# Patient Record
Sex: FEMALE | Race: BLACK OR AFRICAN AMERICAN | Employment: UNEMPLOYED | URBAN - METROPOLITAN AREA
[De-identification: names, ages, dates, MRNs, and addresses within clinical notes are randomized per-mention and may not be internally consistent; named-entity substitution may affect disease eponyms.]

---

## 2018-06-15 ENCOUNTER — OP HISTORICAL/CONVERTED ENCOUNTER (OUTPATIENT)
Dept: OTHER | Age: 28
End: 2018-06-15

## 2018-06-20 ENCOUNTER — OP HISTORICAL/CONVERTED ENCOUNTER (OUTPATIENT)
Dept: OTHER | Age: 28
End: 2018-06-20

## 2019-08-05 ENCOUNTER — IP HISTORICAL/CONVERTED ENCOUNTER (OUTPATIENT)
Dept: OTHER | Age: 29
End: 2019-08-05

## 2020-09-03 ENCOUNTER — ED HISTORICAL/CONVERTED ENCOUNTER (OUTPATIENT)
Dept: OTHER | Age: 30
End: 2020-09-03

## 2020-09-04 ENCOUNTER — ED HISTORICAL/CONVERTED ENCOUNTER (OUTPATIENT)
Dept: OTHER | Age: 30
End: 2020-09-04

## 2020-12-19 ENCOUNTER — HOSPITAL ENCOUNTER (EMERGENCY)
Age: 30
Discharge: HOME | End: 2020-12-20
Attending: EMERGENCY MEDICINE
Payer: MEDICAID

## 2020-12-19 VITALS
SYSTOLIC BLOOD PRESSURE: 130 MMHG | WEIGHT: 144 LBS | RESPIRATION RATE: 16 BRPM | HEIGHT: 62 IN | DIASTOLIC BLOOD PRESSURE: 76 MMHG | BODY MASS INDEX: 26.5 KG/M2 | TEMPERATURE: 98 F | HEART RATE: 101 BPM | OXYGEN SATURATION: 98 %

## 2020-12-19 DIAGNOSIS — J20.8 ACUTE BRONCHITIS DUE TO OTHER SPECIFIED ORGANISMS: ICD-10-CM

## 2020-12-19 DIAGNOSIS — R10.9 ABDOMINAL DISCOMFORT: Primary | ICD-10-CM

## 2020-12-19 PROCEDURE — 99283 EMERGENCY DEPT VISIT LOW MDM: CPT

## 2020-12-19 PROCEDURE — 96375 TX/PRO/DX INJ NEW DRUG ADDON: CPT

## 2020-12-19 PROCEDURE — 96374 THER/PROPH/DIAG INJ IV PUSH: CPT

## 2020-12-20 ENCOUNTER — APPOINTMENT (OUTPATIENT)
Dept: CT IMAGING | Age: 30
End: 2020-12-20
Attending: EMERGENCY MEDICINE
Payer: MEDICAID

## 2020-12-20 LAB
ALBUMIN SERPL-MCNC: 2.3 G/DL (ref 3.5–5)
ALBUMIN/GLOB SERPL: 0.4 {RATIO} (ref 1.1–2.2)
ALP SERPL-CCNC: 157 U/L (ref 45–117)
ALT SERPL-CCNC: 23 U/L (ref 12–78)
ANION GAP SERPL CALC-SCNC: 10 MMOL/L (ref 5–15)
APPEARANCE UR: ABNORMAL
AST SERPL W P-5'-P-CCNC: 59 U/L (ref 15–37)
BACTERIA URNS QL MICRO: ABNORMAL /HPF
BILIRUB DIRECT SERPL-MCNC: 0.1 MG/DL (ref 0–0.2)
BILIRUB SERPL-MCNC: 0.4 MG/DL (ref 0.2–1)
BILIRUB UR QL: ABNORMAL
BUN SERPL-MCNC: 18 MG/DL (ref 6–20)
BUN/CREAT SERPL: 18 (ref 12–20)
CA-I BLD-MCNC: 8 MG/DL (ref 8.5–10.1)
CHLORIDE SERPL-SCNC: 98 MMOL/L (ref 97–108)
CO2 SERPL-SCNC: 24 MMOL/L (ref 21–32)
COLOR UR: YELLOW
CREAT SERPL-MCNC: 1.01 MG/DL (ref 0.55–1.02)
DIFFERENTIAL METHOD BLD: ABNORMAL
EPITH CASTS URNS QL MICRO: ABNORMAL /LPF
ERYTHROCYTE [DISTWIDTH] IN BLOOD BY AUTOMATED COUNT: 16.1 % (ref 11.5–14.5)
GLOBULIN SER CALC-MCNC: 5.3 G/DL (ref 2–4)
GLUCOSE SERPL-MCNC: 98 MG/DL (ref 65–100)
GLUCOSE UR STRIP.AUTO-MCNC: NEGATIVE MG/DL
HCG SERPL QL: NEGATIVE
HCT VFR BLD AUTO: 25.6 % (ref 35–47)
HGB BLD-MCNC: 8.2 G/DL (ref 11.5–16)
HGB UR QL STRIP: ABNORMAL
KETONES UR QL STRIP.AUTO: 15 MG/DL
LEUKOCYTE ESTERASE UR QL STRIP.AUTO: ABNORMAL
LIPASE SERPL-CCNC: 138 U/L (ref 73–393)
LYMPHOCYTES NFR BLD: 15 % (ref 12–49)
MCH RBC QN AUTO: 29.4 PG (ref 26–34)
MCHC RBC AUTO-ENTMCNC: 32 G/DL (ref 30–36.5)
MCV RBC AUTO: 91.8 FL (ref 80–99)
MONOCYTES NFR BLD: 7 % (ref 5–13)
NEUTS SEG NFR BLD: 78 % (ref 32–75)
NITRITE UR QL STRIP.AUTO: NEGATIVE
PH UR STRIP: 6 [PH] (ref 5–8)
PLATELET # BLD AUTO: 147 K/UL (ref 150–400)
PMV BLD AUTO: 10.1 FL (ref 8.9–12.9)
POTASSIUM SERPL-SCNC: 3.2 MMOL/L (ref 3.5–5.1)
PROT SERPL-MCNC: 7.6 G/DL (ref 6.4–8.2)
PROT UR STRIP-MCNC: ABNORMAL MG/DL
RBC # BLD AUTO: 2.79 M/UL (ref 3.8–5.2)
RBC #/AREA URNS HPF: ABNORMAL /HPF (ref 0–5)
SODIUM SERPL-SCNC: 132 MMOL/L (ref 136–145)
SP GR UR REFRACTOMETRY: 1.02 (ref 1–1.03)
UROBILINOGEN UR QL STRIP.AUTO: 0.1 EU/DL (ref 0.2–1)
WBC # BLD AUTO: 4.1 K/UL (ref 3.6–11)
WBC URNS QL MICRO: ABNORMAL /HPF (ref 0–4)

## 2020-12-20 PROCEDURE — C9113 INJ PANTOPRAZOLE SODIUM, VIA: HCPCS | Performed by: EMERGENCY MEDICINE

## 2020-12-20 PROCEDURE — 84703 CHORIONIC GONADOTROPIN ASSAY: CPT

## 2020-12-20 PROCEDURE — 74011250636 HC RX REV CODE- 250/636: Performed by: EMERGENCY MEDICINE

## 2020-12-20 PROCEDURE — 81001 URINALYSIS AUTO W/SCOPE: CPT

## 2020-12-20 PROCEDURE — 80048 BASIC METABOLIC PNL TOTAL CA: CPT

## 2020-12-20 PROCEDURE — 74011250637 HC RX REV CODE- 250/637: Performed by: EMERGENCY MEDICINE

## 2020-12-20 PROCEDURE — 80076 HEPATIC FUNCTION PANEL: CPT

## 2020-12-20 PROCEDURE — 85025 COMPLETE CBC W/AUTO DIFF WBC: CPT

## 2020-12-20 PROCEDURE — 74176 CT ABD & PELVIS W/O CONTRAST: CPT

## 2020-12-20 PROCEDURE — 96374 THER/PROPH/DIAG INJ IV PUSH: CPT

## 2020-12-20 PROCEDURE — 71250 CT THORAX DX C-: CPT

## 2020-12-20 PROCEDURE — 83690 ASSAY OF LIPASE: CPT

## 2020-12-20 PROCEDURE — 96375 TX/PRO/DX INJ NEW DRUG ADDON: CPT

## 2020-12-20 RX ORDER — DULOXETIN HYDROCHLORIDE 30 MG/1
CAPSULE, DELAYED RELEASE ORAL
COMMUNITY
Start: 2020-10-25

## 2020-12-20 RX ORDER — CEPHALEXIN 500 MG/1
500 CAPSULE ORAL ONCE
Status: DISCONTINUED | OUTPATIENT
Start: 2020-12-20 | End: 2020-12-20

## 2020-12-20 RX ORDER — RIOCIGUAT 2.5 MG/1
2.5 TABLET, FILM COATED ORAL 2 TIMES DAILY
COMMUNITY
Start: 2020-12-08

## 2020-12-20 RX ORDER — AMBRISENTAN 5 MG/1
5 TABLET, FILM COATED ORAL DAILY
COMMUNITY
Start: 2020-12-08

## 2020-12-20 RX ORDER — PANTOPRAZOLE SODIUM 40 MG/1
TABLET, DELAYED RELEASE ORAL
COMMUNITY
Start: 2020-11-15

## 2020-12-20 RX ORDER — AMOXICILLIN 500 MG/1
500 CAPSULE ORAL ONCE
Status: COMPLETED | OUTPATIENT
Start: 2020-12-20 | End: 2020-12-20

## 2020-12-20 RX ORDER — AMOXICILLIN 500 MG/1
500 TABLET, FILM COATED ORAL 3 TIMES DAILY
Qty: 21 TAB | Refills: 0 | Status: SHIPPED | OUTPATIENT
Start: 2020-12-20 | End: 2020-12-27

## 2020-12-20 RX ORDER — SPIRONOLACTONE 25 MG/1
25 TABLET ORAL DAILY
COMMUNITY
Start: 2020-12-13

## 2020-12-20 RX ORDER — MESALAMINE 1000 MG/1
SUPPOSITORY RECTAL
Status: ON HOLD | COMMUNITY
Start: 2020-11-15 | End: 2021-08-07

## 2020-12-20 RX ORDER — FUROSEMIDE 20 MG/1
20 TABLET ORAL DAILY
COMMUNITY
Start: 2020-12-17

## 2020-12-20 RX ORDER — ONDANSETRON 2 MG/ML
4 INJECTION INTRAMUSCULAR; INTRAVENOUS
Status: COMPLETED | OUTPATIENT
Start: 2020-12-20 | End: 2020-12-20

## 2020-12-20 RX ORDER — PREDNISONE 5 MG/1
18 TABLET ORAL
COMMUNITY
Start: 2020-10-21

## 2020-12-20 RX ORDER — POLYETHYLENE GLYCOL 3350 17 G/17G
POWDER, FOR SOLUTION ORAL
Status: ON HOLD | COMMUNITY
Start: 2020-11-12 | End: 2021-08-07

## 2020-12-20 RX ORDER — POTASSIUM CHLORIDE 750 MG/1
CAPSULE, EXTENDED RELEASE ORAL
COMMUNITY
Start: 2020-12-13 | End: 2021-02-12

## 2020-12-20 RX ORDER — HYDROXYCHLOROQUINE SULFATE 200 MG/1
200 TABLET, FILM COATED ORAL DAILY
COMMUNITY
Start: 2020-12-14

## 2020-12-20 RX ADMIN — AMOXICILLIN 500 MG: 500 CAPSULE ORAL at 02:56

## 2020-12-20 RX ADMIN — PANTOPRAZOLE SODIUM 40 MG: 40 INJECTION, POWDER, FOR SOLUTION INTRAVENOUS at 01:31

## 2020-12-20 RX ADMIN — ONDANSETRON 4 MG: 2 INJECTION INTRAMUSCULAR; INTRAVENOUS at 01:31

## 2020-12-20 NOTE — ED PROVIDER NOTES
EMERGENCY DEPARTMENT HISTORY AND PHYSICAL EXAM      Date: 12/19/2020  Patient Name: Dominick Richmond    History of Presenting Illness     Chief Complaint   Patient presents with    Cough     for a week    Abdominal Pain     gastris with diarrhea    Facial Pain     with swelling since yesterday       History Provided By: Patient    HPI: Dominick Richmond, 31 y.o. female   presents to the ED with cc of abdominal discomfort. Patient who recently had a colonoscopy came to emergency room complaining of abdominal discomfort. The discomfort is generalized with a fluctuating intensity without any obvious aggravating or alleviating factors. Mild nausea without active vomiting. Patient states she had a several episode of diarrhea that was nonbloody. Patient also complains of nonproductive cough about a week. Patient has longstanding have lupus on chronic prednisone with other immunosuppressive medications. No fever chills. No shortness of breath. Patient states that she recently had a negative Covid test.      PCP: UNKNOWN    No current facility-administered medications on file prior to encounter. Current Outpatient Medications on File Prior to Encounter   Medication Sig Dispense Refill    spironolactone (ALDACTONE) 25 mg tablet       Adempas 2.5 mg tab tablet       predniSONE (DELTASONE) 5 mg tablet TAKE 1 TABLET BY MOUTH TWICE A DAY      potassium chloride SA (MICRO-K) 10 mEq capsule       Miralax 17 gram/dose powder MIX 238G AND DRINK ONCE AS DIRECTED BY MD      pantoprazole (PROTONIX) 40 mg tablet TAKE 1 TABLET BY MOUTH EVERY DAY      mesalamine (CANASA) 1,000 mg suppository INSERT 1 SUPPOSITORY RECTALLY EVERY DAY AT BEDTIME      hydrOXYchloroQUINE (PLAQUENIL) 200 mg tablet       furosemide (LASIX) 20 mg tablet       DULoxetine (CYMBALTA) 30 mg capsule TAKE 1 CAPSULE BY MOUTH TWICE A DAY      Letairis 5 mg tablet          Past History     Past Medical History:  History reviewed.  No pertinent past medical history. Past Surgical History:  History reviewed. No pertinent surgical history. Family History:  History reviewed. No pertinent family history. Social History:  Social History     Tobacco Use    Smoking status: Never Smoker    Smokeless tobacco: Never Used   Substance Use Topics    Alcohol use: Never     Frequency: Never    Drug use: Never       Allergies: Allergies   Allergen Reactions    Doxycycline Nausea and Vomiting    Heparin Anaphylaxis    Percocet [Oxycodone-Acetaminophen] Nausea and Vomiting    Remicade [Infliximab] Anaphylaxis    Vancomycin Hives         Review of Systems   Review of Systems   Constitutional: Negative for activity change, appetite change, chills and fever. HENT: Negative for sore throat. Eyes: Negative for discharge. Respiratory: Negative for shortness of breath. Cardiovascular: Negative for chest pain. Gastrointestinal: Positive for nausea. Endocrine: Negative for polyuria. Genitourinary: Positive for dysuria and frequency. Negative for difficulty urinating. Musculoskeletal: Negative for arthralgias. Skin: Negative for rash. Neurological: Negative for headaches. Hematological: Negative for adenopathy. Psychiatric/Behavioral: Negative for dysphoric mood. All other systems reviewed and are negative. Physical Exam   Physical Exam  Vitals signs and nursing note reviewed. Constitutional:       Appearance: Normal appearance. Comments: Patient with a cushingoid appearance of the face   HENT:      Head: Normocephalic and atraumatic. Nose: Nose normal.      Mouth/Throat:      Mouth: Mucous membranes are moist.      Pharynx: Oropharynx is clear. Eyes:      Conjunctiva/sclera: Conjunctivae normal.   Neck:      Musculoskeletal: Neck supple. Cardiovascular:      Rate and Rhythm: Normal rate and regular rhythm. Heart sounds: Normal heart sounds.    Pulmonary:      Effort: Pulmonary effort is normal.      Breath sounds: Normal breath sounds. Abdominal:      General: Abdomen is protuberant. Bowel sounds are normal. There is distension. Palpations: Abdomen is soft. Tenderness: There is no abdominal tenderness. Hernia: No hernia is present. Musculoskeletal:      Right lower leg: No edema. Left lower leg: No edema. Skin:     General: Skin is warm and dry. Neurological:      General: No focal deficit present. Mental Status: She is alert and oriented to person, place, and time.    Psychiatric:         Mood and Affect: Mood normal.         Diagnostic Study Results     Labs -     Recent Results (from the past 12 hour(s))   URINALYSIS W/ RFLX MICROSCOPIC    Collection Time: 12/20/20 12:00 AM   Result Value Ref Range    Color Yellow      Appearance Hazy (A) Clear      Specific gravity 1.020 1.003 - 1.030      pH (UA) 6.0 5.0 - 8.0      Protein Trace (A) Negative mg/dL    Glucose Negative Negative mg/dL    Ketone 15 (A) Negative mg/dL    Bilirubin Small (A) Negative      Blood Small (A) Negative      Urobilinogen 0.1 (L) 0.2 - 1.0 EU/dL    Nitrites Negative Negative      Leukocyte Esterase Large (A) Negative     CBC WITH AUTOMATED DIFF    Collection Time: 12/20/20 12:00 AM   Result Value Ref Range    WBC 4.1 3.6 - 11.0 K/uL    RBC 2.79 (L) 3.80 - 5.20 M/uL    HGB 8.2 (L) 11.5 - 16.0 g/dL    HCT 25.6 (L) 35.0 - 47.0 %    MCV 91.8 80.0 - 99.0 FL    MCH 29.4 26.0 - 34.0 PG    MCHC 32.0 30.0 - 36.5 g/dL    RDW 16.1 (H) 11.5 - 14.5 %    PLATELET 317 (L) 535 - 400 K/uL    MPV 10.1 8.9 - 12.9 FL    NEUTROPHILS 78 (H) 32 - 75 %    LYMPHOCYTES 15 12 - 49 %    MONOCYTES 7 5 - 13 %    DF AUTOMATED     METABOLIC PANEL, BASIC    Collection Time: 12/20/20 12:00 AM   Result Value Ref Range    Sodium 132 (L) 136 - 145 mmol/L    Potassium 3.2 (L) 3.5 - 5.1 mmol/L    Chloride 98 97 - 108 mmol/L    CO2 24 21 - 32 mmol/L    Anion gap 10 5 - 15 mmol/L    Glucose 98 65 - 100 mg/dL    BUN 18 6 - 20 mg/dL    Creatinine 1.01 0.55 - 1.02 mg/dL    BUN/Creatinine ratio 18 12 - 20      GFR est AA >60 >60 ml/min/1.73m2    GFR est non-AA >60 >60 ml/min/1.73m2    Calcium 8.0 (L) 8.5 - 10.1 mg/dL   HEPATIC FUNCTION PANEL    Collection Time: 12/20/20 12:00 AM   Result Value Ref Range    Protein, total 7.6 6.4 - 8.2 g/dL    Albumin 2.3 (L) 3.5 - 5.0 g/dL    Globulin 5.3 (H) 2.0 - 4.0 g/dL    A-G Ratio 0.4 (L) 1.1 - 2.2      Bilirubin, total 0.4 0.2 - 1.0 mg/dL    Bilirubin, direct 0.1 0.0 - 0.2 mg/dL    Alk. phosphatase 157 (H) 45 - 117 U/L    AST (SGOT) 59 (H) 15 - 37 U/L    ALT (SGPT) 23 12 - 78 U/L   LIPASE    Collection Time: 12/20/20 12:00 AM   Result Value Ref Range    Lipase 138 73 - 393 U/L   URINE MICROSCOPIC    Collection Time: 12/20/20 12:00 AM   Result Value Ref Range    WBC 5-10 0 - 4 /hpf    RBC 0-5 0 - 5 /hpf    Epithelial cells Few Few /lpf    Bacteria 2+ (A) Negative /hpf   HCG QL SERUM    Collection Time: 12/20/20 12:15 AM   Result Value Ref Range    HCG, Ql. Negative Negative         Radiologic Studies -   CT ABD PELV WO CONT   Final Result   IMPRESSION: No acute cardiopulmonary abnormality. Numerous chronic compression   fractures without acute component, or retropulsion. Diffuse subcutaneous edema   and soft tissue calcifications in this patient has a history of connective   tissue disease/lupus            HISTORY:   abd pain /abd distension/s/p colonoscopy/ cough     Dose reduction technique: All CT scans at this facility are performed using dose reduction optimization   technique as appropriate on the exam including the following: Automated exposure   control, adjustment of the MA and/or KV according to patient size of use of   iterative reconstructive technique. .      TECHNIQUE: CT of the abdomen and pelvis without contrast   COMPARISON: None   LIMITATIONS: None      CHEST: See above.                LIVER: Normal.        GALLBLADDER: Normal.        BILIARY TREE: Normal.          PANCREAS: Normal.           SPLEEN: Normal. ADRENAL GLANDS: Normal.       KIDNEYS/URETERS/BLADDER: Normal.          RETROPERITONEUM/AORTA: Normal aorta. Retroperitoneal adenopathy is noted for   instance with 1.2 cm left paratracheal lymph node axial image 55 series 206. I   suspect adenopathy along the iliac vasculature as well, bilateral.   BOWEL/MESENTERY: Wall thickening and surrounding fat stranding/fluid at the   level of the rectum. No obvious drainable fluid collection. Some of the   perirectal fluid appears to be contiguous with distal retroperitoneal fluid at   the level of the iliac vasculature. PERITONEAL CAVITY: Fluid about the rectum. No free air. .   Reproductive organs: Normal   BONE/TISSUES: No acute abnormality. . Diffuse subcutaneous fat calcifications   throughout the visualized chest abdomen and pelvic wall. Causes of diffuse         OTHER: None. Results called to [facility] on 12/20/2020 2:42 AM.         IMPRESSION: Wall thickening at the level of the rectum with some adjacent   stranding is noted and nonspecific. This could be some residual change after   colonoscopy or due to a proctocolitis. Retroperitoneal adenopathy, pelvic   adenopathy, diffuse subcutaneous edema and marked soft tissue subcutaneous fat   calcifications in this patient with connective tissue disease/lupus; the   adenopathy could be contributing to some of the edema in the retroperitoneum. CT CHEST WO CONT   Final Result   IMPRESSION: No acute cardiopulmonary abnormality. Numerous chronic compression   fractures without acute component, or retropulsion. Diffuse subcutaneous edema   and soft tissue calcifications in this patient has a history of connective   tissue disease/lupus            HISTORY:   abd pain /abd distension/s/p colonoscopy/ cough     Dose reduction technique:    All CT scans at this facility are performed using dose reduction optimization   technique as appropriate on the exam including the following: Automated exposure   control, adjustment of the MA and/or KV according to patient size of use of   iterative reconstructive technique. .      TECHNIQUE: CT of the abdomen and pelvis without contrast   COMPARISON: None   LIMITATIONS: None      CHEST: See above. LIVER: Normal.        GALLBLADDER: Normal.        BILIARY TREE: Normal.          PANCREAS: Normal.           SPLEEN: Normal.           ADRENAL GLANDS: Normal.       KIDNEYS/URETERS/BLADDER: Normal.          RETROPERITONEUM/AORTA: Normal aorta. Retroperitoneal adenopathy is noted for   instance with 1.2 cm left paratracheal lymph node axial image 55 series 206. I   suspect adenopathy along the iliac vasculature as well, bilateral.   BOWEL/MESENTERY: Wall thickening and surrounding fat stranding/fluid at the   level of the rectum. No obvious drainable fluid collection. Some of the   perirectal fluid appears to be contiguous with distal retroperitoneal fluid at   the level of the iliac vasculature. PERITONEAL CAVITY: Fluid about the rectum. No free air. .   Reproductive organs: Normal   BONE/TISSUES: No acute abnormality. . Diffuse subcutaneous fat calcifications   throughout the visualized chest abdomen and pelvic wall. Causes of diffuse         OTHER: None. Results called to [facility] on 12/20/2020 2:42 AM.         IMPRESSION: Wall thickening at the level of the rectum with some adjacent   stranding is noted and nonspecific. This could be some residual change after   colonoscopy or due to a proctocolitis. Retroperitoneal adenopathy, pelvic   adenopathy, diffuse subcutaneous edema and marked soft tissue subcutaneous fat   calcifications in this patient with connective tissue disease/lupus; the   adenopathy could be contributing to some of the edema in the retroperitoneum. CT Results  (Last 48 hours)               12/20/20 0126  CT ABD PELV WO CONT Final result    Impression:  IMPRESSION: No acute cardiopulmonary abnormality.  Numerous chronic compression fractures without acute component, or retropulsion. Diffuse subcutaneous edema   and soft tissue calcifications in this patient has a history of connective   tissue disease/lupus               HISTORY:   abd pain /abd distension/s/p colonoscopy/ cough     Dose reduction technique: All CT scans at this facility are performed using dose reduction optimization   technique as appropriate on the exam including the following: Automated exposure   control, adjustment of the MA and/or KV according to patient size of use of   iterative reconstructive technique. .       TECHNIQUE: CT of the abdomen and pelvis without contrast   COMPARISON: None   LIMITATIONS: None       CHEST: See above. LIVER: Normal.        GALLBLADDER: Normal.        BILIARY TREE: Normal.           PANCREAS: Normal.            SPLEEN: Normal.           ADRENAL GLANDS: Normal.       KIDNEYS/URETERS/BLADDER: Normal.           RETROPERITONEUM/AORTA: Normal aorta. Retroperitoneal adenopathy is noted for   instance with 1.2 cm left paratracheal lymph node axial image 55 series 206. I   suspect adenopathy along the iliac vasculature as well, bilateral.   BOWEL/MESENTERY: Wall thickening and surrounding fat stranding/fluid at the   level of the rectum. No obvious drainable fluid collection. Some of the   perirectal fluid appears to be contiguous with distal retroperitoneal fluid at   the level of the iliac vasculature. PERITONEAL CAVITY: Fluid about the rectum. No free air. .   Reproductive organs: Normal   BONE/TISSUES: No acute abnormality. . Diffuse subcutaneous fat calcifications   throughout the visualized chest abdomen and pelvic wall. Causes of diffuse           OTHER: None. Results called to [facility] 11 on 12/20/2020 2:42 AM.           IMPRESSION: Wall thickening at the level of the rectum with some adjacent   stranding is noted and nonspecific.  This could be some residual change after   colonoscopy or due to a proctocolitis. Retroperitoneal adenopathy, pelvic   adenopathy, diffuse subcutaneous edema and marked soft tissue subcutaneous fat   calcifications in this patient with connective tissue disease/lupus; the   adenopathy could be contributing to some of the edema in the retroperitoneum. Narrative:  HISTORY:  abd pain /abd distension/s/p colonoscopy/ cough   Dose reduction technique: All CT scans at this facility are performed using dose reduction optimization   technique as appropriate on the exam including the following: Automated exposure   control, adjustment of the MA and/or KV according to patient size of use of   iterative reconstructive technique. .       TECHNIQUE: Noncontrast CT of the chest is performed    COMPARISON: Multilevel tract cannot abdomen: None   LIMITATIONS: None       LUNG PARENCHYMA: Normal   TRACHEA/BRONCHI: Normal   PULMONARY VESSELS: Normal.    PLEURA: Normal   MEDIASTINUM: Normal   HEART: Normal   AORTA/GREAT VESSELS: Normal.   ESOPHAGUS: Normal   AXILLAE: Normal   BONES/TISSUES: No acute osseous abnormality. There are numerous areas of wedge   type compression fracture throughout the thoracic spine including T3, T4, T6,   T8, T10, T11, T12, none of these demonstrate retropulsion or acute component at   this time. There is diffuse subcutaneous edema and diffuse subcutaneous fat   calcification throughout the chest wall. No soft tissue gas or obvious drainable   fluid collection. UPPER ABDOMEN: See below   OTHER: None           12/20/20 0125  CT CHEST WO CONT Final result    Impression:  IMPRESSION: No acute cardiopulmonary abnormality. Numerous chronic compression   fractures without acute component, or retropulsion. Diffuse subcutaneous edema   and soft tissue calcifications in this patient has a history of connective   tissue disease/lupus               HISTORY:   abd pain /abd distension/s/p colonoscopy/ cough     Dose reduction technique:    All CT scans at this facility are performed using dose reduction optimization   technique as appropriate on the exam including the following: Automated exposure   control, adjustment of the MA and/or KV according to patient size of use of   iterative reconstructive technique. .       TECHNIQUE: CT of the abdomen and pelvis without contrast   COMPARISON: None   LIMITATIONS: None       CHEST: See above. LIVER: Normal.        GALLBLADDER: Normal.        BILIARY TREE: Normal.           PANCREAS: Normal.            SPLEEN: Normal.           ADRENAL GLANDS: Normal.       KIDNEYS/URETERS/BLADDER: Normal.           RETROPERITONEUM/AORTA: Normal aorta. Retroperitoneal adenopathy is noted for   instance with 1.2 cm left paratracheal lymph node axial image 55 series 206. I   suspect adenopathy along the iliac vasculature as well, bilateral.   BOWEL/MESENTERY: Wall thickening and surrounding fat stranding/fluid at the   level of the rectum. No obvious drainable fluid collection. Some of the   perirectal fluid appears to be contiguous with distal retroperitoneal fluid at   the level of the iliac vasculature. PERITONEAL CAVITY: Fluid about the rectum. No free air. .   Reproductive organs: Normal   BONE/TISSUES: No acute abnormality. . Diffuse subcutaneous fat calcifications   throughout the visualized chest abdomen and pelvic wall. Causes of diffuse           OTHER: None. Results called to [facility] on 12/20/2020 2:42 AM.           IMPRESSION: Wall thickening at the level of the rectum with some adjacent   stranding is noted and nonspecific. This could be some residual change after   colonoscopy or due to a proctocolitis. Retroperitoneal adenopathy, pelvic   adenopathy, diffuse subcutaneous edema and marked soft tissue subcutaneous fat   calcifications in this patient with connective tissue disease/lupus; the   adenopathy could be contributing to some of the edema in the retroperitoneum.        Narrative:  HISTORY:  abd pain /abd distension/s/p colonoscopy/ cough   Dose reduction technique: All CT scans at this facility are performed using dose reduction optimization   technique as appropriate on the exam including the following: Automated exposure   control, adjustment of the MA and/or KV according to patient size of use of   iterative reconstructive technique. .       TECHNIQUE: Noncontrast CT of the chest is performed    COMPARISON: Multilevel tract cannot abdomen: None   LIMITATIONS: None       LUNG PARENCHYMA: Normal   TRACHEA/BRONCHI: Normal   PULMONARY VESSELS: Normal.    PLEURA: Normal   MEDIASTINUM: Normal   HEART: Normal   AORTA/GREAT VESSELS: Normal.   ESOPHAGUS: Normal   AXILLAE: Normal   BONES/TISSUES: No acute osseous abnormality. There are numerous areas of wedge   type compression fracture throughout the thoracic spine including T3, T4, T6,   T8, T10, T11, T12, none of these demonstrate retropulsion or acute component at   this time. There is diffuse subcutaneous edema and diffuse subcutaneous fat   calcification throughout the chest wall. No soft tissue gas or obvious drainable   fluid collection. UPPER ABDOMEN: See below   OTHER: None               CXR Results  (Last 48 hours)    None            Medical Decision Making   I am the first provider for this patient. I reviewed the vital signs, available nursing notes, past medical history, past surgical history, family history and social history. Vital Signs-Reviewed the patient's vital signs. Patient Vitals for the past 12 hrs:   Temp Pulse Resp BP SpO2   12/19/20 2350 98 °F (36.7 °C) (!) 101 16 130/76 98 %       Records Reviewed:     Provider Notes (Medical Decision Making):       ED Course:   Initial assessment performed. The patients presenting problems have been discussed, and they are in agreement with the care plan formulated and outlined with them. I have encouraged them to ask questions as they arise throughout their visit. PROCEDURES      Disposition: Condition stable   DC- Adult Discharges: All of the diagnostic tests were reviewed and questions answered. Diagnosis, care plan and treatment options were discussed. understand instructions and will follow up as directed. The patients results have been reviewed with them. They have been counseled regarding their diagnosis. The patient verbally convey understanding and agreement of the signs, symptoms, diagnosis, treatment and prognosis and additionally agrees to follow up as recommended. They also agree with the care-plan and convey that all of their questions have been answered. I have also put together some discharge instructions for them that include: 1) educational information regarding their diagnosis, 2) how to care for their diagnosis at home, as well a 3) list of reasons why they would want to return to the ED prior to their follow-up appointment, should their condition change. PLAN:  1. Current Discharge Medication List      START taking these medications    Details   amoxicillin 500 mg tab Take 500 mg by mouth three (3) times daily for 7 days. Qty: 21 Tab, Refills: 0           2. Follow-up Information     Follow up With Specialties Details Why Contact Info    Follow up with your primary care physician  Schedule an appointment as soon as possible for a visit in 3 days As needed     Follow up with your primary care physician  Schedule an appointment as soon as possible for a visit in 3 days As needed     Follow up with your primary care physician  Schedule an appointment as soon as possible for a visit in 3 days As needed         Return to ED if worse     Diagnosis     Clinical Impression:   1. Abdominal discomfort    2. Acute bronchitis due to other specified organisms        Please note that this dictation was completed with FIN[employer], the computer voice recognition software.   Quite often unanticipated grammatical, syntax, homophones, and other interpretive errors are inadvertently transcribed by the computer software. Please disregard these errors. Please excuse any errors that have escaped final proofreading. Thank you.

## 2020-12-20 NOTE — ED TRIAGE NOTES
Pt states she has had a non productive cough for over a week, abdomen pain and diarrhea since Wed and facial swelling since yesterday.

## 2021-02-12 ENCOUNTER — APPOINTMENT (OUTPATIENT)
Dept: GENERAL RADIOLOGY | Age: 31
End: 2021-02-12
Attending: EMERGENCY MEDICINE
Payer: MEDICAID

## 2021-02-12 ENCOUNTER — APPOINTMENT (OUTPATIENT)
Dept: CT IMAGING | Age: 31
End: 2021-02-12
Attending: EMERGENCY MEDICINE
Payer: MEDICAID

## 2021-02-12 ENCOUNTER — HOSPITAL ENCOUNTER (EMERGENCY)
Age: 31
Discharge: HOME | End: 2021-02-12
Attending: EMERGENCY MEDICINE | Admitting: EMERGENCY MEDICINE
Payer: MEDICAID

## 2021-02-12 VITALS
WEIGHT: 145.06 LBS | BODY MASS INDEX: 26.69 KG/M2 | DIASTOLIC BLOOD PRESSURE: 51 MMHG | RESPIRATION RATE: 16 BRPM | HEART RATE: 94 BPM | OXYGEN SATURATION: 93 % | TEMPERATURE: 98.9 F | HEIGHT: 62 IN | SYSTOLIC BLOOD PRESSURE: 105 MMHG

## 2021-02-12 DIAGNOSIS — M62.838 MUSCLE SPASM: Primary | ICD-10-CM

## 2021-02-12 LAB
ALBUMIN SERPL-MCNC: 2.5 G/DL (ref 3.5–5)
ALBUMIN/GLOB SERPL: 0.5 {RATIO} (ref 1.1–2.2)
ALP SERPL-CCNC: 96 U/L (ref 45–117)
ALT SERPL-CCNC: 19 U/L (ref 12–78)
ANION GAP SERPL CALC-SCNC: 10 MMOL/L (ref 5–15)
APPEARANCE UR: ABNORMAL
AST SERPL-CCNC: 36 U/L (ref 15–37)
ATRIAL RATE: 89 BPM
BACTERIA URNS QL MICRO: ABNORMAL /HPF
BASOPHILS # BLD: 0 K/UL (ref 0–0.1)
BASOPHILS NFR BLD: 0 % (ref 0–1)
BILIRUB SERPL-MCNC: 0.3 MG/DL (ref 0.2–1)
BILIRUB UR QL: NEGATIVE
BUN SERPL-MCNC: 15 MG/DL (ref 6–20)
BUN/CREAT SERPL: 18 (ref 12–20)
CALCIUM SERPL-MCNC: 8.1 MG/DL (ref 8.5–10.1)
CALCULATED P AXIS, ECG09: 34 DEGREES
CALCULATED R AXIS, ECG10: -5 DEGREES
CALCULATED T AXIS, ECG11: 15 DEGREES
CHLORIDE SERPL-SCNC: 102 MMOL/L (ref 97–108)
CO2 SERPL-SCNC: 23 MMOL/L (ref 21–32)
COLOR UR: ABNORMAL
CREAT SERPL-MCNC: 0.82 MG/DL (ref 0.55–1.02)
DIAGNOSIS, 93000: NORMAL
DIFFERENTIAL METHOD BLD: ABNORMAL
EOSINOPHIL # BLD: 0 K/UL (ref 0–0.4)
EOSINOPHIL NFR BLD: 0 % (ref 0–7)
EPITH CASTS URNS QL MICRO: ABNORMAL /LPF
ERYTHROCYTE [DISTWIDTH] IN BLOOD BY AUTOMATED COUNT: 15.7 % (ref 11.5–14.5)
GLOBULIN SER CALC-MCNC: 5.3 G/DL (ref 2–4)
GLUCOSE SERPL-MCNC: 83 MG/DL (ref 65–100)
GLUCOSE UR STRIP.AUTO-MCNC: NEGATIVE MG/DL
HCG UR QL: NEGATIVE
HCT VFR BLD AUTO: 27.5 % (ref 35–47)
HGB BLD-MCNC: 8.4 G/DL (ref 11.5–16)
HGB UR QL STRIP: NEGATIVE
IMM GRANULOCYTES # BLD AUTO: 0 K/UL
IMM GRANULOCYTES NFR BLD AUTO: 0 %
KETONES UR QL STRIP.AUTO: NEGATIVE MG/DL
LEUKOCYTE ESTERASE UR QL STRIP.AUTO: NEGATIVE
LYMPHOCYTES # BLD: 0.2 K/UL (ref 0.8–3.5)
LYMPHOCYTES NFR BLD: 5 % (ref 12–49)
MCH RBC QN AUTO: 28.5 PG (ref 26–34)
MCHC RBC AUTO-ENTMCNC: 30.5 G/DL (ref 30–36.5)
MCV RBC AUTO: 93.2 FL (ref 80–99)
MONOCYTES # BLD: 0.2 K/UL (ref 0–1)
MONOCYTES NFR BLD: 6 % (ref 5–13)
MYELOCYTES NFR BLD MANUAL: 2 %
NEUTS SEG # BLD: 3.3 K/UL (ref 1.8–8)
NEUTS SEG NFR BLD: 87 % (ref 32–75)
NITRITE UR QL STRIP.AUTO: NEGATIVE
NRBC # BLD: 0.02 K/UL (ref 0–0.01)
NRBC BLD-RTO: 0.5 PER 100 WBC
P-R INTERVAL, ECG05: 138 MS
PH UR STRIP: 6 [PH] (ref 5–8)
PLATELET # BLD AUTO: 179 K/UL (ref 150–400)
PMV BLD AUTO: 9.7 FL (ref 8.9–12.9)
POTASSIUM SERPL-SCNC: 3.6 MMOL/L (ref 3.5–5.1)
PROT SERPL-MCNC: 7.8 G/DL (ref 6.4–8.2)
PROT UR STRIP-MCNC: ABNORMAL MG/DL
Q-T INTERVAL, ECG07: 370 MS
QRS DURATION, ECG06: 90 MS
QTC CALCULATION (BEZET), ECG08: 450 MS
RBC # BLD AUTO: 2.95 M/UL (ref 3.8–5.2)
RBC #/AREA URNS HPF: ABNORMAL /HPF (ref 0–5)
RBC MORPH BLD: ABNORMAL
SODIUM SERPL-SCNC: 135 MMOL/L (ref 136–145)
SP GR UR REFRACTOMETRY: >1.03 (ref 1–1.03)
TROPONIN I SERPL-MCNC: <0.05 NG/ML
TROPONIN I SERPL-MCNC: <0.05 NG/ML
UA: UC IF INDICATED,UAUC: ABNORMAL
UROBILINOGEN UR QL STRIP.AUTO: 0.2 EU/DL (ref 0.2–1)
VENTRICULAR RATE, ECG03: 89 BPM
WBC # BLD AUTO: 3.8 K/UL (ref 3.6–11)
WBC URNS QL MICRO: ABNORMAL /HPF (ref 0–4)

## 2021-02-12 PROCEDURE — 81025 URINE PREGNANCY TEST: CPT

## 2021-02-12 PROCEDURE — 99285 EMERGENCY DEPT VISIT HI MDM: CPT

## 2021-02-12 PROCEDURE — 36415 COLL VENOUS BLD VENIPUNCTURE: CPT

## 2021-02-12 PROCEDURE — 71046 X-RAY EXAM CHEST 2 VIEWS: CPT

## 2021-02-12 PROCEDURE — 74011000636 HC RX REV CODE- 636: Performed by: EMERGENCY MEDICINE

## 2021-02-12 PROCEDURE — 93005 ELECTROCARDIOGRAM TRACING: CPT

## 2021-02-12 PROCEDURE — 84484 ASSAY OF TROPONIN QUANT: CPT

## 2021-02-12 PROCEDURE — 74011250637 HC RX REV CODE- 250/637: Performed by: EMERGENCY MEDICINE

## 2021-02-12 PROCEDURE — 81001 URINALYSIS AUTO W/SCOPE: CPT

## 2021-02-12 PROCEDURE — 71275 CT ANGIOGRAPHY CHEST: CPT

## 2021-02-12 PROCEDURE — 96374 THER/PROPH/DIAG INJ IV PUSH: CPT

## 2021-02-12 PROCEDURE — 74011250636 HC RX REV CODE- 250/636: Performed by: EMERGENCY MEDICINE

## 2021-02-12 PROCEDURE — 85025 COMPLETE CBC W/AUTO DIFF WBC: CPT

## 2021-02-12 PROCEDURE — 80053 COMPREHEN METABOLIC PANEL: CPT

## 2021-02-12 RX ORDER — MORPHINE SULFATE 2 MG/ML
4 INJECTION, SOLUTION INTRAMUSCULAR; INTRAVENOUS
Status: COMPLETED | OUTPATIENT
Start: 2021-02-12 | End: 2021-02-12

## 2021-02-12 RX ORDER — DIAZEPAM 5 MG/1
5 TABLET ORAL
Qty: 15 TAB | Refills: 0 | Status: ON HOLD | OUTPATIENT
Start: 2021-02-12 | End: 2021-08-07

## 2021-02-12 RX ORDER — DIAZEPAM 5 MG/1
5 TABLET ORAL
Status: COMPLETED | OUTPATIENT
Start: 2021-02-12 | End: 2021-02-12

## 2021-02-12 RX ORDER — GABAPENTIN 600 MG/1
300 TABLET ORAL 2 TIMES DAILY
COMMUNITY

## 2021-02-12 RX ADMIN — MORPHINE SULFATE 4 MG: 2 INJECTION, SOLUTION INTRAMUSCULAR; INTRAVENOUS at 11:30

## 2021-02-12 RX ADMIN — DIAZEPAM 5 MG: 5 TABLET ORAL at 13:31

## 2021-02-12 RX ADMIN — IOPAMIDOL 80 ML: 755 INJECTION, SOLUTION INTRAVENOUS at 15:02

## 2021-02-12 NOTE — ED NOTES
Pt noted she had an episode of palpitations while at XR. Pt stated she normally has intermittent palpitations and this felt like one of her normal episodes but was concerned morphine caused. Denies any discomfort at this time. Dr. Lee Cronin made aware.

## 2021-02-12 NOTE — ED PROVIDER NOTES
Date: 2/12/2021  Patient Name: Rick Warner    History of Presenting Illness     Chief Complaint   Patient presents with    Muscle Pain       History Provided By: Patient    HPI: Rick Warner, 31 y.o. female with a past medical history of lupus, RA, and pulmonary hypertension presents to the ED with cc of left-sided chest, neck and back pain. Patient states that she was receiving an infusion for her lupus when she suddenly began to experience this pain. She states that the pain is not worse with movement or with palpation, but just comes and goes on its own. She denies any prior episodes of the symptoms and has tolerated her infusion without any difficulty in the past.  She states that she has mild associated shortness of breath, but denies any recent fever, cough or any other symptoms. There are no other complaints, changes, or physical findings at this time. PCP: UNKNOWN    No current facility-administered medications on file prior to encounter. Current Outpatient Medications on File Prior to Encounter   Medication Sig Dispense Refill    gabapentin (NEURONTIN) 600 mg tablet Take 600 mg by mouth two (2) times a day.  rituximab (RITUXAN IV) 1,000 mg by IntraVENous route every 6 months.  spironolactone (ALDACTONE) 25 mg tablet       Adempas 2.5 mg tab tablet       predniSONE (DELTASONE) 5 mg tablet Take 10 mg by mouth nightly.       Miralax 17 gram/dose powder MIX 238G AND DRINK ONCE AS DIRECTED BY MD      pantoprazole (PROTONIX) 40 mg tablet TAKE 1 TABLET BY MOUTH EVERY DAY      mesalamine (CANASA) 1,000 mg suppository INSERT 1 SUPPOSITORY RECTALLY EVERY DAY AT BEDTIME      hydrOXYchloroQUINE (PLAQUENIL) 200 mg tablet       furosemide (LASIX) 20 mg tablet       DULoxetine (CYMBALTA) 30 mg capsule TAKE 1 CAPSULE BY MOUTH TWICE A DAY      Letairis 5 mg tablet       [DISCONTINUED] potassium chloride SA (MICRO-K) 10 mEq capsule          Past History     Past Medical History:  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[facility] ([facility] [address]     Pulmonary HTN (HCC)     RA (rheumatoid arthritis) ([facility] [address]        Past Surgical History:  Past Surgical History:   Procedure Laterality Date    HX ORTHOPAEDIC      x2 left knee sxs    HX ORTHOPAEDIC      bilateral ankle sxs       Family History:  History reviewed. No pertinent family history. Social History:  Social History     Tobacco Use    Smoking status: Never Smoker    Smokeless tobacco: Never Used   Substance Use Topics    Alcohol use: Never     Frequency: Never    Drug use: Never       Allergies: Allergies   Allergen Reactions    Doxycycline Nausea and Vomiting    Heparin Anaphylaxis    Percocet [Oxycodone-Acetaminophen] Nausea and Vomiting    Remicade [Infliximab] Anaphylaxis    Vancomycin Hives         Review of Systems   Review of Systems   Constitutional: Negative for fatigue and fever. HENT: Negative. Eyes: Negative. Respiratory: Positive for shortness of breath. Negative for wheezing. Cardiovascular: Positive for chest pain. Negative for leg swelling. Gastrointestinal: Negative for blood in stool, constipation, diarrhea, nausea and vomiting. Endocrine: Negative. Genitourinary: Negative for difficulty urinating and dysuria. Musculoskeletal: Positive for back pain and neck pain. Skin: Negative for rash. Allergic/Immunologic: Negative. Neurological: Negative for weakness and numbness. Hematological: Negative. Psychiatric/Behavioral: Negative. Physical Exam   Physical Exam  Vitals signs and nursing note reviewed. Constitutional:       General: She is not in acute distress. Appearance: She is well-developed. Comments: Appears uncomfortable   HENT:      Head: Normocephalic and atraumatic. Eyes:      Conjunctiva/sclera: Conjunctivae normal.   Neck:      Musculoskeletal: Neck supple. Vascular: No JVD. Trachea: No tracheal deviation.    Cardiovascular:      Rate and Rhythm: Normal rate and regular rhythm. Heart sounds: No murmur. No friction rub. No gallop. Pulmonary:      Effort: Pulmonary effort is normal. No respiratory distress. Breath sounds: Normal breath sounds. No stridor. No wheezing. Abdominal:      General: Bowel sounds are normal. There is no distension. Palpations: Abdomen is soft. There is no mass. Tenderness: There is no abdominal tenderness. There is no guarding. Musculoskeletal: Normal range of motion. General: No tenderness. Comments: No deformity. No ttp over the thoracic or lumbar back. Skin:     General: Skin is warm and dry. Findings: No rash. Neurological:      Mental Status: She is alert and oriented to person, place, and time. Comments: No focal deficits   Psychiatric:         Behavior: Behavior normal.         Thought Content: Thought content normal.         Judgment: Judgment normal.         Diagnostic Study Results     Labs -  Recent Results (from the past 72 hour(s))   METABOLIC PANEL, COMPREHENSIVE    Collection Time: 02/12/21 11:23 AM   Result Value Ref Range    Sodium 135 (L) 136 - 145 mmol/L    Potassium 3.6 3.5 - 5.1 mmol/L    Chloride 102 97 - 108 mmol/L    CO2 23 21 - 32 mmol/L    Anion gap 10 5 - 15 mmol/L    Glucose 83 65 - 100 mg/dL    BUN 15 6 - 20 MG/DL    Creatinine 0.82 0.55 - 1.02 MG/DL    BUN/Creatinine ratio 18 12 - 20      GFR est AA >60 >60 ml/min/1.73m2    GFR est non-AA >60 >60 ml/min/1.73m2    Calcium 8.1 (L) 8.5 - 10.1 MG/DL    Bilirubin, total 0.3 0.2 - 1.0 MG/DL    ALT (SGPT) 19 12 - 78 U/L    AST (SGOT) 36 15 - 37 U/L    Alk.  phosphatase 96 45 - 117 U/L    Protein, total 7.8 6.4 - 8.2 g/dL    Albumin 2.5 (L) 3.5 - 5.0 g/dL    Globulin 5.3 (H) 2.0 - 4.0 g/dL    A-G Ratio 0.5 (L) 1.1 - 2.2     CBC WITH AUTOMATED DIFF    Collection Time: 02/12/21 11:23 AM   Result Value Ref Range    WBC 3.8 3.6 - 11.0 K/uL    RBC 2.95 (L) 3.80 - 5.20 M/uL    HGB 8.4 (L) 11.5 - 16.0 g/dL    HCT 27.5 (L) 35.0 - 47.0 %    MCV 93.2 80.0 - 99.0 FL    MCH 28.5 26.0 - 34.0 PG    MCHC 30.5 30.0 - 36.5 g/dL    RDW 15.7 (H) 11.5 - 14.5 %    PLATELET 367 705 - 522 K/uL    MPV 9.7 8.9 - 12.9 FL    NRBC 0.5 (H) 0  WBC    ABSOLUTE NRBC 0.02 (H) 0.00 - 0.01 K/uL    NEUTROPHILS 87 (H) 32 - 75 %    LYMPHOCYTES 5 (L) 12 - 49 %    MONOCYTES 6 5 - 13 %    EOSINOPHILS 0 0 - 7 %    BASOPHILS 0 0 - 1 %    MYELOCYTES 2 (H) 0 %    IMMATURE GRANULOCYTES 0 %    ABS. NEUTROPHILS 3.3 1.8 - 8.0 K/UL    ABS. LYMPHOCYTES 0.2 (L) 0.8 - 3.5 K/UL    ABS. MONOCYTES 0.2 0.0 - 1.0 K/UL    ABS. EOSINOPHILS 0.0 0.0 - 0.4 K/UL    ABS. BASOPHILS 0.0 0.0 - 0.1 K/UL    ABS. IMM.  GRANS. 0.0 K/UL    DF MANUAL      RBC COMMENTS ANISOCYTOSIS  1+       TROPONIN I    Collection Time: 02/12/21 11:23 AM   Result Value Ref Range    Troponin-I, Qt. <0.05 <0.05 ng/mL   EKG, 12 LEAD, INITIAL    Collection Time: 02/12/21 11:28 AM   Result Value Ref Range    Ventricular Rate 89 BPM    Atrial Rate 89 BPM    P-R Interval 138 ms    QRS Duration 90 ms    Q-T Interval 370 ms    QTC Calculation (Bezet) 450 ms    Calculated P Axis 34 degrees    Calculated R Axis -5 degrees    Calculated T Axis 15 degrees    Diagnosis       Normal sinus rhythm  Nonspecific ST and T wave abnormality  Abnormal ECG  No previous ECGs available  Confirmed by Mindy Shaffer M.D., Sujatha Santiago ([id number]) on 2/12/2021 12:06:56 PM     URINALYSIS W/ REFLEX CULTURE    Collection Time: 02/12/21  1:06 PM    Specimen: Urine   Result Value Ref Range    Color YELLOW/STRAW      Appearance CLOUDY (A) CLEAR      Specific gravity >1.030 (H) 1.003 - 1.030    pH (UA) 6.0 5.0 - 8.0      Protein TRACE (A) NEG mg/dL    Glucose Negative NEG mg/dL    Ketone Negative NEG mg/dL    Bilirubin Negative NEG      Blood Negative NEG      Urobilinogen 0.2 0.2 - 1.0 EU/dL    Nitrites Negative NEG      Leukocyte Esterase Negative NEG      WBC 0-4 0 - 4 /hpf    RBC 0-5 0 - 5 /hpf    Epithelial cells MODERATE (A) FEW /lpf    Bacteria 1+ (A) NEG /hpf UA: UC IF INDICATED CULTURE NOT INDICATED BY UA RESULT CNI     HCG URINE, QL. - POC    Collection Time: 02/12/21  1:08 PM   Result Value Ref Range    Pregnancy test,urine (POC) Negative NEG     TROPONIN I    Collection Time: 02/12/21  2:34 PM   Result Value Ref Range    Troponin-I, Qt. <0.05 <0.05 ng/mL       Radiologic Studies -   CTA CHEST W OR W WO CONT   Final Result   1. Suboptimal examination due to respiratory motion. 2. No pulmonary embolism demonstrated to the segmental level. 3. Cardiomegaly, vascular congestion, and mild interstitial edema. No pleural   fluid. Distended central pulmonary arteries, increased in size since 12/2/2020.   4. Extensive calcification in the subcutaneous soft tissues and multiple chronic   vertebral compression fractures, as previously demonstrated. XR CHEST PA LAT   Final Result    Diffuse interstitial prominence or mild infiltrate with small pleural   effusions. Correlate for interstitial edema versus chronic interstitial change. .    . CT Results  (Last 48 hours)               02/12/21 1503  CTA CHEST W OR W WO CONT Final result    Impression:  1. Suboptimal examination due to respiratory motion. 2. No pulmonary embolism demonstrated to the segmental level. 3. Cardiomegaly, vascular congestion, and mild interstitial edema. No pleural   fluid. Distended central pulmonary arteries, increased in size since 12/2/2020.   4. Extensive calcification in the subcutaneous soft tissues and multiple chronic   vertebral compression fractures, as previously demonstrated. Narrative:  EXAMINATION:  CTA CHEST W OR W WO CONT   CLINICAL INFORMATION:  Sudden onset L chest pain radiating to neck and down back   during infusion treatment. Abnormal CXR. COMPARISON: CT chest of 12/20/2020       TECHNIQUE: Precontrast  images were obtained to localize the volume for   acquisition.  Multislice helical CT arteriography was performed from the   diaphragm to the thoracic inlet during uneventful rapid bolus intravenous   administration of 80 mL of Isovue  370. Lung and soft tissue windows were   generated. Post processing was performed to include 3D MIP  and coronal and   sagittal reformatted images. CT dose reduction was achieved through the use of a   standardized protocol tailored for this examination and automatic exposure   control for dose modulation. Findings   DIAGNOSTIC QUALITY: Suboptimal due to motion artifact. PULMONARY EMBOLISM: No central pulmonary embolism. Difficult to exclude   pulmonary embolism distal to the segmental level. PULMONARY ARTERIES:  Dilated, suggesting pulmonary arterial hypertension. Interval increase in size of pulmonary arteries when compared to 12/20/2020. LUNG PARENCHYMA: No focal airspace disease. Mild vascular congestion and   interstitial edema. THYROID: No nodule. PLEURAL EFFUSION: None. CENTRAL AIRWAYS:  Patent. ADENOPATHY:  None. HEART : Generalized cardiomegaly. Heart size appears increased when compared to   the previous study. No pericardial fluid. GREAT VESSELS:  No significant abnormalities. UPPER ABDOMEN:  Hepatic steatosis. BONES:  Multiple mild to moderate thoracic vertebral compression fractures, also   present previously. No acute fractures. No focal bone destruction. Extensive soft tissue calcifications, also present previously, presumably due to   the patient's history of connective tissue disease. CXR Results  (Last 48 hours)    None          Medical Decision Making   I am the first provider for this patient. I reviewed the vital signs, available nursing notes, past medical history, past surgical history, family history and social history. Vital Signs-Reviewed the patient's vital signs.   Patient Vitals for the past 12 hrs:   Temp Pulse Resp BP SpO2   02/12/21 1111 98.7 °F (37.1 °C) 87 16 113/72 100 %         Records Reviewed: Nursing Notes and Old Medical Records    Provider Notes (Medical Decision Making):   Patient presenting with acute onset of L-sided chest pain radiating into her neck and down her back during her infusion treatment. Lower suspicion for infusion reaction as patient has had her infusions before without difficulty. DDx includes muscle strain/spasm, atypical presentation of ACS, PE, pleurisy. WIll check labs, cardiac enzymes, CXR. ED Course:   Initial assessment performed. The patients presenting problems have been discussed, and they are in agreement with the care plan formulated and outlined with them. I have encouraged them to ask questions as they arise throughout their visit. ED Course as of Feb 13 1501   Fri Feb 12, 2021   1349 Pt continues to have pain despite the morning, Valium ordered. Repeat trop at 1430/     [CK]   1457 Pt states she is feeling better. Discussed lab and imaging results with patient. Will get CTA chest given abnormal CXR. [CK]      ED Course User Index  [CK] Melly Garcia DO       EKG interpreted by me. Shows a normal sinus rhythm with a heart rate of 89. No ST elevations or depressions concerning for ischemia. Normal intervals. Betsy Ryan DO          Disposition:      Chandrakant So results have been reviewed with her. She has been counseled regarding her diagnosis. She verbally conveys understanding and agreement of the signs, symptoms, diagnosis, treatment, and prognosis and additionally agrees to follow up as recommended with Dr. Adam Jones in 24-48 hours. She also agrees with the care-plan and conveys that all of Her questions have been answered. I have also put together some discharge instructions for Her that include: 1) educational information regarding their diagnosis, 2) how to care for their diagnosis at home, as well a 3) list of reasons why they would want to return to the ED prior to their follow-up appointment, should their condition change. DISCHARGE PLAN:  1. Discharge Medication List as of 2/12/2021  3:49 PM      START taking these medications    Details   diazePAM (Valium) 5 mg tablet Take 1 Tab by mouth every eight (8) hours as needed (spasm). Max Daily Amount: 15 mg., Normal, Disp-15 Tab, R-0         CONTINUE these medications which have NOT CHANGED    Details   gabapentin (NEURONTIN) 600 mg tablet Take 600 mg by mouth two (2) times a day., Historical Med      rituximab (RITUXAN IV) 1,000 mg by IntraVENous route every 6 months., Historical Med      spironolactone (ALDACTONE) 25 mg tablet Historical Med      Adempas 2.5 mg tab tablet Historical Med, JOHN      predniSONE (DELTASONE) 5 mg tablet Take 10 mg by mouth nightly., Historical Med      Miralax 17 gram/dose powder MIX 238G AND DRINK ONCE AS DIRECTED BY MD, Historical Med, JOHN      pantoprazole (PROTONIX) 40 mg tablet TAKE 1 TABLET BY MOUTH EVERY DAY, Historical Med      mesalamine (CANASA) 1,000 mg suppository INSERT 1 SUPPOSITORY RECTALLY EVERY DAY AT BEDTIME, Historical Med      hydrOXYchloroQUINE (PLAQUENIL) 200 mg tablet Historical Med      furosemide (LASIX) 20 mg tablet Historical Med      DULoxetine (CYMBALTA) 30 mg capsule TAKE 1 CAPSULE BY MOUTH TWICE A DAY, Historical Med      Letairis 5 mg tablet Historical Med, JOHN           2. Follow-up Information     Follow up With Specialties Details Why Contact Info      Schedule an appointment as soon as possible for a visit   Your PCP    [facility]  As needed, If symptoms worsen [place] [id number]        3. Return to ED if worse     Diagnosis     Clinical Impression:   1. Muscle spasm        Attestations:    Shona Morgan, DO    Please note that this dictation was completed with CRMnext, the [employer] voice recognition software. Quite often unanticipated grammatical, syntax, homophones, and other interpretive errors are inadvertently transcribed by the computer software.   Please disregard these errors. Please excuse any errors that have escaped final proofreading. Thank you.

## 2021-02-12 NOTE — ED NOTES
Pt able to get up to the bedside commode without assistance and steady gait. Mother present at bedside. Urine sample obtained and will send to lab.

## 2021-02-12 NOTE — ED TRIAGE NOTES
Triage: pt was receiving her RA infusion at [facility] office in [place] and went to lay back, sharp pain from neck radiating into left lower back. Pt was given 324mg Aspirin at office. NSR per EMS en route. Denies injury or trauma to area.

## 2021-02-12 NOTE — ED NOTES
Pt unable to provide urine sample at this time but reports she is not pregnant. Dr. Janyce Peabody made aware and morphine given.

## 2021-02-12 NOTE — ED NOTES
Pt ambulatory out of ED with discharge instructions and prescriptions in hand given by Dr. Mitch Henriquez; pt verbalized understanding of discharge paperwork and time allotted for questions. VSS. Pt alert and oriented. Pt accompanied by mother.

## 2021-08-07 ENCOUNTER — APPOINTMENT (OUTPATIENT)
Dept: GENERAL RADIOLOGY | Age: 31
End: 2021-08-07
Attending: FAMILY MEDICINE
Payer: MEDICARE

## 2021-08-07 ENCOUNTER — APPOINTMENT (OUTPATIENT)
Dept: CT IMAGING | Age: 31
End: 2021-08-07
Attending: PHYSICIAN ASSISTANT
Payer: MEDICARE

## 2021-08-07 ENCOUNTER — HOSPITAL ENCOUNTER (OUTPATIENT)
Age: 31
Setting detail: OBSERVATION
Discharge: HOME HEALTH | End: 2021-08-10
Attending: FAMILY MEDICINE | Admitting: FAMILY MEDICINE
Payer: MEDICARE

## 2021-08-07 DIAGNOSIS — A41.9 SEPSIS WITHOUT ACUTE ORGAN DYSFUNCTION, DUE TO UNSPECIFIED ORGANISM (HCC): ICD-10-CM

## 2021-08-07 DIAGNOSIS — L03.113 CELLULITIS OF RIGHT UPPER EXTREMITY: Primary | ICD-10-CM

## 2021-08-07 DIAGNOSIS — L02.413 ABSCESS OF RIGHT UPPER EXTREMITY: ICD-10-CM

## 2021-08-07 PROBLEM — L03.90 CELLULITIS: Status: ACTIVE | Noted: 2021-08-07

## 2021-08-07 LAB
ANION GAP SERPL CALC-SCNC: 10 MMOL/L (ref 5–15)
BASOPHILS # BLD: 0 K/UL (ref 0–0.1)
BASOPHILS NFR BLD: 0 % (ref 0–1)
BUN SERPL-MCNC: 16 MG/DL (ref 6–20)
BUN/CREAT SERPL: 20 (ref 12–20)
CA-I BLD-MCNC: 8.4 MG/DL (ref 8.5–10.1)
CHLORIDE SERPL-SCNC: 108 MMOL/L (ref 97–108)
CO2 SERPL-SCNC: 26 MMOL/L (ref 21–32)
CREAT SERPL-MCNC: 0.82 MG/DL (ref 0.55–1.02)
CRP SERPL-MCNC: 1.66 MG/DL (ref 0–0.6)
DIFFERENTIAL METHOD BLD: ABNORMAL
EOSINOPHIL # BLD: 0 K/UL (ref 0–0.4)
EOSINOPHIL NFR BLD: 0 % (ref 0–7)
ERYTHROCYTE [DISTWIDTH] IN BLOOD BY AUTOMATED COUNT: 18.4 % (ref 11.5–14.5)
ERYTHROCYTE [SEDIMENTATION RATE] IN BLOOD: 81 MM/HR
GLUCOSE BLD STRIP.AUTO-MCNC: 91 MG/DL (ref 65–117)
GLUCOSE SERPL-MCNC: 112 MG/DL (ref 65–100)
HCT VFR BLD AUTO: 33.3 % (ref 35–47)
HGB BLD-MCNC: 9.7 G/DL (ref 11.5–16)
IMM GRANULOCYTES # BLD AUTO: 0.1 K/UL (ref 0–0.04)
IMM GRANULOCYTES NFR BLD AUTO: 2 % (ref 0–0.5)
LACTATE SERPL-SCNC: 1.6 MMOL/L (ref 0.4–2)
LYMPHOCYTES # BLD: 0.7 K/UL (ref 0.8–3.5)
LYMPHOCYTES NFR BLD: 11 % (ref 12–49)
MCH RBC QN AUTO: 28 PG (ref 26–34)
MCHC RBC AUTO-ENTMCNC: 29.1 G/DL (ref 30–36.5)
MCV RBC AUTO: 96 FL (ref 80–99)
MONOCYTES # BLD: 0.1 K/UL (ref 0–1)
MONOCYTES NFR BLD: 2 % (ref 5–13)
NEUTS SEG # BLD: 5.1 K/UL (ref 1.8–8)
NEUTS SEG NFR BLD: 85 % (ref 32–75)
PERFORMED BY, TECHID: NORMAL
PLATELET # BLD AUTO: 264 K/UL (ref 150–400)
PMV BLD AUTO: 9.6 FL (ref 8.9–12.9)
POTASSIUM SERPL-SCNC: 3 MMOL/L (ref 3.5–5.1)
RBC # BLD AUTO: 3.47 M/UL (ref 3.8–5.2)
RBC MORPH BLD: ABNORMAL
SODIUM SERPL-SCNC: 144 MMOL/L (ref 136–145)
WBC # BLD AUTO: 6 K/UL (ref 3.6–11)

## 2021-08-07 PROCEDURE — 73060 X-RAY EXAM OF HUMERUS: CPT

## 2021-08-07 PROCEDURE — 65270000029 HC RM PRIVATE

## 2021-08-07 PROCEDURE — 99218 HC RM OBSERVATION: CPT

## 2021-08-07 PROCEDURE — 80048 BASIC METABOLIC PNL TOTAL CA: CPT

## 2021-08-07 PROCEDURE — 74011250636 HC RX REV CODE- 250/636: Performed by: INTERNAL MEDICINE

## 2021-08-07 PROCEDURE — 74011000258 HC RX REV CODE- 258: Performed by: INTERNAL MEDICINE

## 2021-08-07 PROCEDURE — 99205 OFFICE O/P NEW HI 60 MIN: CPT | Performed by: SURGERY

## 2021-08-07 PROCEDURE — 75810000289 HC I&D ABSCESS SIMP/COMP/MULT

## 2021-08-07 PROCEDURE — 74011000258 HC RX REV CODE- 258: Performed by: FAMILY MEDICINE

## 2021-08-07 PROCEDURE — 74011250636 HC RX REV CODE- 250/636: Performed by: FAMILY MEDICINE

## 2021-08-07 PROCEDURE — 74011636637 HC RX REV CODE- 636/637: Performed by: PHYSICIAN ASSISTANT

## 2021-08-07 PROCEDURE — 74011000250 HC RX REV CODE- 250: Performed by: FAMILY MEDICINE

## 2021-08-07 PROCEDURE — 82962 GLUCOSE BLOOD TEST: CPT

## 2021-08-07 PROCEDURE — 36415 COLL VENOUS BLD VENIPUNCTURE: CPT

## 2021-08-07 PROCEDURE — 86140 C-REACTIVE PROTEIN: CPT

## 2021-08-07 PROCEDURE — 74011250637 HC RX REV CODE- 250/637: Performed by: FAMILY MEDICINE

## 2021-08-07 PROCEDURE — 74011250637 HC RX REV CODE- 250/637: Performed by: PHYSICIAN ASSISTANT

## 2021-08-07 PROCEDURE — 87205 SMEAR GRAM STAIN: CPT

## 2021-08-07 PROCEDURE — 96367 TX/PROPH/DG ADDL SEQ IV INF: CPT

## 2021-08-07 PROCEDURE — 99284 EMERGENCY DEPT VISIT MOD MDM: CPT

## 2021-08-07 PROCEDURE — 73090 X-RAY EXAM OF FOREARM: CPT

## 2021-08-07 PROCEDURE — G0378 HOSPITAL OBSERVATION PER HR: HCPCS

## 2021-08-07 PROCEDURE — 87040 BLOOD CULTURE FOR BACTERIA: CPT

## 2021-08-07 PROCEDURE — 99223 1ST HOSP IP/OBS HIGH 75: CPT | Performed by: INTERNAL MEDICINE

## 2021-08-07 PROCEDURE — 96365 THER/PROPH/DIAG IV INF INIT: CPT

## 2021-08-07 PROCEDURE — 83605 ASSAY OF LACTIC ACID: CPT

## 2021-08-07 PROCEDURE — 85652 RBC SED RATE AUTOMATED: CPT

## 2021-08-07 PROCEDURE — 74011250636 HC RX REV CODE- 250/636: Performed by: PHYSICIAN ASSISTANT

## 2021-08-07 PROCEDURE — 85025 COMPLETE CBC W/AUTO DIFF WBC: CPT

## 2021-08-07 RX ORDER — SODIUM CHLORIDE 0.9 % (FLUSH) 0.9 %
5-40 SYRINGE (ML) INJECTION EVERY 8 HOURS
Status: DISCONTINUED | OUTPATIENT
Start: 2021-08-07 | End: 2021-08-10 | Stop reason: HOSPADM

## 2021-08-07 RX ORDER — LINEZOLID 2 MG/ML
600 INJECTION, SOLUTION INTRAVENOUS EVERY 12 HOURS
Status: DISCONTINUED | OUTPATIENT
Start: 2021-08-07 | End: 2021-08-07

## 2021-08-07 RX ORDER — OXYCODONE HYDROCHLORIDE 5 MG/1
5 TABLET ORAL
Status: DISCONTINUED | OUTPATIENT
Start: 2021-08-07 | End: 2021-08-10 | Stop reason: HOSPADM

## 2021-08-07 RX ORDER — DIPHENHYDRAMINE HYDROCHLORIDE 50 MG/ML
25 INJECTION, SOLUTION INTRAMUSCULAR; INTRAVENOUS ONCE
Status: DISCONTINUED | OUTPATIENT
Start: 2021-08-07 | End: 2021-08-07

## 2021-08-07 RX ORDER — HYDROXYCHLOROQUINE SULFATE 200 MG/1
200 TABLET, FILM COATED ORAL
Status: DISCONTINUED | OUTPATIENT
Start: 2021-08-08 | End: 2021-08-10 | Stop reason: HOSPADM

## 2021-08-07 RX ORDER — HYDROMORPHONE HYDROCHLORIDE 1 MG/ML
1 INJECTION, SOLUTION INTRAMUSCULAR; INTRAVENOUS; SUBCUTANEOUS
Status: DISPENSED | OUTPATIENT
Start: 2021-08-07 | End: 2021-08-09

## 2021-08-07 RX ORDER — POTASSIUM CHLORIDE 750 MG/1
40 TABLET, FILM COATED, EXTENDED RELEASE ORAL
Status: COMPLETED | OUTPATIENT
Start: 2021-08-07 | End: 2021-08-07

## 2021-08-07 RX ORDER — CLINDAMYCIN PHOSPHATE 600 MG/50ML
600 INJECTION INTRAVENOUS EVERY 8 HOURS
Status: DISCONTINUED | OUTPATIENT
Start: 2021-08-07 | End: 2021-08-07

## 2021-08-07 RX ORDER — FUROSEMIDE 40 MG/1
20 TABLET ORAL DAILY
Status: DISCONTINUED | OUTPATIENT
Start: 2021-08-08 | End: 2021-08-10 | Stop reason: HOSPADM

## 2021-08-07 RX ORDER — ACETAMINOPHEN 325 MG/1
650 TABLET ORAL
Status: DISCONTINUED | OUTPATIENT
Start: 2021-08-07 | End: 2021-08-10 | Stop reason: HOSPADM

## 2021-08-07 RX ORDER — DULOXETIN HYDROCHLORIDE 30 MG/1
30 CAPSULE, DELAYED RELEASE ORAL DAILY
Status: DISCONTINUED | OUTPATIENT
Start: 2021-08-08 | End: 2021-08-10 | Stop reason: HOSPADM

## 2021-08-07 RX ORDER — SODIUM CHLORIDE 0.9 % (FLUSH) 0.9 %
5-40 SYRINGE (ML) INJECTION AS NEEDED
Status: DISCONTINUED | OUTPATIENT
Start: 2021-08-07 | End: 2021-08-10 | Stop reason: HOSPADM

## 2021-08-07 RX ORDER — ONDANSETRON 4 MG/1
4 TABLET, ORALLY DISINTEGRATING ORAL
Status: DISCONTINUED | OUTPATIENT
Start: 2021-08-07 | End: 2021-08-10 | Stop reason: HOSPADM

## 2021-08-07 RX ORDER — SODIUM CHLORIDE 9 MG/ML
125 INJECTION, SOLUTION INTRAVENOUS CONTINUOUS
Status: DISCONTINUED | OUTPATIENT
Start: 2021-08-07 | End: 2021-08-10 | Stop reason: HOSPADM

## 2021-08-07 RX ORDER — PANTOPRAZOLE SODIUM 40 MG/1
40 TABLET, DELAYED RELEASE ORAL
Status: DISCONTINUED | OUTPATIENT
Start: 2021-08-08 | End: 2021-08-10 | Stop reason: HOSPADM

## 2021-08-07 RX ORDER — ONDANSETRON 2 MG/ML
4 INJECTION INTRAMUSCULAR; INTRAVENOUS
Status: DISCONTINUED | OUTPATIENT
Start: 2021-08-07 | End: 2021-08-10 | Stop reason: HOSPADM

## 2021-08-07 RX ORDER — ACETAMINOPHEN 650 MG/1
650 SUPPOSITORY RECTAL
Status: DISCONTINUED | OUTPATIENT
Start: 2021-08-07 | End: 2021-08-10 | Stop reason: HOSPADM

## 2021-08-07 RX ORDER — POLYETHYLENE GLYCOL 3350 17 G/17G
17 POWDER, FOR SOLUTION ORAL DAILY PRN
Status: DISCONTINUED | OUTPATIENT
Start: 2021-08-07 | End: 2021-08-10 | Stop reason: HOSPADM

## 2021-08-07 RX ORDER — AMBRISENTAN 5 MG/1
5 TABLET, FILM COATED ORAL DAILY
Status: DISCONTINUED | OUTPATIENT
Start: 2021-08-10 | End: 2021-08-10 | Stop reason: HOSPADM

## 2021-08-07 RX ORDER — SPIRONOLACTONE 25 MG/1
25 TABLET ORAL DAILY
Status: DISCONTINUED | OUTPATIENT
Start: 2021-08-08 | End: 2021-08-10 | Stop reason: HOSPADM

## 2021-08-07 RX ORDER — PREDNISONE 1 MG/1
3 TABLET ORAL
Status: DISCONTINUED | OUTPATIENT
Start: 2021-08-07 | End: 2021-08-10 | Stop reason: HOSPADM

## 2021-08-07 RX ORDER — LIDOCAINE HYDROCHLORIDE AND EPINEPHRINE 10; 10 MG/ML; UG/ML
1.5 INJECTION, SOLUTION INFILTRATION; PERINEURAL ONCE
Status: COMPLETED | OUTPATIENT
Start: 2021-08-07 | End: 2021-08-07

## 2021-08-07 RX ORDER — GABAPENTIN 300 MG/1
300 CAPSULE ORAL 2 TIMES DAILY
Status: DISCONTINUED | OUTPATIENT
Start: 2021-08-07 | End: 2021-08-10 | Stop reason: HOSPADM

## 2021-08-07 RX ADMIN — CEFTAROLINE FOSAMIL 600 MG: 600 POWDER, FOR SOLUTION INTRAVENOUS at 23:05

## 2021-08-07 RX ADMIN — POTASSIUM CHLORIDE 40 MEQ: 750 TABLET, FILM COATED, EXTENDED RELEASE ORAL at 12:23

## 2021-08-07 RX ADMIN — Medication 10 ML: at 18:57

## 2021-08-07 RX ADMIN — Medication 10 ML: at 22:32

## 2021-08-07 RX ADMIN — GABAPENTIN 300 MG: 300 CAPSULE ORAL at 22:32

## 2021-08-07 RX ADMIN — ACETAMINOPHEN 650 MG: 325 TABLET ORAL at 18:58

## 2021-08-07 RX ADMIN — OXYCODONE 5 MG: 5 TABLET ORAL at 22:32

## 2021-08-07 RX ADMIN — PIPERACILLIN AND TAZOBACTAM 3.38 G: 3; .375 INJECTION, POWDER, LYOPHILIZED, FOR SOLUTION INTRAVENOUS at 13:00

## 2021-08-07 RX ADMIN — SODIUM CHLORIDE 1000 ML: 9 INJECTION, SOLUTION INTRAVENOUS at 12:14

## 2021-08-07 RX ADMIN — PREDNISONE 3 MG: 1 TABLET ORAL at 23:03

## 2021-08-07 RX ADMIN — HYDROMORPHONE HYDROCHLORIDE 1 MG: 1 INJECTION, SOLUTION INTRAMUSCULAR; INTRAVENOUS; SUBCUTANEOUS at 20:26

## 2021-08-07 RX ADMIN — SODIUM CHLORIDE 125 ML/HR: 9 INJECTION, SOLUTION INTRAVENOUS at 22:31

## 2021-08-07 RX ADMIN — CLINDAMYCIN PHOSPHATE 600 MG: 600 INJECTION, SOLUTION INTRAVENOUS at 14:20

## 2021-08-07 RX ADMIN — LIDOCAINE HYDROCHLORIDE AND EPINEPHRINE 15 MG: 10; 10 INJECTION, SOLUTION INFILTRATION; PERINEURAL at 10:57

## 2021-08-07 NOTE — H&P
History and Physical    Patient: Laura Carlson MRN: [id number]  SSN: [id number]    YOB: 1990  Age: 27 y.o. Sex: female      Subjective:      Laura Carlson is a 27 y.o. female who presents to the [facility] emergency department with a history of lupus, pulmonary hypertension, rheumatoid arthritis and calcinosis who developed right arm cellulitis approximately 7 days ago while at the [facility] in [place]. She has since developed abscesses in the upper right arm and 2 separate abscesses in the mid forearm. Patient is immunosuppressed her primary surgeon is Dr. Garett Steele. Patient with a C-reactive protein of 1.66 blood cultures were obtained as well as wound culture from an I&D performed in the upper arm. Patient given clindamycin. 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[facility] ([facility] [address]     Pulmonary HTN ([facility] [address]     RA (rheumatoid arthritis) ([facility] [address]      Past Surgical History:   Procedure Laterality Date    HX ORTHOPAEDIC      x2 left knee sxs    HX ORTHOPAEDIC      bilateral ankle sxs      Family History   Problem Relation Age of Onset    Hypertension Mother      Social History     Tobacco Use    Smoking status: Never Smoker    Smokeless tobacco: Never Used   Substance Use Topics    Alcohol use: Never      Prior to Admission medications    Medication Sig Start Date End Date Taking? Authorizing Provider   gabapentin (NEURONTIN) 600 mg tablet Take 300 mg by mouth two (2) times a day. Yes Other, MD Sherman   spironolactone (ALDACTONE) 25 mg tablet 25 mg daily. At bedtime 12/13/20  Yes Other, MD Sherman   Adempas 2.5 mg tab tablet 2.5 mg two (2) times a day. 12/8/20  Yes Other, MD Sherman   predniSONE (DELTASONE) 5 mg tablet Take 18 mg by mouth nightly.  15 mg in the AM and 3 mg at night 10/21/20  Yes Other, MD Sherman   pantoprazole (PROTONIX) 40 mg tablet TAKE 1 TABLET BY MOUTH EVERY DAY 11/15/20  Yes Other, MD Sherman   hydrOXYchloroQUINE (PLAQUENIL) 200 mg tablet  12/14/20  Yes Yvonne, Sherman MD   furosemide (LASIX) 20 mg tablet  12/17/20  Yes Other, MD Sherman   DULoxetine (CYMBALTA) 30 mg capsule TAKE 1 CAPSULE BY MOUTH TWICE A DAY 10/25/20  Yes Other, MD hSerman   Letairis 5 mg tablet 5 mg daily. In AM 12/8/20  Yes Other, MD Sherman        Allergies   Allergen Reactions    Doxycycline Nausea and Vomiting    Heparin Anaphylaxis    Percocet [Oxycodone-Acetaminophen] Nausea and Vomiting    Remicade [Infliximab] Anaphylaxis    Vancomycin Hives       Review of Systems:  Review of Systems   Constitutional: Positive for chills and malaise/fatigue. Negative for fever. HENT: Negative. Eyes: Negative. Respiratory: Negative for cough and shortness of breath. Cardiovascular: Negative for chest pain and leg swelling. Gastrointestinal: Negative for abdominal pain, nausea and vomiting. Genitourinary: Negative. Musculoskeletal: Positive for joint pain and myalgias. Skin:        Abscess of the right arm with cellulitis   Neurological: Negative. Psychiatric/Behavioral: Negative. Objective:     Recent Results (from the past 24 hour(s))   CBC WITH AUTOMATED DIFF    Collection Time: 08/07/21 10:56 AM   Result Value Ref Range    WBC 6.0 3.6 - 11.0 K/uL    RBC 3.47 (L) 3.80 - 5.20 M/uL    HGB 9.7 (L) 11.5 - 16.0 g/dL    HCT 33.3 (L) 35.0 - 47.0 %    MCV 96.0 80.0 - 99.0 FL    MCH 28.0 26.0 - 34.0 PG    MCHC 29.1 (L) 30.0 - 36.5 g/dL    RDW 18.4 (H) 11.5 - 14.5 %    PLATELET 602 750 - 610 K/uL    MPV 9.6 8.9 - 12.9 FL    NEUTROPHILS 85 (H) 32 - 75 %    LYMPHOCYTES 11 (L) 12 - 49 %    MONOCYTES 2 (L) 5 - 13 %    EOSINOPHILS 0 0 - 7 %    BASOPHILS 0 0 - 1 %    IMMATURE GRANULOCYTES 2 (H) 0.0 - 0.5 %    ABS. NEUTROPHILS 5.1 1.8 - 8.0 K/UL    ABS. LYMPHOCYTES 0.7 (L) 0.8 - 3.5 K/UL    ABS. MONOCYTES 0.1 0.0 - 1.0 K/UL    ABS. EOSINOPHILS 0.0 0.0 - 0.4 K/UL    ABS. BASOPHILS 0.0 0.0 - 0.1 K/UL    ABS. IMM.  GRANS. 0.1 (H) 0.00 - 0.04 K/UL    DF Smear Scanned      RBC COMMENTS Anisocytosis  2+ METABOLIC PANEL, BASIC    Collection Time: 08/07/21 10:56 AM   Result Value Ref Range    Sodium 144 136 - 145 mmol/L    Potassium 3.0 (L) 3.5 - 5.1 mmol/L    Chloride 108 97 - 108 mmol/L    CO2 26 21 - 32 mmol/L    Anion gap 10 5 - 15 mmol/L    Glucose 112 (H) 65 - 100 mg/dL    BUN 16 6 - 20 mg/dL    Creatinine 0.82 0.55 - 1.02 mg/dL    BUN/Creatinine ratio 20 12 - 20      GFR est AA >60 >60 ml/min/1.73m2    GFR est non-AA >60 >60 ml/min/1.73m2    Calcium 8.4 (L) 8.5 - 10.1 mg/dL   LACTIC ACID    Collection Time: 08/07/21 10:56 AM   Result Value Ref Range    Lactic acid 1.6 0.4 - 2.0 mmol/L   C REACTIVE PROTEIN, QT    Collection Time: 08/07/21 10:56 AM   Result Value Ref Range    C-Reactive protein 1.66 (H) 0.00 - 0.60 mg/dL   SED RATE (ESR)    Collection Time: 08/07/21 10:56 AM   Result Value Ref Range    Sed rate, automated 81 mm/hr        XR FOREARM RT AP/LAT   Final Result   No soft tissue gas is identified. No acute injury. Chronic   subcutaneous calcifications. XR HUMERUS RT   Final Result   impression: 2 views of the right forearm reveal no fracture or dislocation. Diffuse calcifications are visible, similar to the forearm. The pattern does not   suggest vascular arterial or venous calcifications. Psoas, thermal injury prior   cellulitis or infection should be considered. Vitals:    08/07/21 1019 08/07/21 1424 08/07/21 1636   BP: 134/88 129/86 128/78   Pulse: (!) 110 (!) 107 97   Resp: 18 16 16   Temp: 98.3 °F (36.8 °C)  98.1 °F (36.7 °C)   SpO2: 98% 98% 99%   Weight: 64 kg (141 lb)     Height: 5' 3\" (1.6 m)          Physical Exam:  Physical Exam  Vitals reviewed. Constitutional:       Appearance: She is ill-appearing. HENT:      Head: Normocephalic and atraumatic. Mouth/Throat:      Mouth: Mucous membranes are moist.      Pharynx: Oropharynx is clear. Cardiovascular:      Rate and Rhythm: Normal rate and regular rhythm. Heart sounds: Normal heart sounds.    Pulmonary: Effort: Pulmonary effort is normal.      Breath sounds: Normal breath sounds. Abdominal:      General: Abdomen is flat. Bowel sounds are normal.      Palpations: Abdomen is soft. Musculoskeletal:         General: Normal range of motion. Cervical back: Normal range of motion and neck supple. Skin:     Comments: Right upper arm abscess with I&D placed. There is also a second smaller abscess in the proximal forearm with a large abscess in the distal forearm. Fluctuance is noted at the distal site   Neurological:      General: No focal deficit present. Mental Status: She is alert and oriented to person, place, and time. Mental status is at baseline. Psychiatric:         Mood and Affect: Mood normal.          Assessment:     Hospital Problems  Never Reviewed        Codes Class Noted POA    Cellulitis ICD-10-CM: L03.90  ICD-9-CM: 682.9  8/7/2021 Unknown              Plan:     Impression:    1. Immunosuppression  2. Lupus  3. Pulmonary hypertension  4. Cellulitis with abscesses of the right arm  5. Rheumatoid arthritis    Plan:    1. Immunosuppression  Continue prednisone    2. Lupus  Continue prednisone  Continue Plaquenil    3. Pulmonary hypertension  Continue adempas  Continue Lasix  Continue Letairis  Continue spironolactone    4. Cellulitis with abscess of the right forearm  CT of the right arm to rule out deep infection  General surgery consultation, Dr. Brenna Nguyen patient's primary surgeon  Clindamycin and Zosyn  Blood cultures pending  Wound culture pending from I&D at freestanding ER  ID consultation due to immunosuppression  Due to the possibility of 3 abscesses may benefit from OR I&D    5.   Rheumatoid arthritis  Continue immunosuppression medications    CODE STATUS: Full code    DVT prophylaxis: SCDs  Ulcer prophylaxis: Protonix    Discharge barriers: Continue antibiotics, wound cultures and surgical evaluation    Time of evaluation 45 minutes  Signed By: NORY Stone 7, 2021

## 2021-08-07 NOTE — CONSULTS
Consult Date: 8/7/2021    Consults  Abscess and immunosuppression    Subjective   This is a 27year old female with Lupus and RA on Plaquenil and Prednisone who presented to the ED with right arm redness, pain, swelling and warmth. She was afebrile with normal WBC but elevated CRP and ESR. Plain x-rays showed no soft tissue gas. Images reviewed by me (see below). Blood and wound cultures were sent. Patient started on Clindamycin because of Vancomycin allergy and Zosyn. ID has been consulted for this reason. He has also been seen by General Surgery but no surgical intervention recommended at this time. Patient resting comfortably except for pain in her right upper arm with redness extending down her arm. There was drainage when she was admitted but not at this time. 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us ([facility] [address]     Pulmonary HTN ([facility] [address]     RA (rheumatoid arthritis) ([facility] [address]       Past Surgical History:   Procedure Laterality Date    HX ORTHOPAEDIC      x2 left knee sxs    HX ORTHOPAEDIC      bilateral ankle sxs     Family History   Problem Relation Age of Onset    Hypertension Mother       Social History     Tobacco Use    Smoking status: Never Smoker    Smokeless tobacco: Never Used   Substance Use Topics    Alcohol use: Never       Current Facility-Administered Medications   Medication Dose Route Frequency Provider Last Rate Last Admin    . PHARMACY TO SUBSTITUTE PER PROTOCOL (Reordered from: Adempas 2.5 mg tab tablet)    Per Protocol Roya Morales PA-C        [START ON 8/8/2021] DULoxetine (CYMBALTA) capsule 30 mg  30 mg Oral DAILY Roya Morales PA-C        [START ON 8/8/2021] furosemide (LASIX) tablet 20 mg  20 mg Oral DAILY Roya Morales PA-C        gabapentin (NEURONTIN) capsule 300 mg  300 mg Oral BID Huma Perez PA-C        [START ON 8/8/2021] hydrOXYchloroQUINE (PLAQUENIL) tablet 200 mg  200 mg Oral DAILY WITH BREAKFAST Roya Morales PA-C        [facility] PHARMACY TO SUBSTITUTE PER PROTOCOL (Reordered from: Letairis 5 mg tablet)    Per Protocol Reasoner, Claudene Evangelist, PA-C        [START ON 8/8/2021] pantoprazole (PROTONIX) tablet 40 mg  40 mg Oral ACB Eligio Morales PA-C        [START ON 8/8/2021] spironolactone (ALDACTONE) tablet 25 mg  25 mg Oral DAILY Reasoner, Claudene Evangelist, PA-C        [START ON 8/8/2021] predniSONE (DELTASONE) tablet 15 mg  15 mg Oral DAILY WITH BREAKFAST Reasoner, Claudene Evangelist, PA-C        predniSONE (DELTASONE) tablet 3 mg  3 mg Oral QHS Eligio Morales PA-C        sodium chloride (NS) flush 5-40 mL  5-40 mL IntraVENous Q8H Reasoner, Claudene Evangelist, PA-C        sodium chloride (NS) flush 5-40 mL  5-40 mL IntraVENous PRN Reasoner, Claudene Evangelist, PA-C        acetaminophen (TYLENOL) tablet 650 mg  650 mg Oral Q6H PRN Reasoner, Claudene Evangelist, PA-C        Or    acetaminophen (TYLENOL) suppository 650 mg  650 mg Rectal Q6H PRN Reasoner, Claudene Evangelist, PA-C        polyethylene glycol (MIRALAX) packet 17 g  17 g Oral DAILY PRN Reasoner, Claudene Evangelist, PA-C        ondansetron (ZOFRAN ODT) tablet 4 mg  4 mg Oral Q8H PRN Reasoner, Claudene Evangelist, PA-C        Or    ondansetron (ZOFRAN) injection 4 mg  4 mg IntraVENous Q6H PRN Reasoner, Claudene Evangelist, PA-C        piperacillin-tazobactam (ZOSYN) 3.375 g in 0.9% sodium chloride (MBP/ADV) 100 mL MBP  3.375 g IntraVENous Q8H Reasoner, Claudene Evangelist, PA-C        HYDROmorphone (DILAUDID) injection 1 mg  1 mg IntraVENous Q4H PRN Reasoner, Claudene Evangelist, PA-C        clindamycin (CLEOCIN) 600mg D5W 50mL IVPB (premix)  600 mg IntraVENous Q8H Reasoner, Claudene Evangelist, PA-C        oxyCODONE IR (ROXICODONE) tablet 5 mg  5 mg Oral Q4H PRN Reasoner, Claudene Evangelist, PA-C        0.9% sodium chloride infusion  125 mL/hr IntraVENous CONTINUOUS Reasoner, Claudene Evangelist, PA-C            Review of Systems   Constitutional: Negative for chills and fever. HENT: Negative. Eyes: Negative. Respiratory: Negative. Cardiovascular: Negative. Gastrointestinal: Negative. Endocrine: Negative. Genitourinary: Negative. Musculoskeletal: Positive for arthralgias, joint swelling and myalgias. Skin: Positive for wound (right arm). Negative for rash. Allergic/Immunologic: Positive for immunocompromised state. Neurological: Negative. Hematological: Negative. Psychiatric/Behavioral: Negative. Objective     Vital signs for last 24 hours:  Visit Vitals  /78   Pulse 97   Temp 98.1 °F (36.7 °C)   Resp 16   Ht 5' 3\" (1.6 m)   Wt 141 lb (64 kg)   SpO2 99%   BMI 24.98 kg/m²       Intake/Output this shift:  Current Shift: 08/07 0701 - 08/07 1900  In: 1566.7 [I.V.:1566.7]  Out: -   Last 3 Shifts: No intake/output data recorded. Data Review:   Recent Results (from the past 24 hour(s))   CBC WITH AUTOMATED DIFF    Collection Time: 08/07/21 10:56 AM   Result Value Ref Range    WBC 6.0 3.6 - 11.0 K/uL    RBC 3.47 (L) 3.80 - 5.20 M/uL    HGB 9.7 (L) 11.5 - 16.0 g/dL    HCT 33.3 (L) 35.0 - 47.0 %    MCV 96.0 80.0 - 99.0 FL    MCH 28.0 26.0 - 34.0 PG    MCHC 29.1 (L) 30.0 - 36.5 g/dL    RDW 18.4 (H) 11.5 - 14.5 %    PLATELET 091 890 - 514 K/uL    MPV 9.6 8.9 - 12.9 FL    NEUTROPHILS 85 (H) 32 - 75 %    LYMPHOCYTES 11 (L) 12 - 49 %    MONOCYTES 2 (L) 5 - 13 %    EOSINOPHILS 0 0 - 7 %    BASOPHILS 0 0 - 1 %    IMMATURE GRANULOCYTES 2 (H) 0.0 - 0.5 %    ABS. NEUTROPHILS 5.1 1.8 - 8.0 K/UL    ABS. LYMPHOCYTES 0.7 (L) 0.8 - 3.5 K/UL    ABS. MONOCYTES 0.1 0.0 - 1.0 K/UL    ABS. EOSINOPHILS 0.0 0.0 - 0.4 K/UL    ABS. BASOPHILS 0.0 0.0 - 0.1 K/UL    ABS. IMM.  GRANS. 0.1 (H) 0.00 - 0.04 K/UL    DF Smear Scanned      RBC COMMENTS Anisocytosis  2+       METABOLIC PANEL, BASIC    Collection Time: 08/07/21 10:56 AM   Result Value Ref Range    Sodium 144 136 - 145 mmol/L    Potassium 3.0 (L) 3.5 - 5.1 mmol/L    Chloride 108 97 - 108 mmol/L    CO2 26 21 - 32 mmol/L    Anion gap 10 5 - 15 mmol/L    Glucose 112 (H) 65 - 100 mg/dL    BUN 16 6 - 20 mg/dL    Creatinine 0.82 0.55 - 1.02 mg/dL BUN/Creatinine ratio 20 12 - 20      GFR est AA >60 >60 ml/min/1.73m2    GFR est non-AA >60 >60 ml/min/1.73m2    Calcium 8.4 (L) 8.5 - 10.1 mg/dL   LACTIC ACID    Collection Time: 08/07/21 10:56 AM   Result Value Ref Range    Lactic acid 1.6 0.4 - 2.0 mmol/L   C REACTIVE PROTEIN, QT    Collection Time: 08/07/21 10:56 AM   Result Value Ref Range    C-Reactive protein 1.66 (H) 0.00 - 0.60 mg/dL   SED RATE (ESR)    Collection Time: 08/07/21 10:56 AM   Result Value Ref Range    Sed rate, automated 81 mm/hr     X-ray right arm (8/7)        Physical Exam  Vitals and nursing note reviewed. Constitutional:       Appearance: She is ill-appearing. HENT:      Head: Normocephalic and atraumatic. Right Ear: External ear normal.      Left Ear: External ear normal.      Nose: Nose normal.      Mouth/Throat:      Pharynx: Oropharynx is clear. Eyes:      Pupils: Pupils are equal, round, and reactive to light. Cardiovascular:      Rate and Rhythm: Normal rate and regular rhythm. Heart sounds: No murmur heard. Pulmonary:      Effort: Pulmonary effort is normal.      Breath sounds: Normal breath sounds. Abdominal:      General: Bowel sounds are normal.      Palpations: Abdomen is soft. Genitourinary:     Comments: No Mckeon  Musculoskeletal:         General: Swelling and tenderness present. Cervical back: Neck supple. Right lower leg: No edema. Left lower leg: No edema. Comments: Right arm is moderately swollen with erythema and warmth; there is a small wound over bicep that is dry at this time; there is diffuse tenderness; hardening of the skin noted, related to subcutaneous calcification seen on x-rays   Skin:     Findings: Erythema present. No rash. Neurological:      General: No focal deficit present. Mental Status: She is alert and oriented to person, place, and time.    Psychiatric:         Mood and Affect: Mood normal.         Behavior: Behavior normal.         Thought Content: Thought content normal.         Judgment: Judgment normal.       ASSESSMENT/PLAN    1. Cellulitis and abscess, right arm, etiology unclear  2. Immunocompromised patient   3. Elevated CRP  4. Lupus  5. Rheumatoid arthritis  6. Allergy to Vancomycin and Doxycycline    Comment: This is likely Staphylococcal in origin, either MSSA or MRSA. MRSA resistance is about 50% here, therefore, will need to adjust antibiotic. Vancomycin and Doxycycline are prohibited because of allergy and Linezolid would interact with her Duloxetine to cause serotonin syndrome. This leaves Daptomycin and Ceftaroline, neither of which are available in oral formulation. Ceftaroline would allow us to use monotherapy and cover MRSA and Gram negative rods. ESR and CRP may be difficult to follow because of her Lupus and RA. 1. Discontinue Zosyn and Clindamycin  2. Start Ceftaroline IV for now  3. In am, repeat CRP and check procal  4. Follow-up blood and wound culture    Isaac Parada MD

## 2021-08-07 NOTE — CONSULTS
History and Physical    Chief complaints: Right arm abscess. History of Presenting Illness:  Zehra Doherty is a 27 y.o. woman presented to hospital with right arm abscess. Patient has a history of lupus. Patient is immunocompromise state. Patient apparently had a small abscess drained right upper arm area in the emergency room. Patient complaining of significant pain on the right arm and some swelling. 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us ([facility] [address]     Pulmonary HTN ([facility] [address]     RA (rheumatoid arthritis) ([facility] [address]       Past Surgical History:   Procedure Laterality Date    HX ORTHOPAEDIC      x2 left knee sxs    HX ORTHOPAEDIC      bilateral ankle sxs     Family History   Problem Relation Age of Onset    Hypertension Mother       Social History     Tobacco Use    Smoking status: Never Smoker    Smokeless tobacco: Never Used   Substance Use Topics    Alcohol use: Never       Prior to Admission medications    Medication Sig Start Date End Date Taking? Authorizing Provider   gabapentin (NEURONTIN) 600 mg tablet Take 300 mg by mouth two (2) times a day. Yes Other, MD Sherman   spironolactone (ALDACTONE) 25 mg tablet 25 mg daily. At bedtime 12/13/20  Yes Other, MD Sherman   Adempas 2.5 mg tab tablet 2.5 mg two (2) times a day. 12/8/20  Yes Other, MD Sherman   predniSONE (DELTASONE) 5 mg tablet Take 18 mg by mouth nightly. 15 mg in the AM and 3 mg at night 10/21/20  Yes Other, MD Sherman   pantoprazole (PROTONIX) 40 mg tablet TAKE 1 TABLET BY MOUTH EVERY DAY 11/15/20  Yes Other, MD Sherman   hydrOXYchloroQUINE (PLAQUENIL) 200 mg tablet  12/14/20  Yes Other, MD Sherman   furosemide (LASIX) 20 mg tablet  12/17/20  Yes Other, MD Sherman   DULoxetine (CYMBALTA) 30 mg capsule TAKE 1 CAPSULE BY MOUTH TWICE A DAY 10/25/20  Yes Other, MD Sherman   Letairis 5 mg tablet 5 mg daily.  In AM 12/8/20  Yes Other, MD Sherman     Allergies   Allergen Reactions    Doxycycline Nausea and Vomiting    Heparin Anaphylaxis    Remicade [Infliximab] Anaphylaxis    Vancomycin Hives    Percocet [Oxycodone-Acetaminophen] Nausea and Vomiting     Side effect        Review of Systems:  Pertinent review of systems discussed in HPI, and rest of organ systems personally reviewed and they are negative. Objective:   Vital signs reviewed:      Visit Vitals  /78   Pulse 97   Temp 98.1 °F (36.7 °C)   Resp 16   Ht 5' 3\" (1.6 m)   Wt 141 lb (64 kg)   SpO2 99%   BMI 24.98 kg/m²       Physical Exam:   General appearance:   Well hydrated she is awake and alert  Head and neck atraumatic normocephalic  Cardiac system regular rate  ENT oral mucosa is normal no hoarseness no jaundice  Pulmonary no audible wheeze  Chest wall excursion normal with respiration cycle  Abdomen soft nontender distended  Neurologic intact cranial nerves intact skin is warm moist.  Right arm is examined. Patient has a relatively small size abscesses at least 3 different locations one is a proximal to the dorsum of the right wrist about the size of a marble and there is another 1 distal to the antecubital area at the volar aspect and about the size of a marble and there is another one at the medial aspect of the biceps and this 1 is may be about marble size as well. She does have surrounding cellulitis. Data Review: Labs are reviewed. Discussed  Recent Results (from the past 24 hour(s))   CBC WITH AUTOMATED DIFF    Collection Time: 08/07/21 10:56 AM   Result Value Ref Range    WBC 6.0 3.6 - 11.0 K/uL    RBC 3.47 (L) 3.80 - 5.20 M/uL    HGB 9.7 (L) 11.5 - 16.0 g/dL    HCT 33.3 (L) 35.0 - 47.0 %    MCV 96.0 80.0 - 99.0 FL    MCH 28.0 26.0 - 34.0 PG    MCHC 29.1 (L) 30.0 - 36.5 g/dL    RDW 18.4 (H) 11.5 - 14.5 %    PLATELET 057 667 - 647 K/uL    MPV 9.6 8.9 - 12.9 FL    NEUTROPHILS 85 (H) 32 - 75 %    LYMPHOCYTES 11 (L) 12 - 49 %    MONOCYTES 2 (L) 5 - 13 %    EOSINOPHILS 0 0 - 7 %    BASOPHILS 0 0 - 1 %    IMMATURE GRANULOCYTES 2 (H) 0.0 - 0.5 %    ABS. NEUTROPHILS 5.1 1.8 - 8.0 K/UL    ABS. LYMPHOCYTES 0.7 (L) 0.8 - 3.5 K/UL    ABS. MONOCYTES 0.1 0.0 - 1.0 K/UL    ABS. EOSINOPHILS 0.0 0.0 - 0.4 K/UL    ABS. BASOPHILS 0.0 0.0 - 0.1 K/UL    ABS. IMM. GRANS. 0.1 (H) 0.00 - 0.04 K/UL    DF Smear Scanned      RBC COMMENTS Anisocytosis  2+       METABOLIC PANEL, BASIC    Collection Time: 08/07/21 10:56 AM   Result Value Ref Range    Sodium 144 136 - 145 mmol/L    Potassium 3.0 (L) 3.5 - 5.1 mmol/L    Chloride 108 97 - 108 mmol/L    CO2 26 21 - 32 mmol/L    Anion gap 10 5 - 15 mmol/L    Glucose 112 (H) 65 - 100 mg/dL    BUN 16 6 - 20 mg/dL    Creatinine 0.82 0.55 - 1.02 mg/dL    BUN/Creatinine ratio 20 12 - 20      GFR est AA >60 >60 ml/min/1.73m2    GFR est non-AA >60 >60 ml/min/1.73m2    Calcium 8.4 (L) 8.5 - 10.1 mg/dL   LACTIC ACID    Collection Time: 08/07/21 10:56 AM   Result Value Ref Range    Lactic acid 1.6 0.4 - 2.0 mmol/L   C REACTIVE PROTEIN, QT    Collection Time: 08/07/21 10:56 AM   Result Value Ref Range    C-Reactive protein 1.66 (H) 0.00 - 0.60 mg/dL   SED RATE (ESR)    Collection Time: 08/07/21 10:56 AM   Result Value Ref Range    Sed rate, automated 81 mm/hr         Imagings reviewed: discussed as below. Assessment:     Active Problems:    Cellulitis (8/7/2021)      Sepsis ([address] (8/7/2021)        Plan:     Patient has multiple abscesses right forearm and they are relatively small size. My recommendation is to keep the arm elevated conservative management with arm elevations. And frequent warm compress therapy. Patient is a relatively small size abscesses and that we can manage this conservatively. Patient is not certainly septic. And keep the arm elevated as as well.     I thank you for consultation and let me participate the care of this patient

## 2021-08-08 ENCOUNTER — APPOINTMENT (OUTPATIENT)
Dept: CT IMAGING | Age: 31
End: 2021-08-08
Attending: PHYSICIAN ASSISTANT
Payer: MEDICARE

## 2021-08-08 LAB
CRP SERPL-MCNC: 4.99 MG/DL (ref 0–0.6)
PROCALCITONIN SERPL-MCNC: <0.05 NG/ML

## 2021-08-08 PROCEDURE — 74011250637 HC RX REV CODE- 250/637: Performed by: PHYSICIAN ASSISTANT

## 2021-08-08 PROCEDURE — 97530 THERAPEUTIC ACTIVITIES: CPT

## 2021-08-08 PROCEDURE — G0378 HOSPITAL OBSERVATION PER HR: HCPCS

## 2021-08-08 PROCEDURE — 74011250636 HC RX REV CODE- 250/636: Performed by: INTERNAL MEDICINE

## 2021-08-08 PROCEDURE — 84145 PROCALCITONIN (PCT): CPT

## 2021-08-08 PROCEDURE — 36415 COLL VENOUS BLD VENIPUNCTURE: CPT

## 2021-08-08 PROCEDURE — 99232 SBSQ HOSP IP/OBS MODERATE 35: CPT | Performed by: INTERNAL MEDICINE

## 2021-08-08 PROCEDURE — 97161 PT EVAL LOW COMPLEX 20 MIN: CPT

## 2021-08-08 PROCEDURE — 74011636637 HC RX REV CODE- 636/637: Performed by: PHYSICIAN ASSISTANT

## 2021-08-08 PROCEDURE — 74011000636 HC RX REV CODE- 636: Performed by: FAMILY MEDICINE

## 2021-08-08 PROCEDURE — 74011250636 HC RX REV CODE- 250/636: Performed by: PHYSICIAN ASSISTANT

## 2021-08-08 PROCEDURE — 73201 CT UPPER EXTREMITY W/DYE: CPT

## 2021-08-08 PROCEDURE — 65270000029 HC RM PRIVATE

## 2021-08-08 PROCEDURE — 86140 C-REACTIVE PROTEIN: CPT

## 2021-08-08 PROCEDURE — 74011000258 HC RX REV CODE- 258: Performed by: INTERNAL MEDICINE

## 2021-08-08 RX ORDER — KETOROLAC TROMETHAMINE 15 MG/ML
15 INJECTION, SOLUTION INTRAMUSCULAR; INTRAVENOUS
Status: DISCONTINUED | OUTPATIENT
Start: 2021-08-08 | End: 2021-08-10 | Stop reason: HOSPADM

## 2021-08-08 RX ADMIN — KETOROLAC TROMETHAMINE 15 MG: 15 INJECTION, SOLUTION INTRAMUSCULAR; INTRAVENOUS at 15:33

## 2021-08-08 RX ADMIN — PREDNISONE 3 MG: 1 TABLET ORAL at 21:38

## 2021-08-08 RX ADMIN — IOPAMIDOL 100 ML: 755 INJECTION, SOLUTION INTRAVENOUS at 13:08

## 2021-08-08 RX ADMIN — SODIUM CHLORIDE 125 ML/HR: 9 INJECTION, SOLUTION INTRAVENOUS at 19:47

## 2021-08-08 RX ADMIN — HYDROMORPHONE HYDROCHLORIDE 1 MG: 1 INJECTION, SOLUTION INTRAMUSCULAR; INTRAVENOUS; SUBCUTANEOUS at 13:58

## 2021-08-08 RX ADMIN — HYDROMORPHONE HYDROCHLORIDE 1 MG: 1 INJECTION, SOLUTION INTRAMUSCULAR; INTRAVENOUS; SUBCUTANEOUS at 04:42

## 2021-08-08 RX ADMIN — Medication 10 ML: at 21:39

## 2021-08-08 RX ADMIN — CEFTAROLINE FOSAMIL 600 MG: 600 POWDER, FOR SOLUTION INTRAVENOUS at 21:39

## 2021-08-08 RX ADMIN — FUROSEMIDE 20 MG: 40 TABLET ORAL at 10:09

## 2021-08-08 RX ADMIN — GABAPENTIN 300 MG: 300 CAPSULE ORAL at 21:38

## 2021-08-08 RX ADMIN — Medication 10 ML: at 15:41

## 2021-08-08 RX ADMIN — DULOXETINE HYDROCHLORIDE 30 MG: 30 CAPSULE, DELAYED RELEASE ORAL at 10:10

## 2021-08-08 RX ADMIN — HYDROXYCHLOROQUINE SULFATE 200 MG: 200 TABLET, FILM COATED ORAL at 10:08

## 2021-08-08 RX ADMIN — HYDROMORPHONE HYDROCHLORIDE 1 MG: 1 INJECTION, SOLUTION INTRAMUSCULAR; INTRAVENOUS; SUBCUTANEOUS at 10:10

## 2021-08-08 RX ADMIN — SPIRONOLACTONE 25 MG: 25 TABLET ORAL at 10:08

## 2021-08-08 RX ADMIN — PREDNISONE 15 MG: 5 TABLET ORAL at 13:58

## 2021-08-08 RX ADMIN — Medication 10 ML: at 06:00

## 2021-08-08 RX ADMIN — SODIUM CHLORIDE 125 ML/HR: 9 INJECTION, SOLUTION INTRAVENOUS at 04:46

## 2021-08-08 RX ADMIN — CEFTAROLINE FOSAMIL 600 MG: 600 POWDER, FOR SOLUTION INTRAVENOUS at 10:34

## 2021-08-08 RX ADMIN — GABAPENTIN 300 MG: 300 CAPSULE ORAL at 10:09

## 2021-08-08 RX ADMIN — KETOROLAC TROMETHAMINE 15 MG: 15 INJECTION, SOLUTION INTRAMUSCULAR; INTRAVENOUS at 21:38

## 2021-08-08 RX ADMIN — PANTOPRAZOLE SODIUM 40 MG: 40 TABLET, DELAYED RELEASE ORAL at 10:08

## 2021-08-08 NOTE — ROUTINE PROCESS
Bedside and Verbal shift change report given to Dk Colon Janessaboyd Acuna by Jeannine Regalado (offgoing nurse).  Report included the following information SBAR, Kardex, MAR, Accordion, Recent Results, Med Rec Status and Quality Measures

## 2021-08-08 NOTE — PROGRESS NOTES
Hospitalist Progress Note    Subjective:   Daily Progress Note: 8/8/2021 3:21 PM    Still with persistent severe right arm pain and cellulitis    Current Facility-Administered Medications   Medication Dose Route Frequency    ketorolac (TORADOL) injection 15 mg  15 mg IntraVENous Q6H PRN    . PHARMACY TO SUBSTITUTE PER PROTOCOL (Reordered from: Adempas 2.5 mg tab tablet)    Per Protocol    DULoxetine (CYMBALTA) capsule 30 mg  30 mg Oral DAILY    furosemide (LASIX) tablet 20 mg  20 mg Oral DAILY    gabapentin (NEURONTIN) capsule 300 mg  300 mg Oral BID    hydrOXYchloroQUINE (PLAQUENIL) tablet 200 mg  200 mg Oral DAILY WITH BREAKFAST    . PHARMACY TO SUBSTITUTE PER PROTOCOL (Reordered from: Letairis 5 mg tablet)    Per Protocol    pantoprazole (PROTONIX) tablet 40 mg  40 mg Oral ACB    spironolactone (ALDACTONE) tablet 25 mg  25 mg Oral DAILY    predniSONE (DELTASONE) tablet 15 mg  15 mg Oral DAILY WITH BREAKFAST    predniSONE (DELTASONE) tablet 3 mg  3 mg Oral QHS    sodium chloride (NS) flush 5-40 mL  5-40 mL IntraVENous Q8H    sodium chloride (NS) flush 5-40 mL  5-40 mL IntraVENous PRN    acetaminophen (TYLENOL) tablet 650 mg  650 mg Oral Q6H PRN    Or    acetaminophen (TYLENOL) suppository 650 mg  650 mg Rectal Q6H PRN    polyethylene glycol (MIRALAX) packet 17 g  17 g Oral DAILY PRN    ondansetron (ZOFRAN ODT) tablet 4 mg  4 mg Oral Q8H PRN    Or    ondansetron (ZOFRAN) injection 4 mg  4 mg IntraVENous Q6H PRN    HYDROmorphone (DILAUDID) injection 1 mg  1 mg IntraVENous Q4H PRN    oxyCODONE IR (ROXICODONE) tablet 5 mg  5 mg Oral Q4H PRN    0.9% sodium chloride infusion  125 mL/hr IntraVENous CONTINUOUS    cefTARoline (TEFLARO) 600 mg in 0.9% sodium chloride (MBP/ADV) 50 mL MBP  600 mg IntraVENous Q12H        Review of Systems  Review of Systems   Constitutional: Positive for malaise/fatigue. Negative for chills and fever. HENT: Negative. Eyes: Negative. Respiratory: Negative.   Negative for cough and shortness of breath. Cardiovascular: Negative. Negative for chest pain and leg swelling. Gastrointestinal: Negative for abdominal pain, nausea and vomiting. Genitourinary: Negative. Musculoskeletal: Negative. Skin:        Right arm cellulitis   Neurological: Negative. Psychiatric/Behavioral: Negative. Objective:     Visit Vitals  /89 (BP Patient Position: At rest;Supine)   Pulse 71   Temp 97.9 °F (36.6 °C)   Resp 17   Ht 5' 3\" (1.6 m)   Wt 64 kg (141 lb)   SpO2 97%   BMI 24.98 kg/m²      O2 Device: None (Room air)    Temp (24hrs), Av °F (36.7 °C), Min:97.9 °F (36.6 °C), Max:98.1 °F (36.7 °C)      No intake/output data recorded.  1901 -  0700  In: 1566.7 [I.V.:1566.7]  Out: -     Recent Results (from the past 24 hour(s))   GLUCOSE, POC    Collection Time: 21  9:12 PM   Result Value Ref Range    Glucose (POC) 91 65 - 117 mg/dL    Performed by Gabi Costa    C REACTIVE PROTEIN, QT    Collection Time: 21 11:03 AM   Result Value Ref Range    C-Reactive protein 4.99 (H) 0.00 - 0.60 mg/dL   PROCALCITONIN    Collection Time: 21 11:03 AM   Result Value Ref Range    Procalcitonin <0.05 (H) 0 ng/mL        XR FOREARM RT AP/LAT   Final Result   No soft tissue gas is identified. No acute injury. Chronic   subcutaneous calcifications. XR HUMERUS RT   Final Result   impression: 2 views of the right forearm reveal no fracture or dislocation. Diffuse calcifications are visible, similar to the forearm. The pattern does not   suggest vascular arterial or venous calcifications. Psoas, thermal injury prior   cellulitis or infection should be considered. CT UP EXT RT W CONT    (Results Pending)        PHYSICAL EXAM:    Physical Exam  Vitals reviewed. Constitutional:       Appearance: She is ill-appearing. HENT:      Head: Normocephalic and atraumatic.       Mouth/Throat:      Mouth: Mucous membranes are moist.      Pharynx: Oropharynx is clear.   Eyes:      Conjunctiva/sclera: Conjunctivae normal.   Cardiovascular:      Rate and Rhythm: Regular rhythm. Tachycardia present. Pulses: Normal pulses. Heart sounds: Normal heart sounds. Pulmonary:      Effort: Pulmonary effort is normal.      Breath sounds: Normal breath sounds. Abdominal:      General: Abdomen is flat. Bowel sounds are normal.      Palpations: Abdomen is soft. Musculoskeletal:         General: Normal range of motion. Cervical back: Normal range of motion and neck supple. Skin:     Comments: Right arm cellulitis with 2 areas of induration without obvious fluctuance   Neurological:      General: No focal deficit present. Mental Status: She is alert and oriented to person, place, and time. Mental status is at baseline. Psychiatric:         Mood and Affect: Mood normal.          Data Review    Recent Results (from the past 24 hour(s))   GLUCOSE, POC    Collection Time: 08/07/21  9:12 PM   Result Value Ref Range    Glucose (POC) 91 65 - 117 mg/dL    Performed by Theresa Salvador    C REACTIVE PROTEIN, QT    Collection Time: 08/08/21 11:03 AM   Result Value Ref Range    C-Reactive protein 4.99 (H) 0.00 - 0.60 mg/dL   PROCALCITONIN    Collection Time: 08/08/21 11:03 AM   Result Value Ref Range    Procalcitonin <0.05 (H) 0 ng/mL        Assessment/Plan:     Active Problems:    Cellulitis (8/7/2021)      Sepsis ([address] (8/7/2021)      Hospital course: This 79-year-old female with immunosuppression currently on prednisone secondary to lupus and rheumatoid arthritis. Patient developed right arm cellulitis and 3 potential abscesses on the right arm. The upper arm abscess was I&D at the [facility] emergency department and cultures are pending. ID consultation due to the immunosuppression. Patient was started on clindamycin and Zosyn. These were discontinued for Teflaro. Dr. Stephanie Juárez, general surgery consultation. CT of the right arm pending    Impression:     1. Immunosuppression  2. Lupus  3. Pulmonary hypertension  4. Cellulitis with abscesses of the right arm  5. Rheumatoid arthritis     Plan:     1. Immunosuppression  Continue prednisone     2.  Lupus  Continue prednisone  Continue Plaquenil     3.  Pulmonary hypertension  Continue adempas  Continue Lasix  Continue Letairis  Continue spironolactone     4.  Cellulitis with abscess of the right forearm  CT of the right arm to rule out deep infection  General surgery consultation, Dr. Stephanie Juárez patient's primary surgeon  Clindamycin and Zosyn discontinued  Teflaro  Blood cultures pending  [facility]  ID consultation due to immunosuppression  Due to the possibility of 3 abscesses may benefit from OR I&D  Dilaudid and Toradol for pain control  Warm compresses to the indurated area     5. Rheumatoid arthritis  Continue immunosuppression medications     CODE STATUS: Full code     DVT prophylaxis: SCDs  Ulcer prophylaxis: Protonix     Discharge barriers: Continue antibiotics, wound cultures and surgical evaluation    Care Plan discussed with: Patient/Family    Total time spent with patient: >35 minutes.

## 2021-08-08 NOTE — PROGRESS NOTES
Progress Note    Patient: Sandie Arriola MRN: [id number]  SSN: [id number]    YOB: 1990  Age: 27 y.o. Sex: female      Admit Date: 8/7/2021    LOS: 1 day     Subjective:   Patient followed for cellulitis/abscess right arm. Blood and wound cultures still pending. She remains afebrile. Currently on IV Ceftaroline alone. CT scan of right arm showed mild subcutaneous edema within the right upper extremity, nonspecific but consistent with cellulitis and no abscess seen. Objective:     Vitals:    08/07/21 1424 08/07/21 1636 08/07/21 2221 08/08/21 0823   BP: 129/86 128/78 (!) 141/81 136/89   Pulse: (!) 107 97 84 71   Resp: 16 16 16 17   Temp:  98.1 °F (36.7 °C) 98.1 °F (36.7 °C) 97.9 °F (36.6 °C)   SpO2: 98% 99% 97% 97%   Weight:       Height:            Intake and Output:  Current Shift: No intake/output data recorded. Last three shifts: 08/06 1901 - 08/08 0700  In: 1566.7 [I.V.:1566.7]  Out: -     Physical Exam:   Vitals and nursing note reviewed. Constitutional:       Appearance: She is ill-appearing. HENT:      Head: Normocephalic and atraumatic. Right Ear: External ear normal.      Left Ear: External ear normal.      Nose: Nose normal.      Mouth/Throat:      Pharynx: Oropharynx is clear. Eyes:      Pupils: Pupils are equal, round, and reactive to light. Cardiovascular:      Rate and Rhythm: Normal rate and regular rhythm. Heart sounds: No murmur heard. Pulmonary:      Effort: Pulmonary effort is normal.      Breath sounds: Normal breath sounds. Abdominal:      General: Bowel sounds are normal.      Palpations: Abdomen is soft. Genitourinary:     Comments: No Mckeon  Musculoskeletal:         General: Swelling and tenderness present. Cervical back: Neck supple. Right lower leg: No edema. Left lower leg: No edema.       Comments: Right arm is moderately swollen with erythema and warmth; there is a small wound over bicep that is dry at this time; there is diffuse tenderness; hardening of the skin noted, related to subcutaneous calcification seen on x-rays   Skin:     Findings: Erythema present. No rash. Neurological:      General: No focal deficit present. Mental Status: She is alert and oriented to person, place, and time. Psychiatric:         Mood and Affect: Mood normal.         Behavior: Behavior normal.         Thought Content: Thought content normal.         Judgment: Judgment normal.      Lab/Data Review:     CRP 4.99 < 1.66  Procal  <0.05    Blood cultures (8/7) Pending  Wound culture right arm (8/7) Pending    CT scan right arm (8/8) Mild subcutaneous edema within the right upper extremity, nonspecific but  could be seen with cellulitis. No drainable rim-enhancing abscess identified. No  CT evidence of osteomyelitis  Assessment:     Active Problems:    Cellulitis (8/7/2021)      Sepsis ([address] (8/7/2021)    1. Cellulitis and abscess, right arm, etiology unclear, Day #2 IV Ceftaroline  2. Immunocompromised patient   3. Elevated CRP  4. Lupus  5. Rheumatoid arthritis  6. Allergy to Vancomycin and Doxycycline     Comment:  Gram stain of wound culture unimpressive with only rare WBCs and no organisms seen, which would be unusual for MSSA/MRSA infections. No abscess on CT scan. Plan:   1. Continue Ceftaroline IV for now  2. In am, repeat CRP and procal  4.  Follow-up blood and wound cultures       Signed By: Marcia Oconnell MD     August 8, 2021

## 2021-08-08 NOTE — PROGRESS NOTES
Transition of Care Plan   RUR- 10% Low Risk   DISPOSITION: The disposition plan is pending medical progression and recommendation.  F/U with PCP/Specialist     Transport: Family -Alesia Lilly [facility] [phone]  Reason for Admission: \"cellulitus in arm\"                    RUR Score: 10% Low Risk                    Plan for utilizing home health: N/A         PATIENT WOULD LIKE A PCP HERE AT [address] [place] [address]. PCP: First and Last name:  Unknown (Inactive)     Name of Practice:    Are you a current patient: Yes/No:    Approximate date of last visit:    Can you participate in a virtual visit with your PCP:                     Current Advanced Directive/Advance Care Plan: Full Code  Has no ACP documentation on file at this time. Healthcare Decision Maker:   Click here to complete liveBooks including selection of the Healthcare Decision Maker Relationship (ie \"Primary\")   Parents                       Transition of Care Plan:  Pending recommendation. Reviewed chart for transitions of care and discussed in rounds. CM met with patient at bedside to explain role and offer support. Patient is alert and oriented x4 and confirmed demographics. Baseline: DME - walker  ADLs/IDALS: Needs  assistance  Previous Home Health:Yes - [facility]  Previous SNF/IPR: N/A  ER Contact: Mother Duarte Lilly [facility] - [phone]    Patient lives in a 1 level house with 4 steps to enter. Patient reports that she lives with her parents Patient needs assistance with ADLs/IDAL. Patient uses DMEs (walker) to ambulate. Patient's preferred pharmacy is [facility] located in [place] for her prescriptions. Patient's parents is expected to transport at discharge. Care Management Interventions  PCP Verified by CM: Yes  Palliative Care Criteria Met (RRAT>21 & CHF Dx)?: No  Mode of Transport at Discharge:  Other (see comment)  Transition of Care Consult (CM Consult): Discharge Planning  MyChart Signup: No  Discharge Durable Medical Equipment: No  Physical Therapy Consult: Yes  Occupational Therapy Consult: Yes  Speech Therapy Consult: No  Current Support Network: Relative's Home  Confirm Follow Up Transport: Family  The Patient and/or Patient Representative was Provided with a Choice of Provider and Agrees with the Discharge Plan?: Yes  Freedom of Choice List was Provided with Basic Dialogue that Supports the Patient's Individualized Plan of Care/Goals, Treatment Preferences and Shares the Quality Data Associated with the Providers?: Yes  The [employer] Information Provided?: No    Medicare pt has received, reviewed, and signed 2nd IM letter informing them of their right to appeal the discharge. Signed copy has been placed on pt bedside chart. CM will continue to follow, provide support and assist with ALO needs as they arise.     Renuka Harkins, MSW, CRM, LMHP-e

## 2021-08-08 NOTE — PROGRESS NOTES
PHYSICAL THERAPY EVALUATION/DISCHARGE  Patient: Karla Enciso (32 y.o. female)  Date: 8/8/2021  Primary Diagnosis: Cellulitis [L03.90]  Sepsis ([facility] [A41.9]       Precautions:        ASSESSMENT  Based on the objective data described below, the patient presents at functional baseline for mobility and amb. Pt A&O x 4; per pt report pt resides with her mother who only assists with her wound care in a 1 SH with 3 HENRY, handrail available; IND with ADLS and IADLS, used RW for mobility when experiencing increased pain. DME at home RW. Pt sitting EOB upon PT arrival, agreeable to evaluation and reporting incr LBP. Recliner obtained for pt and pt ambulated independently to chair. Pt completed toileting with IND and requested ambulation in hallway, demonstrated good endurance and safety awareness, left ambulating with mother in hallway due to demonstration of functional baseline. Pt demonstrated IND for bed mobility, supine <> sit and sit <> stand transfers. Pt amb 20 ft in hallway with use of IV pole; demonstrates slowed gt pattern 2/2 incr pain reported in RUE/LLE due to presence of wounds noted. Pt does not have concerns for acute PT, pt educated on use of outpatient PT for musculoskeletal pain/dysfunction reported during session. Pt did well with session today, increase UE pain reported but good participation. Pt has no skilled acute PT needs at this time noted by PT or reported by pt, will DC skilled PT following evaluation; pt verbalized understanding and agreement.      Other factors to consider for discharge: mother provides some assist at baseline, good pt education on condition/safety awareness     PLAN :  Recommendations and Planned Interventions: DC from skilled PT services following evaluation    Frequency/Duration: Patient will be followed by physical therapy: DC from services following evaluation due to pt being at functional baseline; no skilled therapy required during admission, please reorder if needed Recommendation for discharge: (in order for the patient to meet his/her long term goals)  Home with prior level of care    This discharge recommendation:  Has been made in collaboration with the attending provider and/or case management    IF patient discharges home will need the following DME: patient owns DME required for discharge       SUBJECTIVE:   Patient stated I'm just moving slow because of the pain.     OBJECTIVE DATA SUMMARY:   HISTORY:    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     [facility]     RA (rheumatoid arthritis) ([facility]      Past Surgical History:   Procedure Laterality Date    HX ORTHOPAEDIC      x2 left knee sxs    HX ORTHOPAEDIC      bilateral ankle sxs       Personal factors and/or comorbidities impacting plan of care:     Home Situation  Home Environment: Private residence  # Steps to Enter: 3  Rails to Enter: Yes  One/Two Story Residence: One story  Living Alone: No  Support Systems: Parent  Patient Expects to be Discharged to[de-identified] House  Current DME Used/Available at Home: Walker, rolling    EXAMINATION/PRESENTATION/DECISION MAKING:   Critical Behavior:  Neurologic State: Alert  Orientation Level: Oriented X4  Cognition: Appropriate decision making, Appropriate for age attention/concentration, Appropriate safety awareness     Hearing: Auditory  Auditory Impairment: None  Skin:  Wound present on RUE/LLE, LLE dressing present  Edema: RUE swelling noted  Range Of Motion:  AROM: Within functional limits      Strength:    Strength: Within functional limits         Functional Mobility:  Bed Mobility:   Did not participate           Transfers:  Sit to Stand: Independent  Stand to Sit: Independent                 Sliding Board : Independent     Balance:   Sitting: Intact  Standing: Intact; With support  Ambulation/Gait Training:  Distance (ft): 30 Feet (ft)  Assistive Device: Other (comment) (IV pole)  Ambulation - Level of Assistance: Supervision        Gait Abnormalities: Trunk sway increased        Base of Support: Widened     Speed/Krysten: Slow         Functional Measure:  [employer] AM-PAC 6 Clicks         Basic Mobility Inpatient Short Form  How much difficulty does the patient currently have. .. Unable A Lot A Little None   1. Turning over in bed (including adjusting bedclothes, sheets and blankets)? [] 1   [] 2   [] 3   [x] 4   2. Sitting down on and standing up from a chair with arms ( e.g., wheelchair, bedside commode, etc.)   [] 1   [] 2   [] 3   [x] 4   3. Moving from lying on back to sitting on the side of the bed? [] 1   [] 2   [] 3   [x] 4          How much help from another person does the patient currently need. .. Total A Lot A Little None   4. Moving to and from a bed to a chair (including a wheelchair)? [] 1   [] 2   [] 3   [x] 4   5. Need to walk in hospital room? [] 1   [] 2   [] 3   [x] 4   6. Climbing 3-5 steps with a railing? [] 1   [] 2   [] 3   [x] 4   © 2007, Trustees of [facility] [employer], under license to [employer]. All rights reserved     Score:  Initial: 24/24 Most Recent: X (Date: 8/8/2021)   Interpretation of Tool:  Represents activities that are increasingly more difficult (i.e. Bed mobility, Transfers, Gait).   Score 24 23 22-20 19-15 14-10 9-7 6   Modifier CH CI CJ CK CL CM CN          Physical Therapy Evaluation Charge Determination   History Examination Presentation Decision-Making   MEDIUM  Complexity : 1-2 comorbidities / personal factors will impact the outcome/ POC  LOW Complexity : 1-2 Standardized tests and measures addressing body structure, function, activity limitation and / or participation in recreation  LOW Complexity : Stable, uncomplicated  Other outcome measures [facility]  LOW      Based on the above components, the patient evaluation is determined to be of the following complexity level: LOW     Pain Rating:  Pt did not verbally rate pain but did complain of RUE pain frequently during session    Activity Tolerance:   Fair    After treatment patient left in no apparent distress:   Ambulating in hallway with mother and RN updated      COMMUNICATION/EDUCATION:   The patients plan of care was discussed with: Registered nurse. Fall prevention education was provided and the patient/caregiver indicated understanding. and Patient/family have participated as able in goal setting and plan of care.       Thank you for this referral.  Bethel Rodriguez, PT, DPT   Time Calculation: 30 mins

## 2021-08-09 ENCOUNTER — ANESTHESIA EVENT (OUTPATIENT)
Dept: SURGERY | Age: 31
End: 2021-08-09
Payer: MEDICARE

## 2021-08-09 ENCOUNTER — ANESTHESIA (OUTPATIENT)
Dept: SURGERY | Age: 31
End: 2021-08-09
Payer: MEDICARE

## 2021-08-09 LAB
ALBUMIN SERPL-MCNC: 2.5 G/DL (ref 3.5–5)
ALBUMIN/GLOB SERPL: 0.5 {RATIO} (ref 1.1–2.2)
ALP SERPL-CCNC: 94 U/L (ref 45–117)
ALT SERPL-CCNC: 23 U/L (ref 12–78)
ANION GAP SERPL CALC-SCNC: 3 MMOL/L (ref 5–15)
AST SERPL W P-5'-P-CCNC: 13 U/L (ref 15–37)
BACTERIA SPEC CULT: NORMAL
BASOPHILS # BLD: 0 K/UL (ref 0–0.1)
BASOPHILS NFR BLD: 0 % (ref 0–1)
BILIRUB SERPL-MCNC: 0.3 MG/DL (ref 0.2–1)
BUN SERPL-MCNC: 17 MG/DL (ref 6–20)
BUN/CREAT SERPL: 27 (ref 12–20)
CA-I BLD-MCNC: 8.6 MG/DL (ref 8.5–10.1)
CHLORIDE SERPL-SCNC: 108 MMOL/L (ref 97–108)
CO2 SERPL-SCNC: 26 MMOL/L (ref 21–32)
CREAT SERPL-MCNC: 0.64 MG/DL (ref 0.55–1.02)
CRP SERPL-MCNC: 9.44 MG/DL (ref 0–0.6)
DIFFERENTIAL METHOD BLD: ABNORMAL
EOSINOPHIL # BLD: 0 K/UL (ref 0–0.4)
EOSINOPHIL NFR BLD: 0 % (ref 0–7)
ERYTHROCYTE [DISTWIDTH] IN BLOOD BY AUTOMATED COUNT: 18.1 % (ref 11.5–14.5)
GLOBULIN SER CALC-MCNC: 4.7 G/DL (ref 2–4)
GLUCOSE BLD STRIP.AUTO-MCNC: 78 MG/DL (ref 65–117)
GLUCOSE BLD STRIP.AUTO-MCNC: 88 MG/DL (ref 65–117)
GLUCOSE BLD STRIP.AUTO-MCNC: 95 MG/DL (ref 65–117)
GLUCOSE SERPL-MCNC: 80 MG/DL (ref 65–100)
GRAM STN SPEC: NORMAL
GRAM STN SPEC: NORMAL
HCT VFR BLD AUTO: 28.6 % (ref 35–47)
HGB BLD-MCNC: 8.3 G/DL (ref 11.5–16)
IMM GRANULOCYTES # BLD AUTO: 0.1 K/UL (ref 0–0.04)
IMM GRANULOCYTES NFR BLD AUTO: 1 % (ref 0–0.5)
LYMPHOCYTES # BLD: 0.3 K/UL (ref 0.8–3.5)
LYMPHOCYTES NFR BLD: 5 % (ref 12–49)
MCH RBC QN AUTO: 27.2 PG (ref 26–34)
MCHC RBC AUTO-ENTMCNC: 29 G/DL (ref 30–36.5)
MCV RBC AUTO: 93.8 FL (ref 80–99)
MONOCYTES # BLD: 0.2 K/UL (ref 0–1)
MONOCYTES NFR BLD: 3 % (ref 5–13)
NEUTS SEG # BLD: 5.2 K/UL (ref 1.8–8)
NEUTS SEG NFR BLD: 91 % (ref 32–75)
NRBC # BLD: 0.02 K/UL (ref 0–0.01)
NRBC BLD-RTO: 0.3 PER 100 WBC
PERFORMED BY, TECHID: NORMAL
PLATELET # BLD AUTO: 251 K/UL (ref 150–400)
PMV BLD AUTO: 9.8 FL (ref 8.9–12.9)
POTASSIUM SERPL-SCNC: 3.8 MMOL/L (ref 3.5–5.1)
PROCALCITONIN SERPL-MCNC: 0.07 NG/ML
PROT SERPL-MCNC: 7.2 G/DL (ref 6.4–8.2)
RBC # BLD AUTO: 3.05 M/UL (ref 3.8–5.2)
SODIUM SERPL-SCNC: 137 MMOL/L (ref 136–145)
SPECIAL REQUESTS,SREQ: NORMAL
WBC # BLD AUTO: 5.7 K/UL (ref 3.6–11)

## 2021-08-09 PROCEDURE — 87205 SMEAR GRAM STAIN: CPT

## 2021-08-09 PROCEDURE — 99232 SBSQ HOSP IP/OBS MODERATE 35: CPT | Performed by: SURGERY

## 2021-08-09 PROCEDURE — 85025 COMPLETE CBC W/AUTO DIFF WBC: CPT

## 2021-08-09 PROCEDURE — 84145 PROCALCITONIN (PCT): CPT

## 2021-08-09 PROCEDURE — 77030040361 HC SLV COMPR DVT MDII -B: Performed by: SURGERY

## 2021-08-09 PROCEDURE — 87077 CULTURE AEROBIC IDENTIFY: CPT

## 2021-08-09 PROCEDURE — 74011250636 HC RX REV CODE- 250/636: Performed by: INTERNAL MEDICINE

## 2021-08-09 PROCEDURE — 74011000258 HC RX REV CODE- 258: Performed by: INTERNAL MEDICINE

## 2021-08-09 PROCEDURE — 82962 GLUCOSE BLOOD TEST: CPT

## 2021-08-09 PROCEDURE — 74011250636 HC RX REV CODE- 250/636: Performed by: SURGERY

## 2021-08-09 PROCEDURE — 77030019895 HC PCKNG STRP IODO -A: Performed by: SURGERY

## 2021-08-09 PROCEDURE — 74011250637 HC RX REV CODE- 250/637: Performed by: PHYSICIAN ASSISTANT

## 2021-08-09 PROCEDURE — 99232 SBSQ HOSP IP/OBS MODERATE 35: CPT | Performed by: INTERNAL MEDICINE

## 2021-08-09 PROCEDURE — 10061 I&D ABSCESS COMP/MULTIPLE: CPT | Performed by: SURGERY

## 2021-08-09 PROCEDURE — 2709999900 HC NON-CHARGEABLE SUPPLY: Performed by: SURGERY

## 2021-08-09 PROCEDURE — 36415 COLL VENOUS BLD VENIPUNCTURE: CPT

## 2021-08-09 PROCEDURE — 86140 C-REACTIVE PROTEIN: CPT

## 2021-08-09 PROCEDURE — 76060000032 HC ANESTHESIA 0.5 TO 1 HR: Performed by: SURGERY

## 2021-08-09 PROCEDURE — 74011250636 HC RX REV CODE- 250/636: Performed by: PHYSICIAN ASSISTANT

## 2021-08-09 PROCEDURE — 76210000006 HC OR PH I REC 0.5 TO 1 HR: Performed by: SURGERY

## 2021-08-09 PROCEDURE — 74011000250 HC RX REV CODE- 250: Performed by: SURGERY

## 2021-08-09 PROCEDURE — 74011636637 HC RX REV CODE- 636/637: Performed by: PHYSICIAN ASSISTANT

## 2021-08-09 PROCEDURE — G0378 HOSPITAL OBSERVATION PER HR: HCPCS

## 2021-08-09 PROCEDURE — 97165 OT EVAL LOW COMPLEX 30 MIN: CPT

## 2021-08-09 PROCEDURE — 80053 COMPREHEN METABOLIC PANEL: CPT

## 2021-08-09 PROCEDURE — 65270000029 HC RM PRIVATE

## 2021-08-09 PROCEDURE — 97535 SELF CARE MNGMENT TRAINING: CPT

## 2021-08-09 PROCEDURE — 87147 CULTURE TYPE IMMUNOLOGIC: CPT

## 2021-08-09 PROCEDURE — 76010000138 HC OR TIME 0.5 TO 1 HR: Performed by: SURGERY

## 2021-08-09 PROCEDURE — 87186 SC STD MICRODIL/AGAR DIL: CPT

## 2021-08-09 RX ORDER — SODIUM CHLORIDE 0.9 % (FLUSH) 0.9 %
5-40 SYRINGE (ML) INJECTION AS NEEDED
Status: CANCELLED | OUTPATIENT
Start: 2021-08-09

## 2021-08-09 RX ORDER — ONDANSETRON 4 MG/1
4 TABLET, ORALLY DISINTEGRATING ORAL
Status: CANCELLED | OUTPATIENT
Start: 2021-08-09

## 2021-08-09 RX ORDER — HYDROMORPHONE HYDROCHLORIDE 1 MG/ML
0.2 INJECTION, SOLUTION INTRAMUSCULAR; INTRAVENOUS; SUBCUTANEOUS
Status: DISCONTINUED | OUTPATIENT
Start: 2021-08-09 | End: 2021-08-09 | Stop reason: HOSPADM

## 2021-08-09 RX ORDER — PROPOFOL 10 MG/ML
INJECTION, EMULSION INTRAVENOUS AS NEEDED
Status: DISCONTINUED | OUTPATIENT
Start: 2021-08-09 | End: 2021-08-09 | Stop reason: HOSPADM

## 2021-08-09 RX ORDER — FENTANYL CITRATE 50 UG/ML
INJECTION, SOLUTION INTRAMUSCULAR; INTRAVENOUS AS NEEDED
Status: DISCONTINUED | OUTPATIENT
Start: 2021-08-09 | End: 2021-08-09 | Stop reason: HOSPADM

## 2021-08-09 RX ORDER — CEFAZOLIN SODIUM 1 G/3ML
INJECTION, POWDER, FOR SOLUTION INTRAMUSCULAR; INTRAVENOUS AS NEEDED
Status: DISCONTINUED | OUTPATIENT
Start: 2021-08-09 | End: 2021-08-09 | Stop reason: HOSPADM

## 2021-08-09 RX ORDER — LIDOCAINE HYDROCHLORIDE 10 MG/ML
INJECTION INFILTRATION; PERINEURAL AS NEEDED
Status: DISCONTINUED | OUTPATIENT
Start: 2021-08-09 | End: 2021-08-09 | Stop reason: HOSPADM

## 2021-08-09 RX ORDER — FENTANYL CITRATE 50 UG/ML
25 INJECTION, SOLUTION INTRAMUSCULAR; INTRAVENOUS
Status: DISCONTINUED | OUTPATIENT
Start: 2021-08-09 | End: 2021-08-09 | Stop reason: HOSPADM

## 2021-08-09 RX ORDER — SODIUM CHLORIDE 0.9 % (FLUSH) 0.9 %
5-40 SYRINGE (ML) INJECTION EVERY 8 HOURS
Status: DISCONTINUED | OUTPATIENT
Start: 2021-08-09 | End: 2021-08-09 | Stop reason: HOSPADM

## 2021-08-09 RX ORDER — MIDAZOLAM HYDROCHLORIDE 1 MG/ML
INJECTION, SOLUTION INTRAMUSCULAR; INTRAVENOUS AS NEEDED
Status: DISCONTINUED | OUTPATIENT
Start: 2021-08-09 | End: 2021-08-09 | Stop reason: HOSPADM

## 2021-08-09 RX ORDER — CEFAZOLIN SODIUM 1 G/3ML
INJECTION, POWDER, FOR SOLUTION INTRAMUSCULAR; INTRAVENOUS
Status: COMPLETED
Start: 2021-08-09 | End: 2021-08-09

## 2021-08-09 RX ORDER — SODIUM CHLORIDE 0.9 % (FLUSH) 0.9 %
5-40 SYRINGE (ML) INJECTION AS NEEDED
Status: DISCONTINUED | OUTPATIENT
Start: 2021-08-09 | End: 2021-08-09 | Stop reason: HOSPADM

## 2021-08-09 RX ORDER — ONDANSETRON 2 MG/ML
4 INJECTION INTRAMUSCULAR; INTRAVENOUS
Status: CANCELLED | OUTPATIENT
Start: 2021-08-09

## 2021-08-09 RX ORDER — SODIUM CHLORIDE 0.9 % (FLUSH) 0.9 %
5-40 SYRINGE (ML) INJECTION EVERY 8 HOURS
Status: CANCELLED | OUTPATIENT
Start: 2021-08-09

## 2021-08-09 RX ORDER — ENOXAPARIN SODIUM 100 MG/ML
40 INJECTION SUBCUTANEOUS DAILY
Status: CANCELLED | OUTPATIENT
Start: 2021-08-10

## 2021-08-09 RX ADMIN — FENTANYL CITRATE 25 MCG: 50 INJECTION, SOLUTION INTRAMUSCULAR; INTRAVENOUS at 18:49

## 2021-08-09 RX ADMIN — Medication 10 ML: at 21:37

## 2021-08-09 RX ADMIN — CEFAZOLIN 2 G: 1 INJECTION, POWDER, FOR SOLUTION INTRAMUSCULAR; INTRAVENOUS at 21:29

## 2021-08-09 RX ADMIN — PROPOFOL 50 MG: 10 INJECTION, EMULSION INTRAVENOUS at 19:02

## 2021-08-09 RX ADMIN — OXYCODONE 5 MG: 5 TABLET ORAL at 13:18

## 2021-08-09 RX ADMIN — FUROSEMIDE 20 MG: 40 TABLET ORAL at 10:19

## 2021-08-09 RX ADMIN — KETOROLAC TROMETHAMINE 15 MG: 15 INJECTION, SOLUTION INTRAMUSCULAR; INTRAVENOUS at 21:28

## 2021-08-09 RX ADMIN — MIDAZOLAM HYDROCHLORIDE 2 MG: 1 INJECTION, SOLUTION INTRAMUSCULAR; INTRAVENOUS at 18:48

## 2021-08-09 RX ADMIN — MIDAZOLAM HYDROCHLORIDE 2 MG: 1 INJECTION, SOLUTION INTRAMUSCULAR; INTRAVENOUS at 18:33

## 2021-08-09 RX ADMIN — PROPOFOL 50 MG: 10 INJECTION, EMULSION INTRAVENOUS at 18:58

## 2021-08-09 RX ADMIN — Medication 10 ML: at 05:31

## 2021-08-09 RX ADMIN — HYDROXYCHLOROQUINE SULFATE 200 MG: 200 TABLET, FILM COATED ORAL at 10:16

## 2021-08-09 RX ADMIN — GABAPENTIN 300 MG: 300 CAPSULE ORAL at 10:16

## 2021-08-09 RX ADMIN — CEFAZOLIN SODIUM 2 G: 1 INJECTION, POWDER, FOR SOLUTION INTRAMUSCULAR; INTRAVENOUS at 18:48

## 2021-08-09 RX ADMIN — PANTOPRAZOLE SODIUM 40 MG: 40 TABLET, DELAYED RELEASE ORAL at 10:16

## 2021-08-09 RX ADMIN — PIPERACILLIN AND TAZOBACTAM 3.38 G: 3; .375 INJECTION, POWDER, LYOPHILIZED, FOR SOLUTION INTRAVENOUS at 16:32

## 2021-08-09 RX ADMIN — FENTANYL CITRATE 25 MCG: 50 INJECTION, SOLUTION INTRAMUSCULAR; INTRAVENOUS at 18:54

## 2021-08-09 RX ADMIN — CEFTAROLINE FOSAMIL 600 MG: 600 POWDER, FOR SOLUTION INTRAVENOUS at 10:15

## 2021-08-09 RX ADMIN — HYDROMORPHONE HYDROCHLORIDE 1 MG: 1 INJECTION, SOLUTION INTRAMUSCULAR; INTRAVENOUS; SUBCUTANEOUS at 01:59

## 2021-08-09 RX ADMIN — GABAPENTIN 300 MG: 300 CAPSULE ORAL at 21:33

## 2021-08-09 RX ADMIN — FENTANYL CITRATE 50 MCG: 50 INJECTION, SOLUTION INTRAMUSCULAR; INTRAVENOUS at 18:52

## 2021-08-09 RX ADMIN — Medication 10 ML: at 13:24

## 2021-08-09 RX ADMIN — SPIRONOLACTONE 25 MG: 25 TABLET ORAL at 10:16

## 2021-08-09 RX ADMIN — KETOROLAC TROMETHAMINE 15 MG: 15 INJECTION, SOLUTION INTRAMUSCULAR; INTRAVENOUS at 10:16

## 2021-08-09 RX ADMIN — DULOXETINE HYDROCHLORIDE 30 MG: 30 CAPSULE, DELAYED RELEASE ORAL at 10:16

## 2021-08-09 RX ADMIN — SODIUM CHLORIDE 125 ML/HR: 9 INJECTION, SOLUTION INTRAVENOUS at 05:54

## 2021-08-09 RX ADMIN — PREDNISONE 15 MG: 5 TABLET ORAL at 10:16

## 2021-08-09 RX ADMIN — PREDNISONE 3 MG: 1 TABLET ORAL at 21:33

## 2021-08-09 RX ADMIN — PROPOFOL 50 MG: 10 INJECTION, EMULSION INTRAVENOUS at 18:53

## 2021-08-09 RX ADMIN — SODIUM CHLORIDE 125 ML/HR: 9 INJECTION, SOLUTION INTRAVENOUS at 21:34

## 2021-08-09 NOTE — ANESTHESIA POSTPROCEDURE EVALUATION
Procedure(s):  INCISION AND DRAINAGE OF RIGHT ARM.     general    Anesthesia Post Evaluation      Multimodal analgesia: multimodal analgesia not used between 6 hours prior to anesthesia start to PACU discharge  Patient location during evaluation: bedside  Patient participation: complete - patient participated  Level of consciousness: awake and alert  Pain score: 0  Pain management: adequate  Airway patency: patent  Anesthetic complications: no  Cardiovascular status: acceptable  Respiratory status: acceptable  Hydration status: acceptable  Post anesthesia nausea and vomiting:  none  Final Post Anesthesia Temperature Assessment:  Normothermia (36.0-37.5 degrees C)      INITIAL Post-op Vital signs:   Vitals Value Taken Time   /91 08/09/21 1905   Temp 37 °C (98.6 °F) 08/09/21 1905   Pulse 73 08/09/21 1905   Resp 20 08/09/21 1905   SpO2 99 % 08/09/21 1905

## 2021-08-09 NOTE — PROGRESS NOTES
Problem: Self Care Deficits Care Plan (Adult)  Goal: *Acute Goals and Plan of Care (Insert Text)  Outcome: Not Met  Note:    Patient will be mod I with bathing  Patient will be independent with toilet transfer, toileting hygiene  Patient will be independent with edema management techniques for the [facility]  Patient will be independent with RUE HEP to promote > ADL independence     OCCUPATIONAL THERAPY EVALUATION  Patient: Tonya Lou (74 y.o. female)  Date: 8/9/2021  Primary Diagnosis: Cellulitis [L03.90]  Sepsis ([address] [A41.9]  Procedure(s) (LRB):  INCISION AND DRAINAGE OF RIGHT ARM (Right)     Precautions: elevate RuE       ASSESSMENT  Based on the objective data described below, the patient presents with minor deficits in self care and mobility tasks. Deficits/impairments noted;  decreased RUE strength/ROM, edema, pain. Patient admitted to [facility] due to arm pain. Dx;  cellulitis/sepsis; Chart review indicates patient developed cellulitis and with several abcesses ~ 7 days PTA. An I&D was performed at a free standing ED, test results from culture still pending. PMHx:  RA, Lupus, MELINA ankle surgery. Xrays negative for right forearm/humeral fracture. Patient resides with parents, has a walker which is used on occasion. Patient overall independent with ADLs/IADLs prior to admission. Patient received sitting at EOB. Patient agreeable to OT evaluation. Patient sat EOB to complete grooming tasks and was initially agreeable for upper body bathing but declined later as not feeling up to it/low back and RUE hurting. Patient able to complete tasks at Mod I level-  increased time needed to complete tasks using the right UE. Current Level of Function Impacting Discharge (ADLs/self-care): Ted to mod I for ADLs. SBA for OOB mobility due to patient's report of low back pain: moving slow, feeling \"stiff\". Functional Outcome Measure:   The patient scored 21/24 on the [facility] outcome measure which is indicative of patient  benefiting from brief OT services to promote maximal level of independence prior to discharge    Other factors to consider for discharge: PLOF     Patient will benefit from skilled therapy intervention to address the above noted impairments. PLAN :  Recommendations and Planned Interventions: self care training, therapeutic exercise, therapeutic activities, patient education, home safety training, and family training/education    Frequency/Duration: Patient will be followed by occupational therapy 2 times a week to address goals. Recommendation for discharge: (in order for the patient to meet his/her long term goals)  Home to self/family care    This discharge recommendation:  A follow-up discussion with the attending provider and/or case management is planned    IF patient discharges home will need the following DME: none       SUBJECTIVE:   Patient stated I can't open my hand fully right now. Re; right hand    OBJECTIVE DATA SUMMARY:   HISTORY:   Past Medical History:   Diagnosis Date    Lupus [address]     Pulmonary HTN ([facility]     RA (rheumatoid arthritis) ([facility] [facility]      Past Surgical History:   Procedure Laterality Date    HX ORTHOPAEDIC      x2 left knee sxs    HX ORTHOPAEDIC      bilateral ankle sxs       Expanded or extensive additional review of patient history:     Home Situation  Home Environment: Private residence  # Steps to Enter: 3  Rails to Enter: Yes  One/Two Story Residence: One story  Living Alone: No  Support Systems: Parent  Patient Expects to be Discharged to[de-identified] House  Current DME Used/Available at Home: Walker, rolling    PLOF: Pt mod I for ADLS/IADLS, mod I with mobility prior to admission. Hand dominance: Right    EXAMINATION OF PERFORMANCE DEFICITS:  Cognitive/Behavioral Status:  Neurologic State: Alert  Orientation Level: Oriented X4  Cognition: Appropriate decision making; Appropriate safety awareness             Skin: not formally assessed    Edema: RUE/not formally assessed    Hearing: Auditory  Auditory Impairment: None    Vision/Perceptual:           Appeared intact            Range of Motion:  UE:  AROM: Within functional limits (decreased RUE movement)  --decreased RUE movement due to pain. Decreased R finger flexion/extension   Strength:  UE  Strength: Within functional limits (decreased RUE)      --R UE not formally tested due to pain,          Coordination:     Fine Motor Skills-Upper: Left Intact; Right Impaired    Gross Motor Skills-Upper: Left Intact; Right Intact    Tone & Sensation: Joanna UE:  Tone--normal       Balance:  Sitting: Intact; Without support  Standing: Intact; Without support    Functional Mobility and Transfers for ADLs:  Bed Mobility:  Sit to Supine: Stand-by assistance  Scooting: Modified independent    Transfers:  Sit to Stand: Modified independent  Stand to Sit: Modified independent    ADL Assessment:     Feeding: mod I  LB dressing; mod I  Grooming; Mod I    --tasks completed sitting at EOB       ADL Intervention and task modifications:  Feeding  Feeding Assistance: Modified independent  Drink to Mouth: Modified independent    Grooming  Grooming Assistance: Modified independent  Position Performed: Seated edge of bed  Washing Face: Modified independent  Washing Hands: Modified independent  Brushing Teeth: Modified independent         Lower Body Dressing Assistance  Socks: Modified independent  Position Performed: Seated edge of bed        Functional Measure:      [address] [facility] [place] [address] [address] AM-PACTM \"6 Clicks\"                                                       Daily Activity Inpatient Short Form  How much help from another person does the patient currently need. .. Total; A Lot A Little None   1. Putting on and taking off regular lower body clothing? []  1 []  2 []  3 [x]  4   2. Bathing (including washing, rinsing, drying)? []  1 []  2 [x]  3 []  4   3. Toileting, which includes using toilet, bedpan or urinal? [] 1 []  2 [x]  3 []  4   4. Putting on and taking off regular upper body clothing? []  1 []  2 [x]  3 []  4   5. Taking care of personal grooming such as brushing teeth? []  1 []  2 []  3 [x]  4   6. Eating meals? []  1 []  2 []  3 [x]  4   © , Trustees of [address] [place] [address] [address] [address], under license to [employer]. All rights reserved     Score: 24     Interpretation of Tool:  Represents clinically-significant functional categories (i.e. Activities of daily living). Percentage of Impairment CH    0%   CI    1-19% CJ    20-39% CK    40-59% CL    60-79% CM    80-99% CN     100%   [facility]  Score 6-24 24 23 20-22 15-19 10-14 7-9 6        Occupational Therapy Evaluation Charge Determination   History Examination Decision-Making   LOW Complexity : Brief history review  LOW Complexity : 1-3 performance deficits relating to physical, cognitive , or psychosocial skils that result in activity limitations and / or participation restrictions  MEDIUM Complexity : Patient may present with comorbidities that affect occupational performnce. Miniml to moderate modification of tasks or assistance (eg, physical or verbal ) with assesment(s) is necessary to enable patient to complete evaluation       Based on the above components, the patient evaluation is determined to be of the following complexity level: LOW   Pain Ratin/10--RUE, Low back    Activity Tolerance:   Fair  Please refer to the flowsheet for vital signs taken during this treatment. After treatment patient left in no apparent distress:    Supine in bed, Call bell within reach, and RUE elevated on pillows    COMMUNICATION/EDUCATION:   The patients plan of care was discussed with: Case management. Patient/family have participated as able in goal setting and plan of care. This patients plan of care is appropriate for delegation to [facility].     Thank you for this referral.  Rayo Mckenna OTR/L  Time Calculation: 27 mins

## 2021-08-09 NOTE — PROGRESS NOTES
Patient came to patient hold without her IV antibiotics infusing. This RN called Chad Parsons, primary RN on floor. Stated the antibiotic was not even finished. This RN will investigate and address. This RN informed Dr. Sonia Prasad, verbal order given to give patient 2g ancef in OR then finish pipercillin infusion when the patient returns to the floor.

## 2021-08-09 NOTE — ANESTHESIA PREPROCEDURE EVALUATION
Relevant Problems   No relevant active problems       Anesthetic History               Review of Systems / Medical History      Pulmonary                Comments: H/O Pulmonary Hypertension - no echo in system   Neuro/Psych              Cardiovascular    Hypertension                   GI/Hepatic/Renal                Endo/Other        Arthritis     Other Findings   Comments: H/O Lupus   (no cardiopulmonary issues according to pt.)           Physical Exam    Airway  Mallampati: III  TM Distance: 4 - 6 cm    Mouth opening: Normal     Cardiovascular  Regular rate and rhythm,  S1 and S2 normal,  no murmur, click, rub, or gallop             Dental         Pulmonary  Breath sounds clear to auscultation               Abdominal  GI exam deferred       Other Findings            Anesthetic Plan    ASA: 3  Anesthesia type: general          Induction: Intravenous  Anesthetic plan and risks discussed with: Patient

## 2021-08-09 NOTE — PROGRESS NOTES
Hospitalist Progress Note    Subjective:   Daily Progress Note: 8/9/2021 3:21 PM    Patient seen and examined at bedside, overnight events noted, patient currently still has right arm discomfort, minimal improvement, discussed with RN    Current Facility-Administered Medications   Medication Dose Route Frequency    ketorolac (TORADOL) injection 15 mg  15 mg IntraVENous Q6H PRN    . PHARMACY TO SUBSTITUTE PER PROTOCOL (Reordered from: [facility] 2.5 mg tab tablet)    Per Protocol    DULoxetine (CYMBALTA) capsule 30 mg  30 mg Oral DAILY    furosemide (LASIX) tablet 20 mg  20 mg Oral DAILY    gabapentin (NEURONTIN) capsule 300 mg  300 mg Oral BID    hydrOXYchloroQUINE (PLAQUENIL) tablet 200 mg  200 mg Oral DAILY WITH BREAKFAST    . PHARMACY TO SUBSTITUTE PER PROTOCOL (Reordered from: [facility] 5 mg tablet)    Per Protocol    pantoprazole (PROTONIX) tablet 40 mg  40 mg Oral ACB    spironolactone (ALDACTONE) tablet 25 mg  25 mg Oral DAILY    predniSONE (DELTASONE) tablet 15 mg  15 mg Oral DAILY WITH BREAKFAST    predniSONE (DELTASONE) tablet 3 mg  3 mg Oral QHS    sodium chloride (NS) flush 5-40 mL  5-40 mL IntraVENous Q8H    sodium chloride (NS) flush 5-40 mL  5-40 mL IntraVENous PRN    acetaminophen (TYLENOL) tablet 650 mg  650 mg Oral Q6H PRN    Or    acetaminophen (TYLENOL) suppository 650 mg  650 mg Rectal Q6H PRN    polyethylene glycol (MIRALAX) packet 17 g  17 g Oral DAILY PRN    ondansetron (ZOFRAN ODT) tablet 4 mg  4 mg Oral Q8H PRN    Or    ondansetron (ZOFRAN) injection 4 mg  4 mg IntraVENous Q6H PRN    HYDROmorphone (DILAUDID) injection 1 mg  1 mg IntraVENous Q4H PRN    oxyCODONE IR (ROXICODONE) tablet 5 mg  5 mg Oral Q4H PRN    0.9% sodium chloride infusion  125 mL/hr IntraVENous CONTINUOUS    cefTARoline (TEFLARO) 600 mg in 0.9% sodium chloride (MBP/ADV) 50 mL MBP  600 mg IntraVENous Q12H        Review of Systems  Review of Systems   Constitutional: Positive for malaise/fatigue.  Negative for chills and fever. HENT: Negative. Eyes: Negative. Respiratory: Negative. Negative for cough and shortness of breath. Cardiovascular: Negative. Negative for chest pain and leg swelling. Gastrointestinal: Negative for abdominal pain, nausea and vomiting. Genitourinary: Negative. Musculoskeletal: Negative. Skin:        Right arm cellulitis   Neurological: Negative. Psychiatric/Behavioral: Negative. Objective:     Visit Vitals  BP (!) 139/91   Pulse 91   Temp 98.3 °F (36.8 °C)   Resp 15   Ht 5' 3\" (1.6 m)   Wt 64 kg (141 lb)   SpO2 96%   BMI 24.98 kg/m²      O2 Device: None (Room air)    Temp (24hrs), Av.4 °F (36.9 °C), Min:98 °F (36.7 °C), Max:98.9 °F (37.2 °C)      No intake/output data recorded. No intake/output data recorded.     Recent Results (from the past 24 hour(s))   C REACTIVE PROTEIN, QT    Collection Time: 21 11:03 AM   Result Value Ref Range    C-Reactive protein 4.99 (H) 0.00 - 0.60 mg/dL   PROCALCITONIN    Collection Time: 21 11:03 AM   Result Value Ref Range    Procalcitonin <0.05 (H) 0 ng/mL   C REACTIVE PROTEIN, QT    Collection Time: 21  7:40 AM   Result Value Ref Range    C-Reactive protein 9.44 (H) 0.00 - 0.60 mg/dL   PROCALCITONIN    Collection Time: 21  7:40 AM   Result Value Ref Range    Procalcitonin 0.07 (H) 0 ng/mL   CBC WITH AUTOMATED DIFF    Collection Time: 21  7:40 AM   Result Value Ref Range    WBC 5.7 3.6 - 11.0 K/uL    RBC 3.05 (L) 3.80 - 5.20 M/uL    HGB 8.3 (L) 11.5 - 16.0 g/dL    HCT 28.6 (L) 35.0 - 47.0 %    MCV 93.8 80.0 - 99.0 FL    MCH 27.2 26.0 - 34.0 PG    MCHC 29.0 (L) 30.0 - 36.5 g/dL    RDW 18.1 (H) 11.5 - 14.5 %    PLATELET 513 845 - 370 K/uL    MPV 9.8 8.9 - 12.9 FL    NRBC 0.3 (H) 0.0  WBC    ABSOLUTE NRBC 0.02 (H) 0.00 - 0.01 K/uL    NEUTROPHILS 91 (H) 32 - 75 %    LYMPHOCYTES 5 (L) 12 - 49 %    MONOCYTES 3 (L) 5 - 13 %    EOSINOPHILS 0 0 - 7 %    BASOPHILS 0 0 - 1 %    IMMATURE GRANULOCYTES 1 (H) 0 - 0.5 %    ABS. NEUTROPHILS 5.2 1.8 - 8.0 K/UL    ABS. LYMPHOCYTES 0.3 (L) 0.8 - 3.5 K/UL    ABS. MONOCYTES 0.2 0.0 - 1.0 K/UL    ABS. EOSINOPHILS 0.0 0.0 - 0.4 K/UL    ABS. BASOPHILS 0.0 0.0 - 0.1 K/UL    ABS. IMM. GRANS. 0.1 (H) 0.00 - 0.04 K/UL    DF AUTOMATED     METABOLIC PANEL, COMPREHENSIVE    Collection Time: 08/09/21  7:40 AM   Result Value Ref Range    Sodium 137 136 - 145 mmol/L    Potassium 3.8 3.5 - 5.1 mmol/L    Chloride 108 97 - 108 mmol/L    CO2 26 21 - 32 mmol/L    Anion gap 3 (L) 5 - 15 mmol/L    Glucose 80 65 - 100 mg/dL    BUN 17 6 - 20 mg/dL    Creatinine 0.64 0.55 - 1.02 mg/dL    BUN/Creatinine ratio 27 (H) 12 - 20      GFR est AA >60 >60 ml/min/1.73m2    GFR est non-AA >60 >60 ml/min/1.73m2    Calcium 8.6 8.5 - 10.1 mg/dL    Bilirubin, total 0.3 0.2 - 1.0 mg/dL    AST (SGOT) 13 (L) 15 - 37 U/L    ALT (SGPT) 23 12 - 78 U/L    Alk. phosphatase 94 45 - 117 U/L    Protein, total 7.2 6.4 - 8.2 g/dL    Albumin 2.5 (L) 3.5 - 5.0 g/dL    Globulin 4.7 (H) 2.0 - 4.0 g/dL    A-G Ratio 0.5 (L) 1.1 - 2.2     GLUCOSE, POC    Collection Time: 08/09/21  9:05 AM   Result Value Ref Range    Glucose (POC) 95 65 - 117 mg/dL    Performed by [facility] MARISA         CT UP EXT RT W CONT   Final Result   1. Mild subcutaneous edema within the right upper extremity, nonspecific but   could be seen with cellulitis. No drainable rim-enhancing abscess identified. No   CT evidence of osteomyelitis. 2. Diffuse subcutaneous calcifications are nonspecific but could be seen with   connective tissue disease      XR FOREARM RT AP/LAT   Final Result   No soft tissue gas is identified. No acute injury. Chronic   subcutaneous calcifications. XR HUMERUS RT   Final Result   impression: 2 views of the right forearm reveal no fracture or dislocation. Diffuse calcifications are visible, similar to the forearm. The pattern does not   suggest vascular arterial or venous calcifications.  Psoas, thermal injury prior cellulitis or infection should be considered. PHYSICAL EXAM:    Physical Exam  Vitals reviewed. Constitutional:       Appearance: She is ill-appearing. HENT:      Head: Normocephalic and atraumatic. Mouth/Throat:      Mouth: Mucous membranes are moist.      Pharynx: Oropharynx is clear. Eyes:      Conjunctiva/sclera: Conjunctivae normal.   Cardiovascular:      Rate and Rhythm: Regular rhythm. Tachycardia present. Pulses: Normal pulses. Heart sounds: Normal heart sounds. Pulmonary:      Effort: Pulmonary effort is normal.      Breath sounds: Normal breath sounds. Abdominal:      General: Abdomen is flat. Bowel sounds are normal.      Palpations: Abdomen is soft. Musculoskeletal:         General: Normal range of motion. Cervical back: Normal range of motion and neck supple. Skin:     Comments: Right arm cellulitis with 2 areas of induration without obvious fluctuance   Neurological:      General: No focal deficit present. Mental Status: She is alert and oriented to person, place, and time. Mental status is at baseline.    Psychiatric:         Mood and Affect: Mood normal.          Data Review    Recent Results (from the past 24 hour(s))   C REACTIVE PROTEIN, QT    Collection Time: 08/08/21 11:03 AM   Result Value Ref Range    C-Reactive protein 4.99 (H) 0.00 - 0.60 mg/dL   PROCALCITONIN    Collection Time: 08/08/21 11:03 AM   Result Value Ref Range    Procalcitonin <0.05 (H) 0 ng/mL   C REACTIVE PROTEIN, QT    Collection Time: 08/09/21  7:40 AM   Result Value Ref Range    C-Reactive protein 9.44 (H) 0.00 - 0.60 mg/dL   PROCALCITONIN    Collection Time: 08/09/21  7:40 AM   Result Value Ref Range    Procalcitonin 0.07 (H) 0 ng/mL   CBC WITH AUTOMATED DIFF    Collection Time: 08/09/21  7:40 AM   Result Value Ref Range    WBC 5.7 3.6 - 11.0 K/uL    RBC 3.05 (L) 3.80 - 5.20 M/uL    HGB 8.3 (L) 11.5 - 16.0 g/dL    HCT 28.6 (L) 35.0 - 47.0 %    MCV 93.8 80.0 - 99.0 FL    MCH 27.2 26.0 - 34.0 PG    MCHC 29.0 (L) 30.0 - 36.5 g/dL    RDW 18.1 (H) 11.5 - 14.5 %    PLATELET 550 295 - 983 K/uL    MPV 9.8 8.9 - 12.9 FL    NRBC 0.3 (H) 0.0  WBC    ABSOLUTE NRBC 0.02 (H) 0.00 - 0.01 K/uL    NEUTROPHILS 91 (H) 32 - 75 %    LYMPHOCYTES 5 (L) 12 - 49 %    MONOCYTES 3 (L) 5 - 13 %    EOSINOPHILS 0 0 - 7 %    BASOPHILS 0 0 - 1 %    IMMATURE GRANULOCYTES 1 (H) 0 - 0.5 %    ABS. NEUTROPHILS 5.2 1.8 - 8.0 K/UL    ABS. LYMPHOCYTES 0.3 (L) 0.8 - 3.5 K/UL    ABS. MONOCYTES 0.2 0.0 - 1.0 K/UL    ABS. EOSINOPHILS 0.0 0.0 - 0.4 K/UL    ABS. BASOPHILS 0.0 0.0 - 0.1 K/UL    ABS. IMM. GRANS. 0.1 (H) 0.00 - 0.04 K/UL    DF AUTOMATED     METABOLIC PANEL, COMPREHENSIVE    Collection Time: 08/09/21  7:40 AM   Result Value Ref Range    Sodium 137 136 - 145 mmol/L    Potassium 3.8 3.5 - 5.1 mmol/L    Chloride 108 97 - 108 mmol/L    CO2 26 21 - 32 mmol/L    Anion gap 3 (L) 5 - 15 mmol/L    Glucose 80 65 - 100 mg/dL    BUN 17 6 - 20 mg/dL    Creatinine 0.64 0.55 - 1.02 mg/dL    BUN/Creatinine ratio 27 (H) 12 - 20      GFR est AA >60 >60 ml/min/1.73m2    GFR est non-AA >60 >60 ml/min/1.73m2    Calcium 8.6 8.5 - 10.1 mg/dL    Bilirubin, total 0.3 0.2 - 1.0 mg/dL    AST (SGOT) 13 (L) 15 - 37 U/L    ALT (SGPT) 23 12 - 78 U/L    Alk. phosphatase 94 45 - 117 U/L    Protein, total 7.2 6.4 - 8.2 g/dL    Albumin 2.5 (L) 3.5 - 5.0 g/dL    Globulin 4.7 (H) 2.0 - 4.0 g/dL    A-G Ratio 0.5 (L) 1.1 - 2.2     GLUCOSE, POC    Collection Time: 08/09/21  9:05 AM   Result Value Ref Range    Glucose (POC) 95 65 - 117 mg/dL    Performed by Georgiana Dwyer         Assessment/Plan:     Active Problems:    Cellulitis (8/7/2021)      Sepsis ([address] (8/7/2021)      Hospital course: This 39-year-old female with immunosuppression currently on prednisone secondary to lupus and rheumatoid arthritis. Patient developed right arm cellulitis and 3 potential abscesses on the right arm.   The upper arm abscess was I&D at the freestanding emergency department and cultures are pending. ID consultation due to the immunosuppression. Patient was started on clindamycin and Zosyn. These were discontinued for Teflaro. Dr. Adam Rivera, general surgery consultation. CT of the right arm pending    Impression:     1. Immunosuppression  2. Lupus  3. Pulmonary hypertension  4. Cellulitis with abscesses of the right arm  5.   Rheumatoid arthritis     Plan:    Cellulitis with right arm abscess-patient presented with above-mentioned symptomatology found to have cellulitis of right arm abscess, currently does not meet sepsis criteria at this time  Follow blood cultures  Follow wound cultures  Continue ceftaroline for antibiotic coverage  Infectious disease consult appreciated, continue to follow recommendations  General surgery consult appreciated, patient to go for incision and drainage later today    Systemic lupus erythematosus-continue prednisone and Plaquenil    Pulmonary hypertension-continue home medications    Rheumatoid arthritis-continue current medication SCDs  FEN-n.p.o., replete potassium and magnesium  Full code, will clarify about surrogate decision-maker,    Prophylaxis-SCDs  Disposition-pending clinical improvement, possible discharge home tomorrow once clarification of antibiotics with ID as well as clinical improvement

## 2021-08-09 NOTE — BRIEF OP NOTE
Brief Postoperative Note    Patient: Renetta Soriano  YOB: 1990  MRN: [id number]    Date of Procedure: 8/9/2021     Pre-Op Diagnosis: Cellulitis of arm, right [L03.113]    Post-Op Diagnosis: multiple right arm abscess, 4 pockets  Procedure(s):  INCISION AND DRAINAGE OF RIGHT ARM x4     Surgeon(s):  Maria T Benitez MD    Surgical Assistant: Surg Asst-1: Jayce Johns    Anesthesia: General     Estimated Blood Loss (UQ):17ZM    Complications: none    Specimens:   ID Type Source Tests Collected by Time Destination   1 : Right Forearm Abscess Abscess Right Arm CULTURE, AEROBIC AND ANAEROBIC Maria T Benitez MD 8/9/2021 1856 Microbiology        Implants: none  Drains:none  Findings: as above.     Electronically Signed by Sarwat Guerra MD on 8/9/2021 at 7:13 PM

## 2021-08-09 NOTE — ROUTINE PROCESS
Bedside and Verbal shift change report given to GAYLA Purdy RN (oncoming nurse) by Genie Castro (offgoing nurse). Report included the following information SBAR, Kardex, MAR, Accordion, Recent Results, Med Rec Status and Quality Measures.

## 2021-08-09 NOTE — PROGRESS NOTES
Progress Note    Patient: Tiffanie Verma MRN: [id number]  SSN: [id number]    YOB: 1990  Age: 27 y.o. Sex: female      Admit Date: 8/7/2021    LOS: 2 days     Subjective:   Patient followed for cellulitis/abscess right arm. Blood cultures negative so far and wound culture negative. She was taken to the OR earlier today because of multiple right arm abscesses, undergoing I&D with cultures submitted. She is afebrile with normal WBC, however, procal and CRP increasing. She is currently on IV Ceftaroline alone. She is off the floor at this time and is apparently in the PACU. Objective:     Vitals:    08/08/21 0823 08/08/21 1647 08/08/21 2038 08/09/21 0800   BP: 136/89 124/70 116/76 (!) 139/91   Pulse: 71 77 98 91   Resp: 17 16 16 15   Temp: 97.9 °F (36.6 °C) 98 °F (36.7 °C) 98.9 °F (37.2 °C) 98.3 °F (36.8 °C)   SpO2: 97% 98% 96% 96%   Weight:       Height:            Intake and Output:  Current Shift: 08/09 0701 - 08/09 1900  In: -   Out: 2 [Urine:2]  Last three shifts: No intake/output data recorded. Physical Exam:   Vitals and nursing note reviewed. Patient unavailable for re-examination  Constitutional:       Appearance: She is ill-appearing. HENT:      Head: Normocephalic and atraumatic. Right Ear: External ear normal.      Left Ear: External ear normal.      Nose: Nose normal.      Mouth/Throat:      Pharynx: Oropharynx is clear. Eyes:      Pupils: Pupils are equal, round, and reactive to light. Cardiovascular:      Rate and Rhythm: Normal rate and regular rhythm. Heart sounds: No murmur heard. Pulmonary:      Effort: Pulmonary effort is normal.      Breath sounds: Normal breath sounds. Abdominal:      General: Bowel sounds are normal.      Palpations: Abdomen is soft. Genitourinary:     Comments: No [place]  Musculoskeletal:         General: Swelling and tenderness present. Cervical back: Neck supple. Right lower leg: No edema.       Left lower leg: No edema. Comments: Right arm is moderately swollen with erythema and warmth; there is a small wound over bicep that is dry at this time; there is diffuse tenderness; hardening of the skin noted, related to subcutaneous calcification seen on x-rays   Skin:     Findings: Erythema present. No rash. Neurological:      General: No focal deficit present. Mental Status: She is alert and oriented to person, place, and time. Psychiatric:         Mood and Affect: Mood normal.         Behavior: Behavior normal.         Thought Content: Thought content normal.         Judgment: Judgment normal.      Lab/Data Review:     WBC 5,700    CRP 9.44 <4.99 < 1.66  Procal 0.07 <0.05    Blood cultures (8/7) No growth 1 day  Wound culture right arm (8/7) No growth FINAL    CT scan right arm (8/8) Mild subcutaneous edema within the right upper extremity, nonspecific but  could be seen with cellulitis. No drainable rim-enhancing abscess identified. No  CT evidence of osteomyelitis  Assessment:     Active Problems:    Cellulitis (8/7/2021)      Sepsis ([address] (8/7/2021)    1. Cellulitis and abscess, right arm, etiology unclear, status post I&D, Day #2 IV Ceftaroline  2. Immunocompromised patient   3. Elevated CRP and procal  4. Lupus  5. Rheumatoid arthritis  6. Allergy to Vancomycin and Doxycycline     Comment:  Negative wound culture with increasing CRP. Would favor adjusting antibiotic at this time, pending intra-operative cultures. Plan:   1. Discontinue Ceftaroline   2. Start IV Zosyn and Daptomycin pending intra-operative cultures (DDI with Linezolid)  3. In am, repeat CRP and procal  4.  Follow-up blood cultures and intra-operative cultures       Signed By: Miriam Renae MD     August 9, 2021

## 2021-08-09 NOTE — PROGRESS NOTES
PROBLEM: PNA    ASSESSMENT: patient is alert and oriented. Oxygen saturation >92% on room air. Reporting . Scheduled nebs. IV lasix once. Denying pain. Tolerating activity. Tolerating diet.      ACTION: Medications administered as ordered, patients needs PROGRESS NOTE      Chief Complaints:  Right arm swelling  HPI and  Objective:    Ms. Yin Every is examined this morning. She is complaining more swelling and pain right forearm area. She had a CT scan done yesterday. She denies fever chills denies generalized weakness abdominal pain nausea chest pain shortness of breath. Review of Systems:  Rest of review of system negative, personally reviewed. EXAM:  Visit Vitals  /76   Pulse 98   Temp 98.9 °F (37.2 °C)   Resp 16   Ht 5' 3\" (1.6 m)   Wt 141 lb (64 kg)   SpO2 96%   BMI 24.98 kg/m²     Patient is awake and alert  Head and neck atraumatic  Cardiac system regular rate  Pulmonary no audible wheeze  Abdomen soft  Neurologic intact nonfocal skin is warm moist.  Distally well-perfused. Right forearm small fluctuant fluid collection small swelling. Especially proximal to the wrist joint and the antecubital level  Recent Results (from the past 24 hour(s))   C REACTIVE PROTEIN, QT    Collection Time: 08/08/21 11:03 AM   Result Value Ref Range    C-Reactive protein 4.99 (H) 0.00 - 0.60 mg/dL   PROCALCITONIN    Collection Time: 08/08/21 11:03 AM   Result Value Ref Range    Procalcitonin <0.05 (H) 0 ng/mL       ASSESSMENT:   Patient is 27 y.o. with diagnosis of : Active Problems:    Cellulitis (8/7/2021)      Sepsis ([address] (8/7/2021)        PLAN:                 I did review the CT scan of the right arm there is no discernible abscess but clinically she has more fluctuant fluid collections noted in the antecubital area and also proximal to the wrist joint. I think she should benefit from a I&D of the abscesses this area surgically. Even though CT scan does not suggest abscess. Patient is agreeable to this. I will schedule for I&D of abscess later today.   Keep her n.p.o.

## 2021-08-10 VITALS
WEIGHT: 141 LBS | BODY MASS INDEX: 24.98 KG/M2 | OXYGEN SATURATION: 97 % | RESPIRATION RATE: 18 BRPM | DIASTOLIC BLOOD PRESSURE: 84 MMHG | HEIGHT: 63 IN | TEMPERATURE: 98.2 F | SYSTOLIC BLOOD PRESSURE: 134 MMHG | HEART RATE: 91 BPM

## 2021-08-10 LAB
ALBUMIN SERPL-MCNC: 2.1 G/DL (ref 3.5–5)
ALBUMIN/GLOB SERPL: 0.5 {RATIO} (ref 1.1–2.2)
ALP SERPL-CCNC: 90 U/L (ref 45–117)
ALT SERPL-CCNC: 19 U/L (ref 12–78)
ANION GAP SERPL CALC-SCNC: 5 MMOL/L (ref 5–15)
AST SERPL W P-5'-P-CCNC: 14 U/L (ref 15–37)
BASOPHILS # BLD: 0 K/UL (ref 0–0.1)
BASOPHILS NFR BLD: 0 % (ref 0–1)
BILIRUB SERPL-MCNC: 0.2 MG/DL (ref 0.2–1)
BUN SERPL-MCNC: 19 MG/DL (ref 6–20)
BUN/CREAT SERPL: 23 (ref 12–20)
CA-I BLD-MCNC: 8 MG/DL (ref 8.5–10.1)
CHLORIDE SERPL-SCNC: 112 MMOL/L (ref 97–108)
CO2 SERPL-SCNC: 26 MMOL/L (ref 21–32)
CREAT SERPL-MCNC: 0.82 MG/DL (ref 0.55–1.02)
CRP SERPL-MCNC: 6.62 MG/DL (ref 0–0.6)
DIFFERENTIAL METHOD BLD: ABNORMAL
EOSINOPHIL # BLD: 0 K/UL (ref 0–0.4)
EOSINOPHIL NFR BLD: 0 % (ref 0–7)
ERYTHROCYTE [DISTWIDTH] IN BLOOD BY AUTOMATED COUNT: 18.1 % (ref 11.5–14.5)
GLOBULIN SER CALC-MCNC: 4.3 G/DL (ref 2–4)
GLUCOSE SERPL-MCNC: 105 MG/DL (ref 65–100)
HCT VFR BLD AUTO: 25.2 % (ref 35–47)
HGB BLD-MCNC: 7.4 G/DL (ref 11.5–16)
IMM GRANULOCYTES # BLD AUTO: 0.1 K/UL (ref 0–0.04)
IMM GRANULOCYTES NFR BLD AUTO: 1 % (ref 0–0.5)
LYMPHOCYTES # BLD: 0.2 K/UL (ref 0.8–3.5)
LYMPHOCYTES NFR BLD: 5 % (ref 12–49)
MCH RBC QN AUTO: 27.6 PG (ref 26–34)
MCHC RBC AUTO-ENTMCNC: 29.4 G/DL (ref 30–36.5)
MCV RBC AUTO: 94 FL (ref 80–99)
MONOCYTES # BLD: 0.2 K/UL (ref 0–1)
MONOCYTES NFR BLD: 5 % (ref 5–13)
NEUTS SEG # BLD: 3.5 K/UL (ref 1.8–8)
NEUTS SEG NFR BLD: 89 % (ref 32–75)
NRBC # BLD: 0.02 K/UL (ref 0–0.01)
NRBC BLD-RTO: 0.5 PER 100 WBC
PLATELET # BLD AUTO: 233 K/UL (ref 150–400)
PMV BLD AUTO: 9.5 FL (ref 8.9–12.9)
POTASSIUM SERPL-SCNC: 3.3 MMOL/L (ref 3.5–5.1)
PROCALCITONIN SERPL-MCNC: <0.05 NG/ML
PROT SERPL-MCNC: 6.4 G/DL (ref 6.4–8.2)
RBC # BLD AUTO: 2.68 M/UL (ref 3.8–5.2)
SODIUM SERPL-SCNC: 143 MMOL/L (ref 136–145)
WBC # BLD AUTO: 4 K/UL (ref 3.6–11)

## 2021-08-10 PROCEDURE — 74011250636 HC RX REV CODE- 250/636: Performed by: INTERNAL MEDICINE

## 2021-08-10 PROCEDURE — 74011250637 HC RX REV CODE- 250/637: Performed by: PHYSICIAN ASSISTANT

## 2021-08-10 PROCEDURE — 80053 COMPREHEN METABOLIC PANEL: CPT

## 2021-08-10 PROCEDURE — 74011250637 HC RX REV CODE- 250/637: Performed by: STUDENT IN AN ORGANIZED HEALTH CARE EDUCATION/TRAINING PROGRAM

## 2021-08-10 PROCEDURE — 84145 PROCALCITONIN (PCT): CPT

## 2021-08-10 PROCEDURE — 74011000258 HC RX REV CODE- 258: Performed by: INTERNAL MEDICINE

## 2021-08-10 PROCEDURE — 74011250636 HC RX REV CODE- 250/636: Performed by: PHYSICIAN ASSISTANT

## 2021-08-10 PROCEDURE — 86140 C-REACTIVE PROTEIN: CPT

## 2021-08-10 PROCEDURE — 94760 N-INVAS EAR/PLS OXIMETRY 1: CPT

## 2021-08-10 PROCEDURE — 36415 COLL VENOUS BLD VENIPUNCTURE: CPT

## 2021-08-10 PROCEDURE — G0378 HOSPITAL OBSERVATION PER HR: HCPCS

## 2021-08-10 PROCEDURE — 99232 SBSQ HOSP IP/OBS MODERATE 35: CPT | Performed by: INTERNAL MEDICINE

## 2021-08-10 PROCEDURE — 85025 COMPLETE CBC W/AUTO DIFF WBC: CPT

## 2021-08-10 PROCEDURE — 74011636637 HC RX REV CODE- 636/637: Performed by: PHYSICIAN ASSISTANT

## 2021-08-10 PROCEDURE — 99024 POSTOP FOLLOW-UP VISIT: CPT | Performed by: SURGERY

## 2021-08-10 RX ORDER — POTASSIUM CHLORIDE 750 MG/1
40 TABLET, FILM COATED, EXTENDED RELEASE ORAL DAILY
Status: DISCONTINUED | OUTPATIENT
Start: 2021-08-10 | End: 2021-08-10 | Stop reason: HOSPADM

## 2021-08-10 RX ORDER — AMOXICILLIN AND CLAVULANATE POTASSIUM 875; 125 MG/1; MG/1
1 TABLET, FILM COATED ORAL 2 TIMES DAILY
Qty: 28 TABLET | Refills: 0 | Status: SHIPPED | OUTPATIENT
Start: 2021-08-11 | End: 2021-08-25

## 2021-08-10 RX ADMIN — OXYCODONE 5 MG: 5 TABLET ORAL at 11:04

## 2021-08-10 RX ADMIN — PIPERACILLIN AND TAZOBACTAM 3.38 G: 3; .375 INJECTION, POWDER, LYOPHILIZED, FOR SOLUTION INTRAVENOUS at 09:06

## 2021-08-10 RX ADMIN — PIPERACILLIN AND TAZOBACTAM 3.38 G: 3; .375 INJECTION, POWDER, LYOPHILIZED, FOR SOLUTION INTRAVENOUS at 01:33

## 2021-08-10 RX ADMIN — POTASSIUM CHLORIDE 40 MEQ: 750 TABLET, FILM COATED, EXTENDED RELEASE ORAL at 11:13

## 2021-08-10 RX ADMIN — HYDROXYCHLOROQUINE SULFATE 200 MG: 200 TABLET, FILM COATED ORAL at 09:06

## 2021-08-10 RX ADMIN — KETOROLAC TROMETHAMINE 15 MG: 15 INJECTION, SOLUTION INTRAMUSCULAR; INTRAVENOUS at 04:38

## 2021-08-10 RX ADMIN — SPIRONOLACTONE 25 MG: 25 TABLET ORAL at 09:07

## 2021-08-10 RX ADMIN — PREDNISONE 15 MG: 5 TABLET ORAL at 09:07

## 2021-08-10 RX ADMIN — KETOROLAC TROMETHAMINE 15 MG: 15 INJECTION, SOLUTION INTRAMUSCULAR; INTRAVENOUS at 11:04

## 2021-08-10 RX ADMIN — ACETAMINOPHEN 650 MG: 325 TABLET ORAL at 11:14

## 2021-08-10 RX ADMIN — GABAPENTIN 300 MG: 300 CAPSULE ORAL at 09:07

## 2021-08-10 RX ADMIN — Medication 10 ML: at 05:10

## 2021-08-10 RX ADMIN — FUROSEMIDE 20 MG: 40 TABLET ORAL at 09:07

## 2021-08-10 RX ADMIN — Medication 10 ML: at 13:12

## 2021-08-10 RX ADMIN — DULOXETINE HYDROCHLORIDE 30 MG: 30 CAPSULE, DELAYED RELEASE ORAL at 09:07

## 2021-08-10 NOTE — PROGRESS NOTES
Physician Progress Note      PATIENT:               Dottie Holstein  CSN #:                  [id number]  :                       1990  ADMIT DATE:       2021 10:12 AM  DISCH DATE:  RESPONDING  PROVIDER #:        LISA HOPE PA-C          QUERY TEXT:    Dear Attending,    Patient admitted with right arm abscess. Noted documentation of sepsis in  Attending progress note. In order to support the diagnosis of sepsis, please include additional clinical indicators in your documentation. Or please document if the diagnosis of sepsis has been ruled out after further study. The medical record reflects the following:  Risk Factors: 27year old female, cellulitis, immunosuppression currently on prednisone secondary to lupus and rheumatoid arthritis. Clinical Indicators:  Attending PN - Sepsis  Visit Vitals  /89 (BP Patient Position: At rest;Supine)  Pulse 71  Temp 97.9 ? F (36.6 ? C)  Resp 17  Temp (24hrs), Av ?F (36.7 ? C), Min:97.9 ? F (36.6 ? C), Max:98.1 ? F (36.7 ? C)  WBC: 6.0-5.7-4.0  Lactic Acid: 1.6  Procalcitonin: <0.05  CRP: 1.66-4.99-9.44-6.62   Wound culture - No growth 2 days   Wound culture - Occasional Gram Positive Cocci IN PAIRS  Treatment: I&D of right arm abscess, IV Zosyn, IV Cefazolin, IV Clindamycin    Please call [phone] with any questions  Options provided:  -- Sepsis present as evidenced by, Please document evidence. -- Sepsis was ruled out after study  -- Other - I will add my own diagnosis  -- Disagree - Not applicable / Not valid  -- Disagree - Clinically unable to determine / Unknown  -- Refer to Clinical Documentation Reviewer    PROVIDER RESPONSE TEXT:    Sepsis was ruled out after study. Query created by:  Mik Vera on 8/10/2021 2:14 PM      Electronically signed by:  Mechelle Katz PA-C 8/10/2021 3:07 PM

## 2021-08-10 NOTE — PROGRESS NOTES
Patient discharge instructions provided to the patient. Included in those discharge instructions were how to care for her wounds properly. The patient and mother verbalized understanding on how to care for them properly. Her right femoral central line was removed with no complications noted. Patient was discharged via wheelchair at [address] hours.

## 2021-08-10 NOTE — PROGRESS NOTES
Post procedure skin assessment done by primary nurse and Soila London RN. Clean dry intact dressing to right arm post I & D site. Old dressing to left thigh remains intact. No drains nor tubes in place.

## 2021-08-10 NOTE — PROGRESS NOTES
Hospitalist Progress Note    Subjective:   Daily Progress Note: 8/10/2021 1:23 PM    Hospital Course:  41-year-old AA female with a PMHx significant for immunosuppression currently on prednisone secondary to lupus and rheumatoid arthritis. Patient developed right arm cellulitis and 3 potential abscesses on the right arm. The upper arm abscess was I&D at the freestanding emergency department and cultures are pending. ID consultation due to the immunosuppression. Patient was started on clindamycin and Zosyn. These were discontinued for Teflaro. Dr. Garett Steele, general surgery consultation. Infectious Disease consult. Initially started on IV Ceftaroline and switched to IV Zosyn and Daptomycin. CT of the right arm showing mild subcutaneous edema suggestive of cellulitis. She went for repeat I&D right arm x4 pockets on 08/09/2021. Intra-op cultures pending. Subjective:    POD #1 right arm I&D. Patient seen and examined at bedside. She reports significant improvement in right upper extremity pain.      Current Facility-Administered Medications   Medication Dose Route Frequency    potassium chloride SR (KLOR-CON 10) tablet 40 mEq  40 mEq Oral DAILY    cadexomer iodine (IODOSORB) 0.9 % topical gel (Patient Supplied)   Topical EVERY OTHER DAY    piperacillin-tazobactam (ZOSYN) 3.375 g in 0.9% sodium chloride (MBP/ADV) 100 mL MBP  3.375 g IntraVENous Q8H    ketorolac (TORADOL) injection 15 mg  15 mg IntraVENous Q6H PRN    riociguat (ADEMPAS) tablet 2.5 mg (Patient Supplied)  2.5 mg Oral TID    DULoxetine (CYMBALTA) capsule 30 mg  30 mg Oral DAILY    furosemide (LASIX) tablet 20 mg  20 mg Oral DAILY    gabapentin (NEURONTIN) capsule 300 mg  300 mg Oral BID    hydrOXYchloroQUINE (PLAQUENIL) tablet 200 mg  200 mg Oral DAILY WITH BREAKFAST    Ambrisentan (LETAIRIS) tab 5 mg (Patient Supplied)  5 mg Oral DAILY    pantoprazole (PROTONIX) tablet 40 mg  40 mg Oral ACB    spironolactone (ALDACTONE) tablet 25 mg  25 mg Oral DAILY    predniSONE (DELTASONE) tablet 15 mg  15 mg Oral DAILY WITH BREAKFAST    predniSONE (DELTASONE) tablet 3 mg  3 mg Oral QHS    sodium chloride (NS) flush 5-40 mL  5-40 mL IntraVENous Q8H    sodium chloride (NS) flush 5-40 mL  5-40 mL IntraVENous PRN    acetaminophen (TYLENOL) tablet 650 mg  650 mg Oral Q6H PRN    Or    acetaminophen (TYLENOL) suppository 650 mg  650 mg Rectal Q6H PRN    polyethylene glycol (MIRALAX) packet 17 g  17 g Oral DAILY PRN    ondansetron (ZOFRAN ODT) tablet 4 mg  4 mg Oral Q8H PRN    Or    ondansetron (ZOFRAN) injection 4 mg  4 mg IntraVENous Q6H PRN    oxyCODONE IR (ROXICODONE) tablet 5 mg  5 mg Oral Q4H PRN    0.9% sodium chloride infusion  125 mL/hr IntraVENous CONTINUOUS        Review of Systems  Constitutional: Positive for malaise/fatigue. No fevers, No chills. Respiratory: No Shortness of Breath, No cough, No wheezing  Cardiovascular: No chest pain, No palpitations, No extremity edema  Gastrointestinal: No nausea, No vomiting, No diarrhea, No abdominal pain  Genitourinary: No frequency, No dysuria  Integument/breast: Right arm cellulitis  Neurological: No Confusion, No headaches, No dizziness      Objective:     Visit Vitals  /89 (BP 1 Location: Left upper arm, BP Patient Position: Sitting)   Pulse 85   Temp 98.2 °F (36.8 °C)   Resp 18   Ht 5' 3\" (1.6 m)   Wt 64 kg (141 lb)   SpO2 99%   BMI 24.98 kg/m²    O2 Flow Rate (L/min): 3 l/min O2 Device: None (Room air)    Temp (24hrs), Av.8 °F (36.6 °C), Min:96.7 °F (35.9 °C), Max:98.6 °F (37 °C)      08/10 0701 - 08/10 1900  In: 120 [P.O.:120]  Out: 2 [Urine:1]  1901 - 08/10 0700  In: -   Out: 2 [Urine:2]    PHYSICAL EXAM:  Constitutional: No acute distress  Skin: Right arm with circumferential dressing that is clean, dry, intact. Left posterior thigh with wounds that are clean, dry appear to be well-healing. HEENT: Sclerae anicteric. PERRL. The neck is supple and no masses.    Cardiovascular: Regular rate and rhythm. Normal S1/S2. No murmur. Respiratory:  Clear breath sounds bilaterally with no wheezes, rales, or rhonchi. GI: Abdomen nondistended, soft, and nontender. Normal active bowel sounds. Rectal: Deferred   Musculoskeletal: No pitting edema of the lower legs. Able to move all ext  Neurological:  Patient is alert and oriented x3. Cranial nerves II-XII grossly intact  Psychiatric: Mood appears appropriate       Data Review    Recent Results (from the past 24 hour(s))   GLUCOSE, POC    Collection Time: 08/09/21  4:21 PM   Result Value Ref Range    Glucose (POC) 88 65 - 117 mg/dL    Performed by [email or web address]    CULTURE, WOUND [place] STAIN    Collection Time: 08/09/21  7:00 PM    Specimen: Wound    RIGHT ARM ABSCESS   Result Value Ref Range    Special Requests: No Special Requests      GRAM STAIN NO WBCS SEEN     GRAM STAIN Occasional Gram Positive Cocci in clusters      GRAM STAIN Occasional Gram Positive Cocci IN PAIRS      Culture result: PENDING    GLUCOSE, POC    Collection Time: 08/09/21  7:52 PM   Result Value Ref Range    Glucose (POC) 78 65 - 117 mg/dL    Performed by Janeth Duos    CBC WITH AUTOMATED DIFF    Collection Time: 08/10/21  6:14 AM   Result Value Ref Range    WBC 4.0 3.6 - 11.0 K/uL    RBC 2.68 (L) 3.80 - 5.20 M/uL    HGB 7.4 (L) 11.5 - 16.0 g/dL    HCT 25.2 (L) 35.0 - 47.0 %    MCV 94.0 80.0 - 99.0 FL    MCH 27.6 26.0 - 34.0 PG    MCHC 29.4 (L) 30.0 - 36.5 g/dL    RDW 18.1 (H) 11.5 - 14.5 %    PLATELET 951 503 - 834 K/uL    MPV 9.5 8.9 - 12.9 FL    NRBC 0.5 (H) 0.0  WBC    ABSOLUTE NRBC 0.02 (H) 0.00 - 0.01 K/uL    NEUTROPHILS 89 (H) 32 - 75 %    LYMPHOCYTES 5 (L) 12 - 49 %    MONOCYTES 5 5 - 13 %    EOSINOPHILS 0 0 - 7 %    BASOPHILS 0 0 - 1 %    IMMATURE GRANULOCYTES 1 (H) 0 - 0.5 %    ABS. NEUTROPHILS 3.5 1.8 - 8.0 K/UL    ABS. LYMPHOCYTES 0.2 (L) 0.8 - 3.5 K/UL    ABS. MONOCYTES 0.2 0.0 - 1.0 K/UL    ABS. EOSINOPHILS 0.0 0.0 - 0.4 K/UL    ABS.  BASOPHILS 0.0 0.0 - 0.1 K/UL    ABS. IMM. GRANS. 0.1 (H) 0.00 - 0.04 K/UL    DF AUTOMATED     METABOLIC PANEL, COMPREHENSIVE    Collection Time: 08/10/21  6:14 AM   Result Value Ref Range    Sodium 143 136 - 145 mmol/L    Potassium 3.3 (L) 3.5 - 5.1 mmol/L    Chloride 112 (H) 97 - 108 mmol/L    CO2 26 21 - 32 mmol/L    Anion gap 5 5 - 15 mmol/L    Glucose 105 (H) 65 - 100 mg/dL    BUN 19 6 - 20 mg/dL    Creatinine 0.82 0.55 - 1.02 mg/dL    BUN/Creatinine ratio 23 (H) 12 - 20      GFR est AA >60 >60 ml/min/1.73m2    GFR est non-AA >60 >60 ml/min/1.73m2    Calcium 8.0 (L) 8.5 - 10.1 mg/dL    Bilirubin, total 0.2 0.2 - 1.0 mg/dL    AST (SGOT) 14 (L) 15 - 37 U/L    ALT (SGPT) 19 12 - 78 U/L    Alk. phosphatase 90 45 - 117 U/L    Protein, total 6.4 6.4 - 8.2 g/dL    Albumin 2.1 (L) 3.5 - 5.0 g/dL    Globulin 4.3 (H) 2.0 - 4.0 g/dL    A-G Ratio 0.5 (L) 1.1 - 2.2     C REACTIVE PROTEIN, QT    Collection Time: 08/10/21  6:14 AM   Result Value Ref Range    C-Reactive protein 6.62 (H) 0.00 - 0.60 mg/dL   PROCALCITONIN    Collection Time: 08/10/21  6:14 AM   Result Value Ref Range    Procalcitonin <0.05 (H) 0 ng/mL       CT UP EXT RT W CONT   Final Result   1. Mild subcutaneous edema within the right upper extremity, nonspecific but   could be seen with cellulitis. No drainable rim-enhancing abscess identified. No   CT evidence of osteomyelitis. 2. Diffuse subcutaneous calcifications are nonspecific but could be seen with   connective tissue disease      XR FOREARM RT AP/LAT   Final Result   No soft tissue gas is identified. No acute injury. Chronic   subcutaneous calcifications. XR HUMERUS RT   Final Result   impression: 2 views of the right forearm reveal no fracture or dislocation. Diffuse calcifications are visible, similar to the forearm. The pattern does not   suggest vascular arterial or venous calcifications. Psoas, thermal injury prior   cellulitis or infection should be considered.           Active Problems:    Cellulitis (8/7/2021)      Sepsis ([facility] [address] (8/7/2021)        Assessment/Plan:     1. Cellulitis with right arm abscess  - CT of the right arm showing mild subcutaneous edema suggestive of cellulitis. - General Surgery consult  - Infectious Disease consult   - Initially started on IV Ceftaroline and switched to IV Zosyn and Daptomycin  - S/p I&D right arm x4 pockets on 08/09/2021  - Initial wound cultures and Intra-op cultures pending.      2. Systemic lupus erythematosus-continue prednisone and Plaquenil     3. Pulmonary hypertension-continue home medications     4. Rheumatoid arthritis-continue current medication SCDs      DVT Prophylaxis: SCDs  Code Status: Full  POA:    Care Plan discussed with: nursing and patient. Spoke with mother, Emily Gruber, over the phone and gave update on patient. Dispo: Pending intra-op wound cultures and ID clearance. Total time spent with patient: >35 minutes.

## 2021-08-10 NOTE — PROGRESS NOTES
PROGRESS NOTE      Chief Complaints:  No complaints. HPI and  Objective:    Patient says right arm feels better. No fever overnight. Patient denies generalized weakness abdominal pain chest pain shortness of breath. Review of Systems:  Rest of review of system negative. EXAM:  Visit Vitals  /89 (BP 1 Location: Left upper arm, BP Patient Position: Sitting)   Pulse 85   Temp 98.2 °F (36.8 °C)   Resp 18   Ht 5' 3\" (1.6 m)   Wt 141 lb (64 kg)   SpO2 99%   BMI 24.98 kg/m²     Patient awake and alert  Head and neck atraumatic normocephalic  Cardiac system regular rate  Pulmonary no audible wheeze next abdomen soft neurologically intact. Dressings intact right arm. Recent Results (from the past 24 hour(s))   GLUCOSE, POC    Collection Time: 08/09/21  4:21 PM   Result Value Ref Range    Glucose (POC) 88 65 - 117 mg/dL    Performed by Jazmin Ibanez    GLUCOSE, POC    Collection Time: 08/09/21  7:52 PM   Result Value Ref Range    Glucose (POC) 78 65 - 117 mg/dL    Performed by Jazmin Ibanez    CBC WITH AUTOMATED DIFF    Collection Time: 08/10/21  6:14 AM   Result Value Ref Range    WBC 4.0 3.6 - 11.0 K/uL    RBC 2.68 (L) 3.80 - 5.20 M/uL    HGB 7.4 (L) 11.5 - 16.0 g/dL    HCT 25.2 (L) 35.0 - 47.0 %    MCV 94.0 80.0 - 99.0 FL    MCH 27.6 26.0 - 34.0 PG    MCHC 29.4 (L) 30.0 - 36.5 g/dL    RDW 18.1 (H) 11.5 - 14.5 %    PLATELET 709 102 - 493 K/uL    MPV 9.5 8.9 - 12.9 FL    NRBC 0.5 (H) 0.0  WBC    ABSOLUTE NRBC 0.02 (H) 0.00 - 0.01 K/uL    NEUTROPHILS 89 (H) 32 - 75 %    LYMPHOCYTES 5 (L) 12 - 49 %    MONOCYTES 5 5 - 13 %    EOSINOPHILS 0 0 - 7 %    BASOPHILS 0 0 - 1 %    IMMATURE GRANULOCYTES 1 (H) 0 - 0.5 %    ABS. NEUTROPHILS 3.5 1.8 - 8.0 K/UL    ABS. LYMPHOCYTES 0.2 (L) 0.8 - 3.5 K/UL    ABS. MONOCYTES 0.2 0.0 - 1.0 K/UL    ABS. EOSINOPHILS 0.0 0.0 - 0.4 K/UL    ABS. BASOPHILS 0.0 0.0 - 0.1 K/UL    ABS. IMM.  GRANS. 0.1 (H) 0.00 - 0.04 K/UL    DF AUTOMATED     METABOLIC PANEL, COMPREHENSIVE Collection Time: 08/10/21  6:14 AM   Result Value Ref Range    Sodium 143 136 - 145 mmol/L    Potassium 3.3 (L) 3.5 - 5.1 mmol/L    Chloride 112 (H) 97 - 108 mmol/L    CO2 26 21 - 32 mmol/L    Anion gap 5 5 - 15 mmol/L    Glucose 105 (H) 65 - 100 mg/dL    BUN 19 6 - 20 mg/dL    Creatinine 0.82 0.55 - 1.02 mg/dL    BUN/Creatinine ratio 23 (H) 12 - 20      GFR est AA >60 >60 ml/min/1.73m2    GFR est non-AA >60 >60 ml/min/1.73m2    Calcium 8.0 (L) 8.5 - 10.1 mg/dL    Bilirubin, total 0.2 0.2 - 1.0 mg/dL    AST (SGOT) 14 (L) 15 - 37 U/L    ALT (SGPT) 19 12 - 78 U/L    Alk. phosphatase 90 45 - 117 U/L    Protein, total 6.4 6.4 - 8.2 g/dL    Albumin 2.1 (L) 3.5 - 5.0 g/dL    Globulin 4.3 (H) 2.0 - 4.0 g/dL    A-G Ratio 0.5 (L) 1.1 - 2.2     C REACTIVE PROTEIN, QT    Collection Time: 08/10/21  6:14 AM   Result Value Ref Range    C-Reactive protein 6.62 (H) 0.00 - 0.60 mg/dL   PROCALCITONIN    Collection Time: 08/10/21  6:14 AM   Result Value Ref Range    Procalcitonin <0.05 (H) 0 ng/mL       ASSESSMENT:   Patient is 27 y.o. with diagnosis of : Active Problems:    Cellulitis (8/7/2021)      Sepsis ([address] (8/7/2021)        PLAN:               I instructed the nursing staff about wound care. Patient had a lot of pus on the right forearm proximal to wrist area. Patient will need iodoform packing strip changes at least once or twice daily. Patient can be discharged home today if is okay with infectious disease and wound care instructions the family needs to be provided. And I will see her back my office 2 weeks follow-up.

## 2021-08-10 NOTE — WOUND CARE
IP WOUND CONSULT    Ab Luna  MEDICAL RECORD NUMBER:  [id number]  AGE: 27 y.o. GENDER: female  : 1990  TODAY'S DATE:  8/10/2021    GENERAL     [] Follow-up   [x] New Consult    Ab Luna is a 27 y.o. female referred by:   [x] Physician  [] Nursing  [] Other:         PAST MEDICAL HISTORY    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 ([facility]     Pulmonary HTN ([facility]     RA (rheumatoid arthritis) ([facility]         PAST SURGICAL HISTORY    Past Surgical History:   Procedure Laterality Date    HX ORTHOPAEDIC      x2 left knee sxs    HX ORTHOPAEDIC      bilateral ankle sxs       FAMILY HISTORY    Family History   Problem Relation Age of Onset    Hypertension Mother          ALLERGIES    Allergies   Allergen Reactions    Doxycycline Nausea and Vomiting    Heparin Anaphylaxis    Remicade [Infliximab] Anaphylaxis    Vancomycin Hives    Rituximab Itching    Percocet [Oxycodone-Acetaminophen] Nausea and Vomiting     Side effect       MEDICATIONS    No current facility-administered medications on file prior to encounter. Current Outpatient Medications on File Prior to Encounter   Medication Sig Dispense Refill    gabapentin (NEURONTIN) 600 mg tablet Take 300 mg by mouth two (2) times a day.  spironolactone (ALDACTONE) 25 mg tablet 25 mg daily. At bedtime      Adempas 2.5 mg tab tablet 2.5 mg two (2) times a day.  predniSONE (DELTASONE) 5 mg tablet Take 18 mg by mouth nightly. 15 mg in the AM and 3 mg at night      pantoprazole (PROTONIX) 40 mg tablet TAKE 1 TABLET BY MOUTH EVERY DAY      hydrOXYchloroQUINE (PLAQUENIL) 200 mg tablet       furosemide (LASIX) 20 mg tablet       DULoxetine (CYMBALTA) 30 mg capsule TAKE 1 CAPSULE BY MOUTH TWICE A DAY      Letairis 5 mg tablet 5 mg daily.  In AM           [unfilled]  Visit Vitals  /89 (BP 1 Location: Left upper arm, BP Patient Position: Sitting)   Pulse 85   Temp 98.2 °F (36.8 °C)   Resp 18   Ht 5' 3\" (1.6 m)   Wt 64 kg (141 lb)   SpO2 99%   BMI 24.98 kg/m²       ASSESSMENT     Wound Identification & Type: Surgical incision and graft receiving site to left thigh posterior; graft donor site to left thigh lateral  Dressing change: Yes, see flow chart  Verbal consent for picture: Yes    Contributing Factors: immunosuppression    Wound Arm upper Anterior;Right Incions from I and D of abscess 08/07/21 (Active)   Wound Etiology Other (Comment) 08/09/21 0800   Dressing Status Clean;Dry; Intact 08/07/21 1440   Cleansed Betadine/Povidone Iodine 08/07/21 1440   Dressing/Treatment Non-adherent 08/07/21 1440   Number of days: 3       Wound Thigh Left;Lateral Skin graft site 08/10/21 (Active)   Wound Image   08/10/21 1057   Wound Etiology Other (Comment) 08/10/21 1057   Dressing Status New dressing applied 08/10/21 1057   Cleansed Irrigated with saline 08/10/21 1057   Dressing/Treatment ABD pad;Coban/self-adherent bandages; Xeroform;Roll gauze 08/10/21 1057   Dressing Change Due 08/12/21 08/10/21 1057   Wound Length (cm) 20 cm 08/10/21 1057   Wound Width (cm) 5 cm 08/10/21 1057   Wound Depth (cm) 0.2 cm 08/10/21 1057   Wound Surface Area (cm^2) 100 cm^2 08/10/21 1057   Wound Volume (cm^3) 20 cm^3 08/10/21 1057   Wound Assessment Dry;Pink/red 08/10/21 1057   Drainage Amount None 08/10/21 1057   Wound Odor None 08/10/21 1057   Isabella-Wound/Incision Assessment Intact 08/10/21 1057   Edges Defined edges 08/10/21 1057   Number of days: 0       Wound Thigh Left;Posterior Surgical Site with Graft 08/10/21 (Active)   Wound Etiology Other (Comment) 08/10/21 1054   Cleansed Irrigated with saline 08/10/21 1054   Dressing/Treatment ABD pad; Other (Comment); Coban/self-adherent bandages; Non-adherent; Roll gauze 08/10/21 1054   Dressing Change Due 08/12/21 08/10/21 1054   Wound Length (cm) 18 cm 08/10/21 1054   Wound Width (cm) 4 cm 08/10/21 1054   Wound Depth (cm) 0.3 cm 08/10/21 1054   Wound Surface Area (cm^2) 72 cm^2 08/10/21 1054   Wound Volume (cm^3) 21.6 cm^3 08/10/21 1054   Wound Assessment Pink/red;Granulation tissue 08/10/21 1054   Drainage Amount Small 08/10/21 1054   Drainage Description Serosanguinous 08/10/21 1054   Isabella-Wound/Incision Assessment Intact 08/10/21 1054   Edges Defined edges 08/10/21 1054   Wound Thickness Description Full thickness 08/10/21 1054   Number of days: 0       Incision 08/09/21 Arm Right (Active)   Dressing Status Old drainage noted; Intact 08/10/21 0800   Dressing/Treatment Iodoform gauze;Gauze dressing/dressing sponge; Ace wrap 08/09/21 1900   Number of days: 1          PLAN     Skin Care & Pressure Relief Recommendations  Minimize layers of linen  Turn/reposition approximately every 2 hours  Pillow wedges  Offload heels pillows    Braden 21  Blood Glucose: 105 on 8/10/21                             Albumin: 2.1 on 8/10/21  WBCs: 4.0 on 8/10/21    Support Surface: Gel mattress    Physician/Provider notified: Rose MALDONADO  Recommendations: Patient's wounds to left latera thigh are being managed by the Wound Care team at [facility]. Both areas are well-healing. Iodosorb topical is being used for draining areas to posterior thigh wound every other day. See dressing order for every other day. Patient ambulatory as witnessed and normally able to go to the bathroom. She had a small amount of stool on bed. Linens changed. When I came back to patient's room after retrieving wrap material, she was sitting on side of bed and crying from back pain. Farshad Baird RN was informed. Will continue to follow. Discharge: Patient will resume with [facility] care.       Teaching completed with:   [] Patient           [] Family member       [] Caregiver          [] Nursing  [] Other    Patient/Caregiver Teaching:  Level of patient/caregiver understanding able to:   [] Indicates understanding       [] Needs reinforcement  [] Unsuccessful      [] Verbal Understanding  [] Demonstrated understanding       [] No evidence of learning  [] Refused teaching [] N/A       Electronically signed by Aura Andrew RN on 8/10/2021 at 10:59 AM

## 2021-08-10 NOTE — DISCHARGE SUMMARY
Hospitalist Discharge Summary     Patient ID:    Iva Reynolds  [id number]  70 y.o.  1990    Admit date: 8/7/2021    Discharge date : 8/10/2021    Chronic Diagnoses:    Problem List as of 8/10/2021 Never Reviewed        Codes Class Noted - Resolved    Cellulitis ICD-10-CM: L03.90  ICD-9-CM: 682.9  8/7/2021 - Present        * (Principal) Sepsis ([address] ICD-10-CM: A41.9  ICD-9-CM: 038.9, 995.91  8/7/2021 - Present          22    Final Diagnoses:   Principal Problem:    Sepsis ([address] (8/7/2021)    Active Problems:    Cellulitis (8/7/2021)      Hospital Course:   44-year-old AA female with a PMHx significant for immunosuppression currently on prednisone secondary to lupus and rheumatoid arthritis.  Patient developed right arm cellulitis and 3 potential abscesses on the right arm.  The upper arm abscess was I&D at the freestanding emergency department and cultures are pending.  ID consultation due to the immunosuppression. Ana Frank was started on clindamycin and Zosyn.  These were discontinued for Teflaro.  Dr. Benitez, general surgery consultation. Infectious Disease consult. Initially started on IV Ceftaroline and switched to IV Zosyn and Daptomycin. CT of the right arm showing mild subcutaneous edema suggestive of cellulitis. She went for repeat I&D right arm x4 pockets on 08/09/2021. Intra-op cultures pending. Wound gram stain showing gram positive cocci in clusters. Blood cultures negative. Discharge on oral antibiotics with close outpatient follow-up with ID and general surgery. CM to arrange home health wound care. Medically stable for discharge. Discharge Medications:   Current Discharge Medication List      START taking these medications    Details   amoxicillin-clavulanate (Augmentin) 875-125 mg per tablet Take 1 Tablet by mouth two (2) times a day for 14 days.   Qty: 28 Tablet, Refills: 0  Start date: 8/11/2021, End date: 8/25/2021         CONTINUE these medications which have NOT CHANGED    Details   gabapentin (NEURONTIN) 600 mg tablet Take 300 mg by mouth two (2) times a day. spironolactone (ALDACTONE) 25 mg tablet 25 mg daily. At bedtime      Adempas 2.5 mg tab tablet 2.5 mg two (2) times a day. predniSONE (DELTASONE) 5 mg tablet Take 18 mg by mouth nightly. 15 mg in the AM and 3 mg at night      pantoprazole (PROTONIX) 40 mg tablet TAKE 1 TABLET BY MOUTH EVERY DAY      hydrOXYchloroQUINE (PLAQUENIL) 200 mg tablet       furosemide (LASIX) 20 mg tablet       DULoxetine (CYMBALTA) 30 mg capsule TAKE 1 CAPSULE BY MOUTH TWICE A DAY      Letairis 5 mg tablet 5 mg daily. In AM               Follow up Care:    1. Unknown (Inactive) in 1-2 weeks. Follow-up Information     Follow up With Specialties Details Why Contact Info    Yvonne Ordonez NP Nurse Practitioner Schedule an appointment as soon as possible for a visit in 2 weeks [address]  [phone]      Emmanuel, Angela Stern MD Surgery, General and Vascular Surgery Schedule an appointment as soon as possible for a visit in 2 weeks Hospital follow-up for right arm cellulitis s/p I&D [facility]      Rupert Hernandez MD Infectious Disease Schedule an appointment as soon as possible for a visit in 2 weeks Hospital follow-up right arm cellulitis  [address]  [phone]              Patient Follow Up Instructions: Activity: Activity as tolerated  Diet:  Regular Diet  Wound care:   · Right forearm proximal wrist area will require iodoform packing strip changes at least once or twice daily. · Wounds to left lateral thigh is being managed by the Wound Care team at [facility]. Both areas are well-healing. Iodosorb topical is being used for draining areas to posterior thigh wound every other day.     Condition at Discharge: Stable  __________________________________________________________________    Disposition  Home Health Care c  ____________________________________________________________________    Code Status:  Full Code  ___________________________________________________________________    Discharge Exam:  Patient seen and examined by me on discharge day. Pertinent Findings:    Gen:    Not in distress  Skin:  Right upper ext in circumferential dressing. Chest: Clear lungs. Room air. CVS:   Regular rate and rhythm. No edema  Abd:  Soft, not distended, not tender  Neuro:  Alert and oriented.     CONSULTATIONS: ID and General Surgery    Significant Diagnostic Studies:   Recent Results (from the past 24 hour(s))   GLUCOSE, POC    Collection Time: 08/09/21  4:21 PM   Result Value Ref Range    Glucose (POC) 88 65 - 117 mg/dL    Performed by Rudy Murillo    CULTURE, WOUND Deronda Dubonnet STAIN    Collection Time: 08/09/21  7:00 PM    Specimen: Wound    RIGHT ARM ABSCESS   Result Value Ref Range    Special Requests: No Special Requests      GRAM STAIN NO WBCS SEEN     GRAM STAIN Occasional Gram Positive Cocci in clusters      GRAM STAIN Occasional Gram Positive Cocci IN PAIRS      Culture result: PENDING    GLUCOSE, POC    Collection Time: 08/09/21  7:52 PM   Result Value Ref Range    Glucose (POC) 78 65 - 117 mg/dL    Performed by Rudy Murillo    CBC WITH AUTOMATED DIFF    Collection Time: 08/10/21  6:14 AM   Result Value Ref Range    WBC 4.0 3.6 - 11.0 K/uL    RBC 2.68 (L) 3.80 - 5.20 M/uL    HGB 7.4 (L) 11.5 - 16.0 g/dL    HCT 25.2 (L) 35.0 - 47.0 %    MCV 94.0 80.0 - 99.0 FL    MCH 27.6 26.0 - 34.0 PG    MCHC 29.4 (L) 30.0 - 36.5 g/dL    RDW 18.1 (H) 11.5 - 14.5 %    PLATELET 819 010 - 998 K/uL    MPV 9.5 8.9 - 12.9 FL    NRBC 0.5 (H) 0.0  WBC    ABSOLUTE NRBC 0.02 (H) 0.00 - 0.01 K/uL    NEUTROPHILS 89 (H) 32 - 75 %    LYMPHOCYTES 5 (L) 12 - 49 %    MONOCYTES 5 5 - 13 %    EOSINOPHILS 0 0 - 7 %    BASOPHILS 0 0 - 1 % IMMATURE GRANULOCYTES 1 (H) 0 - 0.5 %    ABS. NEUTROPHILS 3.5 1.8 - 8.0 K/UL    ABS. LYMPHOCYTES 0.2 (L) 0.8 - 3.5 K/UL    ABS. MONOCYTES 0.2 0.0 - 1.0 K/UL    ABS. EOSINOPHILS 0.0 0.0 - 0.4 K/UL    ABS. BASOPHILS 0.0 0.0 - 0.1 K/UL    ABS. IMM. GRANS. 0.1 (H) 0.00 - 0.04 K/UL    DF AUTOMATED     METABOLIC PANEL, COMPREHENSIVE    Collection Time: 08/10/21  6:14 AM   Result Value Ref Range    Sodium 143 136 - 145 mmol/L    Potassium 3.3 (L) 3.5 - 5.1 mmol/L    Chloride 112 (H) 97 - 108 mmol/L    CO2 26 21 - 32 mmol/L    Anion gap 5 5 - 15 mmol/L    Glucose 105 (H) 65 - 100 mg/dL    BUN 19 6 - 20 mg/dL    Creatinine 0.82 0.55 - 1.02 mg/dL    BUN/Creatinine ratio 23 (H) 12 - 20      GFR est AA >60 >60 ml/min/1.73m2    GFR est non-AA >60 >60 ml/min/1.73m2    Calcium 8.0 (L) 8.5 - 10.1 mg/dL    Bilirubin, total 0.2 0.2 - 1.0 mg/dL    AST (SGOT) 14 (L) 15 - 37 U/L    ALT (SGPT) 19 12 - 78 U/L    Alk. phosphatase 90 45 - 117 U/L    Protein, total 6.4 6.4 - 8.2 g/dL    Albumin 2.1 (L) 3.5 - 5.0 g/dL    Globulin 4.3 (H) 2.0 - 4.0 g/dL    A-G Ratio 0.5 (L) 1.1 - 2.2     C REACTIVE PROTEIN, QT    Collection Time: 08/10/21  6:14 AM   Result Value Ref Range    C-Reactive protein 6.62 (H) 0.00 - 0.60 mg/dL   PROCALCITONIN    Collection Time: 08/10/21  6:14 AM   Result Value Ref Range    Procalcitonin <0.05 (H) 0 ng/mL     CT UP EXT RT W CONT   Final Result   1. Mild subcutaneous edema within the right upper extremity, nonspecific but   could be seen with cellulitis. No drainable rim-enhancing abscess identified. No   CT evidence of osteomyelitis. 2. Diffuse subcutaneous calcifications are nonspecific but could be seen with   connective tissue disease      XR FOREARM RT AP/LAT   Final Result   No soft tissue gas is identified. No acute injury. Chronic   subcutaneous calcifications. XR HUMERUS RT   Final Result   impression: 2 views of the right forearm reveal no fracture or dislocation.    Diffuse calcifications are visible, similar to the forearm. The pattern does not   suggest vascular arterial or venous calcifications. Psoas, thermal injury prior   cellulitis or infection should be considered.               Signed:  Rose Martell PA-C  8/10/2021  4:00 PM

## 2021-08-10 NOTE — ROUTINE PROCESS
Bedside and Verbal shift change report given to GAYLA Purdy RN (oncoming nurse) by Ian Calix RN(offgoing nurse). Report included the following information SBAR, Kardex, MAR, Accordion, Recent Results, Med Rec Status and Quality Measures.

## 2021-08-10 NOTE — PROGRESS NOTES
DC Plan: [facility] [place] ([facility]: 8/11/21)    Discharge order uploaded via [employer]. Discharge plan of care/case management plan validated with provider's discharge order.

## 2021-08-10 NOTE — DISCHARGE INSTRUCTIONS
Patient Education        Skin Abscess: Care Instructions  Your Care Instructions     A skin abscess is a bacterial infection that forms a pocket of pus. A boil is a kind of skin abscess. The doctor may have cut an opening in the abscess so that the pus can drain out. You may have gauze in the cut so that the abscess will stay open and keep draining. You may need antibiotics. You will need to follow up with your doctor to make sure the infection has gone away. The doctor has checked you carefully, but problems can develop later. If you notice any problems or new symptoms, get medical treatment right away. Follow-up care is a key part of your treatment and safety. Be sure to make and go to all appointments, and call your doctor if you are having problems. It's also a good idea to know your test results and keep a list of the medicines you take. How can you care for yourself at home? · Apply warm and dry compresses, a heating pad set on low, or a hot water bottle 3 or 4 times a day for pain. Keep a cloth between the heat source and your skin. · If your doctor prescribed antibiotics, take them as directed. Do not stop taking them just because you feel better. You need to take the full course of antibiotics. · Take pain medicines exactly as directed. ? If the doctor gave you a prescription medicine for pain, take it as prescribed. ? If you are not taking a prescription pain medicine, ask your doctor if you can take an over-the-counter medicine. · Keep your bandage clean and dry. Change the bandage whenever it gets wet or dirty, or at least one time a day. · If the abscess was packed with gauze:  ? Keep follow-up appointments to have the gauze changed or removed. If the doctor instructed you to remove the gauze, follow the instructions you were given for how to remove it. ? After the gauze is removed, soak the area in warm water for 15 to 20 minutes 2 times a day, until the wound closes.   When should you call for help? Call your doctor now or seek immediate medical care if:    · You have signs of worsening infection, such as:  ? Increased pain, swelling, warmth, or redness. ? Red streaks leading from the infected skin. ? Pus draining from the wound. ? A fever. Watch closely for changes in your health, and be sure to contact your doctor if:    · You do not get better as expected. Where can you learn more? Go to http://www.gray.com/  Enter N234 in the search box to learn more about \"Skin Abscess: Care Instructions. \"  Current as of: July 2, 2020               Content Version: 12.8  © 2006-2021 [employer]. Care instructions adapted under license by [employer] (which disclaims liability or warranty for this information). If you have questions about a medical condition or this instruction, always ask your healthcare professional. [place] [address] any warranty or liability for your use of this information. Patient Education        Cellulitis: Care Instructions  Your Care Instructions     Cellulitis is a skin infection caused by bacteria, most often strep or staph. It often occurs after a break in the skin from a scrape, cut, bite, or puncture, or after a rash. Cellulitis may be treated without doing tests to find out what caused it. But your doctor may do tests, if needed, to look for a specific bacteria, like methicillin-resistant Staphylococcus aureus (MRSA). The doctor has checked you carefully, but problems can develop later. If you notice any problems or new symptoms, get medical treatment right away. Follow-up care is a key part of your treatment and safety. Be sure to make and go to all appointments, and call your doctor if you are having problems. It's also a good idea to know your test results and keep a list of the medicines you take. How can you care for yourself at home? · Take your antibiotics as directed.  Do not stop taking them just because you feel better. You need to take the full course of antibiotics. · Prop up the infected area on pillows to reduce pain and swelling. Try to keep the area above the level of your heart as often as you can. · If your doctor told you how to care for your wound, follow your doctor's instructions. If you did not get instructions, follow this general advice:  ? Wash the wound with clean water 2 times a day. Don't use hydrogen peroxide or alcohol, which can slow healing. ? You may cover the wound with a thin layer of petroleum jelly, such as Vaseline, and a nonstick bandage. ? Apply more petroleum jelly and replace the bandage as needed. · Be safe with medicines. Take pain medicines exactly as directed. ? If the doctor gave you a prescription medicine for pain, take it as prescribed. ? If you are not taking a prescription pain medicine, ask your doctor if you can take an over-the-counter medicine. To prevent cellulitis in the future  · Try to prevent cuts, scrapes, or other injuries to your skin. Cellulitis most often occurs where there is a break in the skin. · If you get a scrape, cut, mild burn, or bite, wash the wound with clean water as soon as you can to help avoid infection. Don't use hydrogen peroxide or alcohol, which can slow healing. · If you have swelling in your legs (edema), support stockings and good skin care may help prevent leg sores and cellulitis. · Take care of your feet, especially if you have diabetes or other conditions that increase the risk of infection. Wear shoes and socks. Do not go barefoot. If you have athlete's foot or other skin problems on your feet, talk to your doctor about how to treat them. When should you call for help? Call your doctor now or seek immediate medical care if:    · You have signs that your infection is getting worse, such as:  ? Increased pain, swelling, warmth, or redness. ? Red streaks leading from the area.   ? Pus draining from the area. ? A fever.     · You get a rash. Watch closely for changes in your health, and be sure to contact your doctor if:    · You do not get better as expected. Where can you learn more? Go to http://www.gray.com/  Enter X309 in the search box to learn more about \"Cellulitis: Care Instructions. \"  Current as of: July 2, 2020               Content Version: 12.8  © 2006-2021 [employer]. Care instructions adapted under license by [employer] (which disclaims liability or warranty for this information). If you have questions about a medical condition or this instruction, always ask your healthcare professional. [address] any warranty or liability for your use of this information. Thank you! Thank you for allowing me to care for you in the emergency department. I sincerely hope that you are satisfied with your visit today. It is my goal to provide you with excellent care. Below you will find a list of your labs and imaging from your visit today. Should you have any questions regarding these results please do not hesitate to call the emergency department. Labs -     Recent Results (from the past 12 hour(s))   CBC WITH AUTOMATED DIFF    Collection Time: 08/07/21 10:56 AM   Result Value Ref Range    WBC 6.0 3.6 - 11.0 K/uL    RBC 3.47 (L) 3.80 - 5.20 M/uL    HGB 9.7 (L) 11.5 - 16.0 g/dL    HCT 33.3 (L) 35.0 - 47.0 %    MCV 96.0 80.0 - 99.0 FL    MCH 28.0 26.0 - 34.0 PG    MCHC 29.1 (L) 30.0 - 36.5 g/dL    RDW 18.4 (H) 11.5 - 14.5 %    PLATELET 869 421 - 246 K/uL    MPV 9.6 8.9 - 12.9 FL    NEUTROPHILS 85 (H) 32 - 75 %    LYMPHOCYTES 11 (L) 12 - 49 %    MONOCYTES 2 (L) 5 - 13 %    EOSINOPHILS 0 0 - 7 %    BASOPHILS 0 0 - 1 %    IMMATURE GRANULOCYTES 2 (H) 0.0 - 0.5 %    ABS. NEUTROPHILS 5.1 1.8 - 8.0 K/UL    ABS. LYMPHOCYTES 0.7 (L) 0.8 - 3.5 K/UL    ABS. MONOCYTES 0.1 0.0 - 1.0 K/UL    ABS.  EOSINOPHILS 0.0 0.0 - 0.4 K/UL    ABS. BASOPHILS 0.0 0.0 - 0.1 K/UL    ABS. IMM. GRANS. 0.1 (H) 0.00 - 0.04 K/UL    DF Smear Scanned      RBC COMMENTS Anisocytosis  2+       METABOLIC PANEL, BASIC    Collection Time: 08/07/21 10:56 AM   Result Value Ref Range    Sodium 144 136 - 145 mmol/L    Potassium 3.0 (L) 3.5 - 5.1 mmol/L    Chloride 108 97 - 108 mmol/L    CO2 26 21 - 32 mmol/L    Anion gap 10 5 - 15 mmol/L    Glucose 112 (H) 65 - 100 mg/dL    BUN 16 6 - 20 mg/dL    Creatinine 0.82 0.55 - 1.02 mg/dL    BUN/Creatinine ratio 20 12 - 20      GFR est AA >60 >60 ml/min/1.73m2    GFR est non-AA >60 >60 ml/min/1.73m2    Calcium 8.4 (L) 8.5 - 10.1 mg/dL   LACTIC ACID    Collection Time: 08/07/21 10:56 AM   Result Value Ref Range    Lactic acid 1.6 0.4 - 2.0 mmol/L   C REACTIVE PROTEIN, QT    Collection Time: 08/07/21 10:56 AM   Result Value Ref Range    C-Reactive protein 1.66 (H) 0.00 - 0.60 mg/dL       Radiologic Studies -   XR FOREARM RT AP/LAT   Final Result   No soft tissue gas is identified. No acute injury. Chronic   subcutaneous calcifications. XR HUMERUS RT   Final Result   impression: 2 views of the right forearm reveal no fracture or dislocation. Diffuse calcifications are visible, similar to the forearm. The pattern does not   suggest vascular arterial or venous calcifications. Psoas, thermal injury prior   cellulitis or infection should be considered. CT Results  (Last 48 hours)      None          CXR Results  (Last 48 hours)      None               If you feel that you have not received excellent quality care or timely care, please ask to speak to the nurse manager. Please choose us in the future for your continued health care needs. ------------------------------------------------------------------------------------------------------------  The exam and treatment you received in the Emergency Department were for an urgent problem and are not intended as complete care.  It is important that you follow-up with a doctor, nurse practitioner, or physician assistant to:  (1) confirm your diagnosis,  (2) re-evaluation of changes in your illness and treatment, and  (3) for ongoing care. If your symptoms become worse or you do not improve as expected and you are unable to reach your usual health care provider, you should return to the Emergency Department. We are available 24 hours a day. Please take your discharge instructions with you when you go to your follow-up appointment. If you have any problem arranging a follow-up appointment, contact the Emergency Department immediately. If a prescription has been provided, please have it filled as soon as possible to prevent a delay in treatment. Read the entire medication instruction sheet provided to you by the pharmacy. If you have any questions or reservations about taking the medication due to side effects or interactions with other medications, please call your primary care physician or contact the ER to speak with the charge nurse. Make an appointment with your family doctor or the physician you were referred to for follow-up of this visit as instructed on your discharge paperwork, as this is a mandatory follow-up. Return to the ER if you are unable to be seen or if you are unable to be seen in a timely manner. If you have any problem arranging the follow-up visit, contact the Emergency Department immediately.

## 2021-08-10 NOTE — PROGRESS NOTES
Progress Note    Patient: Laura Carlson MRN: [id number]  SSN: [id number]    YOB: 1990  Age: 27 y.o. Sex: female      Admit Date: 8/7/2021    LOS: 3 days     Subjective:   Patient followed for cellulitis/abscess right arm. Blood cultures negative so far and initial wound culture negative. She was taken to the OR yesterday undergoing I&D of right arm abscesses with cultures submitted. She is afebrile with normal WBC. Today procal is normal and CRP is decreasing. She is currently on IV Zosyn alone. Patient sitting up in bedside chair, subjectively improved. She states that she has an important clinic appointment tomorrow and wanted to known if she could be discharged. She has been cleared by General Surgery. Objective:     Vitals:    08/09/21 1930 08/09/21 2026 08/10/21 0857 08/10/21 0912   BP: (!) 159/90 132/78 138/89    Pulse: 69 75 85    Resp: 20 18 18    Temp:  97.6 °F (36.4 °C) 98.2 °F (36.8 °C)    SpO2: 99% 97% 100% 99%   Weight:       Height:            Intake and Output:  Current Shift: 08/10 0701 - 08/10 1900  In: 120 [P.O.:120]  Out: 2 [Urine:1]  Last three shifts: 08/08 1901 - 08/10 0700  In: -   Out: 2 [Urine:2]    Physical Exam:   Vitals and nursing note reviewed. Patient unavailable for re-examination  Constitutional:       Appearance: She is ill-appearing. HENT:      Head: Normocephalic and atraumatic. Right Ear: External ear normal.      Left Ear: External ear normal.      Nose: Nose normal.      Mouth/Throat:      Pharynx: Oropharynx is clear. Eyes:      Pupils: Pupils are equal, round, and reactive to light. Cardiovascular:      Rate and Rhythm: Normal rate and regular rhythm. Heart sounds: No murmur heard. Pulmonary:      Effort: Pulmonary effort is normal.      Breath sounds: Normal breath sounds. Abdominal:      General: Bowel sounds are normal.      Palpations: Abdomen is soft.    Genitourinary:     Comments: No Mckeon  Musculoskeletal: General: Swelling and tenderness present. Cervical back: Neck supple. Right lower leg: No edema. Left lower leg: No edema. Comments: Right arm is moderately swollen with erythema and warmth; there is a small wound over bicep that is dry at this time; there is diffuse tenderness; hardening of the skin noted, related to subcutaneous calcification seen on x-rays   Skin:     Findings: Erythema present. No rash. Neurological:      General: No focal deficit present. Mental Status: She is alert and oriented to person, place, and time. Psychiatric:         Mood and Affect: Mood normal.         Behavior: Behavior normal.         Thought Content: Thought content normal.         Judgment: Judgment normal.      Lab/Data Review:     WBC 4,000    CRP 6.62 <9.44 <4.99 < 1.66  Procal <0.05 <0.07 <0.05    Blood cultures (8/7) No growth 2 days  Wound culture right arm (8/7) No growth FINAL  Wound culture right arm (8/9) Pending    CT scan right arm (8/8) Mild subcutaneous edema within the right upper extremity, nonspecific but  could be seen with cellulitis. No drainable rim-enhancing abscess identified. No  CT evidence of osteomyelitis  Assessment:     Active Problems:    Cellulitis (8/7/2021)      Sepsis ([address] (8/7/2021)    1. Cellulitis and abscess, right arm, etiology unclear, status post I&D, Day #3 IV Antibiotics, now IV Zosyn  2. Immunocompromised patient   3. Elevated CRP and procal, resolved or resolving  4. Lupus  5. Rheumatoid arthritis  6. Allergy to Vancomycin and Doxycycline     Comment:  Procal now normal and CRP decreasing. Unclear if this is in response to I&D and/or change in antibiotics. Wound Gram stain showing GPC in clusters which could be MRSA and in pairs. With negative blood cultures, it may not be unreasonable to transition to oral antibiotics pending culture results.     Plan:   1. Reasonable to transition to Augmentin 875 mg po BID for 2 weeks; would like to include Doxycycline for possibility of MRSA but she is allergic  2. Cleared for discharge from ID standpoint  3. Will follow-up pending blood and wound culture results and adjust antibiotics if indicated.     Signed By: Na Vasquez MD     August 10, 2021

## 2021-08-12 LAB
BACTERIA SPEC CULT: NORMAL
GRAM STN SPEC: NORMAL
SPECIAL REQUESTS,SREQ: NORMAL

## 2021-08-13 NOTE — PROGRESS NOTES
Physician Progress Note      PATIENT:               Lainey Cisneros  CSN #:                  [id number]  :                       1990  ADMIT DATE:       2021 10:12 AM  DISCH DATE:        8/10/2021 7:05 PM  RESPONDING  PROVIDER #:        Peyman Vincent MD          QUERY TEXT:    Patient admitted with multiple right arm abscesses. Per Op note dated  documentation of I&D. To accurately reflect the procedure performed please further specify the depth of tissue incised and drained: The medical record reflects the following:  Risk Factors:27year old female, right arm cellulitis, abscesses in right arm  Clinical Indicators:  OP note - Multiple right arm abscesses. Followed by using #10 blade skin incision made over the xiphoid area of the abscess. Once upon entering the skin dermal tissue possible drained especially large amount of right wrist area. And relatively small pus pocket noted proximal forearm distal forearm area. I&D was also performed the right upper deltoid area abscess as well. Treatment: I&D of right arm abscesses, wound packing with Iodoform, IV Cefazolin, IV Zosyn    Please call [phone] with any questions  Options provided:  -- Skin only  -- Subcutaneous tissue  -- Fascia  -- Muscle  -- Joint  -- Bone  -- Other - I will add my own diagnosis  -- Disagree - Not applicable / Not valid  -- Disagree - Clinically unable to determine / Unknown  -- Refer to Clinical Documentation Reviewer    PROVIDER RESPONSE TEXT:    Addendum to  procedure note The depth of the drainage to right upper arm was down to and including subcutaneous tissue. Query created by:  Jace Burleson on 2021 7:40 AM      Electronically signed by:  Peyman Vincent MD 2021 2:10 PM

## 2021-08-14 LAB
BACTERIA SPEC CULT: NORMAL
SPECIAL REQUESTS,SREQ: NORMAL

## 2021-08-19 ENCOUNTER — OFFICE VISIT (OUTPATIENT)
Dept: SURGERY | Age: 31
End: 2021-08-19
Payer: MEDICARE

## 2021-08-19 VITALS
BODY MASS INDEX: 24.66 KG/M2 | WEIGHT: 139.2 LBS | OXYGEN SATURATION: 95 % | DIASTOLIC BLOOD PRESSURE: 79 MMHG | HEART RATE: 109 BPM | RESPIRATION RATE: 18 BRPM | TEMPERATURE: 97.8 F | SYSTOLIC BLOOD PRESSURE: 136 MMHG

## 2021-08-19 DIAGNOSIS — L03.119 CELLULITIS OF UPPER EXTREMITY, UNSPECIFIED LATERALITY: ICD-10-CM

## 2021-08-19 DIAGNOSIS — Z13.31 POSITIVE DEPRESSION SCREENING: ICD-10-CM

## 2021-08-19 DIAGNOSIS — A41.9 SEPSIS WITHOUT ACUTE ORGAN DYSFUNCTION, DUE TO UNSPECIFIED ORGANISM (HCC): Primary | ICD-10-CM

## 2021-08-19 PROCEDURE — 1111F DSCHRG MED/CURRENT MED MERGE: CPT | Performed by: SURGERY

## 2021-08-19 PROCEDURE — G8427 DOCREV CUR MEDS BY ELIG CLIN: HCPCS | Performed by: SURGERY

## 2021-08-19 PROCEDURE — G8431 POS CLIN DEPRES SCRN F/U DOC: HCPCS | Performed by: SURGERY

## 2021-08-19 PROCEDURE — G8420 CALC BMI NORM PARAMETERS: HCPCS | Performed by: SURGERY

## 2021-08-19 PROCEDURE — 99213 OFFICE O/P EST LOW 20 MIN: CPT | Performed by: SURGERY

## 2021-08-19 RX ORDER — CHOLECALCIFEROL TAB 125 MCG (5000 UNIT) 125 MCG
5000 TAB ORAL DAILY
COMMUNITY

## 2021-08-19 NOTE — PROGRESS NOTES
Identified pt with two pt identifiers(name and ). Reviewed record in preparation for visit and have obtained necessary documentation. Chief Complaint   Patient presents with    Post OP Follow Up     cellulitis      Vitals:    21 0952   BP: 136/79   Pulse: (!) 109   Resp: 18   Temp: 97.8 °F (36.6 °C)   TempSrc: Temporal   SpO2: 95%   Weight: 139 lb 3.2 oz (63.1 kg)   PainSc:   0 - No pain       Health Maintenance Review: Patient reminded of \"due or due soon\" health maintenance. I have asked the patient to contact his/her primary care provider (PCP) for follow-up on his/her health maintenance. Coordination of Care Questionnaire:  :   1) Have you been to an emergency room, urgent care, or hospitalized since your last visit? If yes, where when, and reason for visit? yes       2. Have seen or consulted any other health care provider since your last visit? If yes, where when, and reason for visit? NO    ADL Assessment 2021   Feeding yourself No Help Needed   Getting from bed to chair No Help Needed   Getting dressed No Help Needed   Bathing or showering No Help Needed   Walk across the room (includes cane/walker) No Help Needed   Using the telphone No Help Needed   Taking your medications No Help Needed   Preparing meals No Help Needed   Managing money (expenses/bills) No Help Needed   Moderately strenuous housework (laundry) No Help Needed   Shopping for personal items (toiletries/medicines) No Help Needed   Shopping for groceries No Help Needed   Driving No Help Needed   Climbing a flight of stairs No Help Needed   Getting to places beyond walking distances No Help Needed     Abuse Screening Questionnaire 2021   Do you ever feel afraid of your partner? N   Are you in a relationship with someone who physically or mentally threatens you? N   Is it safe for you to go home? Y     Who is the primary learner? Patient    What is the preferred language for health care of the primary learner?  ENGLISH How does the primary learner prefer to learn new concepts? READING    Answered By patient    Relationship to Learner SELF      3 most recent PHQ Screens 8/19/2021   Little interest or pleasure in doing things More than half the days   Feeling down, depressed, irritable, or hopeless More than half the days   Total Score PHQ 2 4   Trouble falling or staying asleep, or sleeping too much Nearly every day   Feeling tired or having little energy Nearly every day   Poor appetite, weight loss, or overeating More than half the days   Feeling bad about yourself - or that you are a failure or have let yourself or your family down Not at all   Trouble concentrating on things such as school, work, reading, or watching TV Not at all   Moving or speaking so slowly that other people could have noticed; or the opposite being so fidgety that others notice Several days   Thoughts of being better off dead, or hurting yourself in some way Not at all   PHQ 9 Score 13   How difficult have these problems made it for you to do your work, take care of your home and get along with others Extremely difficult       Pt states she noticed 2 new cysts and wants to know if they can be drained.

## 2021-08-20 NOTE — PROGRESS NOTES
VASCULAR FOLLOW UP      Subjective:   CHIEF COMPLAINTS:  Multiple abscesses right arm. PRESENTATION OF ILLNESS:  Ms. Piper Jean was recently hospitalized for multiple abscesses right forearm which was drained surgically. She is here today follow-up. She noted that there are 2 more spots showing up on the right upper arm and forearm area. Is painful but she is denies any fever. 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us ([address]     Pulmonary HTN ([facility] [address]     RA (rheumatoid arthritis) ([facility] [address]       Past Surgical History:   Procedure Laterality Date    HX CYST REMOVAL      HX ORTHOPAEDIC      x2 left knee sxs    HX ORTHOPAEDIC      bilateral ankle sxs     Family History   Problem Relation Age of Onset    Hypertension Mother     Cancer Paternal Grandmother       Social History     Tobacco Use    Smoking status: Never Smoker    Smokeless tobacco: Never Used   Substance Use Topics    Alcohol use: Never       Prior to Admission medications    Medication Sig Start Date End Date Taking? Authorizing Provider   cholecalciferol (VITAMIN D3) (5000 Units/125 mcg) tab tablet Take 5,000 Units by mouth daily. Yes Provider, Historical   amoxicillin-clavulanate (Augmentin) 875-125 mg per tablet Take 1 Tablet by mouth two (2) times a day for 14 days. 8/11/21 8/25/21 Yes Aaron Ramirez PA-C   gabapentin (NEURONTIN) 600 mg tablet Take 300 mg by mouth two (2) times a day. Yes Other, MD Sherman   spironolactone (ALDACTONE) 25 mg tablet 25 mg daily. At bedtime 12/13/20  Yes Yvonne, MD Sherman   Adempas 2.5 mg tab tablet 2.5 mg two (2) times a day. 12/8/20  Yes Yvonne, MD Sherman   predniSONE (DELTASONE) 5 mg tablet Take 18 mg by mouth nightly. 15 mg in the AM and 3 mg at night 10/21/20  Yes Other, MD Sherman   pantoprazole (PROTONIX) 40 mg tablet TAKE 1 TABLET BY MOUTH EVERY DAY 11/15/20  Yes Yvonne, MD Sherman   hydrOXYchloroQUINE (PLAQUENIL) 200 mg tablet Take 200 mg by mouth daily.  12/14/20  Yes Yvonne, MD Sherman   furosemide (LASIX) 20 mg tablet Take 20 mg by mouth daily. 12/17/20  Yes Other, MD Sherman   DULoxetine (CYMBALTA) 30 mg capsule TAKE 1 CAPSULE BY MOUTH TWICE A DAY 10/25/20  Yes Other, MD Sherman   Letairis 5 mg tablet 5 mg daily. In AM 12/8/20  Yes Other, MD Sherman     Allergies   Allergen Reactions    Doxycycline Nausea and Vomiting    Heparin Anaphylaxis    Remicade [Infliximab] Anaphylaxis    Vancomycin Hives    Rituximab Itching    Percocet [Oxycodone-Acetaminophen] Nausea and Vomiting     Side effect        Review of Systems:  I reviewed the rest of organ systems personally and they were negative signed by Dr. Roosevelt Tolentino    Objective:     Visit Vitals  /79 (BP 1 Location: Left upper arm, BP Patient Position: Sitting, BP Cuff Size: Adult)   Pulse (!) 109   Temp 97.8 °F (36.6 °C) (Temporal)   Resp 18   Wt 139 lb 3.2 oz (63.1 kg)   SpO2 95%   BMI 24.66 kg/m²     VITAL SIGNS REVIEWED. Physical Exam:  Patient is well-nourished pleasant in conversation is appropriate. Head and neck examination atraumatic, normocephalic. Gaze appropriate. Conversation appropriate. Neck examination shows supple. No mass. No obvious carotid bruit. Chest examination shows lungs are clear bilaterally well-expanded, no crackles or wheezes. Cardiovascular system regular rate, no obvious murmur. Skin warm to touch  and moist, no skin lesions. Abdomen is soft ,not tender or distended bowel sounds present. No palpable mass. Neurological examinations, no focal neuro deficits moving all 4 extremities. Cranial nerves intact. Sensation is intact as well. Hematologic: No obvious bruise or swelling or obvious lymphadenopathy. Psychosocial: Appropriate. Has good effect. Musculoskeletal system: No muscle wasting, appropriate movements upper and lower extremity.           Data Review:   Admission on 08/07/2021, Discharged on 08/10/2021   Component Date Value Ref Range Status    WBC 08/07/2021 6.0  3.6 - 11.0 K/uL Final    RBC 08/07/2021 3.47* 3.80 - 5.20 M/uL Final    HGB 08/07/2021 9.7* 11.5 - 16.0 g/dL Final    HCT 08/07/2021 33.3* 35.0 - 47.0 % Final    MCV 08/07/2021 96.0  80.0 - 99.0 FL Final    MCH 08/07/2021 28.0  26.0 - 34.0 PG Final    MCHC 08/07/2021 29.1* 30.0 - 36.5 g/dL Final    RDW 08/07/2021 18.4* 11.5 - 14.5 % Final    PLATELET 14/78/7834 476  150 - 400 K/uL Final    MPV 08/07/2021 9.6  8.9 - 12.9 FL Final    NEUTROPHILS 08/07/2021 85* 32 - 75 % Final    LYMPHOCYTES 08/07/2021 11* 12 - 49 % Final    MONOCYTES 08/07/2021 2* 5 - 13 % Final    EOSINOPHILS 08/07/2021 0  0 - 7 % Final    BASOPHILS 08/07/2021 0  0 - 1 % Final    IMMATURE GRANULOCYTES 08/07/2021 2* 0.0 - 0.5 % Final    ABS. NEUTROPHILS 08/07/2021 5.1  1.8 - 8.0 K/UL Final    ABS. LYMPHOCYTES 08/07/2021 0.7* 0.8 - 3.5 K/UL Final    ABS. MONOCYTES 08/07/2021 0.1  0.0 - 1.0 K/UL Final    ABS. EOSINOPHILS 08/07/2021 0.0  0.0 - 0.4 K/UL Final    ABS. BASOPHILS 08/07/2021 0.0  0.0 - 0.1 K/UL Final    ABS. IMM. GRANS. 08/07/2021 0.1* 0.00 - 0.04 K/UL Final    DF 08/07/2021 Smear Scanned    Final    RBC COMMENTS 08/07/2021     Final                    Value: Anisocytosis  2+      Sodium 08/07/2021 144  136 - 145 mmol/L Final    Potassium 08/07/2021 3.0* 3.5 - 5.1 mmol/L Final    Chloride 08/07/2021 108  97 - 108 mmol/L Final    CO2 08/07/2021 26  21 - 32 mmol/L Final    Anion gap 08/07/2021 10  5 - 15 mmol/L Final    Glucose 08/07/2021 112* 65 - 100 mg/dL Final    BUN 08/07/2021 16  6 - 20 mg/dL Final    Creatinine 08/07/2021 0.82  0.55 - 1.02 mg/dL Final    BUN/Creatinine ratio 08/07/2021 20  12 - 20   Final    GFR est AA 08/07/2021 >60  >60 ml/min/1.73m2 Final    GFR est non-AA 08/07/2021 >60  >60 ml/min/1.73m2 Final    Calcium 08/07/2021 8.4* 8.5 - 10.1 mg/dL Final    Lactic acid 08/07/2021 1.6  0.4 - 2.0 mmol/L Final    C-Reactive protein 08/07/2021 1.66* 0.00 - 0.60 mg/dL Final    Sed rate, automated 08/07/2021 81  mm/hr Final    Special Requests: 08/07/2021 No Special Requests    Final    Culture result: 08/07/2021 No growth 6 days    Final    Special Requests: 08/07/2021 No Special Requests    Final    GRAM STAIN 08/07/2021 Rare WBCs seen    Final    GRAM STAIN 08/07/2021 No organisms seen    Final    Culture result: 08/07/2021 No growth 2 days    Final    Glucose (POC) 08/07/2021 91  65 - 117 mg/dL Final    Performed by 08/07/2021 Haylee Love   Final    C-Reactive protein 08/08/2021 4.99* 0.00 - 0.60 mg/dL Final    Procalcitonin 08/08/2021 <0.05* 0 ng/mL Final    C-Reactive protein 08/09/2021 9.44* 0.00 - 0.60 mg/dL Final    Procalcitonin 08/09/2021 0.07* 0 ng/mL Final    WBC 08/09/2021 5.7  3.6 - 11.0 K/uL Final    RBC 08/09/2021 3.05* 3.80 - 5.20 M/uL Final    HGB 08/09/2021 8.3* 11.5 - 16.0 g/dL Final    HCT 08/09/2021 28.6* 35.0 - 47.0 % Final    MCV 08/09/2021 93.8  80.0 - 99.0 FL Final    MCH 08/09/2021 27.2  26.0 - 34.0 PG Final    MCHC 08/09/2021 29.0* 30.0 - 36.5 g/dL Final    RDW 08/09/2021 18.1* 11.5 - 14.5 % Final    PLATELET 49/63/4850 291  150 - 400 K/uL Final    MPV 08/09/2021 9.8  8.9 - 12.9 FL Final    NRBC 08/09/2021 0.3* 0.0  WBC Final    ABSOLUTE NRBC 08/09/2021 0.02* 0.00 - 0.01 K/uL Final    NEUTROPHILS 08/09/2021 91* 32 - 75 % Final    LYMPHOCYTES 08/09/2021 5* 12 - 49 % Final    MONOCYTES 08/09/2021 3* 5 - 13 % Final    EOSINOPHILS 08/09/2021 0  0 - 7 % Final    BASOPHILS 08/09/2021 0  0 - 1 % Final    IMMATURE GRANULOCYTES 08/09/2021 1* 0 - 0.5 % Final    ABS. NEUTROPHILS 08/09/2021 5.2  1.8 - 8.0 K/UL Final    ABS. LYMPHOCYTES 08/09/2021 0.3* 0.8 - 3.5 K/UL Final    ABS. MONOCYTES 08/09/2021 0.2  0.0 - 1.0 K/UL Final    ABS. EOSINOPHILS 08/09/2021 0.0  0.0 - 0.4 K/UL Final    ABS. BASOPHILS 08/09/2021 0.0  0.0 - 0.1 K/UL Final    ABS. IMM.  GRANS. 08/09/2021 0.1* 0.00 - 0.04 K/UL Final    DF 08/09/2021 AUTOMATED    Final    Sodium 08/09/2021 137  136 - 145 mmol/L Final    Potassium 08/09/2021 3.8  3.5 - 5.1 mmol/L Final    Chloride 08/09/2021 108  97 - 108 mmol/L Final    CO2 08/09/2021 26  21 - 32 mmol/L Final    Anion gap 08/09/2021 3* 5 - 15 mmol/L Final    Glucose 08/09/2021 80  65 - 100 mg/dL Final    BUN 08/09/2021 17  6 - 20 mg/dL Final    Creatinine 08/09/2021 0.64  0.55 - 1.02 mg/dL Final    BUN/Creatinine ratio 08/09/2021 27* 12 - 20   Final    GFR est AA 08/09/2021 >60  >60 ml/min/1.73m2 Final    GFR est non-AA 08/09/2021 >60  >60 ml/min/1.73m2 Final    Calcium 08/09/2021 8.6  8.5 - 10.1 mg/dL Final    Bilirubin, total 08/09/2021 0.3  0.2 - 1.0 mg/dL Final    AST (SGOT) 08/09/2021 13* 15 - 37 U/L Final    ALT (SGPT) 08/09/2021 23  12 - 78 U/L Final    Alk.  phosphatase 08/09/2021 94  45 - 117 U/L Final    Protein, total 08/09/2021 7.2  6.4 - 8.2 g/dL Final    Albumin 08/09/2021 2.5* 3.5 - 5.0 g/dL Final    Globulin 08/09/2021 4.7* 2.0 - 4.0 g/dL Final    A-G Ratio 08/09/2021 0.5* 1.1 - 2.2   Final    Glucose (POC) 08/09/2021 95  65 - 117 mg/dL Final    Performed by 08/09/2021 [facility] MARISA   Final    Glucose (POC) 08/09/2021 88  65 - 117 mg/dL Final    Performed by 08/09/2021 Idalia Alvarado   Final    Special Requests: 08/09/2021 No Special Requests    Final    GRAM STAIN 08/09/2021 NO WBCS SEEN   Final    GRAM STAIN 08/09/2021 Occasional Gram Positive Cocci in clusters    Final    GRAM STAIN 08/09/2021 Occasional Gram Positive Cocci IN PAIRS    Final    Culture result: 08/09/2021 Heavy Staphylococcus aureus    Final    Glucose (POC) 08/09/2021 78  65 - 117 mg/dL Final    Performed by 08/09/2021 Idalia Alvarado   Final    WBC 08/10/2021 4.0  3.6 - 11.0 K/uL Final    RBC 08/10/2021 2.68* 3.80 - 5.20 M/uL Final    HGB 08/10/2021 7.4* 11.5 - 16.0 g/dL Final    HCT 08/10/2021 25.2* 35.0 - 47.0 % Final    MCV 08/10/2021 94.0  80.0 - 99.0 FL Final    MCH 08/10/2021 27.6  26.0 - 34.0 PG Final    MCHC 08/10/2021 29.4* 30.0 - 36.5 g/dL Final    RDW 08/10/2021 18.1* 11.5 - 14.5 % Final    PLATELET 99/55/3200 025  150 - 400 K/uL Final    MPV 08/10/2021 9.5  8.9 - 12.9 FL Final    NRBC 08/10/2021 0.5* 0.0  WBC Final    ABSOLUTE NRBC 08/10/2021 0.02* 0.00 - 0.01 K/uL Final    NEUTROPHILS 08/10/2021 89* 32 - 75 % Final    LYMPHOCYTES 08/10/2021 5* 12 - 49 % Final    MONOCYTES 08/10/2021 5  5 - 13 % Final    EOSINOPHILS 08/10/2021 0  0 - 7 % Final    BASOPHILS 08/10/2021 0  0 - 1 % Final    IMMATURE GRANULOCYTES 08/10/2021 1* 0 - 0.5 % Final    ABS. NEUTROPHILS 08/10/2021 3.5  1.8 - 8.0 K/UL Final    ABS. LYMPHOCYTES 08/10/2021 0.2* 0.8 - 3.5 K/UL Final    ABS. MONOCYTES 08/10/2021 0.2  0.0 - 1.0 K/UL Final    ABS. EOSINOPHILS 08/10/2021 0.0  0.0 - 0.4 K/UL Final    ABS. BASOPHILS 08/10/2021 0.0  0.0 - 0.1 K/UL Final    ABS. IMM. GRANS. 08/10/2021 0.1* 0.00 - 0.04 K/UL Final    DF 08/10/2021 AUTOMATED    Final    Sodium 08/10/2021 143  136 - 145 mmol/L Final    Potassium 08/10/2021 3.3* 3.5 - 5.1 mmol/L Final    Chloride 08/10/2021 112* 97 - 108 mmol/L Final    CO2 08/10/2021 26  21 - 32 mmol/L Final    Anion gap 08/10/2021 5  5 - 15 mmol/L Final    Glucose 08/10/2021 105* 65 - 100 mg/dL Final    BUN 08/10/2021 19  6 - 20 mg/dL Final    Creatinine 08/10/2021 0.82  0.55 - 1.02 mg/dL Final    BUN/Creatinine ratio 08/10/2021 23* 12 - 20   Final    GFR est AA 08/10/2021 >60  >60 ml/min/1.73m2 Final    GFR est non-AA 08/10/2021 >60  >60 ml/min/1.73m2 Final    Calcium 08/10/2021 8.0* 8.5 - 10.1 mg/dL Final    Bilirubin, total 08/10/2021 0.2  0.2 - 1.0 mg/dL Final    AST (SGOT) 08/10/2021 14* 15 - 37 U/L Final    ALT (SGPT) 08/10/2021 19  12 - 78 U/L Final    Alk.  phosphatase 08/10/2021 90  45 - 117 U/L Final    Protein, total 08/10/2021 6.4  6.4 - 8.2 g/dL Final    Albumin 08/10/2021 2.1* 3.5 - 5.0 g/dL Final    Globulin 08/10/2021 4.3* 2.0 - 4.0 g/dL Final    A-G Ratio 08/10/2021 0.5* 1.1 - 2.2   Final    C-Reactive protein 08/10/2021 6.62* 0.00 - 0.60 mg/dL Final    Procalcitonin 08/10/2021 <0.05* 0 ng/mL Final        Assessment:     Problem List Items Addressed This Visit        Other    Cellulitis    Sepsis ([facility] [address] - Primary              Plan:     I noted there is walnut size abscess forming 1 in the forearm and the other one upper arm area this needs to be monitored if is not any better with conservative management she will need a incision and drainage of these abscesses. And I will reassess her again when you can follow.         Aide Suh MD

## 2021-08-26 ENCOUNTER — OFFICE VISIT (OUTPATIENT)
Dept: INFECTIOUS DISEASES | Age: 31
End: 2021-08-26
Payer: MEDICARE

## 2021-08-26 VITALS
BODY MASS INDEX: 24.98 KG/M2 | RESPIRATION RATE: 15 BRPM | HEIGHT: 63 IN | TEMPERATURE: 98.5 F | DIASTOLIC BLOOD PRESSURE: 86 MMHG | WEIGHT: 141 LBS | SYSTOLIC BLOOD PRESSURE: 132 MMHG | OXYGEN SATURATION: 96 % | HEART RATE: 114 BPM

## 2021-08-26 DIAGNOSIS — L02.413 ABSCESS OF RIGHT ARM: Primary | ICD-10-CM

## 2021-08-26 PROCEDURE — 1111F DSCHRG MED/CURRENT MED MERGE: CPT | Performed by: INTERNAL MEDICINE

## 2021-08-26 PROCEDURE — 99213 OFFICE O/P EST LOW 20 MIN: CPT | Performed by: INTERNAL MEDICINE

## 2021-08-26 PROCEDURE — G8420 CALC BMI NORM PARAMETERS: HCPCS | Performed by: INTERNAL MEDICINE

## 2021-08-26 PROCEDURE — G8427 DOCREV CUR MEDS BY ELIG CLIN: HCPCS | Performed by: INTERNAL MEDICINE

## 2021-08-26 PROCEDURE — G8510 SCR DEP NEG, NO PLAN REQD: HCPCS | Performed by: INTERNAL MEDICINE

## 2021-08-26 RX ORDER — AMOXICILLIN AND CLAVULANATE POTASSIUM 875; 125 MG/1; MG/1
1 TABLET, FILM COATED ORAL EVERY 12 HOURS
Qty: 28 TABLET | Refills: 0 | Status: SHIPPED | OUTPATIENT
Start: 2021-08-26 | End: 2021-09-09

## 2021-08-26 NOTE — PROGRESS NOTES
Dane Sheldon is a 27 y.o. female. HPI   Patient followed earlier this month at [facility] follow multiple abscesses in her right arm, undergoing I&D and responding well to Zosyn. At the time of discharge cultures were still pending but since she had responded to Zosyn, she was discharged on Augmentin for 14 days. Culture subsequently grew MSSA. Blood cultures were negative. At discharge she had packing in her wounds. She was seen last week by General Surgery at which time there was some concern because focal swelling right forearm and upper arm, possibly representing undrained abscesses. Patient is here for routine follow-up. No fever or chills. No arm pain. Having Wound Care at home with packing. Reportedly scheduled for [facility] placement tomorrow. Review of Systems   Constitutional: Negative for chills and fever. Musculoskeletal: Positive for joint pain. Negative for myalgias. Skin: Negative for rash. Wounds on right arm   Endo/Heme/Allergies: Negative. 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us ([facility] [address]     Pulmonary HTN ([facility] [address]     RA (rheumatoid arthritis) ([facility] [address]      Past Surgical History:   Procedure Laterality Date    HX CYST REMOVAL      HX ORTHOPAEDIC      x2 left knee sxs    HX ORTHOPAEDIC      bilateral ankle sxs       Objective  Physical Exam  Vitals and nursing note reviewed. Constitutional:       Appearance: Normal appearance. Musculoskeletal:        Arms:       Comments: Right arm with multiple wounds with iodoform packing which was not removed at this time; no tenderness or surrounding erythema    Raised area dorsal forearm, soft, not fluctuant or tender, no erythema   Skin:     Findings: No erythema or rash. Neurological:      Mental Status: She is alert. Assessment & Plan    1. Cellulitis and abscesses, right arm, secondary to MSSA, status post I&D and 14 days of Augmentin, clinically resolving.   2.  Possible abscesses right forearm and upper arm  3. Immunocompromised patient   4. Lupus  5. Rheumatoid arthritis  6. Allergy to Vancomycin and Doxycycline    Comment:  Areas of concern noted, but no fluctuance, erythema or tenderness at this time. 1. Will start another course of Augmentin for 14 days  2. Follow-up with General Surgery  3. Follow-up with [facility] as needed  4.  Discussed culture results with patient    Sami Burden MD

## 2021-09-17 ENCOUNTER — TELEPHONE (OUTPATIENT)
Dept: SURGERY | Age: 31
End: 2021-09-17

## 2021-09-17 NOTE — TELEPHONE ENCOUNTER
Returned call to [facility]>pt has new sore coming up and is draining. She has finished her antibiotics. She is supposed to f/u with Dr. Anil Hay but hasn't made her appt. Appt given for 8:45am Monday-08/20/2021. Use wet to dry dressing on new open wounds until Monday. Nurse Alanis Peters says she will call the pt. Thank you.

## 2021-09-17 NOTE — TELEPHONE ENCOUNTER
Seth Bennett from [facility] called stated patient hs a new abscess on right upper arm and would like to know if they can treat this area for the patient, if so please send orders fax#[phone] phone number [phone]

## 2021-09-20 ENCOUNTER — OFFICE VISIT (OUTPATIENT)
Dept: SURGERY | Age: 31
End: 2021-09-20
Payer: MEDICARE

## 2021-09-20 VITALS
WEIGHT: 141.8 LBS | TEMPERATURE: 98.6 F | DIASTOLIC BLOOD PRESSURE: 78 MMHG | HEIGHT: 61 IN | BODY MASS INDEX: 26.77 KG/M2 | SYSTOLIC BLOOD PRESSURE: 136 MMHG | OXYGEN SATURATION: 98 % | HEART RATE: 105 BPM

## 2021-09-20 DIAGNOSIS — L03.119 CELLULITIS OF UPPER EXTREMITY, UNSPECIFIED LATERALITY: Primary | ICD-10-CM

## 2021-09-20 PROCEDURE — G8419 CALC BMI OUT NRM PARAM NOF/U: HCPCS | Performed by: SURGERY

## 2021-09-20 PROCEDURE — G8427 DOCREV CUR MEDS BY ELIG CLIN: HCPCS | Performed by: SURGERY

## 2021-09-20 PROCEDURE — 99213 OFFICE O/P EST LOW 20 MIN: CPT | Performed by: SURGERY

## 2021-09-20 PROCEDURE — G8432 DEP SCR NOT DOC, RNG: HCPCS | Performed by: SURGERY

## 2021-09-20 RX ORDER — TOFACITINIB 11 MG/1
1 TABLET, FILM COATED, EXTENDED RELEASE ORAL DAILY
COMMUNITY
Start: 2021-07-13 | End: 2022-08-22

## 2021-09-20 NOTE — PROGRESS NOTES
Chief Complaint   Patient presents with    Follow-up     check wounds on right arm     Visit Vitals  /78 (BP 1 Location: Left arm, BP Patient Position: Sitting, BP Cuff Size: Adult)   Pulse (!) 105   Temp 98.6 °F (37 °C) (Temporal)   Ht 5' 1\" (1.549 m)   Wt 141 lb 12.8 oz (64.3 kg)   SpO2 98%   BMI 26.79 kg/m²

## 2021-09-25 NOTE — PROGRESS NOTES
VASCULAR FOLLOW UP      Subjective:   CHIEF COMPLAINTS:  Right arm abscess  PRESENTATION OF ILLNESS:    Ms. Alyce Bhardwaj is here today follow-up recheck right arm multiple abscesses. Patient had a previous multiple I&D of the abscesses on the right upper arm forearm area. Patient currently doing well. He has no fever. Patient has currently multiple open wounds on right arm area covered with iodoform packing. 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[facility] ([facility]     Pulmonary HTN ([facility]     RA (rheumatoid arthritis) ([facility]       Past Surgical History:   Procedure Laterality Date    HX CYST REMOVAL      HX ORTHOPAEDIC      x2 left knee sxs    HX ORTHOPAEDIC      bilateral ankle sxs     Family History   Problem Relation Age of Onset    Hypertension Mother     Cancer Paternal Grandmother       Social History     Tobacco Use    Smoking status: Never Smoker    Smokeless tobacco: Never Used   Substance Use Topics    Alcohol use: Never       Prior to Admission medications    Medication Sig Start Date End Date Taking? Authorizing Provider   Keely Dinorah XR 11 mg Tb24 Take 1 Tablet by mouth daily. 7/13/21  Yes Provider, Historical   cholecalciferol (VITAMIN D3) (5000 Units/125 mcg) tab tablet Take 5,000 Units by mouth daily. Yes Provider, Historical   gabapentin (NEURONTIN) 600 mg tablet Take 300 mg by mouth two (2) times a day. Yes Other, MD Sherman   spironolactone (ALDACTONE) 25 mg tablet 25 mg daily. At bedtime 12/13/20  Yes Other, MD Sherman   Adempas 2.5 mg tab tablet 2.5 mg two (2) times a day. 12/8/20  Yes Other, MD Sherman   predniSONE (DELTASONE) 5 mg tablet Take 18 mg by mouth nightly. 15 mg in the AM and 3 mg at night 10/21/20  Yes Other, MD Sherman   pantoprazole (PROTONIX) 40 mg tablet TAKE 1 TABLET BY MOUTH EVERY DAY 11/15/20  Yes Other, MD Sherman   hydrOXYchloroQUINE (PLAQUENIL) 200 mg tablet Take 200 mg by mouth daily. 12/14/20  Yes Yvonne, MD Sherman   furosemide (LASIX) 20 mg tablet Take 20 mg by mouth daily. 12/17/20  Yes Other, MD Sherman   DULoxetine (CYMBALTA) 30 mg capsule TAKE 1 CAPSULE BY MOUTH TWICE A DAY 10/25/20  Yes Other, MD Sherman   Letairis 5 mg tablet 5 mg daily. In AM 12/8/20  Yes Other, MD Sherman     Allergies   Allergen Reactions    Doxycycline Nausea and Vomiting    Heparin Anaphylaxis    Remicade [Infliximab] Anaphylaxis    Vancomycin Hives    Rituximab Itching    Percocet [Oxycodone-Acetaminophen] Nausea and Vomiting     Side effect        Review of Systems:  I reviewed the rest of organ systems personally and they were negative signed by Dr. Lavinia Valente    Objective:     Visit Vitals  /78 (BP 1 Location: Left arm, BP Patient Position: Sitting, BP Cuff Size: Adult)   Pulse (!) 105   Temp 98.6 °F (37 °C) (Temporal)   Ht 5' 1\" (1.549 m)   Wt 141 lb 12.8 oz (64.3 kg)   SpO2 98%   BMI 26.79 kg/m²     VITAL SIGNS REVIEWED. Physical Exam:  Patient is well-nourished pleasant in conversation is appropriate. Head and neck examination atraumatic, normocephalic. Gaze appropriate. Conversation appropriate. Neck examination shows supple. No mass. No obvious carotid bruit. Chest examination shows lungs are clear bilaterally well-expanded, no crackles or wheezes. Cardiovascular system regular rate, no obvious murmur. Skin warm to touch  and moist, no skin lesions. Abdomen is soft ,not tender or distended bowel sounds present. No palpable mass. Neurological examinations, no focal neuro deficits moving all 4 extremities. Cranial nerves intact. Sensation is intact as well. Hematologic: No obvious bruise or swelling or obvious lymphadenopathy. Psychosocial: Appropriate. Has good effect. Musculoskeletal system: No muscle wasting, appropriate movements upper and lower extremity. On examination right arm shows resolution of the abscesses. Still mild cellulitis noted. There are 3 open wounds I covered the area with Aquacel Ag Kerlix and Coban.         Data Review:   No visits with results within 1 Month(s) from this visit. Latest known visit with results is:   Admission on 08/07/2021, Discharged on 08/10/2021   Component Date Value Ref Range Status    WBC 08/07/2021 6.0  3.6 - 11.0 K/uL Final    RBC 08/07/2021 3.47* 3.80 - 5.20 M/uL Final    HGB 08/07/2021 9.7* 11.5 - 16.0 g/dL Final    HCT 08/07/2021 33.3* 35.0 - 47.0 % Final    MCV 08/07/2021 96.0  80.0 - 99.0 FL Final    MCH 08/07/2021 28.0  26.0 - 34.0 PG Final    MCHC 08/07/2021 29.1* 30.0 - 36.5 g/dL Final    RDW 08/07/2021 18.4* 11.5 - 14.5 % Final    PLATELET 90/81/1187 135  150 - 400 K/uL Final    MPV 08/07/2021 9.6  8.9 - 12.9 FL Final    NEUTROPHILS 08/07/2021 85* 32 - 75 % Final    LYMPHOCYTES 08/07/2021 11* 12 - 49 % Final    MONOCYTES 08/07/2021 2* 5 - 13 % Final    EOSINOPHILS 08/07/2021 0  0 - 7 % Final    BASOPHILS 08/07/2021 0  0 - 1 % Final    IMMATURE GRANULOCYTES 08/07/2021 2* 0.0 - 0.5 % Final    ABS. NEUTROPHILS 08/07/2021 5.1  1.8 - 8.0 K/UL Final    ABS. LYMPHOCYTES 08/07/2021 0.7* 0.8 - 3.5 K/UL Final    ABS. MONOCYTES 08/07/2021 0.1  0.0 - 1.0 K/UL Final    ABS. EOSINOPHILS 08/07/2021 0.0  0.0 - 0.4 K/UL Final    ABS. BASOPHILS 08/07/2021 0.0  0.0 - 0.1 K/UL Final    ABS. IMM. GRANS. 08/07/2021 0.1* 0.00 - 0.04 K/UL Final    DF 08/07/2021 Smear Scanned    Final    RBC COMMENTS 08/07/2021     Final                    Value: Anisocytosis  2+      Sodium 08/07/2021 144  136 - 145 mmol/L Final    Potassium 08/07/2021 3.0* 3.5 - 5.1 mmol/L Final    Chloride 08/07/2021 108  97 - 108 mmol/L Final    CO2 08/07/2021 26  21 - 32 mmol/L Final    Anion gap 08/07/2021 10  5 - 15 mmol/L Final    Glucose 08/07/2021 112* 65 - 100 mg/dL Final    BUN 08/07/2021 16  6 - 20 mg/dL Final    Creatinine 08/07/2021 0.82  0.55 - 1.02 mg/dL Final    BUN/Creatinine ratio 08/07/2021 20  12 - 20   Final    GFR est AA 08/07/2021 >60  >60 ml/min/1.73m2 Final    GFR est non-AA 08/07/2021 >60  >60 ml/min/1.73m2 Final    Calcium 08/07/2021 8.4* 8.5 - 10.1 mg/dL Final    Lactic acid 08/07/2021 1.6  0.4 - 2.0 mmol/L Final    C-Reactive protein 08/07/2021 1.66* 0.00 - 0.60 mg/dL Final    Sed rate, automated 08/07/2021 81  mm/hr Final    Special Requests: 08/07/2021 No Special Requests    Final    Culture result: 08/07/2021 No growth 6 days    Final    Special Requests: 08/07/2021 No Special Requests    Final    GRAM STAIN 08/07/2021 Rare WBCs seen    Final    GRAM STAIN 08/07/2021 No organisms seen    Final    Culture result: 08/07/2021 No growth 2 days    Final    Glucose (POC) 08/07/2021 91  65 - 117 mg/dL Final    Performed by 08/07/2021 Willow Dougherty   Final    C-Reactive protein 08/08/2021 4.99* 0.00 - 0.60 mg/dL Final    Procalcitonin 08/08/2021 <0.05* 0 ng/mL Final    C-Reactive protein 08/09/2021 9.44* 0.00 - 0.60 mg/dL Final    Procalcitonin 08/09/2021 0.07* 0 ng/mL Final    WBC 08/09/2021 5.7  3.6 - 11.0 K/uL Final    RBC 08/09/2021 3.05* 3.80 - 5.20 M/uL Final    HGB 08/09/2021 8.3* 11.5 - 16.0 g/dL Final    HCT 08/09/2021 28.6* 35.0 - 47.0 % Final    MCV 08/09/2021 93.8  80.0 - 99.0 FL Final    MCH 08/09/2021 27.2  26.0 - 34.0 PG Final    MCHC 08/09/2021 29.0* 30.0 - 36.5 g/dL Final    RDW 08/09/2021 18.1* 11.5 - 14.5 % Final    PLATELET 40/97/7198 298  150 - 400 K/uL Final    MPV 08/09/2021 9.8  8.9 - 12.9 FL Final    NRBC 08/09/2021 0.3* 0.0  WBC Final    ABSOLUTE NRBC 08/09/2021 0.02* 0.00 - 0.01 K/uL Final    NEUTROPHILS 08/09/2021 91* 32 - 75 % Final    LYMPHOCYTES 08/09/2021 5* 12 - 49 % Final    MONOCYTES 08/09/2021 3* 5 - 13 % Final    EOSINOPHILS 08/09/2021 0  0 - 7 % Final    BASOPHILS 08/09/2021 0  0 - 1 % Final    IMMATURE GRANULOCYTES 08/09/2021 1* 0 - 0.5 % Final    ABS. NEUTROPHILS 08/09/2021 5.2  1.8 - 8.0 K/UL Final    ABS. LYMPHOCYTES 08/09/2021 0.3* 0.8 - 3.5 K/UL Final    ABS. MONOCYTES 08/09/2021 0.2  0.0 - 1.0 K/UL Final    ABS.  EOSINOPHILS 08/09/2021 0.0  0.0 - 0.4 K/UL Final    ABS. BASOPHILS 08/09/2021 0.0  0.0 - 0.1 K/UL Final    ABS. IMM. GRANS. 08/09/2021 0.1* 0.00 - 0.04 K/UL Final    DF 08/09/2021 AUTOMATED    Final    Sodium 08/09/2021 137  136 - 145 mmol/L Final    Potassium 08/09/2021 3.8  3.5 - 5.1 mmol/L Final    Chloride 08/09/2021 108  97 - 108 mmol/L Final    CO2 08/09/2021 26  21 - 32 mmol/L Final    Anion gap 08/09/2021 3* 5 - 15 mmol/L Final    Glucose 08/09/2021 80  65 - 100 mg/dL Final    BUN 08/09/2021 17  6 - 20 mg/dL Final    Creatinine 08/09/2021 0.64  0.55 - 1.02 mg/dL Final    BUN/Creatinine ratio 08/09/2021 27* 12 - 20   Final    GFR est AA 08/09/2021 >60  >60 ml/min/1.73m2 Final    GFR est non-AA 08/09/2021 >60  >60 ml/min/1.73m2 Final    Calcium 08/09/2021 8.6  8.5 - 10.1 mg/dL Final    Bilirubin, total 08/09/2021 0.3  0.2 - 1.0 mg/dL Final    AST (SGOT) 08/09/2021 13* 15 - 37 U/L Final    ALT (SGPT) 08/09/2021 23  12 - 78 U/L Final    Alk.  phosphatase 08/09/2021 94  45 - 117 U/L Final    Protein, total 08/09/2021 7.2  6.4 - 8.2 g/dL Final    Albumin 08/09/2021 2.5* 3.5 - 5.0 g/dL Final    Globulin 08/09/2021 4.7* 2.0 - 4.0 g/dL Final    A-G Ratio 08/09/2021 0.5* 1.1 - 2.2   Final    Glucose (POC) 08/09/2021 95  65 - 117 mg/dL Final    Performed by 08/09/2021 [facility]   Final    Glucose (POC) 08/09/2021 88  65 - 117 mg/dL Final    Performed by 08/09/2021 Negar Estrella   Final    Special Requests: 08/09/2021 No Special Requests    Final    GRAM STAIN 08/09/2021 NO WBCS SEEN   Final    GRAM STAIN 08/09/2021 Occasional Gram Positive Cocci in clusters    Final    GRAM STAIN 08/09/2021 Occasional Gram Positive Cocci IN PAIRS    Final    Culture result: 08/09/2021 Heavy Staphylococcus aureus    Final    Glucose (POC) 08/09/2021 78  65 - 117 mg/dL Final    Performed by 08/09/2021 [facility]   Final    WBC 08/10/2021 4.0  3.6 - 11.0 K/uL Final    RBC 08/10/2021 2.68* 3.80 - 5.20 M/uL Final    HGB 08/10/2021 7.4* 11.5 - 16.0 g/dL Final    HCT 08/10/2021 25.2* 35.0 - 47.0 % Final    MCV 08/10/2021 94.0  80.0 - 99.0 FL Final    MCH 08/10/2021 27.6  26.0 - 34.0 PG Final    MCHC 08/10/2021 29.4* 30.0 - 36.5 g/dL Final    RDW 08/10/2021 18.1* 11.5 - 14.5 % Final    PLATELET 92/21/7709 167  150 - 400 K/uL Final    MPV 08/10/2021 9.5  8.9 - 12.9 FL Final    NRBC 08/10/2021 0.5* 0.0  WBC Final    ABSOLUTE NRBC 08/10/2021 0.02* 0.00 - 0.01 K/uL Final    NEUTROPHILS 08/10/2021 89* 32 - 75 % Final    LYMPHOCYTES 08/10/2021 5* 12 - 49 % Final    MONOCYTES 08/10/2021 5  5 - 13 % Final    EOSINOPHILS 08/10/2021 0  0 - 7 % Final    BASOPHILS 08/10/2021 0  0 - 1 % Final    IMMATURE GRANULOCYTES 08/10/2021 1* 0 - 0.5 % Final    ABS. NEUTROPHILS 08/10/2021 3.5  1.8 - 8.0 K/UL Final    ABS. LYMPHOCYTES 08/10/2021 0.2* 0.8 - 3.5 K/UL Final    ABS. MONOCYTES 08/10/2021 0.2  0.0 - 1.0 K/UL Final    ABS. EOSINOPHILS 08/10/2021 0.0  0.0 - 0.4 K/UL Final    ABS. BASOPHILS 08/10/2021 0.0  0.0 - 0.1 K/UL Final    ABS. IMM. GRANS. 08/10/2021 0.1* 0.00 - 0.04 K/UL Final    DF 08/10/2021 AUTOMATED    Final    Sodium 08/10/2021 143  136 - 145 mmol/L Final    Potassium 08/10/2021 3.3* 3.5 - 5.1 mmol/L Final    Chloride 08/10/2021 112* 97 - 108 mmol/L Final    CO2 08/10/2021 26  21 - 32 mmol/L Final    Anion gap 08/10/2021 5  5 - 15 mmol/L Final    Glucose 08/10/2021 105* 65 - 100 mg/dL Final    BUN 08/10/2021 19  6 - 20 mg/dL Final    Creatinine 08/10/2021 0.82  0.55 - 1.02 mg/dL Final    BUN/Creatinine ratio 08/10/2021 23* 12 - 20   Final    GFR est AA 08/10/2021 >60  >60 ml/min/1.73m2 Final    GFR est non-AA 08/10/2021 >60  >60 ml/min/1.73m2 Final    Calcium 08/10/2021 8.0* 8.5 - 10.1 mg/dL Final    Bilirubin, total 08/10/2021 0.2  0.2 - 1.0 mg/dL Final    AST (SGOT) 08/10/2021 14* 15 - 37 U/L Final    ALT (SGPT) 08/10/2021 19  12 - 78 U/L Final    Alk.  phosphatase 08/10/2021 90  45 - 117 U/L Final    Protein, total 08/10/2021 6.4  6.4 - 8.2 g/dL Final    Albumin 08/10/2021 2.1* 3.5 - 5.0 g/dL Final    Globulin 08/10/2021 4.3* 2.0 - 4.0 g/dL Final    A-G Ratio 08/10/2021 0.5* 1.1 - 2.2   Final    C-Reactive protein 08/10/2021 6.62* 0.00 - 0.60 mg/dL Final    Procalcitonin 08/10/2021 <0.05* 0 ng/mL Final        Assessment:     Problem List Items Addressed This Visit        Other    Cellulitis - Primary              Plan:     Patient was instructed about wound care with Aquacel Ag. I also provided her some dressing supplies including Aquacel Ag. I will continue monitor her wound issues with repeat examination and follow-up 2 weeks.         Mica Fritz MD

## 2021-10-08 ENCOUNTER — OFFICE VISIT (OUTPATIENT)
Dept: SURGERY | Age: 31
End: 2021-10-08
Payer: MEDICARE

## 2021-10-08 VITALS
TEMPERATURE: 97.8 F | SYSTOLIC BLOOD PRESSURE: 133 MMHG | RESPIRATION RATE: 24 BRPM | BODY MASS INDEX: 26.7 KG/M2 | HEIGHT: 61 IN | HEART RATE: 96 BPM | DIASTOLIC BLOOD PRESSURE: 85 MMHG | WEIGHT: 141.4 LBS

## 2021-10-08 DIAGNOSIS — L03.119 CELLULITIS OF UPPER EXTREMITY, UNSPECIFIED LATERALITY: ICD-10-CM

## 2021-10-08 DIAGNOSIS — A41.9 SEPSIS WITHOUT ACUTE ORGAN DYSFUNCTION, DUE TO UNSPECIFIED ORGANISM (HCC): Primary | ICD-10-CM

## 2021-10-08 PROCEDURE — G8427 DOCREV CUR MEDS BY ELIG CLIN: HCPCS | Performed by: SURGERY

## 2021-10-08 PROCEDURE — G8432 DEP SCR NOT DOC, RNG: HCPCS | Performed by: SURGERY

## 2021-10-08 PROCEDURE — 99213 OFFICE O/P EST LOW 20 MIN: CPT | Performed by: SURGERY

## 2021-10-08 PROCEDURE — G8419 CALC BMI OUT NRM PARAM NOF/U: HCPCS | Performed by: SURGERY

## 2021-10-08 NOTE — PROGRESS NOTES
Chief Complaint   Patient presents with    Follow-up     wound check     Visit Vitals  /85 (BP 1 Location: Left arm, BP Patient Position: Sitting, BP Cuff Size: Adult)   Pulse 96   Temp 97.8 °F (36.6 °C) (Temporal)   Resp 24   Ht 5' 1\" (1.549 m)   Wt 141 lb 6.4 oz (64.1 kg)   BMI 26.72 kg/m²   Unable to obtain O2 stats due to long nails. 1. Have you been to the ER, urgent care clinic since your last visit? Hospitalized since your last visit? No    2. Have you seen or consulted any other health care providers outside of the [address] [place] [address] since your last visit? Include any pap smears or colon screening.  No

## 2021-10-08 NOTE — PROGRESS NOTES
VASCULAR FOLLOW UP      Subjective:   CHIEF COMPLAINTS:  Right arm abscesses. PRESENTATION OF ILLNESS:    Ms. Marvin Hamilton is here today 2 weeks follow-up examined the right arm and forearm multiple abscesses. We are currently doing wound care with Aquacel Ag Mepilex and Coban dressing. And patient says that she is doing well wounds actually appears to be getting better. 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 ([facility] [address]     Pulmonary HTN ([facility] [address]     RA (rheumatoid arthritis) ([facility] [address]       Past Surgical History:   Procedure Laterality Date    HX CYST REMOVAL      HX ORTHOPAEDIC      x2 left knee sxs    HX ORTHOPAEDIC      bilateral ankle sxs     Family History   Problem Relation Age of Onset    Hypertension Mother     Cancer Paternal Grandmother       Social History     Tobacco Use    Smoking status: Never Smoker    Smokeless tobacco: Never Used   Substance Use Topics    Alcohol use: Never       Prior to Admission medications    Medication Sig Start Date End Date Taking? Authorizing Provider   Paloma Thibodeaux XR 11 mg Tb24 Take 1 Tablet by mouth daily. 7/13/21  Yes Provider, Historical   cholecalciferol (VITAMIN D3) (5000 Units/125 mcg) tab tablet Take 5,000 Units by mouth daily. Yes Provider, Historical   gabapentin (NEURONTIN) 600 mg tablet Take 300 mg by mouth two (2) times a day. Yes Other, MD Sherman   spironolactone (ALDACTONE) 25 mg tablet 25 mg daily. At bedtime 12/13/20  Yes Other, MD Sherman   Adempas 2.5 mg tab tablet 2.5 mg two (2) times a day. 12/8/20  Yes Other, MD Sherman   predniSONE (DELTASONE) 5 mg tablet Take 18 mg by mouth nightly. 15 mg in the AM and 3 mg at night 10/21/20  Yes Other, MD Sherman   pantoprazole (PROTONIX) 40 mg tablet TAKE 1 TABLET BY MOUTH EVERY DAY 11/15/20  Yes Other, MD Sherman   hydrOXYchloroQUINE (PLAQUENIL) 200 mg tablet Take 200 mg by mouth daily. 12/14/20  Yes Yvonne, MD Sherman   furosemide (LASIX) 20 mg tablet Take 20 mg by mouth daily.  12/17/20  Yes Other, MD Sherman DULoxetine (CYMBALTA) 30 mg capsule TAKE 1 CAPSULE BY MOUTH TWICE A DAY 10/25/20  Yes Other, MD Sherman   Letairis 5 mg tablet 5 mg daily. In AM 12/8/20  Yes Other, MD Sherman     Allergies   Allergen Reactions    Doxycycline Nausea and Vomiting    Heparin Anaphylaxis    Remicade [Infliximab] Anaphylaxis    Vancomycin Hives    Rituximab Itching    Percocet [Oxycodone-Acetaminophen] Nausea and Vomiting     Side effect        Review of Systems:  I reviewed the rest of organ systems personally and they were negative signed by Dr. Silvia Parra    Objective:     Visit Vitals  /85 (BP 1 Location: Left arm, BP Patient Position: Sitting, BP Cuff Size: Adult)   Pulse 96   Temp 97.8 °F (36.6 °C) (Temporal)   Resp 24   Ht 5' 1\" (1.549 m)   Wt 141 lb 6.4 oz (64.1 kg)   BMI 26.72 kg/m²     VITAL SIGNS REVIEWED. Physical Exam:  Patient is well-nourished pleasant in conversation is appropriate. Head and neck examination atraumatic, normocephalic. Gaze appropriate. Conversation appropriate. Neck examination shows supple. No mass. No obvious carotid bruit. Chest examination shows lungs are clear bilaterally well-expanded, no crackles or wheezes. Cardiovascular system regular rate, no obvious murmur. Skin warm to touch  and moist, no skin lesions. Abdomen is soft ,not tender or distended bowel sounds present. No palpable mass. Neurological examinations, no focal neuro deficits moving all 4 extremities. Cranial nerves intact. Sensation is intact as well. Hematologic: No obvious bruise or swelling or obvious lymphadenopathy. Psychosocial: Appropriate. Has good effect. Musculoskeletal system: No muscle wasting, appropriate movements upper and lower extremity. Right arm examination shows she has a  3 open wounds and 3 of them closed. Minimal drainage. I cover the area with Aquacel Ag and a Mepilex and Coban. Data Review:   No visits with results within 1 Month(s) from this visit.    Latest known visit with results is:   Admission on 08/07/2021, Discharged on 08/10/2021   Component Date Value Ref Range Status    WBC 08/07/2021 6.0  3.6 - 11.0 K/uL Final    RBC 08/07/2021 3.47* 3.80 - 5.20 M/uL Final    HGB 08/07/2021 9.7* 11.5 - 16.0 g/dL Final    HCT 08/07/2021 33.3* 35.0 - 47.0 % Final    MCV 08/07/2021 96.0  80.0 - 99.0 FL Final    MCH 08/07/2021 28.0  26.0 - 34.0 PG Final    MCHC 08/07/2021 29.1* 30.0 - 36.5 g/dL Final    RDW 08/07/2021 18.4* 11.5 - 14.5 % Final    PLATELET 15/83/6117 511  150 - 400 K/uL Final    MPV 08/07/2021 9.6  8.9 - 12.9 FL Final    NEUTROPHILS 08/07/2021 85* 32 - 75 % Final    LYMPHOCYTES 08/07/2021 11* 12 - 49 % Final    MONOCYTES 08/07/2021 2* 5 - 13 % Final    EOSINOPHILS 08/07/2021 0  0 - 7 % Final    BASOPHILS 08/07/2021 0  0 - 1 % Final    IMMATURE GRANULOCYTES 08/07/2021 2* 0.0 - 0.5 % Final    ABS. NEUTROPHILS 08/07/2021 5.1  1.8 - 8.0 K/UL Final    ABS. LYMPHOCYTES 08/07/2021 0.7* 0.8 - 3.5 K/UL Final    ABS. MONOCYTES 08/07/2021 0.1  0.0 - 1.0 K/UL Final    ABS. EOSINOPHILS 08/07/2021 0.0  0.0 - 0.4 K/UL Final    ABS. BASOPHILS 08/07/2021 0.0  0.0 - 0.1 K/UL Final    ABS. IMM. GRANS. 08/07/2021 0.1* 0.00 - 0.04 K/UL Final    DF 08/07/2021 Smear Scanned    Final    RBC COMMENTS 08/07/2021     Final                    Value: Anisocytosis  2+      Sodium 08/07/2021 144  136 - 145 mmol/L Final    Potassium 08/07/2021 3.0* 3.5 - 5.1 mmol/L Final    Chloride 08/07/2021 108  97 - 108 mmol/L Final    CO2 08/07/2021 26  21 - 32 mmol/L Final    Anion gap 08/07/2021 10  5 - 15 mmol/L Final    Glucose 08/07/2021 112* 65 - 100 mg/dL Final    BUN 08/07/2021 16  6 - 20 mg/dL Final    Creatinine 08/07/2021 0.82  0.55 - 1.02 mg/dL Final    BUN/Creatinine ratio 08/07/2021 20  12 - 20   Final    GFR est AA 08/07/2021 >60  >60 ml/min/1.73m2 Final    GFR est non-AA 08/07/2021 >60  >60 ml/min/1.73m2 Final    Calcium 08/07/2021 8.4* 8.5 - 10.1 mg/dL Final    Lactic acid 08/07/2021 1.6  0.4 - 2.0 mmol/L Final    C-Reactive protein 08/07/2021 1.66* 0.00 - 0.60 mg/dL Final    Sed rate, automated 08/07/2021 81  mm/hr Final    Special Requests: 08/07/2021 No Special Requests    Final    Culture result: 08/07/2021 No growth 6 days    Final    Special Requests: 08/07/2021 No Special Requests    Final    GRAM STAIN 08/07/2021 Rare WBCs seen    Final    GRAM STAIN 08/07/2021 No organisms seen    Final    Culture result: 08/07/2021 No growth 2 days    Final    Glucose (POC) 08/07/2021 91  65 - 117 mg/dL Final    Performed by 08/07/2021 Eljiah Mean   Final    C-Reactive protein 08/08/2021 4.99* 0.00 - 0.60 mg/dL Final    Procalcitonin 08/08/2021 <0.05* 0 ng/mL Final    C-Reactive protein 08/09/2021 9.44* 0.00 - 0.60 mg/dL Final    Procalcitonin 08/09/2021 0.07* 0 ng/mL Final    WBC 08/09/2021 5.7  3.6 - 11.0 K/uL Final    RBC 08/09/2021 3.05* 3.80 - 5.20 M/uL Final    HGB 08/09/2021 8.3* 11.5 - 16.0 g/dL Final    HCT 08/09/2021 28.6* 35.0 - 47.0 % Final    MCV 08/09/2021 93.8  80.0 - 99.0 FL Final    MCH 08/09/2021 27.2  26.0 - 34.0 PG Final    MCHC 08/09/2021 29.0* 30.0 - 36.5 g/dL Final    RDW 08/09/2021 18.1* 11.5 - 14.5 % Final    PLATELET 82/58/1220 498  150 - 400 K/uL Final    MPV 08/09/2021 9.8  8.9 - 12.9 FL Final    NRBC 08/09/2021 0.3* 0.0  WBC Final    ABSOLUTE NRBC 08/09/2021 0.02* 0.00 - 0.01 K/uL Final    NEUTROPHILS 08/09/2021 91* 32 - 75 % Final    LYMPHOCYTES 08/09/2021 5* 12 - 49 % Final    MONOCYTES 08/09/2021 3* 5 - 13 % Final    EOSINOPHILS 08/09/2021 0  0 - 7 % Final    BASOPHILS 08/09/2021 0  0 - 1 % Final    IMMATURE GRANULOCYTES 08/09/2021 1* 0 - 0.5 % Final    ABS. NEUTROPHILS 08/09/2021 5.2  1.8 - 8.0 K/UL Final    ABS. LYMPHOCYTES 08/09/2021 0.3* 0.8 - 3.5 K/UL Final    ABS. MONOCYTES 08/09/2021 0.2  0.0 - 1.0 K/UL Final    ABS. EOSINOPHILS 08/09/2021 0.0  0.0 - 0.4 K/UL Final    ABS.  BASOPHILS 08/09/2021 0.0 0.0 - 0.1 K/UL Final    ABS. IMM. GRANS. 08/09/2021 0.1* 0.00 - 0.04 K/UL Final    DF 08/09/2021 AUTOMATED    Final    Sodium 08/09/2021 137  136 - 145 mmol/L Final    Potassium 08/09/2021 3.8  3.5 - 5.1 mmol/L Final    Chloride 08/09/2021 108  97 - 108 mmol/L Final    CO2 08/09/2021 26  21 - 32 mmol/L Final    Anion gap 08/09/2021 3* 5 - 15 mmol/L Final    Glucose 08/09/2021 80  65 - 100 mg/dL Final    BUN 08/09/2021 17  6 - 20 mg/dL Final    Creatinine 08/09/2021 0.64  0.55 - 1.02 mg/dL Final    BUN/Creatinine ratio 08/09/2021 27* 12 - 20   Final    GFR est AA 08/09/2021 >60  >60 ml/min/1.73m2 Final    GFR est non-AA 08/09/2021 >60  >60 ml/min/1.73m2 Final    Calcium 08/09/2021 8.6  8.5 - 10.1 mg/dL Final    Bilirubin, total 08/09/2021 0.3  0.2 - 1.0 mg/dL Final    AST (SGOT) 08/09/2021 13* 15 - 37 U/L Final    ALT (SGPT) 08/09/2021 23  12 - 78 U/L Final    Alk.  phosphatase 08/09/2021 94  45 - 117 U/L Final    Protein, total 08/09/2021 7.2  6.4 - 8.2 g/dL Final    Albumin 08/09/2021 2.5* 3.5 - 5.0 g/dL Final    Globulin 08/09/2021 4.7* 2.0 - 4.0 g/dL Final    A-G Ratio 08/09/2021 0.5* 1.1 - 2.2   Final    Glucose (POC) 08/09/2021 95  65 - 117 mg/dL Final    Performed by 08/09/2021 [facility] MARISA   Final    Glucose (POC) 08/09/2021 88  65 - 117 mg/dL Final    Performed by 08/09/2021 Jimmy Degroot   Final    Special Requests: 08/09/2021 No Special Requests    Final    GRAM STAIN 08/09/2021 NO WBCS SEEN   Final    GRAM STAIN 08/09/2021 Occasional Gram Positive Cocci in clusters    Final    GRAM STAIN 08/09/2021 Occasional Gram Positive Cocci IN PAIRS    Final    Culture result: 08/09/2021 Heavy Staphylococcus aureus    Final    Glucose (POC) 08/09/2021 78  65 - 117 mg/dL Final    Performed by 08/09/2021 BLACKBURN GIBSON   Final    WBC 08/10/2021 4.0  3.6 - 11.0 K/uL Final    RBC 08/10/2021 2.68* 3.80 - 5.20 M/uL Final    HGB 08/10/2021 7.4* 11.5 - 16.0 g/dL Final    HCT 08/10/2021 25.2* 35.0 - 47.0 % Final    MCV 08/10/2021 94.0  80.0 - 99.0 FL Final    MCH 08/10/2021 27.6  26.0 - 34.0 PG Final    MCHC 08/10/2021 29.4* 30.0 - 36.5 g/dL Final    RDW 08/10/2021 18.1* 11.5 - 14.5 % Final    PLATELET 60/42/8748 192  150 - 400 K/uL Final    MPV 08/10/2021 9.5  8.9 - 12.9 FL Final    NRBC 08/10/2021 0.5* 0.0  WBC Final    ABSOLUTE NRBC 08/10/2021 0.02* 0.00 - 0.01 K/uL Final    NEUTROPHILS 08/10/2021 89* 32 - 75 % Final    LYMPHOCYTES 08/10/2021 5* 12 - 49 % Final    MONOCYTES 08/10/2021 5  5 - 13 % Final    EOSINOPHILS 08/10/2021 0  0 - 7 % Final    BASOPHILS 08/10/2021 0  0 - 1 % Final    IMMATURE GRANULOCYTES 08/10/2021 1* 0 - 0.5 % Final    ABS. NEUTROPHILS 08/10/2021 3.5  1.8 - 8.0 K/UL Final    ABS. LYMPHOCYTES 08/10/2021 0.2* 0.8 - 3.5 K/UL Final    ABS. MONOCYTES 08/10/2021 0.2  0.0 - 1.0 K/UL Final    ABS. EOSINOPHILS 08/10/2021 0.0  0.0 - 0.4 K/UL Final    ABS. BASOPHILS 08/10/2021 0.0  0.0 - 0.1 K/UL Final    ABS. IMM. GRANS. 08/10/2021 0.1* 0.00 - 0.04 K/UL Final    DF 08/10/2021 AUTOMATED    Final    Sodium 08/10/2021 143  136 - 145 mmol/L Final    Potassium 08/10/2021 3.3* 3.5 - 5.1 mmol/L Final    Chloride 08/10/2021 112* 97 - 108 mmol/L Final    CO2 08/10/2021 26  21 - 32 mmol/L Final    Anion gap 08/10/2021 5  5 - 15 mmol/L Final    Glucose 08/10/2021 105* 65 - 100 mg/dL Final    BUN 08/10/2021 19  6 - 20 mg/dL Final    Creatinine 08/10/2021 0.82  0.55 - 1.02 mg/dL Final    BUN/Creatinine ratio 08/10/2021 23* 12 - 20   Final    GFR est AA 08/10/2021 >60  >60 ml/min/1.73m2 Final    GFR est non-AA 08/10/2021 >60  >60 ml/min/1.73m2 Final    Calcium 08/10/2021 8.0* 8.5 - 10.1 mg/dL Final    Bilirubin, total 08/10/2021 0.2  0.2 - 1.0 mg/dL Final    AST (SGOT) 08/10/2021 14* 15 - 37 U/L Final    ALT (SGPT) 08/10/2021 19  12 - 78 U/L Final    Alk.  phosphatase 08/10/2021 90  45 - 117 U/L Final    Protein, total 08/10/2021 6.4  6.4 - 8.2 g/dL Final    Albumin 08/10/2021 2.1* 3.5 - 5.0 g/dL Final    Globulin 08/10/2021 4.3* 2.0 - 4.0 g/dL Final    A-G Ratio 08/10/2021 0.5* 1.1 - 2.2   Final    C-Reactive protein 08/10/2021 6.62* 0.00 - 0.60 mg/dL Final    Procalcitonin 08/10/2021 <0.05* 0 ng/mL Final        Assessment:     Problem List Items Addressed This Visit        Other    Cellulitis    Sepsis ([facility] [address] - Primary              Plan:     Once the wounds are healed the patient will need a compression sleeve to minimize swelling therefore minimizing recurrence of the abscesses. So I wrote a prescription for that. And also patient will be reevaluated follow-up 3 to 4 weeks time. Until that time continue local wound care as instructed.         Bere Rooney MD

## 2021-10-13 ENCOUNTER — TELEPHONE (OUTPATIENT)
Dept: SURGERY | Age: 31
End: 2021-10-13

## 2021-11-15 ENCOUNTER — OFFICE VISIT (OUTPATIENT)
Dept: SURGERY | Age: 31
End: 2021-11-15
Payer: MEDICARE

## 2021-11-15 VITALS
OXYGEN SATURATION: 95 % | TEMPERATURE: 99.5 F | DIASTOLIC BLOOD PRESSURE: 83 MMHG | HEART RATE: 105 BPM | WEIGHT: 145.8 LBS | SYSTOLIC BLOOD PRESSURE: 142 MMHG | BODY MASS INDEX: 27.53 KG/M2 | HEIGHT: 61 IN

## 2021-11-15 DIAGNOSIS — A41.9 SEPSIS WITHOUT ACUTE ORGAN DYSFUNCTION, DUE TO UNSPECIFIED ORGANISM (HCC): ICD-10-CM

## 2021-11-15 DIAGNOSIS — L03.119 CELLULITIS OF UPPER EXTREMITY, UNSPECIFIED LATERALITY: Primary | ICD-10-CM

## 2021-11-15 PROCEDURE — 99213 OFFICE O/P EST LOW 20 MIN: CPT | Performed by: SURGERY

## 2021-11-15 PROCEDURE — G8510 SCR DEP NEG, NO PLAN REQD: HCPCS | Performed by: SURGERY

## 2021-11-15 PROCEDURE — G8419 CALC BMI OUT NRM PARAM NOF/U: HCPCS | Performed by: SURGERY

## 2021-11-15 PROCEDURE — G8427 DOCREV CUR MEDS BY ELIG CLIN: HCPCS | Performed by: SURGERY

## 2021-11-15 NOTE — PROGRESS NOTES
Chief Complaint   Patient presents with    Follow-up     right arm wound check     Visit Vitals  BP (!) 142/83 (BP 1 Location: Left arm, BP Patient Position: Sitting, BP Cuff Size: Adult)   Pulse (!) 105   Temp 99.5 °F (37.5 °C) (Temporal)   Ht 5' 1\" (1.549 m)   Wt 145 lb 12.8 oz (66.1 kg)   SpO2 95%   BMI 27.55 kg/m²     1. Have you been to the ER, urgent care clinic since your last visit? Hospitalized since your last visit? No    2. Have you seen or consulted any other health care providers outside of the [address] [place] [address] since your last visit? Include any pap smears or colon screening.  No

## 2021-11-18 NOTE — PROGRESS NOTES
VASCULAR FOLLOW UP      Subjective:   CHIEF COMPLAINTS:  Right arm multiple abscesses. PRESENTATION OF ILLNESS:  Scott Franklin is a 27 y.o. very pleasant woman here today follow-up wound examination right forearm and upper arm area. Patient says swelling is improved there is no more abscesses on the right arm area. Patient currently not wearing any compression sleeves. Patient was advised last time try to wear compression sleeve to minimize swelling therefore prevent recurrence of the abscesses. 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 ([facility] [address]     Pulmonary HTN ([facility] [address]     RA (rheumatoid arthritis) ([facility] [address]       Past Surgical History:   Procedure Laterality Date    HX CYST REMOVAL      HX ORTHOPAEDIC      x2 left knee sxs    HX ORTHOPAEDIC      bilateral ankle sxs     Family History   Problem Relation Age of Onset    Hypertension Mother     Cancer Paternal Grandmother       Social History     Tobacco Use    Smoking status: Never Smoker    Smokeless tobacco: Never Used   Substance Use Topics    Alcohol use: Never       Prior to Admission medications    Medication Sig Start Date End Date Taking? Authorizing Provider   Elida Pena XR 11 mg Tb24 Take 1 Tablet by mouth daily. 7/13/21  Yes Provider, Historical   cholecalciferol (VITAMIN D3) (5000 Units/125 mcg) tab tablet Take 5,000 Units by mouth daily. Yes Provider, Historical   gabapentin (NEURONTIN) 600 mg tablet Take 300 mg by mouth two (2) times a day. Yes Other, MD Sherman   spironolactone (ALDACTONE) 25 mg tablet 25 mg daily. At bedtime 12/13/20  Yes Other, MD Sherman   Adempas 2.5 mg tab tablet 2.5 mg two (2) times a day. 12/8/20  Yes Other, MD Sherman   predniSONE (DELTASONE) 5 mg tablet Take 18 mg by mouth nightly.  15 mg in the AM and 3 mg at night 10/21/20  Yes Other, MD Sherman   pantoprazole (PROTONIX) 40 mg tablet TAKE 1 TABLET BY MOUTH EVERY DAY 11/15/20  Yes Other, MD Sherman   hydrOXYchloroQUINE (PLAQUENIL) 200 mg tablet Take 200 mg by mouth daily. 12/14/20  Yes Other, MD Sherman   furosemide (LASIX) 20 mg tablet Take 20 mg by mouth daily. 12/17/20  Yes Other, MD Sherman   DULoxetine (CYMBALTA) 30 mg capsule TAKE 1 CAPSULE BY MOUTH TWICE A DAY 10/25/20  Yes Other, MD Sherman   Letairis 5 mg tablet 5 mg daily. In AM 12/8/20  Yes Other, MD Sherman     Allergies   Allergen Reactions    Doxycycline Nausea and Vomiting    Heparin Anaphylaxis    Remicade [Infliximab] Anaphylaxis    Vancomycin Hives    Rituximab Itching    Percocet [Oxycodone-Acetaminophen] Nausea and Vomiting     Side effect        Review of Systems:  I reviewed the rest of organ systems personally and they were negative signed by Dr. Ephraim Moran    Objective:     Visit Vitals  BP (!) 142/83 (BP 1 Location: Left arm, BP Patient Position: Sitting, BP Cuff Size: Adult)   Pulse (!) 105   Temp 99.5 °F (37.5 °C) (Temporal)   Ht 5' 1\" (1.549 m)   Wt 145 lb 12.8 oz (66.1 kg)   SpO2 95%   BMI 27.55 kg/m²     VITAL SIGNS REVIEWED. Physical Exam:  Patient is well-nourished pleasant in conversation is appropriate. Head and neck examination atraumatic, normocephalic. Gaze appropriate. Conversation appropriate. Neck examination shows supple. No mass. No obvious carotid bruit. Chest examination shows lungs are clear bilaterally well-expanded, no crackles or wheezes. Cardiovascular system regular rate, no obvious murmur. Skin warm to touch  and moist, no skin lesions. Abdomen is soft ,not tender or distended bowel sounds present. No palpable mass. Neurological examinations, no focal neuro deficits moving all 4 extremities. Cranial nerves intact. Sensation is intact as well. Hematologic: No obvious bruise or swelling or obvious lymphadenopathy. Psychosocial: Appropriate. Has good effect. Musculoskeletal system: No muscle wasting, appropriate movements upper and lower extremity.   Vascular examination: I examined patient's right upper arm forearm there is no recurrence of the abscesses looks pretty good.  I rewrapped the patient's right upper arm forearm with Ace wraps. Data Review:   No visits with results within 1 Month(s) from this visit. Latest known visit with results is:   Admission on 08/07/2021, Discharged on 08/10/2021   Component Date Value Ref Range Status    WBC 08/07/2021 6.0  3.6 - 11.0 K/uL Final    RBC 08/07/2021 3.47* 3.80 - 5.20 M/uL Final    HGB 08/07/2021 9.7* 11.5 - 16.0 g/dL Final    HCT 08/07/2021 33.3* 35.0 - 47.0 % Final    MCV 08/07/2021 96.0  80.0 - 99.0 FL Final    MCH 08/07/2021 28.0  26.0 - 34.0 PG Final    MCHC 08/07/2021 29.1* 30.0 - 36.5 g/dL Final    RDW 08/07/2021 18.4* 11.5 - 14.5 % Final    PLATELET 06/81/1611 959  150 - 400 K/uL Final    MPV 08/07/2021 9.6  8.9 - 12.9 FL Final    NEUTROPHILS 08/07/2021 85* 32 - 75 % Final    LYMPHOCYTES 08/07/2021 11* 12 - 49 % Final    MONOCYTES 08/07/2021 2* 5 - 13 % Final    EOSINOPHILS 08/07/2021 0  0 - 7 % Final    BASOPHILS 08/07/2021 0  0 - 1 % Final    IMMATURE GRANULOCYTES 08/07/2021 2* 0.0 - 0.5 % Final    ABS. NEUTROPHILS 08/07/2021 5.1  1.8 - 8.0 K/UL Final    ABS. LYMPHOCYTES 08/07/2021 0.7* 0.8 - 3.5 K/UL Final    ABS. MONOCYTES 08/07/2021 0.1  0.0 - 1.0 K/UL Final    ABS. EOSINOPHILS 08/07/2021 0.0  0.0 - 0.4 K/UL Final    ABS. BASOPHILS 08/07/2021 0.0  0.0 - 0.1 K/UL Final    ABS. IMM. GRANS. 08/07/2021 0.1* 0.00 - 0.04 K/UL Final    DF 08/07/2021 Smear Scanned    Final    RBC COMMENTS 08/07/2021     Final                    Value: Anisocytosis  2+      Sodium 08/07/2021 144  136 - 145 mmol/L Final    Potassium 08/07/2021 3.0* 3.5 - 5.1 mmol/L Final    Chloride 08/07/2021 108  97 - 108 mmol/L Final    CO2 08/07/2021 26  21 - 32 mmol/L Final    Anion gap 08/07/2021 10  5 - 15 mmol/L Final    Glucose 08/07/2021 112* 65 - 100 mg/dL Final    BUN 08/07/2021 16  6 - 20 mg/dL Final    Creatinine 08/07/2021 0.82  0.55 - 1.02 mg/dL Final    BUN/Creatinine ratio 08/07/2021 20  12 - 20   Final  GFR est AA 08/07/2021 >60  >60 ml/min/1.73m2 Final    GFR est non-AA 08/07/2021 >60  >60 ml/min/1.73m2 Final    Calcium 08/07/2021 8.4* 8.5 - 10.1 mg/dL Final    Lactic acid 08/07/2021 1.6  0.4 - 2.0 mmol/L Final    C-Reactive protein 08/07/2021 1.66* 0.00 - 0.60 mg/dL Final    Sed rate, automated 08/07/2021 81  mm/hr Final    Special Requests: 08/07/2021 No Special Requests    Final    Culture result: 08/07/2021 No growth 6 days    Final    Special Requests: 08/07/2021 No Special Requests    Final    GRAM STAIN 08/07/2021 Rare WBCs seen    Final    GRAM STAIN 08/07/2021 No organisms seen    Final    Culture result: 08/07/2021 No growth 2 days    Final    Glucose (POC) 08/07/2021 91  65 - 117 mg/dL Final    Performed by 08/07/2021 Phyllihomar Perks   Final    C-Reactive protein 08/08/2021 4.99* 0.00 - 0.60 mg/dL Final    Procalcitonin 08/08/2021 <0.05* 0 ng/mL Final    C-Reactive protein 08/09/2021 9.44* 0.00 - 0.60 mg/dL Final    Procalcitonin 08/09/2021 0.07* 0 ng/mL Final    WBC 08/09/2021 5.7  3.6 - 11.0 K/uL Final    RBC 08/09/2021 3.05* 3.80 - 5.20 M/uL Final    HGB 08/09/2021 8.3* 11.5 - 16.0 g/dL Final    HCT 08/09/2021 28.6* 35.0 - 47.0 % Final    MCV 08/09/2021 93.8  80.0 - 99.0 FL Final    MCH 08/09/2021 27.2  26.0 - 34.0 PG Final    MCHC 08/09/2021 29.0* 30.0 - 36.5 g/dL Final    RDW 08/09/2021 18.1* 11.5 - 14.5 % Final    PLATELET 86/01/2265 820  150 - 400 K/uL Final    MPV 08/09/2021 9.8  8.9 - 12.9 FL Final    NRBC 08/09/2021 0.3* 0.0  WBC Final    ABSOLUTE NRBC 08/09/2021 0.02* 0.00 - 0.01 K/uL Final    NEUTROPHILS 08/09/2021 91* 32 - 75 % Final    LYMPHOCYTES 08/09/2021 5* 12 - 49 % Final    MONOCYTES 08/09/2021 3* 5 - 13 % Final    EOSINOPHILS 08/09/2021 0  0 - 7 % Final    BASOPHILS 08/09/2021 0  0 - 1 % Final    IMMATURE GRANULOCYTES 08/09/2021 1* 0 - 0.5 % Final    ABS. NEUTROPHILS 08/09/2021 5.2  1.8 - 8.0 K/UL Final    ABS.  LYMPHOCYTES 08/09/2021 0.3* 0.8 - 3.5 K/UL Final    ABS. MONOCYTES 08/09/2021 0.2  0.0 - 1.0 K/UL Final    ABS. EOSINOPHILS 08/09/2021 0.0  0.0 - 0.4 K/UL Final    ABS. BASOPHILS 08/09/2021 0.0  0.0 - 0.1 K/UL Final    ABS. IMM. GRANS. 08/09/2021 0.1* 0.00 - 0.04 K/UL Final    DF 08/09/2021 AUTOMATED    Final    Sodium 08/09/2021 137  136 - 145 mmol/L Final    Potassium 08/09/2021 3.8  3.5 - 5.1 mmol/L Final    Chloride 08/09/2021 108  97 - 108 mmol/L Final    CO2 08/09/2021 26  21 - 32 mmol/L Final    Anion gap 08/09/2021 3* 5 - 15 mmol/L Final    Glucose 08/09/2021 80  65 - 100 mg/dL Final    BUN 08/09/2021 17  6 - 20 mg/dL Final    Creatinine 08/09/2021 0.64  0.55 - 1.02 mg/dL Final    BUN/Creatinine ratio 08/09/2021 27* 12 - 20   Final    GFR est AA 08/09/2021 >60  >60 ml/min/1.73m2 Final    GFR est non-AA 08/09/2021 >60  >60 ml/min/1.73m2 Final    Calcium 08/09/2021 8.6  8.5 - 10.1 mg/dL Final    Bilirubin, total 08/09/2021 0.3  0.2 - 1.0 mg/dL Final    AST (SGOT) 08/09/2021 13* 15 - 37 U/L Final    ALT (SGPT) 08/09/2021 23  12 - 78 U/L Final    Alk.  phosphatase 08/09/2021 94  45 - 117 U/L Final    Protein, total 08/09/2021 7.2  6.4 - 8.2 g/dL Final    Albumin 08/09/2021 2.5* 3.5 - 5.0 g/dL Final    Globulin 08/09/2021 4.7* 2.0 - 4.0 g/dL Final    A-G Ratio 08/09/2021 0.5* 1.1 - 2.2   Final    Glucose (POC) 08/09/2021 95  65 - 117 mg/dL Final    Performed by 08/09/2021 [facility]   Final    Glucose (POC) 08/09/2021 88  65 - 117 mg/dL Final    Performed by 08/09/2021 Doc Baez   Final    Special Requests: 08/09/2021 No Special Requests    Final    GRAM STAIN 08/09/2021 NO WBCS SEEN   Final    GRAM STAIN 08/09/2021 Occasional Gram Positive Cocci in clusters    Final    GRAM STAIN 08/09/2021 Occasional Gram Positive Cocci IN PAIRS    Final    Culture result: 08/09/2021 Heavy Staphylococcus aureus    Final    Glucose (POC) 08/09/2021 78  65 - 117 mg/dL Final    Performed by 08/09/2021 GH GIBSON   Final    WBC 08/10/2021 4.0  3.6 - 11.0 K/uL Final    RBC 08/10/2021 2.68* 3.80 - 5.20 M/uL Final    HGB 08/10/2021 7.4* 11.5 - 16.0 g/dL Final    HCT 08/10/2021 25.2* 35.0 - 47.0 % Final    MCV 08/10/2021 94.0  80.0 - 99.0 FL Final    MCH 08/10/2021 27.6  26.0 - 34.0 PG Final    MCHC 08/10/2021 29.4* 30.0 - 36.5 g/dL Final    RDW 08/10/2021 18.1* 11.5 - 14.5 % Final    PLATELET 17/04/5305 180  150 - 400 K/uL Final    MPV 08/10/2021 9.5  8.9 - 12.9 FL Final    NRBC 08/10/2021 0.5* 0.0  WBC Final    ABSOLUTE NRBC 08/10/2021 0.02* 0.00 - 0.01 K/uL Final    NEUTROPHILS 08/10/2021 89* 32 - 75 % Final    LYMPHOCYTES 08/10/2021 5* 12 - 49 % Final    MONOCYTES 08/10/2021 5  5 - 13 % Final    EOSINOPHILS 08/10/2021 0  0 - 7 % Final    BASOPHILS 08/10/2021 0  0 - 1 % Final    IMMATURE GRANULOCYTES 08/10/2021 1* 0 - 0.5 % Final    ABS. NEUTROPHILS 08/10/2021 3.5  1.8 - 8.0 K/UL Final    ABS. LYMPHOCYTES 08/10/2021 0.2* 0.8 - 3.5 K/UL Final    ABS. MONOCYTES 08/10/2021 0.2  0.0 - 1.0 K/UL Final    ABS. EOSINOPHILS 08/10/2021 0.0  0.0 - 0.4 K/UL Final    ABS. BASOPHILS 08/10/2021 0.0  0.0 - 0.1 K/UL Final    ABS. IMM.  GRANS. 08/10/2021 0.1* 0.00 - 0.04 K/UL Final    DF 08/10/2021 AUTOMATED    Final    Sodium 08/10/2021 143  136 - 145 mmol/L Final    Potassium 08/10/2021 3.3* 3.5 - 5.1 mmol/L Final    Chloride 08/10/2021 112* 97 - 108 mmol/L Final    CO2 08/10/2021 26  21 - 32 mmol/L Final    Anion gap 08/10/2021 5  5 - 15 mmol/L Final    Glucose 08/10/2021 105* 65 - 100 mg/dL Final    BUN 08/10/2021 19  6 - 20 mg/dL Final    Creatinine 08/10/2021 0.82  0.55 - 1.02 mg/dL Final    BUN/Creatinine ratio 08/10/2021 23* 12 - 20   Final    GFR est AA 08/10/2021 >60  >60 ml/min/1.73m2 Final    GFR est non-AA 08/10/2021 >60  >60 ml/min/1.73m2 Final    Calcium 08/10/2021 8.0* 8.5 - 10.1 mg/dL Final    Bilirubin, total 08/10/2021 0.2  0.2 - 1.0 mg/dL Final    AST (SGOT) 08/10/2021 14* 15 - 37 U/L Final    ALT (SGPT) 08/10/2021 19  12 - 78 U/L Final    Alk. phosphatase 08/10/2021 90  45 - 117 U/L Final    Protein, total 08/10/2021 6.4  6.4 - 8.2 g/dL Final    Albumin 08/10/2021 2.1* 3.5 - 5.0 g/dL Final    Globulin 08/10/2021 4.3* 2.0 - 4.0 g/dL Final    A-G Ratio 08/10/2021 0.5* 1.1 - 2.2   Final    C-Reactive protein 08/10/2021 6.62* 0.00 - 0.60 mg/dL Final    Procalcitonin 08/10/2021 <0.05* 0 ng/mL Final        Assessment:     Problem List Items Addressed This Visit        Other    Cellulitis - Primary    Sepsis ([facility] [address]              Plan:     Patient was advised to continue wear the compression sleeve as instructed and patient has high chance of recurrence of the abscesses. And I will continue monitor her progress with a 6-month follow-up.         Bere Rooney MD

## 2021-12-04 ENCOUNTER — HOSPITAL ENCOUNTER (EMERGENCY)
Age: 31
Discharge: HOME | End: 2021-12-05
Attending: EMERGENCY MEDICINE
Payer: MEDICARE

## 2021-12-04 ENCOUNTER — APPOINTMENT (OUTPATIENT)
Dept: GENERAL RADIOLOGY | Age: 31
End: 2021-12-04
Attending: EMERGENCY MEDICINE
Payer: MEDICARE

## 2021-12-04 VITALS
TEMPERATURE: 99.4 F | SYSTOLIC BLOOD PRESSURE: 126 MMHG | HEART RATE: 115 BPM | HEIGHT: 60 IN | WEIGHT: 147 LBS | OXYGEN SATURATION: 94 % | BODY MASS INDEX: 28.86 KG/M2 | DIASTOLIC BLOOD PRESSURE: 78 MMHG | RESPIRATION RATE: 20 BRPM

## 2021-12-04 DIAGNOSIS — R06.02 SOB (SHORTNESS OF BREATH): ICD-10-CM

## 2021-12-04 DIAGNOSIS — J06.9 ACUTE UPPER RESPIRATORY INFECTION: ICD-10-CM

## 2021-12-04 DIAGNOSIS — R53.1 GENERALIZED WEAKNESS: Primary | ICD-10-CM

## 2021-12-04 LAB
ALBUMIN SERPL-MCNC: 3.1 G/DL (ref 3.5–5)
ALBUMIN/GLOB SERPL: 0.6 {RATIO} (ref 1.1–2.2)
ALP SERPL-CCNC: 71 U/L (ref 45–117)
ALT SERPL-CCNC: 20 U/L (ref 12–78)
ANION GAP SERPL CALC-SCNC: 12 MMOL/L (ref 5–15)
APPEARANCE UR: ABNORMAL
AST SERPL W P-5'-P-CCNC: 40 U/L (ref 15–37)
BACTERIA URNS QL MICRO: ABNORMAL /HPF
BASOPHILS # BLD: 0 K/UL (ref 0–0.1)
BASOPHILS NFR BLD: 0 % (ref 0–1)
BILIRUB DIRECT SERPL-MCNC: 0.1 MG/DL (ref 0–0.2)
BILIRUB SERPL-MCNC: 0.4 MG/DL (ref 0.2–1)
BILIRUB UR QL: ABNORMAL
BUN SERPL-MCNC: 14 MG/DL (ref 6–20)
BUN/CREAT SERPL: 19 (ref 12–20)
CA-I BLD-MCNC: 8.7 MG/DL (ref 8.5–10.1)
CHLORIDE SERPL-SCNC: 98 MMOL/L (ref 97–108)
CO2 SERPL-SCNC: 25 MMOL/L (ref 21–32)
COLOR UR: YELLOW
CREAT SERPL-MCNC: 0.74 MG/DL (ref 0.55–1.02)
DIFFERENTIAL METHOD BLD: ABNORMAL
EOSINOPHIL # BLD: 0 K/UL (ref 0–0.4)
EOSINOPHIL NFR BLD: 0 % (ref 0–7)
EPITH CASTS URNS QL MICRO: ABNORMAL /LPF
ERYTHROCYTE [DISTWIDTH] IN BLOOD BY AUTOMATED COUNT: 17.5 % (ref 11.5–14.5)
FLUAV AG NPH QL IA: NEGATIVE
FLUBV AG NOSE QL IA: NEGATIVE
GLOBULIN SER CALC-MCNC: 5 G/DL (ref 2–4)
GLUCOSE SERPL-MCNC: 84 MG/DL (ref 65–100)
GLUCOSE UR STRIP.AUTO-MCNC: NEGATIVE MG/DL
HCG UR QL: NEGATIVE
HCT VFR BLD AUTO: 28.3 % (ref 35–47)
HGB BLD-MCNC: 8.6 G/DL (ref 11.5–16)
HGB UR QL STRIP: NEGATIVE
IMM GRANULOCYTES # BLD AUTO: 0 K/UL
IMM GRANULOCYTES NFR BLD AUTO: 0 %
KETONES UR QL STRIP.AUTO: NEGATIVE MG/DL
LACTATE SERPL-SCNC: 0.8 MMOL/L (ref 0.4–2)
LEUKOCYTE ESTERASE UR QL STRIP.AUTO: ABNORMAL
LYMPHOCYTES # BLD: 0.4 K/UL (ref 0.8–3.5)
LYMPHOCYTES NFR BLD: 10 % (ref 12–49)
MCH RBC QN AUTO: 26 PG (ref 26–34)
MCHC RBC AUTO-ENTMCNC: 30.4 G/DL (ref 30–36.5)
MCV RBC AUTO: 85.5 FL (ref 80–99)
MONOCYTES # BLD: 0.1 K/UL (ref 0–1)
MONOCYTES NFR BLD: 4 % (ref 5–13)
NEUTS BAND NFR BLD MANUAL: 8 % (ref 0–6)
NEUTS SEG # BLD: 3 K/UL (ref 1.8–8)
NEUTS SEG NFR BLD: 78 % (ref 32–75)
NITRITE UR QL STRIP.AUTO: NEGATIVE
PH UR STRIP: 6 [PH] (ref 5–8)
PLATELET # BLD AUTO: 293 K/UL (ref 150–400)
PMV BLD AUTO: 9.6 FL (ref 8.9–12.9)
POTASSIUM SERPL-SCNC: 3.4 MMOL/L (ref 3.5–5.1)
PROT SERPL-MCNC: 8.1 G/DL (ref 6.4–8.2)
PROT UR STRIP-MCNC: 30 MG/DL
RBC # BLD AUTO: 3.31 M/UL (ref 3.8–5.2)
RBC #/AREA URNS HPF: ABNORMAL /HPF (ref 0–5)
RBC MORPH BLD: ABNORMAL
SARS-COV-2, COV2: NORMAL
SARS-COV-2, COV2: NOT DETECTED
SODIUM SERPL-SCNC: 135 MMOL/L (ref 136–145)
SP GR UR REFRACTOMETRY: 1.01 (ref 1–1.03)
UROBILINOGEN UR QL STRIP.AUTO: 0.1 EU/DL (ref 0.2–1)
WBC # BLD AUTO: 3.5 K/UL (ref 3.6–11)
WBC CASTS URNS QL MICRO: PRESENT
WBC MORPH BLD: ABNORMAL
WBC URNS QL MICRO: ABNORMAL /HPF (ref 0–4)

## 2021-12-04 PROCEDURE — 81001 URINALYSIS AUTO W/SCOPE: CPT

## 2021-12-04 PROCEDURE — 80076 HEPATIC FUNCTION PANEL: CPT

## 2021-12-04 PROCEDURE — 81025 URINE PREGNANCY TEST: CPT

## 2021-12-04 PROCEDURE — 74011000250 HC RX REV CODE- 250: Performed by: EMERGENCY MEDICINE

## 2021-12-04 PROCEDURE — 87635 SARS-COV-2 COVID-19 AMP PRB: CPT

## 2021-12-04 PROCEDURE — 96374 THER/PROPH/DIAG INJ IV PUSH: CPT

## 2021-12-04 PROCEDURE — 87804 INFLUENZA ASSAY W/OPTIC: CPT

## 2021-12-04 PROCEDURE — 74011250636 HC RX REV CODE- 250/636: Performed by: EMERGENCY MEDICINE

## 2021-12-04 PROCEDURE — 96375 TX/PRO/DX INJ NEW DRUG ADDON: CPT

## 2021-12-04 PROCEDURE — 80048 BASIC METABOLIC PNL TOTAL CA: CPT

## 2021-12-04 PROCEDURE — 93005 ELECTROCARDIOGRAM TRACING: CPT

## 2021-12-04 PROCEDURE — 71045 X-RAY EXAM CHEST 1 VIEW: CPT

## 2021-12-04 PROCEDURE — 83605 ASSAY OF LACTIC ACID: CPT

## 2021-12-04 PROCEDURE — 85025 COMPLETE CBC W/AUTO DIFF WBC: CPT

## 2021-12-04 PROCEDURE — 99284 EMERGENCY DEPT VISIT MOD MDM: CPT

## 2021-12-04 PROCEDURE — 36415 COLL VENOUS BLD VENIPUNCTURE: CPT

## 2021-12-04 PROCEDURE — 87040 BLOOD CULTURE FOR BACTERIA: CPT

## 2021-12-04 RX ORDER — DEXAMETHASONE SODIUM PHOSPHATE 10 MG/ML
10 INJECTION INTRAMUSCULAR; INTRAVENOUS ONCE
Status: COMPLETED | OUTPATIENT
Start: 2021-12-05 | End: 2021-12-04

## 2021-12-04 RX ORDER — FUROSEMIDE 10 MG/ML
60 INJECTION INTRAMUSCULAR; INTRAVENOUS ONCE
Status: COMPLETED | OUTPATIENT
Start: 2021-12-05 | End: 2021-12-04

## 2021-12-04 RX ORDER — ACETAMINOPHEN 500 MG
1000 TABLET ORAL ONCE
Status: DISCONTINUED | OUTPATIENT
Start: 2021-12-04 | End: 2021-12-04

## 2021-12-04 RX ORDER — SODIUM CHLORIDE 9 MG/ML
100 INJECTION, SOLUTION INTRAVENOUS ONCE
Status: COMPLETED | OUTPATIENT
Start: 2021-12-04 | End: 2021-12-04

## 2021-12-04 RX ORDER — SODIUM CHLORIDE 9 MG/ML
125 INJECTION, SOLUTION INTRAVENOUS ONCE
Status: DISCONTINUED | OUTPATIENT
Start: 2021-12-04 | End: 2021-12-04

## 2021-12-04 RX ADMIN — DEXAMETHASONE SODIUM PHOSPHATE 10 MG: 10 INJECTION INTRAMUSCULAR; INTRAVENOUS at 23:45

## 2021-12-04 RX ADMIN — SODIUM CHLORIDE 100 ML/HR: 9 INJECTION, SOLUTION INTRAVENOUS at 22:10

## 2021-12-04 RX ADMIN — SODIUM CHLORIDE 500 ML: 9 INJECTION, SOLUTION INTRAVENOUS at 21:09

## 2021-12-04 RX ADMIN — CEFTRIAXONE SODIUM 2 G: 2 INJECTION, POWDER, FOR SOLUTION INTRAMUSCULAR; INTRAVENOUS at 23:47

## 2021-12-04 RX ADMIN — FUROSEMIDE 60 MG: 10 INJECTION, SOLUTION INTRAMUSCULAR; INTRAVENOUS at 23:50

## 2021-12-05 LAB
ATRIAL RATE: 113 BPM
CALCULATED P AXIS, ECG09: 10 DEGREES
CALCULATED R AXIS, ECG10: -13 DEGREES
CALCULATED T AXIS, ECG11: -17 DEGREES
DIAGNOSIS, 93000: NORMAL
P-R INTERVAL, ECG05: 130 MS
Q-T INTERVAL, ECG07: 352 MS
QRS DURATION, ECG06: 86 MS
QTC CALCULATION (BEZET), ECG08: 482 MS
VENTRICULAR RATE, ECG03: 113 BPM

## 2021-12-05 PROCEDURE — 96375 TX/PRO/DX INJ NEW DRUG ADDON: CPT

## 2021-12-05 PROCEDURE — 74011250636 HC RX REV CODE- 250/636: Performed by: EMERGENCY MEDICINE

## 2021-12-05 RX ORDER — CEPHALEXIN 500 MG/1
500 CAPSULE ORAL 3 TIMES DAILY
Qty: 15 CAPSULE | Refills: 0 | Status: SHIPPED | OUTPATIENT
Start: 2021-12-05 | End: 2021-12-10

## 2021-12-05 RX ORDER — ONDANSETRON 2 MG/ML
4 INJECTION INTRAMUSCULAR; INTRAVENOUS
Status: COMPLETED | OUTPATIENT
Start: 2021-12-05 | End: 2021-12-05

## 2021-12-05 RX ORDER — ONDANSETRON 4 MG/1
4 TABLET, ORALLY DISINTEGRATING ORAL
Qty: 12 TABLET | Refills: 0 | Status: SHIPPED | OUTPATIENT
Start: 2021-12-05 | End: 2022-08-22

## 2021-12-05 RX ADMIN — ONDANSETRON 4 MG: 2 INJECTION INTRAMUSCULAR; INTRAVENOUS at 00:07

## 2021-12-05 NOTE — ED PROVIDER NOTES
EMERGENCY DEPARTMENT HISTORY AND PHYSICAL EXAM      Date: 12/4/2021  Patient Name: Wilfrid Madison    History of Presenting Illness     Chief Complaint   Patient presents with    Shortness of Breath    Cough       History Provided By: Patient    HPI: Wilfrid Madison, 27 y.o. female   presents to the ED with cc of generalized weakness and shortness of breath. Patient with history of rheumatoid arthritis and lupus and pulmonary hypertension came to emergency room complaining of generalized weakness for last several days. Patient also complains of mild shortness of breath intermittently that occurred since yesterday. Patient also complains of mild nonproductive cough without sore throat, nasal congestion, or postnasal drip. No loss of sense of smell or taste. Patient has completed Covid vaccination including booster shot. Patient has received flu vaccination. No chest pain, abdominal pain, nausea vomiting or diarrhea or signs of GI bleeding. No dysuria hematuria. Patient states that she chronically have tachycardia at rate of about 110. PCP: Chris Esteban MD    No current facility-administered medications on file prior to encounter. Current Outpatient Medications on File Prior to Encounter   Medication Sig Dispense Refill    Xeljanz XR 11 mg Tb24 Take 1 Tablet by mouth daily.  cholecalciferol (VITAMIN D3) (5000 Units/125 mcg) tab tablet Take 5,000 Units by mouth daily.  gabapentin (NEURONTIN) 600 mg tablet Take 300 mg by mouth two (2) times a day.  spironolactone (ALDACTONE) 25 mg tablet 25 mg daily. At bedtime      Adempas 2.5 mg tab tablet 2.5 mg two (2) times a day.  predniSONE (DELTASONE) 5 mg tablet Take 18 mg by mouth nightly. 15 mg in the AM and 3 mg at night      pantoprazole (PROTONIX) 40 mg tablet TAKE 1 TABLET BY MOUTH EVERY DAY      hydrOXYchloroQUINE (PLAQUENIL) 200 mg tablet Take 200 mg by mouth daily.       furosemide (LASIX) 20 mg tablet Take 20 mg by mouth daily.  DULoxetine (CYMBALTA) 30 mg capsule TAKE 1 CAPSULE BY MOUTH TWICE A DAY      Letairis 5 mg tablet 5 mg daily. In AM         Past History     Past Medical History:  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     Pulmonary HTN ([facility]     [facility]        Past Surgical History:  Past Surgical History:   Procedure Laterality Date    HX CYST REMOVAL      HX ORTHOPAEDIC      x2 left knee sxs    HX ORTHOPAEDIC      bilateral ankle sxs       Family History:  Family History   Problem Relation Age of Onset    Hypertension Mother     Cancer Paternal Grandmother        Social History:  Social History     Tobacco Use    Smoking status: Never Smoker    Smokeless tobacco: Never Used   Vaping Use    Vaping Use: Never used   Substance Use Topics    Alcohol use: Never    Drug use: Never       Allergies: Allergies   Allergen Reactions    Doxycycline Nausea and Vomiting    Heparin Anaphylaxis    Remicade [Infliximab] Anaphylaxis    Vancomycin Hives    Rituximab Itching    Percocet [Oxycodone-Acetaminophen] Nausea and Vomiting     Side effect         Review of Systems   Review of Systems   Constitutional: Positive for activity change and appetite change. Negative for chills and fever. HENT: Negative for sore throat. Eyes: Negative for discharge. Respiratory: Positive for cough and shortness of breath. Cardiovascular: Negative for chest pain. Gastrointestinal: Positive for nausea. Negative for blood in stool. Endocrine: Negative for polyuria. Genitourinary: Negative for difficulty urinating. Musculoskeletal: Positive for back pain and neck pain. Negative for arthralgias. Skin: Negative for rash. Neurological: Negative for headaches. Hematological: Negative for adenopathy. Psychiatric/Behavioral: Negative for suicidal ideas. All other systems reviewed and are negative. Physical Exam   Physical Exam  Vitals and nursing note reviewed. Constitutional:       Appearance: Normal appearance. HENT:      Head: Normocephalic and atraumatic. Nose: Nose normal.      Mouth/Throat:      Mouth: Mucous membranes are moist.      Pharynx: Oropharynx is clear. Eyes:      Conjunctiva/sclera: Conjunctivae normal.   Cardiovascular:      Rate and Rhythm: Normal rate and regular rhythm. Heart sounds: Normal heart sounds. Pulmonary:      Effort: Pulmonary effort is normal.      Breath sounds: Normal breath sounds. Abdominal:      General: Abdomen is flat. Bowel sounds are normal.      Palpations: Abdomen is soft. Musculoskeletal:      Cervical back: Neck supple. Right lower leg: No edema. Left lower leg: No edema. Skin:     General: Skin is warm and dry. Neurological:      General: No focal deficit present. Mental Status: She is alert and oriented to person, place, and time. Psychiatric:         Mood and Affect: Mood normal.         Diagnostic Study Results     Labs -     Recent Results (from the past 12 hour(s))   CBC WITH AUTOMATED DIFF    Collection Time: 12/04/21  8:50 PM   Result Value Ref Range    WBC 3.5 (L) 3.6 - 11.0 K/uL    RBC 3.31 (L) 3.80 - 5.20 M/uL    HGB 8.6 (L) 11.5 - 16.0 g/dL    HCT 28.3 (L) 35.0 - 47.0 %    MCV 85.5 80.0 - 99.0 FL    MCH 26.0 26.0 - 34.0 PG    MCHC 30.4 30.0 - 36.5 g/dL    RDW 17.5 (H) 11.5 - 14.5 %    PLATELET 801 922 - 606 K/uL    MPV 9.6 8.9 - 12.9 FL    NEUTROPHILS 78 (H) 32 - 75 %    BAND NEUTROPHILS 8 (H) 0 - 6 %    LYMPHOCYTES 10 (L) 12 - 49 %    MONOCYTES 4 (L) 5 - 13 %    EOSINOPHILS 0 0 - 7 %    BASOPHILS 0 0 - 1 %    IMMATURE GRANULOCYTES 0 %    ABS. NEUTROPHILS 3.0 1.8 - 8.0 K/UL    ABS. LYMPHOCYTES 0.4 (L) 0.8 - 3.5 K/UL    ABS. MONOCYTES 0.1 0.0 - 1.0 K/UL    ABS. EOSINOPHILS 0.0 0.0 - 0.4 K/UL    ABS. BASOPHILS 0.0 0.0 - 0.1 K/UL    ABS. IMM.  GRANS. 0.0 K/UL    DF Manual      RBC COMMENTS Polychromasia  1+        WBC COMMENTS Reactive Lymphs     HEPATIC FUNCTION PANEL Collection Time: 12/04/21  8:50 PM   Result Value Ref Range    Protein, total 8.1 6.4 - 8.2 g/dL    Albumin 3.1 (L) 3.5 - 5.0 g/dL    Globulin 5.0 (H) 2.0 - 4.0 g/dL    A-G Ratio 0.6 (L) 1.1 - 2.2      Bilirubin, total 0.4 0.2 - 1.0 mg/dL    Bilirubin, direct 0.1 0.0 - 0.2 mg/dL    Alk.  phosphatase 71 45 - 117 U/L    AST (SGOT) 40 (H) 15 - 37 U/L    ALT (SGPT) 20 12 - 78 U/L   INFLUENZA A & B AG (RAPID TEST)    Collection Time: 12/04/21  8:50 PM   Result Value Ref Range    Influenza A Antigen Negative Negative      Influenza B Antigen Negative Negative     SARS-COV-2    Collection Time: 12/04/21  8:50 PM   Result Value Ref Range    SARS-CoV-2 Please find results under separate order     LACTIC ACID    Collection Time: 12/04/21  8:50 PM   Result Value Ref Range    Lactic acid 0.8 0.4 - 2.0 mmol/L   METABOLIC PANEL, BASIC    Collection Time: 12/04/21  8:50 PM   Result Value Ref Range    Sodium 135 (L) 136 - 145 mmol/L    Potassium 3.4 (L) 3.5 - 5.1 mmol/L    Chloride 98 97 - 108 mmol/L    CO2 25 21 - 32 mmol/L    Anion gap 12 5 - 15 mmol/L    Glucose 84 65 - 100 mg/dL    BUN 14 6 - 20 mg/dL    Creatinine 0.74 0.55 - 1.02 mg/dL    BUN/Creatinine ratio 19 12 - 20      GFR est AA >60 >60 ml/min/1.73m2    GFR est non-AA >60 >60 ml/min/1.73m2    Calcium 8.7 8.5 - 10.1 mg/dL   SARS-COV-2    Collection Time: 12/04/21  8:50 PM   Result Value Ref Range    SARS-CoV-2 Not Detected Not Detected     URINALYSIS W/ RFLX MICROSCOPIC    Collection Time: 12/04/21  9:05 PM   Result Value Ref Range    Color Yellow      Appearance Hazy (A) Clear      Specific gravity 1.015 1.003 - 1.030      pH (UA) 6.0 5.0 - 8.0      Protein 30 (A) Negative mg/dL    Glucose Negative Negative mg/dL    Ketone Negative Negative mg/dL    Bilirubin Small (A) Negative      Blood Negative Negative      Urobilinogen 0.1 (L) 0.2 - 1.0 EU/dL    Nitrites Negative Negative      Leukocyte Esterase Large (A) Negative     HCG URINE, QL    Collection Time: 12/04/21 9:05 PM   Result Value Ref Range    HCG urine, QL Negative Negative     URINE MICROSCOPIC    Collection Time: 12/04/21  9:05 PM   Result Value Ref Range    WBC 5-10 0 - 4 /hpf    RBC 0-5 0 - 5 /hpf    Epithelial cells Few Few /lpf    Bacteria 1+ (A) Negative /hpf    Budding yeast Present (A) Negative         Radiologic Studies -   XR CHEST PORT   Final Result   1. Increased pulmonary edema and/or pneumonia. CT Results  (Last 48 hours)    None        CXR Results  (Last 48 hours)               12/04/21 2027  XR CHEST PORT Final result    Impression:  1. Increased pulmonary edema and/or pneumonia. Narrative:  Shortness of breath, cough. Comparison chest x-ray dated 12/20/2021. FINDINGS: Single frontal view of the chest. Interval placement right port distal   tip SVC/RA junction. Normal heart size. Similar pulmonary inflation. Slightly   increased bilateral pulmonary airspace disease. Unchanged small bilateral   pleural effusions versus CP angle scarring. No pneumothorax. No free air under   diaphragm. Bony structures grossly intact. Extensive superficial soft tissue   calcification. Medical Decision Making   I am the first provider for this patient. I reviewed the vital signs, available nursing notes, past medical history, past surgical history, family history and social history. Vital Signs-Reviewed the patient's vital signs. Patient Vitals for the past 12 hrs:   Temp Pulse Resp BP SpO2   12/04/21 2350 -- (!) 115 -- -- --   12/04/21 2208 99.4 °F (37.4 °C) (!) 120 20 126/78 94 %   12/04/21 2044 -- -- -- -- 94 %   12/04/21 2008 100.3 °F (37.9 °C) (!) 131 16 118/66 94 %       Records Reviewed:     Provider Notes (Medical Decision Making):   Due to patient's immunocompromise state with rheumatologic disorder along with a low-grade temperature, sepsis work-up was initiated.   The work-up was nondiagnostic that includes negative flu and Covid test.  IV Rocephin was given for possible bacteremia. IV Decadron was given patient's chronic steroid intake and to alleviate possible rheumatologic flareup. IV Lasix was given for possible fluid overload. Patient states she is feeling better. Patient ambulated without desaturation of O2. Patient O2 saturation remains above 95% on room air. Patient denies being short of breath or chest pain. Patient states that she is ready to be discharged and will return for worsening symptoms. Patient was discharged in stable condition. ED Course:   Initial assessment performed. The patients presenting problems have been discussed, and they are in agreement with the care plan formulated and outlined with them. I have encouraged them to ask questions as they arise throughout their visit. Apparent distress. No respiratory distress. PROCEDURES      Disposition: Condition stable   DC- Adult Discharges: All of the diagnostic tests were reviewed and questions answered. Diagnosis, care plan and treatment options were discussed. understand instructions and will follow up as directed. The patients results have been reviewed with them. They have been counseled regarding their diagnosis. The patient verbally convey understanding and agreement of the signs, symptoms, diagnosis, treatment and prognosis and additionally agrees to follow up as recommended. They also agree with the care-plan and convey that all of their questions have been answered. I have also put together some discharge instructions for them that include: 1) educational information regarding their diagnosis, 2) how to care for their diagnosis at home, as well a 3) list of reasons why they would want to return to the ED prior to their follow-up appointment, should their condition change. PLAN:  1. Current Discharge Medication List      START taking these medications    Details   cephALEXin (Keflex) 500 mg capsule Take 1 Capsule by mouth three (3) times daily for 5 days.   Qty: 15 Capsule, Refills: 0  Start date: 12/5/2021, End date: 12/10/2021      ondansetron (Zofran ODT) 4 mg disintegrating tablet Take 1 Tablet by mouth every eight (8) hours as needed for Nausea, Vomiting or Nausea or Vomiting. Qty: 12 Tablet, Refills: 0  Start date: 12/5/2021           2. Follow-up Information     Follow up With Specialties Details Why Contact Info    Follow up with your primary care physician  Schedule an appointment as soon as possible for a visit in 3 days As needed         Return to ED if worse     Diagnosis     Clinical Impression:   1. Generalized weakness    2. Acute upper respiratory infection    3. SOB (shortness of breath)        Please note that this dictation was completed with [employer], the computer voice recognition software. Quite often unanticipated grammatical, syntax, homophones, and other interpretive errors are inadvertently transcribed by the computer software. Please disregard these errors. Please excuse any errors that have escaped final proofreading. Thank you.

## 2021-12-05 NOTE — ED TRIAGE NOTES
Pt has hx lupus, c/o fever, SOB, and neck aching since yesterday. No n/v/d.  +weakness. Took tylenol about 1 hour ago. Has fx to left foot.

## 2021-12-11 LAB
BACTERIA SPEC CULT: NORMAL
SPECIAL REQUESTS,SREQ: NORMAL

## 2022-01-01 NOTE — ED TRIAGE NOTES
Patient reports right arm pain with swelling, warmth, and pain in right arm since last weekend denies accident, injury, or insect bite to arm 0

## 2022-02-17 ENCOUNTER — APPOINTMENT (OUTPATIENT)
Dept: GENERAL RADIOLOGY | Age: 32
End: 2022-02-17
Attending: EMERGENCY MEDICINE
Payer: MEDICARE

## 2022-02-17 ENCOUNTER — HOSPITAL ENCOUNTER (EMERGENCY)
Age: 32
Discharge: HOME | End: 2022-02-17
Attending: EMERGENCY MEDICINE
Payer: MEDICARE

## 2022-02-17 VITALS
RESPIRATION RATE: 20 BRPM | WEIGHT: 140 LBS | TEMPERATURE: 98.2 F | HEART RATE: 106 BPM | SYSTOLIC BLOOD PRESSURE: 121 MMHG | OXYGEN SATURATION: 96 % | HEIGHT: 60 IN | BODY MASS INDEX: 27.48 KG/M2 | DIASTOLIC BLOOD PRESSURE: 72 MMHG

## 2022-02-17 DIAGNOSIS — R10.84 ABDOMINAL PAIN, GENERALIZED: Primary | ICD-10-CM

## 2022-02-17 DIAGNOSIS — R19.7 DIARRHEA, UNSPECIFIED TYPE: ICD-10-CM

## 2022-02-17 DIAGNOSIS — D64.9 CHRONIC ANEMIA: ICD-10-CM

## 2022-02-17 DIAGNOSIS — N39.0 URINARY TRACT INFECTION WITHOUT HEMATURIA, SITE UNSPECIFIED: ICD-10-CM

## 2022-02-17 LAB
ALBUMIN SERPL-MCNC: 2.8 G/DL (ref 3.5–5)
ALBUMIN/GLOB SERPL: 0.5 {RATIO} (ref 1.1–2.2)
ALP SERPL-CCNC: 79 U/L (ref 45–117)
ALT SERPL-CCNC: 24 U/L (ref 12–78)
AMORPH CRY URNS QL MICRO: ABNORMAL
ANION GAP SERPL CALC-SCNC: 10 MMOL/L (ref 5–15)
APPEARANCE UR: CLEAR
AST SERPL W P-5'-P-CCNC: 53 U/L (ref 15–37)
BACTERIA URNS QL MICRO: ABNORMAL /HPF
BASOPHILS # BLD: 0 K/UL (ref 0–0.1)
BASOPHILS NFR BLD: 0 % (ref 0–1)
BILIRUB SERPL-MCNC: 0.4 MG/DL (ref 0.2–1)
BILIRUB UR QL: NEGATIVE
BUN SERPL-MCNC: 13 MG/DL (ref 6–20)
BUN/CREAT SERPL: 19 (ref 12–20)
CA-I BLD-MCNC: 8.4 MG/DL (ref 8.5–10.1)
CHLORIDE SERPL-SCNC: 102 MMOL/L (ref 97–108)
CO2 SERPL-SCNC: 25 MMOL/L (ref 21–32)
COLOR UR: YELLOW
CREAT SERPL-MCNC: 0.69 MG/DL (ref 0.55–1.02)
DIFFERENTIAL METHOD BLD: ABNORMAL
EOSINOPHIL # BLD: 0 K/UL (ref 0–0.4)
EOSINOPHIL NFR BLD: 0 % (ref 0–7)
EPITH CASTS URNS QL MICRO: ABNORMAL /LPF
ERYTHROCYTE [DISTWIDTH] IN BLOOD BY AUTOMATED COUNT: 19.3 % (ref 11.5–14.5)
GLOBULIN SER CALC-MCNC: 5.5 G/DL (ref 2–4)
GLUCOSE SERPL-MCNC: 104 MG/DL (ref 65–100)
GLUCOSE UR STRIP.AUTO-MCNC: NEGATIVE MG/DL
HCG UR QL: NEGATIVE
HCT VFR BLD AUTO: 25.3 % (ref 35–47)
HGB BLD-MCNC: 7.8 G/DL (ref 11.5–16)
HGB UR QL STRIP: NEGATIVE
IMM GRANULOCYTES # BLD AUTO: 0 K/UL
IMM GRANULOCYTES NFR BLD AUTO: 0 %
KETONES UR QL STRIP.AUTO: 15 MG/DL
LEUKOCYTE ESTERASE UR QL STRIP.AUTO: ABNORMAL
LIPASE SERPL-CCNC: 100 U/L (ref 73–393)
LYMPHOCYTES # BLD: 0.2 K/UL (ref 0.8–3.5)
LYMPHOCYTES NFR BLD: 6 % (ref 12–49)
MCH RBC QN AUTO: 28.4 PG (ref 26–34)
MCHC RBC AUTO-ENTMCNC: 30.8 G/DL (ref 30–36.5)
MCV RBC AUTO: 92 FL (ref 80–99)
MONOCYTES # BLD: 0.1 K/UL (ref 0–1)
MONOCYTES NFR BLD: 2 % (ref 5–13)
NEUTS BAND NFR BLD MANUAL: 2 % (ref 0–6)
NEUTS SEG # BLD: 2.8 K/UL (ref 1.8–8)
NEUTS SEG NFR BLD: 90 % (ref 32–75)
NITRITE UR QL STRIP.AUTO: NEGATIVE
PH UR STRIP: 6 [PH] (ref 5–8)
PLATELET # BLD AUTO: 238 K/UL (ref 150–400)
PLATELET COMMENTS,PCOM: ABNORMAL
PMV BLD AUTO: 9.7 FL (ref 8.9–12.9)
POTASSIUM SERPL-SCNC: 3.3 MMOL/L (ref 3.5–5.1)
PROT SERPL-MCNC: 8.3 G/DL (ref 6.4–8.2)
PROT UR STRIP-MCNC: 30 MG/DL
RBC # BLD AUTO: 2.75 M/UL (ref 3.8–5.2)
RBC #/AREA URNS HPF: ABNORMAL /HPF (ref 0–5)
RBC MORPH BLD: ABNORMAL
SODIUM SERPL-SCNC: 137 MMOL/L (ref 136–145)
SP GR UR REFRACTOMETRY: 1.02 (ref 1–1.03)
UROBILINOGEN UR QL STRIP.AUTO: 0.1 EU/DL (ref 0.2–1)
WBC # BLD AUTO: 3.1 K/UL (ref 3.6–11)
WBC URNS QL MICRO: ABNORMAL /HPF (ref 0–4)

## 2022-02-17 PROCEDURE — 83690 ASSAY OF LIPASE: CPT

## 2022-02-17 PROCEDURE — 96374 THER/PROPH/DIAG INJ IV PUSH: CPT

## 2022-02-17 PROCEDURE — 81001 URINALYSIS AUTO W/SCOPE: CPT

## 2022-02-17 PROCEDURE — 80053 COMPREHEN METABOLIC PANEL: CPT

## 2022-02-17 PROCEDURE — 74019 RADEX ABDOMEN 2 VIEWS: CPT

## 2022-02-17 PROCEDURE — 99283 EMERGENCY DEPT VISIT LOW MDM: CPT

## 2022-02-17 PROCEDURE — 85025 COMPLETE CBC W/AUTO DIFF WBC: CPT

## 2022-02-17 PROCEDURE — 74011250636 HC RX REV CODE- 250/636: Performed by: EMERGENCY MEDICINE

## 2022-02-17 PROCEDURE — 81025 URINE PREGNANCY TEST: CPT

## 2022-02-17 RX ORDER — ONDANSETRON 4 MG/1
4 TABLET, FILM COATED ORAL
Qty: 20 TABLET | Refills: 0 | Status: SHIPPED | OUTPATIENT
Start: 2022-02-17 | End: 2022-08-22

## 2022-02-17 RX ORDER — CEPHALEXIN 500 MG/1
500 CAPSULE ORAL 2 TIMES DAILY
Qty: 10 CAPSULE | Refills: 0 | OUTPATIENT
Start: 2022-02-17 | End: 2022-03-03

## 2022-02-17 RX ORDER — KETOROLAC TROMETHAMINE 10 MG/1
10 TABLET, FILM COATED ORAL
Qty: 20 TABLET | Refills: 0 | Status: SHIPPED | OUTPATIENT
Start: 2022-02-17 | End: 2022-02-17 | Stop reason: SDUPTHER

## 2022-02-17 RX ORDER — KETOROLAC TROMETHAMINE 10 MG/1
10 TABLET, FILM COATED ORAL
Qty: 20 TABLET | Refills: 0 | Status: SHIPPED | OUTPATIENT
Start: 2022-02-17 | End: 2022-02-22

## 2022-02-17 RX ORDER — ONDANSETRON 4 MG/1
4 TABLET, FILM COATED ORAL
Qty: 20 TABLET | Refills: 0 | Status: SHIPPED | OUTPATIENT
Start: 2022-02-17 | End: 2022-02-17 | Stop reason: SDUPTHER

## 2022-02-17 RX ORDER — KETOROLAC TROMETHAMINE 15 MG/ML
15 INJECTION, SOLUTION INTRAMUSCULAR; INTRAVENOUS
Status: COMPLETED | OUTPATIENT
Start: 2022-02-17 | End: 2022-02-17

## 2022-02-17 RX ORDER — CEPHALEXIN 500 MG/1
500 CAPSULE ORAL 2 TIMES DAILY
Qty: 10 CAPSULE | Refills: 0 | Status: SHIPPED | OUTPATIENT
Start: 2022-02-17 | End: 2022-02-17 | Stop reason: SDUPTHER

## 2022-02-17 RX ADMIN — KETOROLAC TROMETHAMINE 15 MG: 15 INJECTION, SOLUTION INTRAMUSCULAR; INTRAVENOUS at 14:27

## 2022-02-17 NOTE — DISCHARGE INSTRUCTIONS
Thank you! Thank you for allowing me to care for you in the emergency department. I sincerely hope that you are satisfied with your visit today. It is my goal to provide you with excellent care. Below you will find a list of your labs and imaging from your visit today. Should you have any questions regarding these results please do not hesitate to call the emergency department. Labs -     Recent Results (from the past 12 hour(s))   URINALYSIS W/ RFLX MICROSCOPIC    Collection Time: 02/17/22  2:15 PM   Result Value Ref Range    Color Yellow      Appearance Clear Clear      Specific gravity 1.020 1.003 - 1.030      pH (UA) 6.0 5.0 - 8.0      Protein 30 (A) Negative mg/dL    Glucose Negative Negative mg/dL    Ketone 15 (A) Negative mg/dL    Bilirubin Negative Negative      Blood Negative Negative      Urobilinogen 0.1 (L) 0.2 - 1.0 EU/dL    Nitrites Negative Negative      Leukocyte Esterase Small (A) Negative     URINE MICROSCOPIC    Collection Time: 02/17/22  2:15 PM   Result Value Ref Range    WBC 5-10 0 - 4 /hpf    RBC 0-5 0 - 5 /hpf    Epithelial cells Many (A) Few /lpf    Bacteria 2+ (A) Negative /hpf    Amorphous Crystals 1+ (A) Negative   CBC WITH AUTOMATED DIFF    Collection Time: 02/17/22  2:25 PM   Result Value Ref Range    WBC 3.1 (L) 3.6 - 11.0 K/uL    RBC 2.75 (L) 3.80 - 5.20 M/uL    HGB 7.8 (L) 11.5 - 16.0 g/dL    HCT 25.3 (L) 35.0 - 47.0 %    MCV 92.0 80.0 - 99.0 FL    MCH 28.4 26.0 - 34.0 PG    MCHC 30.8 30.0 - 36.5 g/dL    RDW 19.3 (H) 11.5 - 14.5 %    PLATELET 988 261 - 110 K/uL    MPV 9.7 8.9 - 12.9 FL    NEUTROPHILS 90 (H) 32 - 75 %    BAND NEUTROPHILS 2 0 - 6 %    LYMPHOCYTES 6 (L) 12 - 49 %    MONOCYTES 2 (L) 5 - 13 %    EOSINOPHILS 0 0 - 7 %    BASOPHILS 0 0 - 1 %    IMMATURE GRANULOCYTES 0 %    ABS. NEUTROPHILS 2.8 1.8 - 8.0 K/UL    ABS. LYMPHOCYTES 0.2 (L) 0.8 - 3.5 K/UL    ABS. MONOCYTES 0.1 0.0 - 1.0 K/UL    ABS. EOSINOPHILS 0.0 0.0 - 0.4 K/UL    ABS.  BASOPHILS 0.0 0.0 - 0.1 K/UL ABS. IMM. GRANS. 0.0 K/UL    DF Manual      PLATELET COMMENTS Giant Platelets      RBC COMMENTS Anisocytosis  1+        RBC COMMENTS Macrocytosis  1+        RBC COMMENTS Microcytosis  1+       METABOLIC PANEL, COMPREHENSIVE    Collection Time: 02/17/22  2:25 PM   Result Value Ref Range    Sodium 137 136 - 145 mmol/L    Potassium 3.3 (L) 3.5 - 5.1 mmol/L    Chloride 102 97 - 108 mmol/L    CO2 25 21 - 32 mmol/L    Anion gap 10 5 - 15 mmol/L    Glucose 104 (H) 65 - 100 mg/dL    BUN 13 6 - 20 mg/dL    Creatinine 0.69 0.55 - 1.02 mg/dL    BUN/Creatinine ratio 19 12 - 20      GFR est AA >60 >60 ml/min/1.73m2    GFR est non-AA >60 >60 ml/min/1.73m2    Calcium 8.4 (L) 8.5 - 10.1 mg/dL    Bilirubin, total 0.4 0.2 - 1.0 mg/dL    AST (SGOT) 53 (H) 15 - 37 U/L    ALT (SGPT) 24 12 - 78 U/L    Alk. phosphatase 79 45 - 117 U/L    Protein, total 8.3 (H) 6.4 - 8.2 g/dL    Albumin 2.8 (L) 3.5 - 5.0 g/dL    Globulin 5.5 (H) 2.0 - 4.0 g/dL    A-G Ratio 0.5 (L) 1.1 - 2.2     LIPASE    Collection Time: 02/17/22  2:25 PM   Result Value Ref Range    Lipase 100 73 - 393 U/L   HCG URINE, QL    Collection Time: 02/17/22  3:00 PM   Result Value Ref Range    HCG urine, QL Negative Negative         Radiologic Studies -   XR ABD FLAT/ ERECT   Final Result   1. There is a nonobstructive bowel gas pattern. 2.  There are diffuse soft tissue calcifications as well as apparent   osteoporosis versus osteomalacia of the osseous structures with old compression   fractures. The soft tissue calcifications could be related to secondary   hyperparathyroidism from renal insufficiency versus primary hyperparathyroidism. These findings are unchanged compared to recent baseline imaging studies. CT Results  (Last 48 hours)      None          CXR Results  (Last 48 hours)      None               If you feel that you have not received excellent quality care or timely care, please ask to speak to the nurse manager.  Please choose us in the future for your continued health care needs. ------------------------------------------------------------------------------------------------------------  The exam and treatment you received in the Emergency Department were for an urgent problem and are not intended as complete care. It is important that you follow-up with a doctor, nurse practitioner, or physician assistant to:  (1) confirm your diagnosis,  (2) re-evaluation of changes in your illness and treatment, and  (3) for ongoing care. If your symptoms become worse or you do not improve as expected and you are unable to reach your usual health care provider, you should return to the Emergency Department. We are available 24 hours a day. Please take your discharge instructions with you when you go to your follow-up appointment. If you have any problem arranging a follow-up appointment, contact the Emergency Department immediately. If a prescription has been provided, please have it filled as soon as possible to prevent a delay in treatment. Read the entire medication instruction sheet provided to you by the pharmacy. If you have any questions or reservations about taking the medication due to side effects or interactions with other medications, please call your primary care physician or contact the ER to speak with the charge nurse. Make an appointment with your family doctor or the physician you were referred to for follow-up of this visit as instructed on your discharge paperwork, as this is a mandatory follow-up. Return to the ER if you are unable to be seen or if you are unable to be seen in a timely manner. If you have any problem arranging the follow-up visit, contact the Emergency Department immediately.

## 2022-02-17 NOTE — Clinical Note
[address] [facility] EMERGENCY DEPARTMENT  [address] [facility] [address]  [phone]    Work/School Note    Date: 2/17/2022    To Whom It May concern:    Andee Gosselin was seen and treated today in the emergency room by the following provider(s):  Attending Provider: Namita Wadsworth MD.      Andee Gosselin is excused from work/school on 02/17/22 and 02/18/22. She is medically clear to return to work/school on 2/19/2022.        Sincerely,          Domenico Mitchell MD

## 2022-02-17 NOTE — ED PROVIDER NOTES
EMERGENCY DEPARTMENT HISTORY AND PHYSICAL EXAM      Date: 2/17/2022  Patient Name: Justin Willard    History of Presenting Illness     Chief Complaint   Patient presents with    Abdominal Pain    Diarrhea    Generalized Body Aches       History Provided By: Patient    HPI: Justin Willard, 27 y.o. female with a past medical history significant SLE presents to the ED with cc of diffuse intermittent abdominal pain. Patient reports pain is cramping in nature, worse with bowel movement. Patient denies urinary complaints. Patient denies radiation of the pain. Patient denies nausea or vomiting. There are no other complaints, changes, or physical findings at this time. PCP: Kelli Schwarz MD    No current facility-administered medications on file prior to encounter. Current Outpatient Medications on File Prior to Encounter   Medication Sig Dispense Refill    Xeljanz XR 11 mg Tb24 Take 1 Tablet by mouth daily.  cholecalciferol (VITAMIN D3) (5000 Units/125 mcg) tab tablet Take 5,000 Units by mouth daily.  gabapentin (NEURONTIN) 600 mg tablet Take 300 mg by mouth two (2) times a day.  spironolactone (ALDACTONE) 25 mg tablet 25 mg daily. At bedtime      Adempas 2.5 mg tab tablet 2.5 mg two (2) times a day.  predniSONE (DELTASONE) 5 mg tablet Take 18 mg by mouth nightly. 15 mg in the AM and 3 mg at night      pantoprazole (PROTONIX) 40 mg tablet TAKE 1 TABLET BY MOUTH EVERY DAY      hydrOXYchloroQUINE (PLAQUENIL) 200 mg tablet Take 200 mg by mouth daily.  furosemide (LASIX) 20 mg tablet Take 20 mg by mouth daily.  DULoxetine (CYMBALTA) 30 mg capsule TAKE 1 CAPSULE BY MOUTH TWICE A DAY      Letairis 5 mg tablet 5 mg daily. In AM      ondansetron (Zofran ODT) 4 mg disintegrating tablet Take 1 Tablet by mouth every eight (8) hours as needed for Nausea, Vomiting or Nausea or Vomiting.  12 Tablet 0       Past History     Past Medical History:  Past Medical History: Diagnosis Date    Lupus ([facility] [address]     Pulmonary HTN (HCC)     RA (rheumatoid arthritis) ([facility] [address]        Past Surgical History:  Past Surgical History:   Procedure Laterality Date    HX CYST REMOVAL      HX ORTHOPAEDIC      x2 left knee sxs    HX ORTHOPAEDIC      bilateral ankle sxs       Family History:  Family History   Problem Relation Age of Onset    Hypertension Mother     Cancer Paternal Grandmother        Social History:  Social History     Tobacco Use    Smoking status: Never Smoker    Smokeless tobacco: Never Used   Vaping Use    Vaping Use: Never used   Substance Use Topics    Alcohol use: Never    Drug use: Never       Allergies: Allergies   Allergen Reactions    Doxycycline Nausea and Vomiting    Heparin Anaphylaxis    Remicade [Infliximab] Anaphylaxis    Vancomycin Hives    Rituximab Itching    Percocet [Oxycodone-Acetaminophen] Nausea and Vomiting     Side effect         Review of Systems   Review of Systems   Constitutional: Negative for chills and fever. HENT: Negative for sinus pressure and sinus pain. Eyes: Negative for photophobia and redness. Respiratory: Negative for shortness of breath and wheezing. Cardiovascular: Negative for chest pain and palpitations. Gastrointestinal: Positive for abdominal pain and negative for nausea. Genitourinary: Negative for flank pain and hematuria. Musculoskeletal: Negative for arthralgias and gait problem. Skin: Negative for color change and pallor. Neurological: Negative for dizziness and weakness. Review of Systems    Physical Exam   Physical Exam  Constitutional:       General: No acute distress. Appearance: Normal appearance. Not toxic-appearing. HENT:      Head: Normocephalic and atraumatic. Nose: Nose normal.      Mouth/Throat:      Mouth: Mucous membranes are moist.   Eyes:      Extraocular Movements: Extraocular movements intact. Pupils: Pupils are equal, round, and reactive to light. Cardiovascular:      Rate and Rhythm: Normal rate. Pulses: Normal pulses. Pulmonary:      Effort: Pulmonary effort is normal.      Breath sounds: No stridor. Abdominal:      General: Abdomen is flat. There is no distension. Soft, minimal diffuse tenderness without guarding or rebound. Musculoskeletal:         General: Normal range of motion. Cervical back: Normal range of motion and neck supple. Skin:     General: Skin is warm and dry. Capillary Refill: Capillary refill takes less than 2 seconds. Neurological:      General: No focal deficit present. Mental Status: Aert and oriented to person, place, and time.    Psychiatric:         Mood and Affect: Mood normal.         Behavior: Behavior normal.     Physical Exam    Lab and Diagnostic Study Results     Labs -     Recent Results (from the past 12 hour(s))   URINALYSIS W/ RFLX MICROSCOPIC    Collection Time: 02/17/22  2:15 PM   Result Value Ref Range    Color Yellow      Appearance Clear Clear      Specific gravity 1.020 1.003 - 1.030      pH (UA) 6.0 5.0 - 8.0      Protein 30 (A) Negative mg/dL    Glucose Negative Negative mg/dL    Ketone 15 (A) Negative mg/dL    Bilirubin Negative Negative      Blood Negative Negative      Urobilinogen 0.1 (L) 0.2 - 1.0 EU/dL    Nitrites Negative Negative      Leukocyte Esterase Small (A) Negative     URINE MICROSCOPIC    Collection Time: 02/17/22  2:15 PM   Result Value Ref Range    WBC 5-10 0 - 4 /hpf    RBC 0-5 0 - 5 /hpf    Epithelial cells Many (A) Few /lpf    Bacteria 2+ (A) Negative /hpf    Amorphous Crystals 1+ (A) Negative   CBC WITH AUTOMATED DIFF    Collection Time: 02/17/22  2:25 PM   Result Value Ref Range    WBC 3.1 (L) 3.6 - 11.0 K/uL    RBC 2.75 (L) 3.80 - 5.20 M/uL    HGB 7.8 (L) 11.5 - 16.0 g/dL    HCT 25.3 (L) 35.0 - 47.0 %    MCV 92.0 80.0 - 99.0 FL    MCH 28.4 26.0 - 34.0 PG    MCHC 30.8 30.0 - 36.5 g/dL    RDW 19.3 (H) 11.5 - 14.5 %    PLATELET 519 835 - 942 K/uL    MPV 9.7 8.9 - 12.9 FL    NEUTROPHILS 90 (H) 32 - 75 %    BAND NEUTROPHILS 2 0 - 6 %    LYMPHOCYTES 6 (L) 12 - 49 %    MONOCYTES 2 (L) 5 - 13 %    EOSINOPHILS 0 0 - 7 %    BASOPHILS 0 0 - 1 %    IMMATURE GRANULOCYTES 0 %    ABS. NEUTROPHILS 2.8 1.8 - 8.0 K/UL    ABS. LYMPHOCYTES 0.2 (L) 0.8 - 3.5 K/UL    ABS. MONOCYTES 0.1 0.0 - 1.0 K/UL    ABS. EOSINOPHILS 0.0 0.0 - 0.4 K/UL    ABS. BASOPHILS 0.0 0.0 - 0.1 K/UL    ABS. IMM. GRANS. 0.0 K/UL    DF Manual      PLATELET COMMENTS Giant Platelets      RBC COMMENTS Anisocytosis  1+        RBC COMMENTS Macrocytosis  1+        RBC COMMENTS Microcytosis  1+       METABOLIC PANEL, COMPREHENSIVE    Collection Time: 02/17/22  2:25 PM   Result Value Ref Range    Sodium 137 136 - 145 mmol/L    Potassium 3.3 (L) 3.5 - 5.1 mmol/L    Chloride 102 97 - 108 mmol/L    CO2 25 21 - 32 mmol/L    Anion gap 10 5 - 15 mmol/L    Glucose 104 (H) 65 - 100 mg/dL    BUN 13 6 - 20 mg/dL    Creatinine 0.69 0.55 - 1.02 mg/dL    BUN/Creatinine ratio 19 12 - 20      GFR est AA >60 >60 ml/min/1.73m2    GFR est non-AA >60 >60 ml/min/1.73m2    Calcium 8.4 (L) 8.5 - 10.1 mg/dL    Bilirubin, total 0.4 0.2 - 1.0 mg/dL    AST (SGOT) 53 (H) 15 - 37 U/L    ALT (SGPT) 24 12 - 78 U/L    Alk. phosphatase 79 45 - 117 U/L    Protein, total 8.3 (H) 6.4 - 8.2 g/dL    Albumin 2.8 (L) 3.5 - 5.0 g/dL    Globulin 5.5 (H) 2.0 - 4.0 g/dL    A-G Ratio 0.5 (L) 1.1 - 2.2     LIPASE    Collection Time: 02/17/22  2:25 PM   Result Value Ref Range    Lipase 100 73 - 393 U/L   HCG URINE, QL    Collection Time: 02/17/22  3:00 PM   Result Value Ref Range    HCG urine, QL Negative Negative         Radiologic Studies -   @lastxrresult@  CT Results  (Last 48 hours)    None        CXR Results  (Last 48 hours)    None            Medical Decision Making   - I am the first provider for this patient. - I reviewed the vital signs, available nursing notes, past medical history, past surgical history, family history and social history.     - Initial assessment performed. The patients presenting problems have been discussed, and they are in agreement with the care plan formulated and outlined with them. I have encouraged them to ask questions as they arise throughout their visit. Vital Signs-Reviewed the patient's vital signs. Patient Vitals for the past 12 hrs:   Temp Pulse Resp BP SpO2   02/17/22 1306 98.2 °F (36.8 °C) (!) 106 20 121/72 96 %         Disposition   Disposition: DC- Adult Discharges: All of the diagnostic tests were reviewed and questions answered. Diagnosis, care plan and treatment options were discussed. The patient understands the instructions and will follow up as directed. The patients results have been reviewed with them. They have been counseled regarding their diagnosis. The patient verbally convey understanding and agreement of the signs, symptoms, diagnosis, treatment and prognosis and additionally agrees to follow up as recommended with their PCP in 24 - 48 hours. They also agree with the care-plan and convey that all of their questions have been answered. I have also put together some discharge instructions for them that include: 1) educational information regarding their diagnosis, 2) how to care for their diagnosis at home, as well a 3) list of reasons why they would want to return to the ED prior to their follow-up appointment, should their condition change. DISCHARGE PLAN:  1. Current Discharge Medication List      CONTINUE these medications which have NOT CHANGED    Details   Xeljanz XR 11 mg Tb24 Take 1 Tablet by mouth daily. cholecalciferol (VITAMIN D3) (5000 Units/125 mcg) tab tablet Take 5,000 Units by mouth daily. gabapentin (NEURONTIN) 600 mg tablet Take 300 mg by mouth two (2) times a day. spironolactone (ALDACTONE) 25 mg tablet 25 mg daily. At bedtime      Adempas 2.5 mg tab tablet 2.5 mg two (2) times a day. predniSONE (DELTASONE) 5 mg tablet Take 18 mg by mouth nightly.  15 mg in the AM and 3 mg at night      pantoprazole (PROTONIX) 40 mg tablet TAKE 1 TABLET BY MOUTH EVERY DAY      hydrOXYchloroQUINE (PLAQUENIL) 200 mg tablet Take 200 mg by mouth daily. furosemide (LASIX) 20 mg tablet Take 20 mg by mouth daily. DULoxetine (CYMBALTA) 30 mg capsule TAKE 1 CAPSULE BY MOUTH TWICE A DAY      Letairis 5 mg tablet 5 mg daily. In AM      ondansetron (Zofran ODT) 4 mg disintegrating tablet Take 1 Tablet by mouth every eight (8) hours as needed for Nausea, Vomiting or Nausea or Vomiting. Qty: 12 Tablet, Refills: 0           2. Follow-up Information     Follow up With Specialties Details Why Contact Victorina Conrad MD Internal Medicine In 2 days  [address]  [phone]          3. Return to ED if worse   4. Current Discharge Medication List      START taking these medications    Details   ketorolac (TORADOL) 10 mg tablet Take 1 Tablet by mouth every six (6) hours as needed for Pain for up to 5 days. Qty: 20 Tablet, Refills: 0  Start date: 2/17/2022, End date: 2/22/2022      ondansetron hcl (Zofran) 4 mg tablet Take 1 Tablet by mouth every eight (8) hours as needed for Nausea. Qty: 20 Tablet, Refills: 0  Start date: 2/17/2022      cephALEXin (Keflex) 500 mg capsule Take 1 Capsule by mouth two (2) times a day. Qty: 10 Capsule, Refills: 0  Start date: 2/17/2022               Diagnosis     Clinical Impression:   1. Abdominal pain, generalized    2. Diarrhea, unspecified type    3. Chronic anemia    4. Urinary tract infection without hematuria, site unspecified        Attestations:    Courtney Mckenzie MD    Please note that this dictation was completed with [employer], the [employer] voice recognition software. Quite often unanticipated grammatical, syntax, homophones, and other interpretive errors are inadvertently transcribed by the computer software. Please disregard these errors.   Please excuse any errors that have escaped final proofreading. Thank you.

## 2022-03-03 ENCOUNTER — HOSPITAL ENCOUNTER (EMERGENCY)
Age: 32
Discharge: HOME | End: 2022-03-03
Attending: FAMILY MEDICINE
Payer: MEDICARE

## 2022-03-03 VITALS
SYSTOLIC BLOOD PRESSURE: 152 MMHG | HEART RATE: 88 BPM | TEMPERATURE: 98.3 F | BODY MASS INDEX: 27.68 KG/M2 | HEIGHT: 60 IN | DIASTOLIC BLOOD PRESSURE: 94 MMHG | OXYGEN SATURATION: 100 % | RESPIRATION RATE: 18 BRPM | WEIGHT: 141 LBS

## 2022-03-03 DIAGNOSIS — L30.9 DERMATITIS: Primary | ICD-10-CM

## 2022-03-03 PROCEDURE — 96372 THER/PROPH/DIAG INJ SC/IM: CPT

## 2022-03-03 PROCEDURE — 74011250636 HC RX REV CODE- 250/636: Performed by: FAMILY MEDICINE

## 2022-03-03 PROCEDURE — 99284 EMERGENCY DEPT VISIT MOD MDM: CPT

## 2022-03-03 RX ORDER — CEPHALEXIN 500 MG/1
500 CAPSULE ORAL 4 TIMES DAILY
Qty: 28 CAPSULE | Refills: 0 | Status: SHIPPED | OUTPATIENT
Start: 2022-03-03 | End: 2022-03-10

## 2022-03-03 RX ORDER — TRIAMCINOLONE ACETONIDE 1 MG/G
CREAM TOPICAL 2 TIMES DAILY
Qty: 15 G | Refills: 0 | Status: SHIPPED | OUTPATIENT
Start: 2022-03-03 | End: 2022-03-13

## 2022-03-03 RX ORDER — KETOROLAC TROMETHAMINE 30 MG/ML
30 INJECTION, SOLUTION INTRAMUSCULAR; INTRAVENOUS
Status: COMPLETED | OUTPATIENT
Start: 2022-03-03 | End: 2022-03-03

## 2022-03-03 RX ADMIN — KETOROLAC TROMETHAMINE 30 MG: 30 INJECTION, SOLUTION INTRAMUSCULAR; INTRAVENOUS at 17:04

## 2022-03-03 NOTE — ED TRIAGE NOTES
Pt c/o pain and swelling in L index finger and pinky finger. Unsure if artificial nails could be the cause.

## 2022-03-05 NOTE — ED PROVIDER NOTES
EMERGENCY DEPARTMENT HISTORY AND PHYSICAL EXAM      Date: 3/3/2022  Patient Name: Ilana Ocampo    History of Presenting Illness     Chief Complaint   Patient presents with    Finger Pain       History Provided By:     HPI: Ilana Ocampo, is an extremely pleasant 27 y.o. female presenting to the ED with a chief complaint of finger pain. Pt states after getting her nails done she developed irritation at the base of her nails. No fevers nor drainage. Not warm to the touch. Is working on getting nail apt to have them removed     There are no other complaints, changes, or physical findings at this time. PCP: Garth Moore MD    No current facility-administered medications on file prior to encounter. Current Outpatient Medications on File Prior to Encounter   Medication Sig Dispense Refill    ondansetron hcl (Zofran) 4 mg tablet Take 1 Tablet by mouth every eight (8) hours as needed for Nausea. 20 Tablet 0    ondansetron (Zofran ODT) 4 mg disintegrating tablet Take 1 Tablet by mouth every eight (8) hours as needed for Nausea, Vomiting or Nausea or Vomiting. 12 Tablet 0    Xeljanz XR 11 mg Tb24 Take 1 Tablet by mouth daily.  cholecalciferol (VITAMIN D3) (5000 Units/125 mcg) tab tablet Take 5,000 Units by mouth daily.  gabapentin (NEURONTIN) 600 mg tablet Take 300 mg by mouth two (2) times a day.  spironolactone (ALDACTONE) 25 mg tablet 25 mg daily. At bedtime      Adempas 2.5 mg tab tablet 2.5 mg two (2) times a day.  predniSONE (DELTASONE) 5 mg tablet Take 18 mg by mouth nightly. 15 mg in the AM and 3 mg at night      pantoprazole (PROTONIX) 40 mg tablet TAKE 1 TABLET BY MOUTH EVERY DAY      hydrOXYchloroQUINE (PLAQUENIL) 200 mg tablet Take 200 mg by mouth daily.  furosemide (LASIX) 20 mg tablet Take 20 mg by mouth daily.  DULoxetine (CYMBALTA) 30 mg capsule TAKE 1 CAPSULE BY MOUTH TWICE A DAY      Letairis 5 mg tablet 5 mg daily.  In AM         Past History     Past Medical History:  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us ([facility] [address]     Pulmonary HTN ([facility] [address]     RA (rheumatoid arthritis) ([facility] [address]        Past Surgical History:  Past Surgical History:   Procedure Laterality Date    HX CYST REMOVAL      HX ORTHOPAEDIC      x2 left knee sxs    HX ORTHOPAEDIC      bilateral ankle sxs       Family History:  Family History   Problem Relation Age of Onset    Hypertension Mother     Cancer Paternal Grandmother        Social History:  Social History     Tobacco Use    Smoking status: Never Smoker    Smokeless tobacco: Never Used   Vaping Use    Vaping Use: Never used   Substance Use Topics    Alcohol use: Never    Drug use: Never       Allergies: Allergies   Allergen Reactions    Doxycycline Nausea and Vomiting    Heparin Anaphylaxis    Remicade [Infliximab] Anaphylaxis    Vancomycin Hives    Rituximab Itching    Percocet [Oxycodone-Acetaminophen] Nausea and Vomiting     Side effect         Review of Systems     Review of Systems   Constitutional: Negative for activity change, appetite change, chills, fatigue and fever. HENT: Negative for congestion and sore throat. Eyes: Negative for photophobia and visual disturbance. Respiratory: Negative for cough, shortness of breath and wheezing. Cardiovascular: Negative for chest pain, palpitations and leg swelling. Gastrointestinal: Negative for abdominal pain, diarrhea, nausea and vomiting. Endocrine: Negative for cold intolerance and heat intolerance. Musculoskeletal: Negative for gait problem and joint swelling. Skin: Negative for color change and rash. See HPI    Neurological: Negative for dizziness and headaches. Physical Exam     Physical Exam  Constitutional:       Appearance: She is well-developed. HENT:      Head: Normocephalic and atraumatic. Mouth/Throat:      Mouth: Mucous membranes are moist.      Pharynx: Oropharynx is clear.    Eyes:      Conjunctiva/sclera: Conjunctivae normal.      Pupils: Pupils are equal, round, and reactive to light. Cardiovascular:      Rate and Rhythm: Normal rate and regular rhythm. Heart sounds: No murmur heard. Pulmonary:      Effort: No respiratory distress. Breath sounds: No stridor. No wheezing, rhonchi or rales. Abdominal:      General: There is no distension. Tenderness: There is no abdominal tenderness. There is no rebound. Musculoskeletal:      Cervical back: Normal range of motion and neck supple. Skin:     General: Skin is warm and dry. Comments: Minimal erythema at the base of finger nails. No fluctuant lesions nor drainage. Not warm to touch    Neurological:      General: No focal deficit present. Mental Status: She is alert and oriented to person, place, and time. Psychiatric:         Mood and Affect: Mood normal.         Behavior: Behavior normal.         Lab and Diagnostic Study Results     Labs -   No results found for this or any previous visit (from the past 12 hour(s)). Radiologic Studies -   @lastxrresult@  CT Results  (Last 48 hours)    None        CXR Results  (Last 48 hours)    None            Medical Decision Making   - I am the first provider for this patient. - I reviewed the vital signs, available nursing notes, past medical history, past surgical history, family history and social history. - Initial assessment performed. The patients presenting problems have been discussed, and they are in agreement with the care plan formulated and outlined with them. I have encouraged them to ask questions as they arise throughout their visit. Vital Signs-Reviewed the patient's vital signs. No data found. ED Course/ Provider Notes (Medical Decision Making):     Patient presented to the emergency department with a chief complaint of finger pain   On examination the patient is nontoxic and well-appearing. Vitals were reviewed per above.  Suspect contact dermatitis vs photo therapy reaction after getting nails done. Prescribed triamcinolone. Will cover with keflex in case of early cellulitis, although felt less likely. Pt is going to get her nails removed as soon as possible     Adriana Oneal was given a thorough list of signs and symptoms that would warrant an immediate return to the emergency department. Otherwise Adriana Oneal will follow up with PCP. Procedures   Medical Decision Makingedical Decision Making  Performed by: Susie Patel DO  Procedures  None       Disposition   Disposition:     Home     All of the diagnostic tests were reviewed and questions answered. Diagnosis, care plan and treatment options were discussed. The patient understands the instructions and will follow up as directed. The patients results have been reviewed with them. They have been counseled regarding their diagnosis. The patient verbally convey understanding and agreement of the signs, symptoms, diagnosis, treatment and prognosis and additionally agrees to follow up as recommended with their PCP in 24 - 48 hours. They also agree with the care-plan and convey that all of their questions have been answered. I have also put together some discharge instructions for them that include: 1) educational information regarding their diagnosis, 2) how to care for their diagnosis at home, as well a 3) list of reasons why they would want to return to the ED prior to their follow-up appointment, should their condition change. DISCHARGE PLAN:    1. Cannot display discharge medications since this patient is not currently admitted. 2.   Follow-up Information     Follow up With Specialties Details Why Contact Info    Your primary care doctor  Schedule an appointment as soon as possible for a visit in 2 days              3.  Return to ED if worse       4.    Discharge Medication List as of 3/3/2022  5:06 PM      START taking these medications    Details   triamcinolone acetonide (KENALOG) 0.1 % topical cream Apply  to affected area two (2) times a day for 10 days. use thin layer, Normal, Disp-15 g, R-0         CONTINUE these medications which have CHANGED    Details   cephALEXin (Keflex) 500 mg capsule Take 1 Capsule by mouth four (4) times daily for 7 days. , Normal, Disp-28 Capsule, R-0         CONTINUE these medications which have NOT CHANGED    Details   ondansetron hcl (Zofran) 4 mg tablet Take 1 Tablet by mouth every eight (8) hours as needed for Nausea., Normal, Disp-20 Tablet, R-0      ondansetron (Zofran ODT) 4 mg disintegrating tablet Take 1 Tablet by mouth every eight (8) hours as needed for Nausea, Vomiting or Nausea or Vomiting., Normal, Disp-12 Tablet, R-0      Xeljanz XR 11 mg Tb24 Take 1 Tablet by mouth daily. , Historical Med, JOHN      cholecalciferol (VITAMIN D3) (5000 Units/125 mcg) tab tablet Take 5,000 Units by mouth daily. , Historical Med      gabapentin (NEURONTIN) 600 mg tablet Take 300 mg by mouth two (2) times a day., Historical Med      spironolactone (ALDACTONE) 25 mg tablet 25 mg daily. At bedtime, Historical Med      Adempas 2.5 mg tab tablet 2.5 mg two (2) times a day., Historical Med, JOHN      predniSONE (DELTASONE) 5 mg tablet Take 18 mg by mouth nightly. 15 mg in the AM and 3 mg at night, Historical Med      pantoprazole (PROTONIX) 40 mg tablet TAKE 1 TABLET BY MOUTH EVERY DAY, Historical Med      hydrOXYchloroQUINE (PLAQUENIL) 200 mg tablet Take 200 mg by mouth daily. , Historical Med      furosemide (LASIX) 20 mg tablet Take 20 mg by mouth daily. , Historical Med      DULoxetine (CYMBALTA) 30 mg capsule TAKE 1 CAPSULE BY MOUTH TWICE A DAY, Historical Med      Letairis 5 mg tablet 5 mg daily. In AM, Historical Med, JOHN               Diagnosis     Clinical Impression:    1. Dermatitis        Attestations:    Selma Carlson, DO    Please note that this dictation was completed with [employer], the [employer] voice recognition software.   Quite often unanticipated grammatical, syntax, homophones, and other interpretive errors are inadvertently transcribed by the computer software. Please disregard these errors. Please excuse any errors that have escaped final proofreading. Thank you.

## 2022-03-18 PROBLEM — L03.90 CELLULITIS: Status: ACTIVE | Noted: 2021-08-07

## 2022-03-19 PROBLEM — A41.9 SEPSIS (HCC): Status: ACTIVE | Noted: 2021-08-07

## 2022-08-22 ENCOUNTER — HOSPITAL ENCOUNTER (EMERGENCY)
Age: 32
Discharge: HOME | End: 2022-08-22
Attending: STUDENT IN AN ORGANIZED HEALTH CARE EDUCATION/TRAINING PROGRAM
Payer: MEDICARE

## 2022-08-22 VITALS
WEIGHT: 141 LBS | OXYGEN SATURATION: 95 % | SYSTOLIC BLOOD PRESSURE: 148 MMHG | BODY MASS INDEX: 27.68 KG/M2 | RESPIRATION RATE: 18 BRPM | HEIGHT: 60 IN | HEART RATE: 107 BPM | DIASTOLIC BLOOD PRESSURE: 90 MMHG | TEMPERATURE: 97.9 F

## 2022-08-22 DIAGNOSIS — J01.10 ACUTE FRONTAL SINUSITIS, RECURRENCE NOT SPECIFIED: Primary | ICD-10-CM

## 2022-08-22 PROCEDURE — 99283 EMERGENCY DEPT VISIT LOW MDM: CPT

## 2022-08-22 RX ORDER — AMOXICILLIN 500 MG/1
500 TABLET, FILM COATED ORAL 3 TIMES DAILY
Qty: 15 TABLET | Refills: 0 | Status: SHIPPED | OUTPATIENT
Start: 2022-08-22 | End: 2022-08-27

## 2022-08-22 RX ORDER — TOFACITINIB 11 MG/1
TABLET, FILM COATED, EXTENDED RELEASE ORAL
COMMUNITY

## 2022-08-22 RX ORDER — CEFIXIME 400 MG/1
400 CAPSULE ORAL DAILY
Qty: 5 CAPSULE | Refills: 0 | Status: SHIPPED | OUTPATIENT
Start: 2022-08-22 | End: 2022-08-22

## 2022-08-22 RX ORDER — METHOTREXATE 2.5 MG/1
TABLET ORAL
COMMUNITY
Start: 2022-08-16

## 2022-08-22 RX ORDER — FOLIC ACID 1 MG/1
2 TABLET ORAL DAILY
COMMUNITY
Start: 2022-07-21

## 2022-08-22 NOTE — ED TRIAGE NOTES
Pt has had congestion, cough and right sided ear pain for a week. Cough got worse yesterday. Coughing up greenish/yellowish phlegm.

## 2022-08-22 NOTE — ED PROVIDER NOTES
EMERGENCY DEPARTMENT HISTORY AND PHYSICAL EXAM      Date: 8/22/2022  Patient Name: Reesa Cogan    History of Presenting Illness     Chief Complaint   Patient presents with    Ear Pain    Nasal Congestion    Cough       History Provided By: Patient    HPI: Reesa Cogan, 34 y.o. female Past medical history of lupus, osteoporosis, pulmonary hypertension, rheumatoid arthritis presents with complaint of 1 month of ear fullness and 1 week of sinus fullness. Patient states that she feels a \"swishing \"in her right ear. She denies any drainage from that ear, slightly decreased hearing in the right ear. She denies any recent known sick contacts, no fevers or chills, over the last week, she has developed frontal sinus fullness with significant drainage from the nose. She has been attempting to use over-the-counter sinus medications without significant relief. Symptoms initially improved but have become worse recently. Denies any dyspnea, chest pain,  or GI symptoms. There are no other complaints, changes, or physical findings at this time. PCP: Deandra Smith MD    Current Outpatient Medications   Medication Sig Dispense Refill    folic acid (FOLVITE) 1 mg tablet Take 2 mg by mouth daily. methotrexate (RHEUMATREX) 2.5 mg tablet TAKE 4 TABLETS (10 MG TOTAL) BY MOUTH 1 TIME PER WEEK      tofacitinib (Xeljanz XR) 11 mg Tb24 Xeljanz XR 11 mg tablet,extended release   TAKE ONE TABLET BY MOUTH EVERY DAY      cefixime (SUPRAX) 400 mg capsule Take 1 Capsule by mouth daily for 5 days. 5 Capsule 0    cholecalciferol (VITAMIN D3) (5000 Units/125 mcg) tab tablet Take 5,000 Units by mouth daily. gabapentin (NEURONTIN) 600 mg tablet Take 300 mg by mouth two (2) times a day. spironolactone (ALDACTONE) 25 mg tablet 25 mg daily. At bedtime      Adempas 2.5 mg tab tablet 2.5 mg two (2) times a day. predniSONE (DELTASONE) 5 mg tablet Take 18 mg by mouth nightly.  15 mg in the AM and 3 mg at night hydrOXYchloroQUINE (PLAQUENIL) 200 mg tablet Take 200 mg by mouth daily. furosemide (LASIX) 20 mg tablet Take 20 mg by mouth daily. DULoxetine (CYMBALTA) 30 mg capsule TAKE 1 CAPSULE BY MOUTH TWICE A DAY      Letairis 5 mg tablet 5 mg daily. In AM      pantoprazole (PROTONIX) 40 mg tablet TAKE 1 TABLET BY MOUTH EVERY DAY         Past History   Past Medical History:  Past Medical History:   Diagnosis Date    Lupus ([facility] [address]     Osteonecrosis ([facility] [address]     Pulmonary HTN ([facility] [address]     RA (rheumatoid arthritis) ([facility] [address]        Past Surgical History:  Past Surgical History:   Procedure Laterality Date    HX CYST REMOVAL      HX ORTHOPAEDIC      x2 left knee sxs    HX ORTHOPAEDIC      bilateral ankle sxs       Family History:  Family History   Problem Relation Age of Onset    Hypertension Mother     Cancer Paternal Grandmother        Social History:  Social History     Tobacco Use    Smoking status: Never    Smokeless tobacco: Never   Vaping Use    Vaping Use: Never used   Substance Use Topics    Alcohol use: Never    Drug use: Never       Allergies: Allergies   Allergen Reactions    Doxycycline Nausea and Vomiting    Heparin Anaphylaxis    Remicade [Infliximab] Anaphylaxis    Vancomycin Hives    Rituximab Itching    Percocet [Oxycodone-Acetaminophen] Nausea and Vomiting     Side effect     Review of Systems   Review of Systems   Constitutional:  Negative for fever. HENT:  Positive for sinus pressure and sinus pain. Negative for congestion. Eyes:  Positive for pain. Respiratory:  Positive for cough. Negative for shortness of breath. Cardiovascular:  Negative for chest pain. Gastrointestinal:  Negative for abdominal pain, constipation, nausea and vomiting. Genitourinary:  Negative for dysuria. Musculoskeletal:  Negative for arthralgias and myalgias. Skin:  Negative for rash. Allergic/Immunologic: Negative for immunocompromised state. Neurological:  Negative for syncope.    Psychiatric/Behavioral:  Negative for confusion. Physical Exam   Physical Exam  Constitutional:       Appearance: She is not toxic-appearing. HENT:      Head: Normocephalic and atraumatic. Right Ear: Tympanic membrane, ear canal and external ear normal.      Nose: No congestion. Mouth/Throat:      Mouth: Mucous membranes are moist.   Eyes:      Extraocular Movements: Extraocular movements intact. Conjunctiva/sclera: Conjunctivae normal.      Pupils: Pupils are equal, round, and reactive to light. Cardiovascular:      Rate and Rhythm: Normal rate and regular rhythm. Pulmonary:      Effort: Pulmonary effort is normal.      Breath sounds: No wheezing, rhonchi or rales. Abdominal:      General: There is no distension. Palpations: Abdomen is soft. Tenderness: There is no abdominal tenderness. Musculoskeletal:         General: No swelling or deformity. Cervical back: Normal range of motion. Skin:     General: Skin is warm and dry. Neurological:      General: No focal deficit present. Mental Status: She is alert. Psychiatric:         Mood and Affect: Mood normal.         Behavior: Behavior normal.       Lab and Diagnostic Study Results   Labs -   No results found for this or any previous visit (from the past 12 hour(s)). Radiologic Studies -   [unfilled]  CT Results  (Last 48 hours)      None          CXR Results  (Last 48 hours)      None            Medical Decision Making and ED Course   Differential Diagnosis & Medical Decision Making Provider Note:   65-year-old presenting for evaluation of sinus pressure and ear fullness. Physical exam with no otitis media. Symptoms consistent with sinusitis and given duration as well as initial improvement followed by recrudescence, concern for bacterial infection. Will treat with antibiotic, patient instructed on antihistamine use and sinus rinses at home. Patient to follow-up with PCP within the next week.     - I am the first and primary provider for this patient. I reviewed the vital signs, available nursing notes, past medical history, past surgical history, family history and social history. The patient's presenting problems have been discussed, and the staff are in agreement with the care plan formulated and outlined with them. I have encouraged them to ask questions as they arise throughout their visit. Vital Signs-Reviewed the patient's vital signs. Patient Vitals for the past 12 hrs:   Temp Pulse Resp BP SpO2   08/22/22 1025 97.9 °F (36.6 °C) (!) 107 18 (!) 148/90 95 %           ED Course:            Procedures and Critical Care     Performed by: Stephanie Senior MD  Procedures      Disposition   Disposition: DC- Adult Discharges: All of the diagnostic tests were reviewed and questions answered. Diagnosis, care plan and treatment options were discussed. The patient understands the instructions and will follow up as directed. The patients results have been reviewed with them. They have been counseled regarding their diagnosis. The patient verbally convey understanding and agreement of the signs, symptoms, diagnosis, treatment and prognosis and additionally agrees to follow up as recommended with their PCP in 24 - 48 hours. They also agree with the care-plan and convey that all of their questions have been answered. I have also put together some discharge instructions for them that include: 1) educational information regarding their diagnosis, 2) how to care for their diagnosis at home, as well a 3) list of reasons why they would want to return to the ED prior to their follow-up appointment, should their condition change. DISCHARGE PLAN:  1. Cannot display discharge medications since this patient is not currently admitted.     2.   Follow-up Information       Follow up With Specialties Details Why Josue Maria MD Internal Medicine Physician In 2 days  [address] [address]  [phone]            3. Return to ED if worse   4. Discharge Medication List as of 8/22/2022 11:46 AM        START taking these medications    Details   cefixime (SUPRAX) 400 mg capsule Take 1 Capsule by mouth daily for 5 days. , Normal, Disp-5 Capsule, R-0           CONTINUE these medications which have NOT CHANGED    Details   folic acid (FOLVITE) 1 mg tablet Take 2 mg by mouth daily. , Historical Med      methotrexate (RHEUMATREX) 2.5 mg tablet TAKE 4 TABLETS (10 MG TOTAL) BY MOUTH 1 TIME PER WEEK, Historical Med      tofacitinib (Xeljanz XR) 11 mg Tb24 Xeljanz XR 11 mg tablet,extended release   TAKE ONE TABLET BY MOUTH EVERY DAY, Historical Med      cholecalciferol (VITAMIN D3) (5000 Units/125 mcg) tab tablet Take 5,000 Units by mouth daily. , Historical Med      gabapentin (NEURONTIN) 600 mg tablet Take 300 mg by mouth two (2) times a day., Historical Med      spironolactone (ALDACTONE) 25 mg tablet 25 mg daily. At bedtime, Historical Med      Adempas 2.5 mg tab tablet 2.5 mg two (2) times a day., Historical Med, JOHN      predniSONE (DELTASONE) 5 mg tablet Take 18 mg by mouth nightly. 15 mg in the AM and 3 mg at night, Historical Med      hydrOXYchloroQUINE (PLAQUENIL) 200 mg tablet Take 200 mg by mouth daily. , Historical Med      furosemide (LASIX) 20 mg tablet Take 20 mg by mouth daily. , Historical Med      DULoxetine (CYMBALTA) 30 mg capsule TAKE 1 CAPSULE BY MOUTH TWICE A DAY, Historical Med      Letairis 5 mg tablet 5 mg daily. In AM, Historical Med, JOHN      pantoprazole (PROTONIX) 40 mg tablet TAKE 1 TABLET BY MOUTH EVERY DAY, Historical Med           Remove if admitted/discharged    Diagnosis/Clinical Impression     Clinical Impression:   1. Acute frontal sinusitis, recurrence not specified        Attestations: IParvin MD, am the primary clinician of record. Please note that this dictation was completed with [employer], the [employer] voice recognition software.   Quite often unanticipated grammatical, syntax, homophones, and other interpretive errors are inadvertently transcribed by the computer software. Please disregard these errors. Please excuse any errors that have escaped final proofreading. Thank you.

## 2022-11-02 ENCOUNTER — APPOINTMENT (OUTPATIENT)
Dept: GENERAL RADIOLOGY | Age: 32
End: 2022-11-02
Attending: NURSE PRACTITIONER
Payer: MEDICARE

## 2022-11-02 ENCOUNTER — HOSPITAL ENCOUNTER (EMERGENCY)
Age: 32
Discharge: ANOTHER HOSPITAL | End: 2022-11-02
Attending: STUDENT IN AN ORGANIZED HEALTH CARE EDUCATION/TRAINING PROGRAM | Admitting: STUDENT IN AN ORGANIZED HEALTH CARE EDUCATION/TRAINING PROGRAM
Payer: MEDICARE

## 2022-11-02 ENCOUNTER — APPOINTMENT (OUTPATIENT)
Dept: CT IMAGING | Age: 32
End: 2022-11-02
Attending: NURSE PRACTITIONER
Payer: MEDICARE

## 2022-11-02 VITALS
HEIGHT: 60 IN | TEMPERATURE: 101.4 F | RESPIRATION RATE: 23 BRPM | OXYGEN SATURATION: 95 % | BODY MASS INDEX: 28.13 KG/M2 | DIASTOLIC BLOOD PRESSURE: 82 MMHG | WEIGHT: 143.3 LBS | SYSTOLIC BLOOD PRESSURE: 142 MMHG | HEART RATE: 148 BPM

## 2022-11-02 DIAGNOSIS — E87.70 HYPERVOLEMIA, UNSPECIFIED HYPERVOLEMIA TYPE: Primary | ICD-10-CM

## 2022-11-02 DIAGNOSIS — R50.9 FEVER, UNSPECIFIED FEVER CAUSE: ICD-10-CM

## 2022-11-02 DIAGNOSIS — Z96.641 STATUS POST RIGHT HIP REPLACEMENT: ICD-10-CM

## 2022-11-02 DIAGNOSIS — R06.00 DYSPNEA, UNSPECIFIED TYPE: ICD-10-CM

## 2022-11-02 DIAGNOSIS — G89.18 POST-OPERATIVE PAIN: ICD-10-CM

## 2022-11-02 LAB
ALBUMIN SERPL-MCNC: 2.9 G/DL (ref 3.5–5)
ALBUMIN/GLOB SERPL: 0.8 {RATIO} (ref 1.1–2.2)
ALP SERPL-CCNC: 244 U/L (ref 45–117)
ALT SERPL-CCNC: 53 U/L (ref 12–78)
ANION GAP SERPL CALC-SCNC: 8 MMOL/L (ref 5–15)
APPEARANCE UR: CLEAR
AST SERPL W P-5'-P-CCNC: 42 U/L (ref 15–37)
ATRIAL RATE: 136 BPM
BACTERIA URNS QL MICRO: NEGATIVE /HPF
BASOPHILS # BLD: 0 K/UL (ref 0–0.1)
BASOPHILS NFR BLD: 0 % (ref 0–1)
BILIRUB SERPL-MCNC: 0.7 MG/DL (ref 0.2–1)
BILIRUB UR QL: NEGATIVE
BNP SERPL-MCNC: 1062 PG/ML
BUN SERPL-MCNC: 18 MG/DL (ref 6–20)
BUN/CREAT SERPL: 32 (ref 12–20)
CA-I BLD-MCNC: 8.7 MG/DL (ref 8.5–10.1)
CALCULATED P AXIS, ECG09: 14 DEGREES
CALCULATED R AXIS, ECG10: -15 DEGREES
CALCULATED T AXIS, ECG11: 10 DEGREES
CHLORIDE SERPL-SCNC: 100 MMOL/L (ref 97–108)
CO2 SERPL-SCNC: 27 MMOL/L (ref 21–32)
COLOR UR: ABNORMAL
CREAT SERPL-MCNC: 0.57 MG/DL (ref 0.55–1.02)
D DIMER PPP FEU-MCNC: 3.16 UG/ML(FEU)
DIAGNOSIS, 93000: NORMAL
DIFFERENTIAL METHOD BLD: ABNORMAL
EOSINOPHIL # BLD: 0 K/UL (ref 0–0.4)
EOSINOPHIL NFR BLD: 0 % (ref 0–7)
ERYTHROCYTE [DISTWIDTH] IN BLOOD BY AUTOMATED COUNT: 19 % (ref 11.5–14.5)
GLOBULIN SER CALC-MCNC: 3.5 G/DL (ref 2–4)
GLUCOSE SERPL-MCNC: 95 MG/DL (ref 65–100)
GLUCOSE UR STRIP.AUTO-MCNC: NEGATIVE MG/DL
HCT VFR BLD AUTO: 27.5 % (ref 35–47)
HGB BLD-MCNC: 8.6 G/DL (ref 11.5–16)
HGB UR QL STRIP: NEGATIVE
IMM GRANULOCYTES # BLD AUTO: 0 K/UL
IMM GRANULOCYTES NFR BLD AUTO: 0 %
KETONES UR QL STRIP.AUTO: NEGATIVE MG/DL
LACTATE SERPL-SCNC: 1.4 MMOL/L (ref 0.4–2)
LACTATE SERPL-SCNC: 2.2 MMOL/L (ref 0.4–2)
LEUKOCYTE ESTERASE UR QL STRIP.AUTO: NEGATIVE
LYMPHOCYTES # BLD: 0.3 K/UL (ref 0.8–3.5)
LYMPHOCYTES NFR BLD: 7 % (ref 12–49)
MCH RBC QN AUTO: 29.1 PG (ref 26–34)
MCHC RBC AUTO-ENTMCNC: 31.3 G/DL (ref 30–36.5)
MCV RBC AUTO: 92.9 FL (ref 80–99)
MONOCYTES # BLD: 0.3 K/UL (ref 0–1)
MONOCYTES NFR BLD: 9 % (ref 5–13)
NEUTS SEG # BLD: 3.1 K/UL (ref 1.8–8)
NEUTS SEG NFR BLD: 84 % (ref 32–75)
NITRITE UR QL STRIP.AUTO: NEGATIVE
NRBC # BLD: 0.44 K/UL (ref 0–0.01)
NRBC BLD-RTO: 12.1 PER 100 WBC
P-R INTERVAL, ECG05: 126 MS
PH UR STRIP: 6 [PH] (ref 5–8)
PLATELET # BLD AUTO: 222 K/UL (ref 150–400)
PMV BLD AUTO: 9.9 FL (ref 8.9–12.9)
POTASSIUM SERPL-SCNC: 3.8 MMOL/L (ref 3.5–5.1)
PROT SERPL-MCNC: 6.4 G/DL (ref 6.4–8.2)
PROT UR STRIP-MCNC: 30 MG/DL
Q-T INTERVAL, ECG07: 302 MS
QRS DURATION, ECG06: 84 MS
QTC CALCULATION (BEZET), ECG08: 454 MS
RBC # BLD AUTO: 2.96 M/UL (ref 3.8–5.2)
RBC #/AREA URNS HPF: ABNORMAL /HPF (ref 0–5)
RBC MORPH BLD: ABNORMAL
RBC MORPH BLD: ABNORMAL
SODIUM SERPL-SCNC: 135 MMOL/L (ref 136–145)
SP GR UR REFRACTOMETRY: >1.03 (ref 1–1.03)
TROPONIN-HIGH SENSITIVITY: 49 NG/L (ref 0–51)
UA: UC IF INDICATED,UAUC: ABNORMAL
UROBILINOGEN UR QL STRIP.AUTO: 0.1 EU/DL (ref 0.1–1)
VENTRICULAR RATE, ECG03: 136 BPM
WBC # BLD AUTO: 3.7 K/UL (ref 3.6–11)
WBC URNS QL MICRO: ABNORMAL /HPF (ref 0–4)

## 2022-11-02 PROCEDURE — 80053 COMPREHEN METABOLIC PANEL: CPT

## 2022-11-02 PROCEDURE — 74011000636 HC RX REV CODE- 636: Performed by: STUDENT IN AN ORGANIZED HEALTH CARE EDUCATION/TRAINING PROGRAM

## 2022-11-02 PROCEDURE — 85025 COMPLETE CBC W/AUTO DIFF WBC: CPT

## 2022-11-02 PROCEDURE — 87040 BLOOD CULTURE FOR BACTERIA: CPT

## 2022-11-02 PROCEDURE — 36415 COLL VENOUS BLD VENIPUNCTURE: CPT

## 2022-11-02 PROCEDURE — 74011250636 HC RX REV CODE- 250/636: Performed by: NURSE PRACTITIONER

## 2022-11-02 PROCEDURE — 73502 X-RAY EXAM HIP UNI 2-3 VIEWS: CPT

## 2022-11-02 PROCEDURE — 84484 ASSAY OF TROPONIN QUANT: CPT

## 2022-11-02 PROCEDURE — 93005 ELECTROCARDIOGRAM TRACING: CPT

## 2022-11-02 PROCEDURE — 74011250637 HC RX REV CODE- 250/637: Performed by: STUDENT IN AN ORGANIZED HEALTH CARE EDUCATION/TRAINING PROGRAM

## 2022-11-02 PROCEDURE — 99285 EMERGENCY DEPT VISIT HI MDM: CPT | Performed by: STUDENT IN AN ORGANIZED HEALTH CARE EDUCATION/TRAINING PROGRAM

## 2022-11-02 PROCEDURE — 83605 ASSAY OF LACTIC ACID: CPT

## 2022-11-02 PROCEDURE — 74011000250 HC RX REV CODE- 250: Performed by: NURSE PRACTITIONER

## 2022-11-02 PROCEDURE — 83880 ASSAY OF NATRIURETIC PEPTIDE: CPT

## 2022-11-02 PROCEDURE — 81001 URINALYSIS AUTO W/SCOPE: CPT

## 2022-11-02 PROCEDURE — 96376 TX/PRO/DX INJ SAME DRUG ADON: CPT | Performed by: STUDENT IN AN ORGANIZED HEALTH CARE EDUCATION/TRAINING PROGRAM

## 2022-11-02 PROCEDURE — 71045 X-RAY EXAM CHEST 1 VIEW: CPT

## 2022-11-02 PROCEDURE — 96375 TX/PRO/DX INJ NEW DRUG ADDON: CPT | Performed by: STUDENT IN AN ORGANIZED HEALTH CARE EDUCATION/TRAINING PROGRAM

## 2022-11-02 PROCEDURE — 71275 CT ANGIOGRAPHY CHEST: CPT

## 2022-11-02 PROCEDURE — 96374 THER/PROPH/DIAG INJ IV PUSH: CPT | Performed by: STUDENT IN AN ORGANIZED HEALTH CARE EDUCATION/TRAINING PROGRAM

## 2022-11-02 PROCEDURE — 85379 FIBRIN DEGRADATION QUANT: CPT

## 2022-11-02 RX ORDER — ACETAMINOPHEN 500 MG
1000 TABLET ORAL
Status: COMPLETED | OUTPATIENT
Start: 2022-11-02 | End: 2022-11-02

## 2022-11-02 RX ORDER — SODIUM CHLORIDE 0.9 % (FLUSH) 0.9 %
5-10 SYRINGE (ML) INJECTION AS NEEDED
Status: DISCONTINUED | OUTPATIENT
Start: 2022-11-02 | End: 2022-11-02 | Stop reason: HOSPADM

## 2022-11-02 RX ORDER — HYDROMORPHONE HYDROCHLORIDE 1 MG/ML
1 INJECTION, SOLUTION INTRAMUSCULAR; INTRAVENOUS; SUBCUTANEOUS ONCE
Status: COMPLETED | OUTPATIENT
Start: 2022-11-02 | End: 2022-11-02

## 2022-11-02 RX ORDER — FUROSEMIDE 10 MG/ML
40 INJECTION INTRAMUSCULAR; INTRAVENOUS
Status: COMPLETED | OUTPATIENT
Start: 2022-11-02 | End: 2022-11-02

## 2022-11-02 RX ADMIN — SODIUM CHLORIDE 1000 ML: 9 INJECTION, SOLUTION INTRAVENOUS at 01:13

## 2022-11-02 RX ADMIN — SODIUM CHLORIDE, PRESERVATIVE FREE 2 G: 5 INJECTION INTRAVENOUS at 01:13

## 2022-11-02 RX ADMIN — ACETAMINOPHEN 1000 MG: 500 TABLET ORAL at 07:00

## 2022-11-02 RX ADMIN — HYDROMORPHONE HYDROCHLORIDE 1 MG: 1 INJECTION, SOLUTION INTRAMUSCULAR; INTRAVENOUS; SUBCUTANEOUS at 03:55

## 2022-11-02 RX ADMIN — FUROSEMIDE 40 MG: 10 INJECTION, SOLUTION INTRAMUSCULAR; INTRAVENOUS at 03:55

## 2022-11-02 RX ADMIN — IOPAMIDOL 100 ML: 755 INJECTION, SOLUTION INTRAVENOUS at 02:57

## 2022-11-02 RX ADMIN — HYDROMORPHONE HYDROCHLORIDE 1 MG: 1 INJECTION, SOLUTION INTRAMUSCULAR; INTRAVENOUS; SUBCUTANEOUS at 01:13

## 2022-11-02 NOTE — ED TRIAGE NOTES
Per EMS pt had right hip replacement surgery on 10/26/22. Over the past 24 hours pain has increased to the area and site is now red/hot and pt has developed a fever. Pt was found to be hypertensive and tachycardic by EMS. Temp in route 102.9 tympanic. EMS administered 100mcg of fentanyl pta.

## 2022-11-02 NOTE — ED PROVIDER NOTES
EMERGENCY DEPARTMENT HISTORY AND PHYSICAL EXAM      Date: 11/2/2022  Patient Name: Laurel Lyons    History of Presenting Illness     Chief Complaint   Patient presents with    Hip Pain    Fever       History Provided By: Patient    HPI: Laurel Lyons, 34 y.o. female with a past medical history significant for lupus, RA and pulmonary hypertension presents to the emergency department with cc of hip pain. Patient states she is s/p right THR on 10/26/2022 at [facility], discharged 2 days ago. She reports ongoing right hip pain since surgery, worsening over the last 2 days. She denies any falls or trauma. She reports fever of 101 today, took Tylenol prior to arrival.  She denies noting any incisional drainage; she denies any other URI symptoms. She reports increasing SOB since her surgery. She denies any associated CP, palpations or cough. She states she consulted with her pulmonologist and was advised to take an increased dose of her Lasix and spironolactone x2 days. He is in moderate distress upon arrival, complaining of pain localized to her right groin and hip. She presents with a dressing in place to her right hip. There are no other complaints, changes, or physical findings at this time. PCP: Mulu Preciado MD    No current facility-administered medications on file prior to encounter. Current Outpatient Medications on File Prior to Encounter   Medication Sig Dispense Refill    folic acid (FOLVITE) 1 mg tablet Take 2 mg by mouth daily. methotrexate (RHEUMATREX) 2.5 mg tablet TAKE 4 TABLETS (10 MG TOTAL) BY MOUTH 1 TIME PER WEEK      tofacitinib (Xeljanz XR) 11 mg Tb24 Xeljanz XR 11 mg tablet,extended release   TAKE ONE TABLET BY MOUTH EVERY DAY      cholecalciferol (VITAMIN D3) (5000 Units/125 mcg) tab tablet Take 5,000 Units by mouth daily. gabapentin (NEURONTIN) 600 mg tablet Take 300 mg by mouth two (2) times a day. spironolactone (ALDACTONE) 25 mg tablet 25 mg daily.  At bedtime      Adempas 2.5 mg tab tablet 2.5 mg two (2) times a day. predniSONE (DELTASONE) 5 mg tablet Take 18 mg by mouth nightly. 15 mg in the AM and 3 mg at night      pantoprazole (PROTONIX) 40 mg tablet TAKE 1 TABLET BY MOUTH EVERY DAY      hydrOXYchloroQUINE (PLAQUENIL) 200 mg tablet Take 200 mg by mouth daily. furosemide (LASIX) 20 mg tablet Take 20 mg by mouth daily. DULoxetine (CYMBALTA) 30 mg capsule TAKE 1 CAPSULE BY MOUTH TWICE A DAY      Letairis 5 mg tablet 5 mg daily. In AM         Past History     Past Medical History:  Past Medical History:   Diagnosis Date    Lupus ([address]     Osteonecrosis ([facility] [address]     Pulmonary HTN ([facility] [address]     RA (rheumatoid arthritis) ([facility] [address]        Past Surgical History:  Past Surgical History:   Procedure Laterality Date    HX CYST REMOVAL      HX ORTHOPAEDIC      x2 left knee sxs    HX ORTHOPAEDIC      bilateral ankle sxs       Family History:  Family History   Problem Relation Age of Onset    Hypertension Mother     Cancer Paternal Grandmother        Social History:  Social History     Tobacco Use    Smoking status: Never    Smokeless tobacco: Never   Vaping Use    Vaping Use: Never used   Substance Use Topics    Alcohol use: Never    Drug use: Never       Allergies: Allergies   Allergen Reactions    Doxycycline Nausea and Vomiting    Heparin Anaphylaxis    Remicade [Infliximab] Anaphylaxis    Vancomycin Hives    Rituximab Itching    Percocet [Oxycodone-Acetaminophen] Nausea and Vomiting     Side effect       Review of Systems   Review of Systems   Constitutional:  Positive for chills, fatigue and fever. HENT: Negative. Respiratory:  Positive for shortness of breath. Negative for cough. Cardiovascular: Negative. Negative for chest pain, palpitations and leg swelling. Gastrointestinal: Negative. Negative for abdominal pain, diarrhea, nausea and vomiting. Genitourinary: Negative. Negative for dysuria and hematuria.    Musculoskeletal:  Positive for arthralgias. Skin:  Positive for wound. Neurological: Negative. Negative for dizziness and headaches. All other systems reviewed and are negative. Physical Exam   Physical Exam  Vitals and nursing note reviewed. Constitutional:       General: She is in acute distress. Appearance: She is ill-appearing. HENT:      Head: Normocephalic and atraumatic. Eyes:      Extraocular Movements: Extraocular movements intact. Conjunctiva/sclera: Conjunctivae normal.      Comments: Mild facial and bilateral periorbital edema   Cardiovascular:      Rate and Rhythm: Regular rhythm. Tachycardia present. Pulses:           Dorsalis pedis pulses are 2+ on the right side and 2+ on the left side. Heart sounds: Normal heart sounds. Pulmonary:      Effort: Tachypnea and accessory muscle usage present. Breath sounds: Normal breath sounds. No wheezing or rales. Abdominal:      General: Bowel sounds are normal.      Palpations: Abdomen is soft. Tenderness: There is no abdominal tenderness. Musculoskeletal:      Cervical back: Normal range of motion and neck supple. Right hip: Tenderness present. Decreased range of motion. Normal strength. Right upper leg: Tenderness present. Right knee: Normal.      Right lower leg: No edema. Left lower leg: No edema. Comments: Right hip incision with tenderness and surrounding erythema, no drainage appreciated   Skin:     General: Skin is warm and dry. Findings: Bruising and wound present. Neurological:      General: No focal deficit present. Mental Status: She is alert. Psychiatric:         Mood and Affect: Mood normal.         Behavior: Behavior normal. Behavior is cooperative.        Lab and Diagnostic Study Results   Labs -     Recent Results (from the past 12 hour(s))   LACTIC ACID    Collection Time: 11/02/22  1:30 AM   Result Value Ref Range    Lactic acid 2.2 (HH) 0.4 - 2.0 mmol/L   CBC WITH AUTOMATED DIFF Collection Time: 11/02/22  1:30 AM   Result Value Ref Range    WBC 3.7 3.6 - 11.0 K/uL    RBC 2.96 (L) 3.80 - 5.20 M/uL    HGB 8.6 (L) 11.5 - 16.0 g/dL    HCT 27.5 (L) 35.0 - 47.0 %    MCV 92.9 80.0 - 99.0 FL    MCH 29.1 26.0 - 34.0 PG    MCHC 31.3 30.0 - 36.5 g/dL    RDW 19.0 (H) 11.5 - 14.5 %    PLATELET 862 748 - 297 K/uL    MPV 9.9 8.9 - 12.9 FL    NRBC 12.1 (H) 0.0  WBC    ABSOLUTE NRBC 0.44 (H) 0.00 - 0.01 K/uL    NEUTROPHILS 84 (H) 32 - 75 %    LYMPHOCYTES 7 (L) 12 - 49 %    MONOCYTES 9 5 - 13 %    EOSINOPHILS 0 0 - 7 %    BASOPHILS 0 0 - 1 %    IMMATURE GRANULOCYTES 0 %    ABS. NEUTROPHILS 3.1 1.8 - 8.0 K/UL    ABS. LYMPHOCYTES 0.3 (L) 0.8 - 3.5 K/UL    ABS. MONOCYTES 0.3 0.0 - 1.0 K/UL    ABS. EOSINOPHILS 0.0 0.0 - 0.4 K/UL    ABS. BASOPHILS 0.0 0.0 - 0.1 K/UL    ABS. IMM. GRANS. 0.0 K/UL    DF Manual      RBC COMMENTS Anisocytosis  1+        RBC COMMENTS Polychromasia  1+       METABOLIC PANEL, COMPREHENSIVE    Collection Time: 11/02/22  1:30 AM   Result Value Ref Range    Sodium 135 (L) 136 - 145 mmol/L    Potassium 3.8 3.5 - 5.1 mmol/L    Chloride 100 97 - 108 mmol/L    CO2 27 21 - 32 mmol/L    Anion gap 8 5 - 15 mmol/L    Glucose 95 65 - 100 mg/dL    BUN 18 6 - 20 mg/dL    Creatinine 0.57 0.55 - 1.02 mg/dL    BUN/Creatinine ratio 32 (H) 12 - 20      eGFR >60 >60 ml/min/1.73m2    Calcium 8.7 8.5 - 10.1 mg/dL    Bilirubin, total 0.7 0.2 - 1.0 mg/dL    AST (SGOT) 42 (H) 15 - 37 U/L    ALT (SGPT) 53 12 - 78 U/L    Alk.  phosphatase 244 (H) 45 - 117 U/L    Protein, total 6.4 6.4 - 8.2 g/dL    Albumin 2.9 (L) 3.5 - 5.0 g/dL    Globulin 3.5 2.0 - 4.0 g/dL    A-G Ratio 0.8 (L) 1.1 - 2.2     TROPONIN-HIGH SENSITIVITY    Collection Time: 11/02/22  1:30 AM   Result Value Ref Range    Troponin-High Sensitivity 49 0 - 51 ng/L   D DIMER    Collection Time: 11/02/22  1:30 AM   Result Value Ref Range    D DIMER 3.16 (H) <0.50 ug/ml(FEU)   NT-PRO BNP    Collection Time: 11/02/22  1:30 AM   Result Value Ref Range NT pro-BNP 1,062 (H) <125 pg/mL       Radiologic Studies -   @lastxrresult@  CT Results  (Last 48 hours)                 11/02/22 0257  CTA CHEST W OR W WO CONT Final result    Impression:  No pulmonary embolus or other acute cardiopulmonary process. Progressive thoracic compression deformities of indeterminate age at T8, T11,   and T12. Diffuse subcutaneous calcifications again seen. Narrative:  EXAM: CTA CHEST W OR W WO CONT       INDICATION: dyspnea, s/p THR       COMPARISON: CTA 2/12/2021. CONTRAST: 100 mL of Isovue-370. TECHNIQUE:    Precontrast  images were obtained to localize the volume for acquisition. Multislice helical CT arteriography was performed from the diaphragm to the   thoracic inlet during uneventful rapid bolus intravenous contrast   administration. Lung and soft tissue windows were generated. Coronal and   sagittal images were generated and 3D post processing consisting of coronal   maximum intensity images was performed. CT dose reduction was achieved through   use of a standardized protocol tailored for this examination and automatic   exposure control for dose modulation. FINDINGS:   The lungs are clear of mass, nodule, airspace disease or edema. The pulmonary arteries are well enhanced and no pulmonary emboli are identified. There is no mediastinal or hilar adenopathy or mass. The aorta enhances normally   without evidence of aneurysm or dissection. The visualized portions of the upper abdominal organs are normal. Chest wall   structures redemonstrate extensive subcutaneous calcifications. Osseous   structures show numerous compression deformities with, progressive from prior   with T6 sclerosis and interval compression of T8, T11, and T12. No evident acute   fracture deformity.                  CXR Results  (Last 48 hours)                 11/02/22 0100  XR CHEST PORT Final result    Impression:  Congestive failure with interstitial pulmonary edema and   cardiomegaly. Narrative:  EXAM:  XR CHEST PORT       INDICATION: Fever. Evaluate for infiltrate. COMPARISON: Chest x-ray 12/4/2021 and CT 2/12/2021       TECHNIQUE: Upright portable chest AP view       FINDINGS: There is mild pulmonary vascular congestion and prominence of the   interstitium. No consolidative infiltrate. The cardiac silhouette is enlarged   but otherwise within normal limits. The pulmonary vasculature is within normal   limits. The visualized bones and upper abdomen are age-appropriate. Medical Decision Making and ED Course   Differential Diagnosis & Medical Decision Making Provider Note:   Patient presents with hip pain and SOB. DDX: postop pain/complication, sepsis, DVT/PE, ACS, CHF    - I am the first provider for this patient. I reviewed the vital signs, available nursing notes, past medical history, past surgical history, family history and social history. The patients presenting problems have been discussed, and they are in agreement with the care plan formulated and outlined with them. I have encouraged them to ask questions as they arise throughout their visit. Vital Signs-Reviewed the patient's vital signs. Patient Vitals for the past 12 hrs:   Temp Pulse Resp BP SpO2   11/02/22 0458 -- (!) 142 28 (!) 138/96 93 %   11/02/22 0332 100.4 °F (38 °C) (!) 139 (!) 32 (!) 183/101 94 %   11/02/22 0022 99.8 °F (37.7 °C) (!) 135 20 (!) 173/100 100 %       ED Course:   ED Course as of 11/07/22 2306   Wed Nov 02, 2022   0200 CXR findings congestive failure with interstitial pulmonary edema and cardiomegaly. IVF stopped. Results discussed with patient. She denies any previous history of CHF. [LP]   0230 Lactic acid 2.2, WBC normal, Hgb 8.6/baseline, CMP stable, BNP 1062, D-dimer 3.16; will obtain CTA chest to rule out PE. [LP]   0250 Results and plan of care discussed with patient and family.   Patient continues to complain of SOB and pain, she reports short-term improvement from pain medication. Patient noted to be tachycardic HR 140s and tachypneic 30's, no fever or hypoxia. Patient states she consulted with her pulmonologist today and was advised to take a increased dose of her Lasix and spironolactone x2 days, patient states she has not started that yet. Mother also states patient baseline resting heart rate 120s. [LP]   [id number] Patient resting, states her pain is improved. Heart rate continues 140s, tachypneic at 28-30 on O2 at 2L/min maintaining sats 93%. BP improved; diuresed approximately 800 cc urine [LP]   0457 X-ray right hip negative for fracture or other acute finding; will page orthopedics [LP]   7567 Consulted with Dr Leanna Serna, he recommends transfer back to [facility] for continuity of care given recent surgery; transfer initiated through [facility] transfer center. Hannah Houser is on diversion, on-call orthopedic surgeon for [facility] to be consulted, awaiting return phone call [LP]   [phone] SIGN OUT:  5:32 AM  I have discussed pt's hx, disposition, and available diagnostic and imaging results with Dr Leopoldo Vance. Reviewed care plans. Both providers and patient are in agreement with care plan. CYNDIE Ramirez is transferring care of the pt to Dr Leopoldo Vance at this time. Please refer to his/her chart for the patients remaining Emergency Department course. Patient is status post right THR by Dr Kamran Harman at [facility] on 10/26/22, presents with increasing hip pain and fever x 1 day. [LP]   4714 Dr Anjel Linton, [facility] orthopedics accepted patient as transfer; patient to be transferred to the ER, awaiting call back for accepting physician [LP]   8286 Spoke to Dr. Darron Medina from [facility]. Accepted for transfer.  [AA]      ED Course User Index  [AA] Eric Vasquez MD  [LP] Juanjose Guillen NP     CRITICAL CARE NOTE :    5:30 AM    IMPENDING DETERIORATION -Respiratory and Cardiovascular  ASSOCIATED RISK FACTORS - Hypoxia, Metabolic changes, Dehydration, Vascular Compromise, and sepsis  MANAGEMENT- Bedside Assessment and Transfer  INTERPRETATION -  Xrays, CT Scan, ECG, and Blood Pressure  INTERVENTIONS - hemodynamic mngmt, Metobolic interventions, and sepsis interventions  CASE REVIEW - Hospitalist/Intensivist, Medical Sub-Specialist, and Family  TREATMENT RESPONSE -Stable  PERFORMED BY - Self    NOTES   :  I have spent 120 minutes of critical care time involved in lab review, consultations with specialist, family decision- making, bedside attention and documentation. This time excludes time spent in any separate billed procedures. During this entire length of time I was immediately available to the patient . Gabriel Singh NP    Disposition   Disposition: Transferred to [facility] patient verbally agreed to transfer and understand the risks involved as outlined in the EMTALA form. Diagnosis/Clinical Impression     Clinical Impression:   1. Hypervolemia, unspecified hypervolemia type    2. Status post right hip replacement    3. Fever, unspecified fever cause        Attestations: Gabriel SORIANO NP, am the primary clinician of record. Please note that this dictation was completed with [employer], the computer voice recognition software. Quite often unanticipated grammatical, syntax, homophones, and other interpretive errors are inadvertently transcribed by the computer software. Please disregard these errors. Please excuse any errors that have escaped final proofreading. Thank you.

## 2022-11-08 LAB
BACTERIA SPEC CULT: NORMAL
SPECIAL REQUESTS,SREQ: NORMAL

## 2023-01-06 NOTE — TELEPHONE ENCOUNTER
Funmilayo Aguilar from [facility] called stated patient would like to be discharged from home health if wounds are healing.  Please call Funmilayo Aguilar [phone]
Noted.
Spoke with [place]. She states the patient's wounds are shallow and they are only seeing her once a week due to insurance. She states the patient has a history of complex wounds and feels she can manage her wounds at this time. Anum Wakefield I would ask Dr. Cody Ackerman in the morning about them discharging patient from home health.
Wojciech Quesada from [facility] called again today stated they have discharged the patient.  Please call if any questions or concerns [phone]
.

## 2023-01-25 ENCOUNTER — TRANSCRIBE ORDER (OUTPATIENT)
Dept: SCHEDULING | Age: 33
End: 2023-01-25

## 2023-01-25 DIAGNOSIS — H05.243 CONSTANT EXOPHTHALMOS OF BOTH EYES: Primary | ICD-10-CM

## 2023-02-03 ENCOUNTER — APPOINTMENT (OUTPATIENT)
Dept: GENERAL RADIOLOGY | Age: 33
End: 2023-02-03
Attending: EMERGENCY MEDICINE
Payer: MEDICARE

## 2023-02-03 ENCOUNTER — HOSPITAL ENCOUNTER (INPATIENT)
Age: 33
LOS: 13 days | Discharge: HOME HEALTH | End: 2023-02-16
Attending: EMERGENCY MEDICINE | Admitting: INTERNAL MEDICINE
Payer: MEDICARE

## 2023-02-03 DIAGNOSIS — M79.604 RIGHT LEG PAIN: ICD-10-CM

## 2023-02-03 DIAGNOSIS — L02.419 ABSCESS OF CALF: ICD-10-CM

## 2023-02-03 DIAGNOSIS — M79.89 RIGHT LEG SWELLING: ICD-10-CM

## 2023-02-03 DIAGNOSIS — L03.90 CELLULITIS, UNSPECIFIED CELLULITIS SITE: Primary | ICD-10-CM

## 2023-02-03 LAB
ALBUMIN SERPL-MCNC: 2.6 G/DL (ref 3.5–5)
ALBUMIN/GLOB SERPL: 0.8 (ref 1.1–2.2)
ALP SERPL-CCNC: 195 U/L (ref 45–117)
ALT SERPL-CCNC: 56 U/L (ref 12–78)
ANION GAP SERPL CALC-SCNC: 8 MMOL/L (ref 5–15)
AST SERPL W P-5'-P-CCNC: 28 U/L (ref 15–37)
BASOPHILS # BLD: 0 K/UL (ref 0–0.1)
BASOPHILS NFR BLD: 0 % (ref 0–1)
BILIRUB SERPL-MCNC: 0.6 MG/DL (ref 0.2–1)
BUN SERPL-MCNC: 15 MG/DL (ref 6–20)
BUN/CREAT SERPL: 21 (ref 12–20)
CA-I BLD-MCNC: 8.7 MG/DL (ref 8.5–10.1)
CHLORIDE SERPL-SCNC: 96 MMOL/L (ref 97–108)
CO2 SERPL-SCNC: 28 MMOL/L (ref 21–32)
CREAT SERPL-MCNC: 0.7 MG/DL (ref 0.55–1.02)
DIFFERENTIAL METHOD BLD: ABNORMAL
EOSINOPHIL # BLD: 0 K/UL (ref 0–0.4)
EOSINOPHIL NFR BLD: 0 % (ref 0–7)
ERYTHROCYTE [DISTWIDTH] IN BLOOD BY AUTOMATED COUNT: 20.7 % (ref 11.5–14.5)
GLOBULIN SER CALC-MCNC: 3.4 G/DL (ref 2–4)
GLUCOSE SERPL-MCNC: 104 MG/DL (ref 65–100)
HCT VFR BLD AUTO: 31.1 % (ref 35–47)
HGB BLD-MCNC: 9.3 G/DL (ref 11.5–16)
IMM GRANULOCYTES # BLD AUTO: 0 K/UL
IMM GRANULOCYTES NFR BLD AUTO: 0 %
LACTATE SERPL-SCNC: 1.7 MMOL/L (ref 0.4–2)
LACTATE SERPL-SCNC: 2.2 MMOL/L (ref 0.4–2)
LYMPHOCYTES # BLD: 0.2 K/UL (ref 0.8–3.5)
LYMPHOCYTES NFR BLD: 7 % (ref 12–49)
MCH RBC QN AUTO: 26.6 PG (ref 26–34)
MCHC RBC AUTO-ENTMCNC: 29.9 G/DL (ref 30–36.5)
MCV RBC AUTO: 89.1 FL (ref 80–99)
METAMYELOCYTES NFR BLD MANUAL: 1 %
MONOCYTES # BLD: 0.2 K/UL (ref 0–1)
MONOCYTES NFR BLD: 6 % (ref 5–13)
NEUTS BAND NFR BLD MANUAL: 10 % (ref 0–6)
NEUTS SEG # BLD: 2.7 K/UL (ref 1.8–8)
NEUTS SEG NFR BLD: 76 % (ref 32–75)
PLATELET # BLD AUTO: 344 K/UL (ref 150–400)
PMV BLD AUTO: 9.1 FL (ref 8.9–12.9)
POTASSIUM SERPL-SCNC: 4.3 MMOL/L (ref 3.5–5.1)
PROT SERPL-MCNC: 6 G/DL (ref 6.4–8.2)
RBC # BLD AUTO: 3.49 M/UL (ref 3.8–5.2)
RBC MORPH BLD: ABNORMAL
SODIUM SERPL-SCNC: 132 MMOL/L (ref 136–145)
TROPONIN I SERPL HS-MCNC: 95 NG/L (ref 0–51)
WBC # BLD AUTO: 3.1 K/UL (ref 3.6–11)

## 2023-02-03 PROCEDURE — 36415 COLL VENOUS BLD VENIPUNCTURE: CPT

## 2023-02-03 PROCEDURE — 83605 ASSAY OF LACTIC ACID: CPT

## 2023-02-03 PROCEDURE — 80053 COMPREHEN METABOLIC PANEL: CPT

## 2023-02-03 PROCEDURE — 96365 THER/PROPH/DIAG IV INF INIT: CPT

## 2023-02-03 PROCEDURE — 96375 TX/PRO/DX INJ NEW DRUG ADDON: CPT

## 2023-02-03 PROCEDURE — 87040 BLOOD CULTURE FOR BACTERIA: CPT

## 2023-02-03 PROCEDURE — 84484 ASSAY OF TROPONIN QUANT: CPT

## 2023-02-03 PROCEDURE — 99285 EMERGENCY DEPT VISIT HI MDM: CPT

## 2023-02-03 PROCEDURE — 71045 X-RAY EXAM CHEST 1 VIEW: CPT

## 2023-02-03 PROCEDURE — 93005 ELECTROCARDIOGRAM TRACING: CPT

## 2023-02-03 PROCEDURE — 74011000258 HC RX REV CODE- 258: Performed by: EMERGENCY MEDICINE

## 2023-02-03 PROCEDURE — 85025 COMPLETE CBC W/AUTO DIFF WBC: CPT

## 2023-02-03 PROCEDURE — 65270000029 HC RM PRIVATE

## 2023-02-03 PROCEDURE — 74011250636 HC RX REV CODE- 250/636: Performed by: EMERGENCY MEDICINE

## 2023-02-03 PROCEDURE — 74011250637 HC RX REV CODE- 250/637: Performed by: EMERGENCY MEDICINE

## 2023-02-03 PROCEDURE — 74011636637 HC RX REV CODE- 636/637: Performed by: EMERGENCY MEDICINE

## 2023-02-03 RX ORDER — MORPHINE SULFATE 4 MG/ML
4 INJECTION INTRAVENOUS ONCE
Status: COMPLETED | OUTPATIENT
Start: 2023-02-03 | End: 2023-02-03

## 2023-02-03 RX ORDER — ONDANSETRON 2 MG/ML
4 INJECTION INTRAMUSCULAR; INTRAVENOUS
Status: COMPLETED | OUTPATIENT
Start: 2023-02-03 | End: 2023-02-03

## 2023-02-03 RX ORDER — MORPHINE SULFATE 2 MG/ML
4 INJECTION, SOLUTION INTRAMUSCULAR; INTRAVENOUS
Status: COMPLETED | OUTPATIENT
Start: 2023-02-03 | End: 2023-02-03

## 2023-02-03 RX ORDER — CLINDAMYCIN PHOSPHATE 600 MG/50ML
600 INJECTION, SOLUTION INTRAVENOUS EVERY 8 HOURS
Status: DISCONTINUED | OUTPATIENT
Start: 2023-02-03 | End: 2023-02-04

## 2023-02-03 RX ORDER — SODIUM CHLORIDE 0.9 % (FLUSH) 0.9 %
5-10 SYRINGE (ML) INJECTION AS NEEDED
Status: DISCONTINUED | OUTPATIENT
Start: 2023-02-03 | End: 2023-02-16 | Stop reason: HOSPADM

## 2023-02-03 RX ORDER — PREDNISONE 20 MG/1
40 TABLET ORAL ONCE
Status: COMPLETED | OUTPATIENT
Start: 2023-02-03 | End: 2023-02-03

## 2023-02-03 RX ORDER — GABAPENTIN 100 MG/1
300 CAPSULE ORAL ONCE
Status: COMPLETED | OUTPATIENT
Start: 2023-02-03 | End: 2023-02-03

## 2023-02-03 RX ADMIN — PIPERACILLIN AND TAZOBACTAM 3.38 G: 3; .375 INJECTION, POWDER, LYOPHILIZED, FOR SOLUTION INTRAVENOUS at 16:22

## 2023-02-03 RX ADMIN — PREDNISONE 40 MG: 20 TABLET ORAL at 21:25

## 2023-02-03 RX ADMIN — SODIUM CHLORIDE 851 ML: 9 INJECTION, SOLUTION INTRAVENOUS at 17:50

## 2023-02-03 RX ADMIN — CLINDAMYCIN IN 5 PERCENT DEXTROSE 600 MG: 12 INJECTION, SOLUTION INTRAVENOUS at 16:22

## 2023-02-03 RX ADMIN — SODIUM CHLORIDE 1000 ML: 9 INJECTION, SOLUTION INTRAVENOUS at 16:12

## 2023-02-03 RX ADMIN — GABAPENTIN 300 MG: 100 CAPSULE ORAL at 21:25

## 2023-02-03 RX ADMIN — ONDANSETRON 4 MG: 2 INJECTION INTRAMUSCULAR; INTRAVENOUS at 18:41

## 2023-02-03 RX ADMIN — MORPHINE SULFATE 4 MG: 4 INJECTION INTRAVENOUS at 18:41

## 2023-02-03 RX ADMIN — MORPHINE SULFATE 4 MG: 2 INJECTION, SOLUTION INTRAMUSCULAR; INTRAVENOUS at 22:29

## 2023-02-03 NOTE — ED PROVIDER NOTES
[facility] EMERGENCY DEPARTMENT  EMERGENCY DEPARTMENT HISTORY AND PHYSICAL EXAM      Date: 2/3/2023  Patient Name: Jenny Alcantar  MRN: [id number]  [place] 1990  Date of evaluation: 2/3/2023  Provider: Sabino Chase MD   Note Started: 3:43 PM 2/3/23    HISTORY OF PRESENT ILLNESS     Chief Complaint   Patient presents with    Leg Pain       History Provided By: Patient    HPI: Jenny Alcantar, 34 y.o. female presents with right leg pain going on for the past couple of days. She says been red inflamed she is running fevers at home. She says she just feels awful all over. She denies any chest pain or shortness of breath but does endorse fever, chills and right leg pain. Worse with movement relieved minimally with rest.  She is wrapped it with an Ace bandage was has made mild improvement    PAST MEDICAL HISTORY   Past Medical History:  Past Medical History:   Diagnosis Date    Lupus ([address]     Osteonecrosis ([address]     Pulmonary HTN ([address]     RA (rheumatoid arthritis) ([address]        Past Surgical History:  Past Surgical History:   Procedure Laterality Date    HX CYST REMOVAL      HX ORTHOPAEDIC      x2 left knee sxs    HX ORTHOPAEDIC      bilateral ankle sxs       Family History:  Family History   Problem Relation Age of Onset    Hypertension Mother     Cancer Paternal Grandmother        Social History:  Social History     Tobacco Use    Smoking status: Never    Smokeless tobacco: Never   Vaping Use    Vaping Use: Never used   Substance Use Topics    Alcohol use: Never    Drug use: Never       Allergies: Allergies   Allergen Reactions    Doxycycline Nausea and Vomiting    Heparin Anaphylaxis    Remicade [Infliximab] Anaphylaxis    Vancomycin Hives    Rituximab Itching    Percocet [Oxycodone-Acetaminophen] Nausea and Vomiting     Side effect       PCP: Geovanni Rockwell MD    Current Meds:   Previous Medications    ADEMPAS 2.5 MG TAB TABLET    2.5 mg two (2) times a day.     CHOLECALCIFEROL (VITAMIN D3) (5000 UNITS/125 MCG) TAB TABLET    Take 5,000 Units by mouth daily. DULOXETINE (CYMBALTA) 30 MG CAPSULE    TAKE 1 CAPSULE BY MOUTH TWICE A DAY    FOLIC ACID (FOLVITE) 1 MG TABLET    Take 2 mg by mouth daily. FUROSEMIDE (LASIX) 20 MG TABLET    Take 20 mg by mouth daily. GABAPENTIN (NEURONTIN) 600 MG TABLET    Take 300 mg by mouth two (2) times a day. HYDROXYCHLOROQUINE (PLAQUENIL) 200 MG TABLET    Take 200 mg by mouth daily. LETAIRIS 5 MG TABLET    5 mg daily. In AM    METHOTREXATE (RHEUMATREX) 2.5 MG TABLET    TAKE 4 TABLETS (10 MG TOTAL) BY MOUTH 1 TIME PER WEEK    PANTOPRAZOLE (PROTONIX) 40 MG TABLET    TAKE 1 TABLET BY MOUTH EVERY DAY    PREDNISONE (DELTASONE) 5 MG TABLET    Take 18 mg by mouth nightly. 15 mg in the AM and 3 mg at night    SPIRONOLACTONE (ALDACTONE) 25 MG TABLET    25 mg daily. At bedtime    TOFACITINIB (XELJANZ XR) 11 MG TB24    Xeljanz XR 11 mg tablet,extended release   TAKE ONE TABLET BY MOUTH EVERY DAY       REVIEW OF SYSTEMS   Review of Systems   Constitutional:  Positive for chills and fever. Musculoskeletal:  Positive for gait problem and myalgias. Skin:  Positive for rash. All other systems reviewed and are negative. Positives and Pertinent negatives as per HPI. PHYSICAL EXAM     ED Triage Vitals [02/03/23 1512]   ED Encounter Vitals Group      /77      Pulse (Heart Rate) (!) 147      Resp Rate 20      Temp (!) 100.5 °F (38.1 °C)      Temp src       O2 Sat (%) 94 %      Weight 136 lb      Height 4' 11\"      Physical Exam  Vitals and nursing note reviewed. Constitutional:       General: She is not in acute distress. Appearance: She is well-developed. HENT:      Head: Normocephalic and atraumatic. Nose: Nose normal.      Mouth/Throat:      Mouth: Mucous membranes are moist.      Pharynx: Oropharynx is clear. No oropharyngeal exudate. Eyes:      General:         Right eye: No discharge. Left eye: No discharge.       Conjunctiva/sclera: Conjunctivae normal.      Pupils: Pupils are equal, round, and reactive to light. Cardiovascular:      Rate and Rhythm: Regular rhythm. Tachycardia present. Chest Wall: PMI is not displaced. No thrill. Heart sounds: Normal heart sounds. No murmur heard. No friction rub. No gallop. Pulmonary:      Effort: Pulmonary effort is normal. No respiratory distress. Breath sounds: Normal breath sounds. No wheezing or rales. Chest:      Chest wall: No tenderness. Abdominal:      General: Bowel sounds are normal. There is no distension. Palpations: Abdomen is soft. There is no mass. Tenderness: There is no abdominal tenderness. There is no guarding or rebound. Musculoskeletal:         General: Normal range of motion. Cervical back: Normal range of motion and neck supple. Lymphadenopathy:      Cervical: No cervical adenopathy. Skin:     General: Skin is warm and dry. Capillary Refill: Capillary refill takes less than 2 seconds. Findings: Rash present. No erythema. Comments: Diffuse erythematous rash to right lower extremity   Neurological:      Mental Status: She is alert and oriented to person, place, and time. Cranial Nerves: No cranial nerve deficit.       Coordination: Coordination normal.   Psychiatric:         Mood and Affect: Mood normal.         Behavior: Behavior normal.       SCREENINGS               No data recorded        LAB, EKG AND DIAGNOSTIC RESULTS   Labs:  Recent Results (from the past 12 hour(s))   LACTIC ACID    Collection Time: 02/03/23  3:50 PM   Result Value Ref Range    Lactic acid 2.2 (HH) 0.4 - 2.0 mmol/L   CBC WITH AUTOMATED DIFF    Collection Time: 02/03/23  3:50 PM   Result Value Ref Range    WBC 3.1 (L) 3.6 - 11.0 K/uL    RBC 3.49 (L) 3.80 - 5.20 M/uL    HGB 9.3 (L) 11.5 - 16.0 g/dL    HCT 31.1 (L) 35.0 - 47.0 %    MCV 89.1 80.0 - 99.0 FL    MCH 26.6 26.0 - 34.0 PG    MCHC 29.9 (L) 30.0 - 36.5 g/dL    RDW 20.7 (H) 11.5 - 14.5 % PLATELET 573 546 - 569 K/uL    MPV 9.1 8.9 - 12.9 FL    NEUTROPHILS 76 (H) 32 - 75 %    BAND NEUTROPHILS 10 (H) 0 - 6 %    LYMPHOCYTES 7 (L) 12 - 49 %    MONOCYTES 6 5 - 13 %    EOSINOPHILS 0 0 - 7 %    BASOPHILS 0 0 - 1 %    METAMYELOCYTES 1 (H) 0 %    IMMATURE GRANULOCYTES 0 %    ABS. NEUTROPHILS 2.7 1.8 - 8.0 K/UL    ABS. LYMPHOCYTES 0.2 (L) 0.8 - 3.5 K/UL    ABS. MONOCYTES 0.2 0.0 - 1.0 K/UL    ABS. EOSINOPHILS 0.0 0.0 - 0.4 K/UL    ABS. BASOPHILS 0.0 0.0 - 0.1 K/UL    ABS. IMM. GRANS. 0.0 K/UL    DF Manual      RBC COMMENTS Normocytic, Normochromic     METABOLIC PANEL, COMPREHENSIVE    Collection Time: 02/03/23  3:50 PM   Result Value Ref Range    Sodium 132 (L) 136 - 145 mmol/L    Potassium 4.3 3.5 - 5.1 mmol/L    Chloride 96 (L) 97 - 108 mmol/L    CO2 28 21 - 32 mmol/L    Anion gap 8 5 - 15 mmol/L    Glucose 104 (H) 65 - 100 mg/dL    BUN 15 6 - 20 mg/dL    Creatinine 0.70 0.55 - 1.02 mg/dL    BUN/Creatinine ratio 21 (H) 12 - 20      eGFR >60 >60 ml/min/1.73m2    Calcium 8.7 8.5 - 10.1 mg/dL    Bilirubin, total 0.6 0.2 - 1.0 mg/dL    AST (SGOT) 28 15 - 37 U/L    ALT (SGPT) 56 12 - 78 U/L    Alk. phosphatase 195 (H) 45 - 117 U/L    Protein, total 6.0 (L) 6.4 - 8.2 g/dL    Albumin 2.6 (L) 3.5 - 5.0 g/dL    Globulin 3.4 2.0 - 4.0 g/dL    A-G Ratio 0.8 (L) 1.1 - 2.2     TROPONIN-HIGH SENSITIVITY    Collection Time: 02/03/23  3:50 PM   Result Value Ref Range    Troponin-High Sensitivity 95 (H) 0 - 51 ng/L       EKG: Initial EKG interpreted by me. Shows ventricular rate of 140 bpm,  ms, cures duration 94 ms,  ms. Tryptase: Sinus tach with incomplete right bundle branch block.     PROCEDURES   Unless otherwise noted below, none  Performed by: Singh Merchant MD   Procedures      CRITICAL CARE TIME   CRITICAL CARE NOTE :  IMPENDING DETERIORATION -septic  ASSOCIATED RISK FACTORS - Dehydration  MANAGEMENT- Bedside Assessment, Supervision of Care, and Transfer  INTERPRETATION -  Xrays, Blood Pressure, and Cardiac Output Measures   INTERVENTIONS - hemodynamic mngmt and Metobolic interventions  CASE REVIEW - Hospitalist/Intensivist  TREATMENT RESPONSE -Stable  PERFORMED BY - Self    NOTES   :  I have spent 45 minutes of critical care time involved in lab review, consultations with specialist, family decision- making, bedside attention and documentation. This time excludes time spent in any separate billed procedures. During this entire length of time I was immediately available to the patient . Dash Hinton MD     EMERGENCY DEPARTMENT COURSE and DIFFERENTIAL DIAGNOSIS/MDM   Vitals:    Vitals:    02/03/23 1512 02/03/23 1629   BP: 131/77 (!) 157/88   Pulse: (!) 147 (!) 131   Resp: 20    Temp: (!) 100.5 °F (38.1 °C)    SpO2: 94% 93%   Weight: 61.7 kg (136 lb)    Height: 4' 11\" (1.499 m)         Patient was given the following medications:  Medications   sodium chloride (NS) flush 5-10 mL (has no administration in time range)   sodium chloride 0.9 % bolus infusion 1,000 mL (1,000 mL IntraVENous New Bag 2/3/23 1612)     Followed by   sodium chloride 0.9 % bolus infusion 851 mL (has no administration in time range)   piperacillin-tazobactam (ZOSYN) 3.375 g in 0.9% sodium chloride (MBP/ADV) 100 mL MBP (3.375 g IntraVENous New Bag 2/3/23 1622)   clindamycin (CLEOCIN) 600mg D5W 50mL IVPB (premix) (0 mg IntraVENous IV Completed 2/3/23 1657)   morphine injection 4 mg (has no administration in time range)   ondansetron (ZOFRAN) injection 4 mg (has no administration in time range)       CONSULTS: (Who and What was discussed)  None     Chronic Conditions: Rheumatoid arthritis  Social Determinants affecting Dx or Tx: None  Counseling:      Records Reviewed (source and summary of external notes): None    CC/HPI Summary, DDx, ED Course, and Reassessment: She is feeling better is able to tolerate p.o.   She still tachycardic is elevated white blood cell count elevated lactate         Disposition Considerations (Tests not done, Shared Decision Making, Pt Expectation of Test or Treatment.):  76-year-old female with immunocompromise state presents with right leg cellulitis and septic at this point with low white blood cell count elevated lactate and tachycardia as per SIRS criteria. Given the fact that she has immunosuppressive agents and is immunocompromise with her rheumatoid arthritis I do feel that she is in grave danger of going into septic shock. I do recommend IV antibiotics and admission. I discussed the case with Dr. Tonya Hsu who is in agreement with the care plan as outlined. ED FINAL IMPRESSION     1. Cellulitis, unspecified cellulitis site          DISPOSITION/PLAN   Admitted    Admit Note: Pt is being admitted by Dr. Tonya Hsu. The results of their tests and reason(s) for their admission have been discussed with pt and/or available family. They convey agreement and understanding for the need to be admitted and for the admission diagnosis. PATIENT REFERRED TO:  Follow-up Information    None           DISCHARGE MEDICATIONS:  Current Discharge Medication List            DISCONTINUED MEDICATIONS:  Current Discharge Medication List          I am the Primary Clinician of Record. Sonya Ovalle MD (electronically signed)    (Please note that parts of this dictation were completed with voice recognition software. Quite often unanticipated grammatical, syntax, homophones, and other interpretive errors are inadvertently transcribed by the computer software. Please disregards these errors.  Please excuse any errors that have escaped final proofreading.)

## 2023-02-04 ENCOUNTER — APPOINTMENT (OUTPATIENT)
Dept: NON INVASIVE DIAGNOSTICS | Age: 33
End: 2023-02-04
Attending: INTERNAL MEDICINE
Payer: MEDICARE

## 2023-02-04 LAB
ATRIAL RATE: 140 BPM
CALCULATED P AXIS, ECG09: 16 DEGREES
CALCULATED R AXIS, ECG10: 3 DEGREES
CALCULATED T AXIS, ECG11: 38 DEGREES
DIAGNOSIS, 93000: NORMAL
P-R INTERVAL, ECG05: 120 MS
Q-T INTERVAL, ECG07: 352 MS
QRS DURATION, ECG06: 94 MS
QTC CALCULATION (BEZET), ECG08: 537 MS
VENTRICULAR RATE, ECG03: 140 BPM

## 2023-02-04 PROCEDURE — 74011250636 HC RX REV CODE- 250/636: Performed by: EMERGENCY MEDICINE

## 2023-02-04 PROCEDURE — 65270000029 HC RM PRIVATE

## 2023-02-04 PROCEDURE — 74011250636 HC RX REV CODE- 250/636: Performed by: INTERNAL MEDICINE

## 2023-02-04 PROCEDURE — 74011000258 HC RX REV CODE- 258: Performed by: EMERGENCY MEDICINE

## 2023-02-04 PROCEDURE — 74011000258 HC RX REV CODE- 258: Performed by: INTERNAL MEDICINE

## 2023-02-04 PROCEDURE — 74011250637 HC RX REV CODE- 250/637: Performed by: INTERNAL MEDICINE

## 2023-02-04 PROCEDURE — 93970 EXTREMITY STUDY: CPT

## 2023-02-04 PROCEDURE — 74011636637 HC RX REV CODE- 636/637: Performed by: INTERNAL MEDICINE

## 2023-02-04 PROCEDURE — 74011000250 HC RX REV CODE- 250: Performed by: INTERNAL MEDICINE

## 2023-02-04 PROCEDURE — 74011000250 HC RX REV CODE- 250: Performed by: EMERGENCY MEDICINE

## 2023-02-04 RX ORDER — FOLIC ACID 1 MG/1
2 TABLET ORAL DAILY
Status: DISCONTINUED | OUTPATIENT
Start: 2023-02-04 | End: 2023-02-16 | Stop reason: HOSPADM

## 2023-02-04 RX ORDER — ACETAMINOPHEN 325 MG/1
650 TABLET ORAL
Status: DISCONTINUED | OUTPATIENT
Start: 2023-02-04 | End: 2023-02-16 | Stop reason: HOSPADM

## 2023-02-04 RX ORDER — HYDROXYCHLOROQUINE SULFATE 200 MG/1
200 TABLET, FILM COATED ORAL DAILY
Status: DISCONTINUED | OUTPATIENT
Start: 2023-02-04 | End: 2023-02-16 | Stop reason: HOSPADM

## 2023-02-04 RX ORDER — PANTOPRAZOLE SODIUM 40 MG/1
40 TABLET, DELAYED RELEASE ORAL DAILY
Status: DISCONTINUED | OUTPATIENT
Start: 2023-02-04 | End: 2023-02-16 | Stop reason: HOSPADM

## 2023-02-04 RX ORDER — OXYCODONE AND ACETAMINOPHEN 5; 325 MG/1; MG/1
1 TABLET ORAL
Status: DISCONTINUED | OUTPATIENT
Start: 2023-02-04 | End: 2023-02-16 | Stop reason: HOSPADM

## 2023-02-04 RX ORDER — AMBRISENTAN 5 MG/1
5 TABLET, FILM COATED ORAL EVERY MORNING
Status: DISCONTINUED | OUTPATIENT
Start: 2023-02-06 | End: 2023-02-16 | Stop reason: HOSPADM

## 2023-02-04 RX ORDER — HYDROMORPHONE HYDROCHLORIDE 1 MG/ML
1 INJECTION, SOLUTION INTRAMUSCULAR; INTRAVENOUS; SUBCUTANEOUS
Status: DISPENSED | OUTPATIENT
Start: 2023-02-04 | End: 2023-02-06

## 2023-02-04 RX ORDER — SODIUM CHLORIDE 0.9 % (FLUSH) 0.9 %
5-40 SYRINGE (ML) INJECTION EVERY 8 HOURS
Status: DISCONTINUED | OUTPATIENT
Start: 2023-02-04 | End: 2023-02-16 | Stop reason: HOSPADM

## 2023-02-04 RX ORDER — DULOXETIN HYDROCHLORIDE 30 MG/1
30 CAPSULE, DELAYED RELEASE ORAL 2 TIMES DAILY
Status: DISCONTINUED | OUTPATIENT
Start: 2023-02-04 | End: 2023-02-16 | Stop reason: HOSPADM

## 2023-02-04 RX ORDER — SODIUM CHLORIDE 0.9 % (FLUSH) 0.9 %
5-40 SYRINGE (ML) INJECTION AS NEEDED
Status: DISCONTINUED | OUTPATIENT
Start: 2023-02-04 | End: 2023-02-16 | Stop reason: HOSPADM

## 2023-02-04 RX ORDER — GABAPENTIN 300 MG/1
600 CAPSULE ORAL 2 TIMES DAILY
Status: DISCONTINUED | OUTPATIENT
Start: 2023-02-04 | End: 2023-02-16 | Stop reason: HOSPADM

## 2023-02-04 RX ORDER — PROCHLORPERAZINE EDISYLATE 5 MG/ML
5 INJECTION INTRAMUSCULAR; INTRAVENOUS ONCE
Status: COMPLETED | OUTPATIENT
Start: 2023-02-04 | End: 2023-02-04

## 2023-02-04 RX ORDER — POLYETHYLENE GLYCOL 3350 17 G/17G
17 POWDER, FOR SOLUTION ORAL DAILY PRN
Status: DISCONTINUED | OUTPATIENT
Start: 2023-02-04 | End: 2023-02-16 | Stop reason: HOSPADM

## 2023-02-04 RX ORDER — ACETAMINOPHEN 650 MG/1
650 SUPPOSITORY RECTAL
Status: DISCONTINUED | OUTPATIENT
Start: 2023-02-04 | End: 2023-02-16 | Stop reason: HOSPADM

## 2023-02-04 RX ORDER — MORPHINE SULFATE 2 MG/ML
2 INJECTION, SOLUTION INTRAMUSCULAR; INTRAVENOUS
Status: DISCONTINUED | OUTPATIENT
Start: 2023-02-04 | End: 2023-02-04

## 2023-02-04 RX ADMIN — ACETAMINOPHEN 650 MG: 325 TABLET ORAL at 17:24

## 2023-02-04 RX ADMIN — FOLIC ACID 2 MG: 1 TABLET ORAL at 10:57

## 2023-02-04 RX ADMIN — HYDROMORPHONE HYDROCHLORIDE 1 MG: 1 INJECTION, SOLUTION INTRAMUSCULAR; INTRAVENOUS; SUBCUTANEOUS at 20:30

## 2023-02-04 RX ADMIN — SODIUM CHLORIDE, PRESERVATIVE FREE 10 ML: 5 INJECTION INTRAVENOUS at 14:00

## 2023-02-04 RX ADMIN — MORPHINE SULFATE 2 MG: 2 INJECTION, SOLUTION INTRAMUSCULAR; INTRAVENOUS at 06:12

## 2023-02-04 RX ADMIN — SODIUM CHLORIDE, PRESERVATIVE FREE 10 ML: 5 INJECTION INTRAVENOUS at 21:19

## 2023-02-04 RX ADMIN — HYDROMORPHONE HYDROCHLORIDE 1 MG: 1 INJECTION, SOLUTION INTRAMUSCULAR; INTRAVENOUS; SUBCUTANEOUS at 13:38

## 2023-02-04 RX ADMIN — SODIUM CHLORIDE, PRESERVATIVE FREE 10 ML: 5 INJECTION INTRAVENOUS at 07:00

## 2023-02-04 RX ADMIN — DULOXETINE HYDROCHLORIDE 30 MG: 30 CAPSULE, DELAYED RELEASE ORAL at 10:57

## 2023-02-04 RX ADMIN — PREDNISONE 30 MG: 20 TABLET ORAL at 21:00

## 2023-02-04 RX ADMIN — PIPERACILLIN AND TAZOBACTAM 3.38 G: 3; .375 INJECTION, POWDER, LYOPHILIZED, FOR SOLUTION INTRAVENOUS at 00:50

## 2023-02-04 RX ADMIN — GABAPENTIN 600 MG: 300 CAPSULE ORAL at 20:29

## 2023-02-04 RX ADMIN — WATER 1 G: 1 INJECTION INTRAMUSCULAR; INTRAVENOUS; SUBCUTANEOUS at 13:37

## 2023-02-04 RX ADMIN — PANTOPRAZOLE SODIUM 40 MG: 40 TABLET, DELAYED RELEASE ORAL at 10:57

## 2023-02-04 RX ADMIN — SODIUM CHLORIDE 1.5 G: 900 INJECTION INTRAVENOUS at 17:24

## 2023-02-04 RX ADMIN — PROMETHAZINE HYDROCHLORIDE 12.5 MG: 25 INJECTION INTRAMUSCULAR; INTRAVENOUS at 10:56

## 2023-02-04 RX ADMIN — PROCHLORPERAZINE EDISYLATE 5 MG: 5 INJECTION INTRAMUSCULAR; INTRAVENOUS at 10:56

## 2023-02-04 RX ADMIN — SODIUM CHLORIDE, PRESERVATIVE FREE 10 ML: 5 INJECTION INTRAVENOUS at 05:59

## 2023-02-04 RX ADMIN — DULOXETINE HYDROCHLORIDE 30 MG: 30 CAPSULE, DELAYED RELEASE ORAL at 20:29

## 2023-02-04 NOTE — H&P
History and Physical    Patient: Ivan Bonilla MRN: [id number]  SSN: [id number]    YOB: 1990  Age: 34 y.o. Sex: female      Subjective:      Ivan Bonilla is a 34 y.o. female with PMH of lupus, rheumatoid arthritis, pulmonary hypertension and osteonecrosis. She presented to the ED with chief complaint of right leg pain and redness that started about 4 days ago. It became progressively worsened and spread to her upper thigh. Severe pain, worsened with movement. Also endorses fever and chills. In the ED, noted febrile and tachycardia . Lab work revealed leukocytosis. Chart review: none    Past Medical History:   Diagnosis Date    Lupus ([facility]     Osteonecrosis ([facility]     Pulmonary HTN ([facility]     RA (rheumatoid arthritis) ([facility]      Family History   Problem Relation Age of Onset    Hypertension Mother     Cancer Paternal Grandmother      Social History     Tobacco Use    Smoking status: Never    Smokeless tobacco: Never   Substance Use Topics    Alcohol use: Never        Objective:     Physical Exam:   General: alert, cooperative, no distress  Eye: conjunctivae/corneas clear. PERRL, EOM's intact. Throat and Neck: normal and no erythema or exudates noted. No mass   Lung: clear to auscultation bilaterally  Heart: regular rate and rhythm,   Abdomen: soft, non-tender. Bowel sounds normal. No masses,  Extremities: Mild right LE edema. able to move all extremities but limited on right leg, atraumatic  Skin: redness at back or right extremity calf and thigh. Neurologic: AOx3. Motor function and sensation grossly intact. Psychiatric: non focal    Most recent lab work and imaging results reviewed in EMR. Assessment and plan:   # Cellulitis  - Non-purulent  - IV cefazolin  - Ultrasound to rule out DVT. # Sepsis  - Source:cellulitis  - Antibiotics empirically: IV cefazolin  - Fluid resuscitation in ED.  Hold off additional IVF given CHF  - Blood culture   - Trend LA    # Tachycardia  - Pulse rate 110-120 at baseline, continue to monitor. # Lupus  - Continue home medications. PO hydroxychloroquine, prednisone. # Pulmonary hypertension  - Continue home dose adempas, letairis    # rheumatoid arthritis  - Continue home medications. PO tofacitinib. # Right sided heart failure  - Not fluid overload clinically  - Hold home lasix and spironolactone given sepsis for now. - continue to monitor     # Social Determents of health: None    # Full code by default, need further clarification    # Medication reconciliation: Medication list reviewed on Epic and with patient/family.      Signed By: Natividad Onofre MD     February 4, 2023

## 2023-02-04 NOTE — PROGRESS NOTES
Problem: Falls - Risk of  Goal: *Absence of Falls  Description: Document Ulysses Sheldon Fall Risk and appropriate interventions in the flowsheet. Outcome: Progressing Towards Goal  Note: Fall Risk Interventions:   Standard Safety Measures include: Bed/Chair alarm on; Bed/Chair Wheels locked; Bed in low position; Call light within reach. Problem: Pressure Injury - Risk of  Goal: *Prevention of pressure injury  Description: Document Delbert Scale and appropriate interventions in the flowsheet.   Outcome: Progressing Towards Goal  Note: Pressure Injury Interventions:  Sensory Interventions: Assess changes in LOC, Check visual cues for pain, Discuss PT/OT consult with provider, Float heels, Keep linens dry and wrinkle-free, Maintain/enhance activity level    Moisture Interventions: Absorbent underpads, Check for incontinence Q2 hours and as needed, Internal/External urinary devices    Activity Interventions: Increase time out of bed, PT/OT evaluation    Mobility Interventions: PT/OT evaluation    Nutrition Interventions: Offer support with meals,snacks and hydration

## 2023-02-04 NOTE — PROGRESS NOTES
Primary Nurse Marciano Vasquez and Mayito Chu, RN performed a dual skin assessment on this patient Impairment noted- see wound doc flow sheet- Patient has scattered scarring all over her body, right lower extremity is red and swollen but no open areas noted.   Delbert score is 18

## 2023-02-04 NOTE — PROGRESS NOTES
Admitted for cellulitis. S/p clinda and zpsyn in ED. Daniel normal, pulse at 120s. Chart review showed HR at 110s- 120s at baseline. Will continue to monitor for now. Full H&P to follow.

## 2023-02-04 NOTE — PROGRESS NOTES
Follow-up from earlier admission by Dr. Kayla Matos:    Extensive RLE cellulitis with pain. Soft; non-tense. Change Abx to Unasyn + Vanc  --------------------------------      Patient: Mike Ruiz MRN: [id number]  SSN: [id number]    YOB: 1990  Age: 34 y.o. Sex: female      Subjective:      Mike Ruiz is a 34 y.o. female with PMH of lupus, rheumatoid arthritis, pulmonary hypertension and osteonecrosis. She presented to the ED with chief complaint of right leg pain and redness that started about 4 days ago. It became progressively worsened and spread to her upper thigh. Severe pain, worsened with movement. Also endorses fever and chills. In the ED, noted febrile and tachycardia . Lab work revealed leukocytosis. Chart review: none    Past Medical History:   Diagnosis Date    Lupus ([facility]     Osteonecrosis ([facility]     P[facility] HTN ([facility]     RA (rheumatoid arthritis) ([facility]      Family History   Problem Relation Age of Onset    Hypertension Mother     Cancer Paternal Grandmother      Social History     Tobacco Use    Smoking status: Never    Smokeless tobacco: Never   Substance Use Topics    Alcohol use: Never        Objective:     Physical Exam:   General: alert, cooperative, no distress  Eye: conjunctivae/corneas clear. PERRL, EOM's intact. Throat and Neck: normal and no erythema or exudates noted. No mass   Lung: clear to auscultation bilaterally  Heart: regular rate and rhythm,   Abdomen: soft, non-tender. Bowel sounds normal. No masses,  Extremities: Mild right LE edema. able to move all extremities but limited on right leg, atraumatic  Skin: redness at back or right extremity calf and thigh. Neurologic: AOx3. Motor function and sensation grossly intact. Psychiatric: non focal    Most recent lab work and imaging results reviewed in EMR. Assessment and plan:   # Cellulitis  - Non-purulent  - IV cefazolin  - Ultrasound to rule out DVT.      # Sepsis  - Source:cellulitis  - Antibiotics empirically: IV cefazolin  - Fluid resuscitation in ED. Hold off additional IVF given CHF  - Blood culture   - Trend LA    # Tachycardia  - Pulse rate 110-120 at baseline, continue to monitor. # Lupus  - Continue home medications. PO hydroxychloroquine, prednisone. # Pulmonary hypertension  - Continue home dose adempas, letairis    # rheumatoid arthritis  - Continue home medications. PO tofacitinib. # Right sided heart failure  - Not fluid overload clinically  - Hold home lasix and spironolactone given sepsis for now. - continue to monitor     # Social Determents of health: None    # Full code by default, need further clarification    # Medication reconciliation: Medication list reviewed on Epic and with patient/family.      Signed By: Jen Lowery MD     February 4, 2023

## 2023-02-05 LAB
ANION GAP SERPL CALC-SCNC: 5 MMOL/L (ref 5–15)
BASOPHILS # BLD: 0 K/UL (ref 0–0.1)
BASOPHILS NFR BLD: 0 % (ref 0–1)
BUN SERPL-MCNC: 12 MG/DL (ref 6–20)
BUN/CREAT SERPL: 29 (ref 12–20)
CA-I BLD-MCNC: 8.2 MG/DL (ref 8.5–10.1)
CHLORIDE SERPL-SCNC: 97 MMOL/L (ref 97–108)
CO2 SERPL-SCNC: 31 MMOL/L (ref 21–32)
CREAT SERPL-MCNC: 0.41 MG/DL (ref 0.55–1.02)
CRP SERPL-MCNC: 23.3 MG/DL (ref 0–0.6)
DIFFERENTIAL METHOD BLD: ABNORMAL
EOSINOPHIL # BLD: 0 K/UL (ref 0–0.4)
EOSINOPHIL NFR BLD: 0 % (ref 0–7)
ERYTHROCYTE [DISTWIDTH] IN BLOOD BY AUTOMATED COUNT: 21 % (ref 11.5–14.5)
ERYTHROCYTE [SEDIMENTATION RATE] IN BLOOD: >140 MM/HR (ref 0–20)
GLUCOSE SERPL-MCNC: 104 MG/DL (ref 65–100)
HCG UR QL: NEGATIVE
HCT VFR BLD AUTO: 30 % (ref 35–47)
HGB BLD-MCNC: 8.9 G/DL (ref 11.5–16)
IMM GRANULOCYTES # BLD AUTO: 0 K/UL
IMM GRANULOCYTES NFR BLD AUTO: 0 %
LYMPHOCYTES # BLD: 0.1 K/UL (ref 0.8–3.5)
LYMPHOCYTES NFR BLD: 3 % (ref 12–49)
MCH RBC QN AUTO: 26.5 PG (ref 26–34)
MCHC RBC AUTO-ENTMCNC: 29.7 G/DL (ref 30–36.5)
MCV RBC AUTO: 89.3 FL (ref 80–99)
MONOCYTES # BLD: 0.2 K/UL (ref 0–1)
MONOCYTES NFR BLD: 4 % (ref 5–13)
NEUTS BAND NFR BLD MANUAL: 3 % (ref 0–6)
NEUTS SEG # BLD: 3.5 K/UL (ref 1.8–8)
NEUTS SEG NFR BLD: 90 % (ref 32–75)
NRBC # BLD: 0.02 K/UL (ref 0–0.01)
NRBC BLD-RTO: 0.5 PER 100 WBC
PLATELET # BLD AUTO: 279 K/UL (ref 150–400)
PMV BLD AUTO: 9.6 FL (ref 8.9–12.9)
POTASSIUM SERPL-SCNC: 4.6 MMOL/L (ref 3.5–5.1)
PROCALCITONIN SERPL-MCNC: 0.36 NG/ML
RBC # BLD AUTO: 3.36 M/UL (ref 3.8–5.2)
RBC MORPH BLD: ABNORMAL
SODIUM SERPL-SCNC: 133 MMOL/L (ref 136–145)
WBC # BLD AUTO: 3.8 K/UL (ref 3.6–11)

## 2023-02-05 PROCEDURE — 86140 C-REACTIVE PROTEIN: CPT

## 2023-02-05 PROCEDURE — 74011000258 HC RX REV CODE- 258: Performed by: INTERNAL MEDICINE

## 2023-02-05 PROCEDURE — 85652 RBC SED RATE AUTOMATED: CPT

## 2023-02-05 PROCEDURE — 99223 1ST HOSP IP/OBS HIGH 75: CPT | Performed by: INTERNAL MEDICINE

## 2023-02-05 PROCEDURE — 86060 ANTISTREPTOLYSIN O TITER: CPT

## 2023-02-05 PROCEDURE — 81025 URINE PREGNANCY TEST: CPT

## 2023-02-05 PROCEDURE — 74011250636 HC RX REV CODE- 250/636: Performed by: INTERNAL MEDICINE

## 2023-02-05 PROCEDURE — 84145 PROCALCITONIN (PCT): CPT

## 2023-02-05 PROCEDURE — 85025 COMPLETE CBC W/AUTO DIFF WBC: CPT

## 2023-02-05 PROCEDURE — 65270000029 HC RM PRIVATE

## 2023-02-05 PROCEDURE — 74011250637 HC RX REV CODE- 250/637: Performed by: INTERNAL MEDICINE

## 2023-02-05 PROCEDURE — 74011636637 HC RX REV CODE- 636/637: Performed by: INTERNAL MEDICINE

## 2023-02-05 PROCEDURE — 94762 N-INVAS EAR/PLS OXIMTRY CONT: CPT

## 2023-02-05 PROCEDURE — 74011000250 HC RX REV CODE- 250: Performed by: INTERNAL MEDICINE

## 2023-02-05 PROCEDURE — 86215 DEOXYRIBONUCLEASE ANTIBODY: CPT

## 2023-02-05 PROCEDURE — 80048 BASIC METABOLIC PNL TOTAL CA: CPT

## 2023-02-05 PROCEDURE — 36415 COLL VENOUS BLD VENIPUNCTURE: CPT

## 2023-02-05 RX ORDER — SODIUM CHLORIDE 9 MG/ML
125 INJECTION, SOLUTION INTRAVENOUS CONTINUOUS
Status: DISCONTINUED | OUTPATIENT
Start: 2023-02-05 | End: 2023-02-06

## 2023-02-05 RX ADMIN — SODIUM CHLORIDE, PRESERVATIVE FREE 10 ML: 5 INJECTION INTRAVENOUS at 22:42

## 2023-02-05 RX ADMIN — SODIUM CHLORIDE 125 ML/HR: 9 INJECTION, SOLUTION INTRAVENOUS at 10:00

## 2023-02-05 RX ADMIN — HYDROMORPHONE HYDROCHLORIDE 1 MG: 1 INJECTION, SOLUTION INTRAMUSCULAR; INTRAVENOUS; SUBCUTANEOUS at 11:19

## 2023-02-05 RX ADMIN — SODIUM CHLORIDE 1.5 G: 900 INJECTION INTRAVENOUS at 05:36

## 2023-02-05 RX ADMIN — GABAPENTIN 600 MG: 300 CAPSULE ORAL at 09:29

## 2023-02-05 RX ADMIN — SODIUM CHLORIDE 500 ML: 9 INJECTION, SOLUTION INTRAVENOUS at 09:36

## 2023-02-05 RX ADMIN — SODIUM CHLORIDE 1.5 G: 900 INJECTION INTRAVENOUS at 00:18

## 2023-02-05 RX ADMIN — SODIUM CHLORIDE 1.5 G: 900 INJECTION INTRAVENOUS at 23:36

## 2023-02-05 RX ADMIN — HYDROXYCHLOROQUINE SULFATE 200 MG: 200 TABLET, FILM COATED ORAL at 09:29

## 2023-02-05 RX ADMIN — PANTOPRAZOLE SODIUM 40 MG: 40 TABLET, DELAYED RELEASE ORAL at 09:30

## 2023-02-05 RX ADMIN — FOLIC ACID 2 MG: 1 TABLET ORAL at 09:30

## 2023-02-05 RX ADMIN — HYDROMORPHONE HYDROCHLORIDE 1 MG: 1 INJECTION, SOLUTION INTRAMUSCULAR; INTRAVENOUS; SUBCUTANEOUS at 03:49

## 2023-02-05 RX ADMIN — HYDROMORPHONE HYDROCHLORIDE 1 MG: 1 INJECTION, SOLUTION INTRAMUSCULAR; INTRAVENOUS; SUBCUTANEOUS at 17:39

## 2023-02-05 RX ADMIN — SODIUM CHLORIDE 125 ML/HR: 9 INJECTION, SOLUTION INTRAVENOUS at 21:12

## 2023-02-05 RX ADMIN — OXYCODONE AND ACETAMINOPHEN 1 TABLET: 5; 325 TABLET ORAL at 21:10

## 2023-02-05 RX ADMIN — SODIUM CHLORIDE 1.5 G: 900 INJECTION INTRAVENOUS at 11:19

## 2023-02-05 RX ADMIN — DULOXETINE HYDROCHLORIDE 30 MG: 30 CAPSULE, DELAYED RELEASE ORAL at 21:11

## 2023-02-05 RX ADMIN — DULOXETINE HYDROCHLORIDE 30 MG: 30 CAPSULE, DELAYED RELEASE ORAL at 09:30

## 2023-02-05 RX ADMIN — SODIUM CHLORIDE, PRESERVATIVE FREE 10 ML: 5 INJECTION INTRAVENOUS at 05:36

## 2023-02-05 RX ADMIN — HYDROMORPHONE HYDROCHLORIDE 1 MG: 1 INJECTION, SOLUTION INTRAMUSCULAR; INTRAVENOUS; SUBCUTANEOUS at 23:36

## 2023-02-05 RX ADMIN — PREDNISONE 30 MG: 20 TABLET ORAL at 21:10

## 2023-02-05 RX ADMIN — GABAPENTIN 600 MG: 300 CAPSULE ORAL at 21:11

## 2023-02-05 RX ADMIN — SODIUM CHLORIDE 1.5 G: 900 INJECTION INTRAVENOUS at 17:35

## 2023-02-05 RX ADMIN — SODIUM CHLORIDE, PRESERVATIVE FREE 10 ML: 5 INJECTION INTRAVENOUS at 14:00

## 2023-02-05 NOTE — PROGRESS NOTES
Reason for Admission:  Cellulitis                     RUR Score:                     Plan for utilizing home health:          PCP: First and Last name:  Desmond Benavidez MD     Name of Practice:    Are you a current patient: Yes/No:    Approximate date of last visit:    Can you participate in a virtual visit with your PCP:                     Current Advanced Directive/Advance Care Plan: Full Code      Healthcare Decision Maker:   Click here to complete [id number] including selection of the Healthcare Decision Maker Relationship (ie \"Primary\")                             Transition of Care Plan:                    CM met with patient at bedside. Patient is alert and oriented lives with mom and dad in a home. Patient is independent with ADL's. Patient has a walker, cane, commode and rollator. Patient had [facility] with [facility] but they do not accept her insurance. Patient's mother will transport home. Patient receives medication from [facility] in [place]. Patient saw PCP about 2 weeks ago. CM will continue to follow.

## 2023-02-05 NOTE — PROGRESS NOTES
Problem: Falls - Risk of  Goal: *Absence of Falls  Description: Document Keyanna Horton Fall Risk and appropriate interventions in the flowsheet. Outcome: Progressing Towards Goal  Note: Fall Risk Interventions:   Standard Safety Measures include: Bed/Chair alarm on; Bed/Chair Wheels locked; Bed in low position; Call light within reach. Problem: Pressure Injury - Risk of  Goal: *Prevention of pressure injury  Description: Document Delbert Scale and appropriate interventions in the flowsheet.   Outcome: Progressing Towards Goal  Note: Pressure Injury Interventions:  Sensory Interventions: Minimize linen layers, Keep linens dry and wrinkle-free    Moisture Interventions: Absorbent underpads, Apply protective barrier, creams and emollients, Internal/External urinary devices    Activity Interventions: Increase time out of bed, PT/OT evaluation    Mobility Interventions: PT/OT evaluation    Nutrition Interventions: Offer support with meals,snacks and hydration

## 2023-02-05 NOTE — CONSULTS
Consult Date: 2/5/2023    Consults Sepsis in immunocompromised patient    Subjective   This is a 34year old female with Lupus and RA on Plaquenila and Prednisone, known to me from August 2021 when followed for cellulitis/abscess right arm, secondary to MSSA, treated with Zosyn and discharged on Augmentin. She was seen in ID Clinic same month with multiple wounds right arm with iodoform packing. Augmentin was extended for another 14 days. She continued to be followed in [address] [place] [address] (Dr. Yokasta Lyons) and was last seen in Nov 2021 at which time her right arm infection appeared to have resolved. Patient presented to the ED this time because of right leg swelling with concern for celluliitis with redness, pain and swelling. She reported fever and chills. She was febrile to 100.5 and on exam noted to have a diffuse erythematous rash involving her right lower extremity. Her WBC was normal but lactic acid was elevated. CXR showed unchanged interstitial changes and small pleural effusions. Images viewed by me. Duplex scan showed no evidence of DVT in RLE. Blood cultures were sent and patient was started on Unasyn. Urinalysis with reflex culture was ordered. ID has been consulted for sepsis. In addition to Plaquenil and Prednisone, she is now on Tofacitinib. Patient is lying in bed and affirms redness, pain and swelling in her right leg. Father at bedside.   Past Medical History:   Diagnosis Date    Lupus ([address]     Osteonecrosis ([address]     Pulmonary HTN ([address]     RA (rheumatoid arthritis) ([address]       Past Surgical History:   Procedure Laterality Date    HX CYST REMOVAL      HX ORTHOPAEDIC      x2 left knee sxs    HX ORTHOPAEDIC      bilateral ankle sxs     Family History   Problem Relation Age of Onset    Hypertension Mother     Cancer Paternal Grandmother       Social History     Tobacco Use    Smoking status: Never    Smokeless tobacco: Never   Substance Use Topics    Alcohol use: Never Current Facility-Administered Medications   Medication Dose Route Frequency Provider Last Rate Last Admin    0.9% sodium chloride infusion  125 mL/hr IntraVENous CONTINUOUS Opal Bourgeois  mL/hr at 02/05/23 1000 125 mL/hr at 02/05/23 1000    . PHARMACY TO SUBSTITUTE PER PROTOCOL (Reordered from: Adempas 2.5 mg tab tablet)    Per Protocol Pearl Ansari MD        DULoxetine (CYMBALTA) capsule 30 mg  30 mg Oral BID Evans Arango MD   30 mg at 91/50/72 4649    folic acid (FOLVITE) tablet 2 mg  2 mg Oral DAILY Pearl Ansari MD   2 mg at 02/05/23 0930    gabapentin (NEURONTIN) capsule 600 mg  600 mg Oral BID Evans Arango MD   600 mg at 02/05/23 9126    hydrOXYchloroQUINE (PLAQUENIL) tablet 200 mg  200 mg Oral DAILY Evans Arango MD   200 mg at 02/05/23 0929    . PHARMACY TO SUBSTITUTE PER PROTOCOL (Reordered from: Letairis 5 mg tablet)    Per Protocol Pearl Ansari MD        pantoprazole (PROTONIX) tablet 40 mg  40 mg Oral DAILY Pearl Ansari MD   40 mg at 02/05/23 0930    predniSONE (DELTASONE) tablet 30 mg  30 mg Oral QHS Evans Arango MD   30 mg at 02/04/23 2100    . PHARMACY TO SUBSTITUTE PER PROTOCOL (Reordered from: tofacitinib (Xeljanz XR) 11 mg Tb24)    Per Protocol Pearl Ansari MD        sodium chloride (NS) flush 5-40 mL  5-40 mL IntraVENous Q8H Pearl Ansari MD   10 mL at 02/05/23 0536    sodium chloride (NS) flush 5-40 mL  5-40 mL IntraVENous PRN Pearl Ansari MD        acetaminophen (TYLENOL) tablet 650 mg  650 mg Oral Q6H PRN Evans Arango MD   650 mg at 02/04/23 1724    Or    acetaminophen (TYLENOL) suppository 650 mg  650 mg Rectal Q6H PRN Evans Arango MD        polyethylene glycol (MIRALAX) packet 17 g  17 g Oral DAILY PRN Evans Arango MD        promethazine (PHENERGAN) 12.5 mg in 0.9% sodium chloride 50 mL IVPB  12.5 mg IntraVENous Q4H PRN Evans Arango  mL/hr at 02/04/23 1056 12.5 mg at 02/04/23 1056    HYDROmorphone (DILAUDID) injection 1 mg  1 mg IntraVENous Q6H PRN Cody Santana MD   1 mg at 02/05/23 1119    oxyCODONE-acetaminophen (PERCOCET) 5-325 mg per tablet 1 Tablet  1 Tablet Oral Q6H PRN Cody Santana MD        ampicillin-sulbactam (UNASYN) 1.5 g in 0.9% sodium chloride (MBP/ADV) 50 mL MBP  1.5 g IntraVENous Q6H Cody Santana  mL/hr at 02/05/23 1119 1.5 g at 02/05/23 1119    sodium chloride (NS) flush 5-10 mL  5-10 mL IntraVENous PRN Lubna Matta MD   10 mL at 02/04/23 0559        Review of Systems   Constitutional:  Positive for chills, fatigue and fever. Negative for appetite change and diaphoresis. HENT: Negative. Eyes: Negative. Respiratory: Negative. Cardiovascular:  Positive for leg swelling. Gastrointestinal: Negative. Musculoskeletal:  Positive for myalgias. Skin:  Positive for color change. Negative for rash and wound. Allergic/Immunologic: Positive for immunocompromised state. Neurological: Negative. Hematological: Negative. Psychiatric/Behavioral: Negative. Objective     Vital signs for last 24 hours:  Visit Vitals  /88 (BP 1 Location: Right upper arm, BP Patient Position: Semi fowlers)   Pulse (!) 133   Temp 100.3 °F (37.9 °C)   Resp 22   Ht 4' 11\" (1.499 m)   Wt 141 lb 1.5 oz (64 kg)   SpO2 92%   BMI 28.50 kg/m²       Intake/Output this shift:  Current Shift: No intake/output data recorded.   Last 3 Shifts: 02/03 1901 - 02/05 0700  In: -   Out: 500 [Urine:500]    Data Review:   Recent Results (from the past 24 hour(s))   METABOLIC PANEL, BASIC    Collection Time: 02/05/23 10:53 AM   Result Value Ref Range    Sodium 133 (L) 136 - 145 mmol/L    Potassium 4.6 3.5 - 5.1 mmol/L    Chloride 97 97 - 108 mmol/L    CO2 31 21 - 32 mmol/L    Anion gap 5 5 - 15 mmol/L    Glucose 104 (H) 65 - 100 mg/dL    BUN 12 6 - 20 mg/dL    Creatinine 0.41 (L) 0.55 - 1.02 mg/dL    BUN/Creatinine ratio 29 (H) 12 - 20      eGFR >60 >60 ml/min/1.73m2    Calcium 8.2 (L) 8.5 - 10.1 mg/dL   CBC WITH AUTOMATED DIFF    Collection Time: 02/05/23 10:53 AM   Result Value Ref Range    WBC 3.8 3.6 - 11.0 K/uL    RBC 3.36 (L) 3.80 - 5.20 M/uL    HGB 8.9 (L) 11.5 - 16.0 g/dL    HCT 30.0 (L) 35.0 - 47.0 %    MCV 89.3 80.0 - 99.0 FL    MCH 26.5 26.0 - 34.0 PG    MCHC 29.7 (L) 30.0 - 36.5 g/dL    RDW 21.0 (H) 11.5 - 14.5 %    PLATELET 258 254 - 834 K/uL    MPV 9.6 8.9 - 12.9 FL    NRBC 0.5 (H) 0.0  WBC    ABSOLUTE NRBC 0.02 (H) 0.00 - 0.01 K/uL    NEUTROPHILS PENDING %    LYMPHOCYTES PENDING %    MONOCYTES PENDING %    EOSINOPHILS PENDING %    BASOPHILS PENDING %    IMMATURE GRANULOCYTES PENDING %    ABS. NEUTROPHILS PENDING K/UL    ABS. LYMPHOCYTES PENDING K/UL    ABS. MONOCYTES PENDING K/UL    ABS. EOSINOPHILS PENDING K/UL    ABS. BASOPHILS PENDING K/UL    ABS. IMM. GRANS. PENDING K/UL    DF PENDING      CXR (2/3)        Physical Exam  Vitals and nursing note reviewed. Constitutional:       General: She is not in acute distress. Appearance: She is ill-appearing. HENT:      Head: Normocephalic. Right Ear: External ear normal.      Left Ear: External ear normal.      Nose: Nose normal.      Mouth/Throat:      Pharynx: Oropharynx is clear. Eyes:      Pupils: Pupils are equal, round, and reactive to light. Cardiovascular:      Rate and Rhythm: Normal rate and regular rhythm. Heart sounds: No murmur heard. Pulmonary:      Effort: Pulmonary effort is normal.      Breath sounds: Normal breath sounds. Abdominal:      General: Bowel sounds are normal. There is no distension. Palpations: Abdomen is soft. Tenderness: There is no abdominal tenderness. There is no guarding. Genitourinary:     Comments: No Mckeon  Musculoskeletal:      Cervical back: Neck supple. Right lower leg: Edema (Generalized erythema, warmth, swelling and pain right medial legp no open wounds or drainage) present. Left lower leg: No edema (multiple well-healed scars). Skin:     Findings: Erythema present. No rash.    Neurological: General: No focal deficit present. Mental Status: She is alert and oriented to person, place, and time. Psychiatric:         Mood and Affect: Mood normal.         Behavior: Behavior normal.         Thought Content: Thought content normal.         Judgment: Judgment normal.     ASSESSMENT/PLAN    Cellulitis right lower extremity, etiology unclear  Sepsis with fever, elevated CRP and procal  Elevated ESR  Immunosuppression on Plaquenil, Prednisone and Tofacitinib  Systemic Lupus Erythematosus  Rheumatoid arthritis  Allergies to Doxycycline and Vancomycin    Comment: Etiology of her cellulitis most likely beta hemolytic Strep but cannot completely rule out MRSA. Unasyn will cover Strep and MSSA, but not MRSA. Patients receiving tofacitinib are indeed at increased risk for infection including active tuberculosis (including disseminated), invasive fungal infection (including cryptococcosis and pneumocystosis), bacterial infection including UTI and cellulitis and viral infection. Inflammatory markers such as ESR and CRP may be difficult to interpret because of her SLE and RA, however, procalcitonin should be reasonably discriminatory for bacterial infection, though is only mildly elevated. Reasonable to continue Unasyn for now  Check CRP, procal, ASO titer and Dnase B antibody  Follow-up blood cultures  Urinalysis with reflex urine culture ordered    Isaac Almanza MD

## 2023-02-05 NOTE — PROGRESS NOTES
Problem: Falls - Risk of  Goal: *Absence of Falls  Description: Document Liseth Garrett Fall Risk and appropriate interventions in the flowsheet.   Outcome: Progressing Towards Goal  Note: Fall Risk Interventions:

## 2023-02-05 NOTE — CONSULTS
CARDIOLOGY CONSULTATION      REASON FOR CONSULT: Pulm HTN    REQUESTING PROVIDER: ER    CHIEF COMPLAINT:  Leg Pain     HISTORY OF PRESENT ILLNESS:  Hudson Parada is a 34y.o. year-old female with past medical history significant for Pulm HTN (seen at [facility]). RA, p/w leg pain. Found to have leg cellulitis. Denies any CP, SOB, palpitations. INPATIENT MEDICATIONS:  Home medications reviewed. Current Facility-Administered Medications:     0.9% sodium chloride infusion, 125 mL/hr, IntraVENous, CONTINUOUS, Logan Seth MD, Last Rate: 125 mL/hr at 02/05/23 1000, 125 mL/hr at 02/05/23 1000    . PHARMACY TO SUBSTITUTE PER PROTOCOL (Reordered from: Adempas 2.5 mg tab tablet), , , Per Protocol, Cha, Meryle Reagin, MD    DULoxetine (CYMBALTA) capsule 30 mg, 30 mg, Oral, BID, Cha, Meryle Reagin, MD, 30 mg at 51/52/06 2957    folic acid (FOLVITE) tablet 2 mg, 2 mg, Oral, DAILY, Cha, Meryle Reagin, MD, 2 mg at 02/05/23 0930    gabapentin (NEURONTIN) capsule 600 mg, 600 mg, Oral, BID, Cha, Meryle Reagin, MD, 600 mg at 02/05/23 5063    hydrOXYchloroQUINE (PLAQUENIL) tablet 200 mg, 200 mg, Oral, DAILY, Ellen Wilkins MD, 200 mg at 02/05/23 0929    . PHARMACY TO SUBSTITUTE PER PROTOCOL (Reordered from: Letairis 5 mg tablet), , , Per Protocol, Cha, Meryle Reagin, MD    pantoprazole (PROTONIX) tablet 40 mg, 40 mg, Oral, DAILY, Cha, Meryle Reagin, MD, 40 mg at 02/05/23 0930    predniSONE (DELTASONE) tablet 30 mg, 30 mg, Oral, QHS, Cha, Meryle Reagin, MD, 30 mg at 02/04/23 2100    . PHARMACY TO SUBSTITUTE PER PROTOCOL (Reordered from: tofacitinib (Xeljanz XR) 11 mg Tb24), , , Per Protocol, Cha, Meryle Reagin, MD    sodium chloride (NS) flush 5-40 mL, 5-40 mL, IntraVENous, Q8H, Cl Ansari MD, 10 mL at 02/05/23 0536    sodium chloride (NS) flush 5-40 mL, 5-40 mL, IntraVENous, PRN, Cha, Meryle Reagin, MD    acetaminophen (TYLENOL) tablet 650 mg, 650 mg, Oral, Q6H PRN, 650 mg at 02/04/23 1724 **OR** acetaminophen (TYLENOL) suppository 650 mg, 650 mg, Rectal, Q6H PRN, Coty Jonh Kwan MD    polyethylene glycol (MIRALAX) packet 17 g, 17 g, Oral, DAILY PRN, Jonh Ansari MD    promethazine (PHENERGAN) 12.5 mg in 0.9% sodium chloride 50 mL IVPB, 12.5 mg, IntraVENous, Q4H PRN, Gonzalo Ritchie MD, Last Rate: 200 mL/hr at 02/04/23 1056, 12.5 mg at 02/04/23 1056    HYDROmorphone (DILAUDID) injection 1 mg, 1 mg, IntraVENous, Q6H PRN, Joan Wilkins MD, 1 mg at 02/05/23 1119    oxyCODONE-acetaminophen (PERCOCET) 5-325 mg per tablet 1 Tablet, 1 Tablet, Oral, Q6H PRN, Joan Wilkins MD    ampicillin-sulbactam (UNASYN) 1.5 g in 0.9% sodium chloride (MBP/ADV) 50 mL MBP, 1.5 g, IntraVENous, Q6H, Deanna Watson MD, Last Rate: 100 mL/hr at 02/05/23 1119, 1.5 g at 02/05/23 1119    sodium chloride (NS) flush 5-10 mL, 5-10 mL, IntraVENous, PRN, Gonzalo Ritchie MD, 10 mL at 02/04/23 0559     ALLERGIES:  Allergies reviewed with the patient,  Allergies   Allergen Reactions    Doxycycline Nausea and Vomiting    Heparin Anaphylaxis    Remicade [Infliximab] Anaphylaxis    Vancomycin Hives    Rituximab Itching    Percocet [Oxycodone-Acetaminophen] Nausea and Vomiting     Side effect    . FAMILY HISTORY:  Family history reviewed. SOCIAL HISTORY:  Notable for no  tobacco use, no heavy alcohol or illicit drug use. REVIEW OF SYSTEMS:  Complete review of systems performed, pertinents noted above, all other systems are negative. PHYSICAL EXAMINATION:  Cardiovascular exam has a heart with a RRR, normal S1 and S2. No murmur present. There are no rubs or gallops. Good peripheral pulses. No jugular venous distension. No carotid bruits are present. Respiratory exam reveals clear lung fields, no rales or rhonchi. Gastrointestinal exam has soft, nontender abdomen with normal bowel sounds. Lymphatic exam reveals No edema and No varicosities.     Visit Vitals  /88 (BP 1 Location: Right upper arm, BP Patient Position: Semi fowlers)   Pulse (!) 130   Temp 99.6 °F (37.6 °C)   Resp 22   Ht 4' 11\" (1.499 m)   Wt 64 kg (141 lb 1.5 oz)   SpO2 92%   BMI 28.50 kg/m²         Recent labs results and imaging reviewed. Notable findings include   Lab Results   Component Value Date/Time    WBC 3.1 (L) 02/03/2023 03:50 PM    HGB 9.3 (L) 02/03/2023 03:50 PM    HCT 31.1 (L) 02/03/2023 03:50 PM    PLATELET 844 36/88/8231 03:50 PM    MCV 89.1 02/03/2023 03:50 PM     Lab Results   Component Value Date/Time    Troponin-I, Qt. <0.05 02/12/2021 02:34 PM      Lab Results   Component Value Date/Time    NT pro-BNP 1,062 (H) 11/02/2022 01:30 AM     .     1) Pulm HTN: Unclear how severe. She follows with [facility]. - Would start her on any home medications   - Echocardiogram pending   - Stable from cardiac standpoint   2) HTN: Controlled   3) Cellulitis: Abx per primary team     Thank you for involving us in the care of this patient.     Mariluz Peña MD  2/5/2023

## 2023-02-05 NOTE — PROGRESS NOTES
Hospitalist Progress Note    Daily Progress Note: 2/5/2023 9:11 AM    Assessment/Plan with MDM & Differential Considerations     # Sepsis d/t Significant RLE Cellulitis extending groin to knee medially with prior hx of such + Immunocompromised state d/t RA/Lupus with active treatment    - (Received Ancef in ER) --> IV Unasyn (allergic to Vanc)  - IVF  - Stress Dose Prednisone  - Venous Duplex Negative for DVT  - C/s ID for 2nd opinion on Abx coverage needs    # Hx Pulm HTN with Chronic Right Heart Failure (not in overt decompensation but mild pleural effusion)    - C/s Cardiology  - Echo      DVT Prophylaxis: Allergic to Heparin  GI Prophylaxis: Protonix  Code Status: Full Code  Care Plan discussed with: patient    Disposition: Continue inpt mgmt >48h. C/s ID, Card, Echo    Admission Hx/HPI per Dr. Gallo Mann:  Hudson Parada is a 34 y.o. female with PMH of lupus, rheumatoid arthritis, pulmonary hypertension and osteonecrosis. She presented to the ED with chief complaint of right leg pain and redness that started about 4 days ago. It became progressively worsened and spread to her upper thigh. Severe pain, worsened with movement. Also endorses fever and chills. In the ED, noted febrile and tachycardia . Lab work revealed leukocytosis.   ----------------------------------------    Subjective: Little better. Less leg pain. Redness little less. No CP, SOB, nausea. Current Facility-Administered Medications   Medication Dose Route Frequency    0.9% sodium chloride infusion  125 mL/hr IntraVENous CONTINUOUS    sodium chloride 0.9 % bolus infusion 500 mL  500 mL IntraVENous ONCE    . PHARMACY TO SUBSTITUTE PER PROTOCOL (Reordered from: Adempas 2.5 mg tab tablet)    Per Protocol    DULoxetine (CYMBALTA) capsule 30 mg  30 mg Oral BID    folic acid (FOLVITE) tablet 2 mg  2 mg Oral DAILY    gabapentin (NEURONTIN) capsule 600 mg  600 mg Oral BID    hydrOXYchloroQUINE (PLAQUENIL) tablet 200 mg  200 mg Oral DAILY    . PHARMACY TO SUBSTITUTE PER PROTOCOL (Reordered from: Letairis 5 mg tablet)    Per Protocol    pantoprazole (PROTONIX) tablet 40 mg  40 mg Oral DAILY    predniSONE (DELTASONE) tablet 30 mg  30 mg Oral QHS    . PHARMACY TO SUBSTITUTE PER PROTOCOL (Reordered from: tofacitinib (Xeljanz XR) 11 mg Tb24)    Per Protocol    sodium chloride (NS) flush 5-40 mL  5-40 mL IntraVENous Q8H    sodium chloride (NS) flush 5-40 mL  5-40 mL IntraVENous PRN    acetaminophen (TYLENOL) tablet 650 mg  650 mg Oral Q6H PRN    Or    acetaminophen (TYLENOL) suppository 650 mg  650 mg Rectal Q6H PRN    polyethylene glycol (MIRALAX) packet 17 g  17 g Oral DAILY PRN    promethazine (PHENERGAN) 12.5 mg in 0.9% sodium chloride 50 mL IVPB  12.5 mg IntraVENous Q4H PRN    HYDROmorphone (DILAUDID) injection 1 mg  1 mg IntraVENous Q6H PRN    oxyCODONE-acetaminophen (PERCOCET) 5-325 mg per tablet 1 Tablet  1 Tablet Oral Q6H PRN    ampicillin-sulbactam (UNASYN) 1.5 g in 0.9% sodium chloride (MBP/ADV) 50 mL MBP  1.5 g IntraVENous Q6H    sodium chloride (NS) flush 5-10 mL  5-10 mL IntraVENous PRN       Objective     Visit Vitals  /88 (BP 1 Location: Right upper arm, BP Patient Position: Semi fowlers)   Pulse (!) 130   Temp 99.6 °F (37.6 °C)   Resp 22   Ht 4' 11\" (1.499 m)   Wt 64 kg (141 lb 1.5 oz)   SpO2 92%   BMI 28.50 kg/m²    O2 Flow Rate (L/min): 2 l/min O2 Device: None (Room air)    Temp (24hrs), Av.6 °F (37.6 °C), Min:98.8 °F (37.1 °C), Max:100.2 °F (37.9 °C)      No intake/output data recorded.  1901 -  0700  In: -   Out: 500 [Urine:500]      PERTINENT PHYSICAL EXAM    Constitutional: NAD, Awake, conversant, comfortable    HEENT: Dry    Neck: Supple     Cardiovascular: S1S2    Respiratory:  CTA ant bilat; vesicular breath sounds, no overt wheeze     GI: Soft, NT; no guarding/rigidity; + bowel sounds    Neurological:  AxOx3; No new focal weakness;  No overt tremors    Psychiatric: Appropriate mood; oriented, coherent/cogent    MS/Skin: Extensive erythema RLE (groin/thigh medially to knee); soft; no fluctuance or tension    Vascular: Palpable pulses; No significant edema      Data Review    No results found for this or any previous visit (from the past 24 hour(s)). DUPLEX LOWER EXT VENOUS BILAT   Final Result      XR CHEST PORT   Final Result      Stable exam. Unchanged interstitial lung abnormalities and small pleural   effusions.               ________________________________________  Pino Varela MD

## 2023-02-06 ENCOUNTER — APPOINTMENT (OUTPATIENT)
Dept: NON INVASIVE DIAGNOSTICS | Age: 33
End: 2023-02-06
Attending: INTERNAL MEDICINE
Payer: MEDICARE

## 2023-02-06 ENCOUNTER — APPOINTMENT (OUTPATIENT)
Dept: GENERAL RADIOLOGY | Age: 33
End: 2023-02-06
Attending: INTERNAL MEDICINE
Payer: MEDICARE

## 2023-02-06 LAB
ANION GAP SERPL CALC-SCNC: 6 MMOL/L (ref 5–15)
BASOPHILS # BLD: 0 K/UL (ref 0–0.1)
BASOPHILS NFR BLD: 0 % (ref 0–1)
BUN SERPL-MCNC: 10 MG/DL (ref 6–20)
BUN/CREAT SERPL: 26 (ref 12–20)
CA-I BLD-MCNC: 8 MG/DL (ref 8.5–10.1)
CHLORIDE SERPL-SCNC: 104 MMOL/L (ref 97–108)
CK SERPL-CCNC: 48 U/L (ref 26–192)
CO2 SERPL-SCNC: 27 MMOL/L (ref 21–32)
CREAT SERPL-MCNC: 0.38 MG/DL (ref 0.55–1.02)
CRP SERPL-MCNC: 15.2 MG/DL (ref 0–0.6)
DIFFERENTIAL METHOD BLD: ABNORMAL
ECHO AO ASC DIAM: 2.7 CM
ECHO AO ASCENDING AORTA INDEX: 1.67 CM/M2
ECHO AO ROOT DIAM: 3.2 CM
ECHO AO ROOT INDEX: 1.98 CM/M2
ECHO AV AREA PEAK VELOCITY: 1.7 CM2
ECHO AV AREA VTI: 1.8 CM2
ECHO AV AREA/BSA PEAK VELOCITY: 1 CM2/M2
ECHO AV AREA/BSA VTI: 1.1 CM2/M2
ECHO AV MEAN GRADIENT: 2 MMHG
ECHO AV MEAN VELOCITY: 0.7 M/S
ECHO AV PEAK GRADIENT: 4 MMHG
ECHO AV PEAK VELOCITY: 1 M/S
ECHO AV VELOCITY RATIO: 0.7
ECHO AV VTI: 13.8 CM
ECHO EST RA PRESSURE: 3 MMHG
ECHO LA DIAMETER INDEX: 2.41 CM/M2
ECHO LA DIAMETER: 3.9 CM
ECHO LA TO AORTIC ROOT RATIO: 1.22
ECHO LV E' LATERAL VELOCITY: 6 CM/S
ECHO LV E' SEPTAL VELOCITY: 6 CM/S
ECHO LV EDV A4C: 31 ML
ECHO LV EDV INDEX A4C: 19 ML/M2
ECHO LV EJECTION FRACTION A4C: 42 %
ECHO LV ESV A4C: 18 ML
ECHO LV ESV INDEX A4C: 11 ML/M2
ECHO LV FRACTIONAL SHORTENING: 10 % (ref 28–44)
ECHO LV INTERNAL DIMENSION DIASTOLE INDEX: 2.96 CM/M2
ECHO LV INTERNAL DIMENSION DIASTOLIC: 4.8 CM (ref 3.9–5.3)
ECHO LV INTERNAL DIMENSION SYSTOLIC INDEX: 2.65 CM/M2
ECHO LV INTERNAL DIMENSION SYSTOLIC: 4.3 CM
ECHO LV IVSD: 1.6 CM (ref 0.6–0.9)
ECHO LV MASS 2D: 350.7 G (ref 67–162)
ECHO LV MASS INDEX 2D: 216.5 G/M2 (ref 43–95)
ECHO LV POSTERIOR WALL DIASTOLIC: 1.7 CM (ref 0.6–0.9)
ECHO LV RELATIVE WALL THICKNESS RATIO: 0.71
ECHO LVOT AREA: 2.5 CM2
ECHO LVOT AV VTI INDEX: 0.7
ECHO LVOT DIAM: 1.8 CM
ECHO LVOT MEAN GRADIENT: 1 MMHG
ECHO LVOT PEAK GRADIENT: 2 MMHG
ECHO LVOT PEAK VELOCITY: 0.7 M/S
ECHO LVOT STROKE VOLUME INDEX: 15.2 ML/M2
ECHO LVOT SV: 24.7 ML
ECHO LVOT VTI: 9.7 CM
ECHO PV MAX VELOCITY: 1.1 M/S
ECHO PV MEAN GRADIENT: 3 MMHG
ECHO PV MEAN VELOCITY: 0.7 M/S
ECHO PV PEAK GRADIENT: 5 MMHG
ECHO PV VTI: 16.8 CM
ECHO RIGHT VENTRICULAR SYSTOLIC PRESSURE (RVSP): 55 MMHG
ECHO RV BASAL DIMENSION: 3.2 CM
ECHO RV MID DIMENSION: 2.8 CM
ECHO TV REGURGITANT MAX VELOCITY: 3.59 M/S
ECHO TV REGURGITANT PEAK GRADIENT: 52 MMHG
EOSINOPHIL # BLD: 0 K/UL (ref 0–0.4)
EOSINOPHIL NFR BLD: 0 % (ref 0–7)
ERYTHROCYTE [DISTWIDTH] IN BLOOD BY AUTOMATED COUNT: 20.8 % (ref 11.5–14.5)
GLUCOSE SERPL-MCNC: 108 MG/DL (ref 65–100)
HCT VFR BLD AUTO: 28 % (ref 35–47)
HGB BLD-MCNC: 8.2 G/DL (ref 11.5–16)
IMM GRANULOCYTES # BLD AUTO: 0 K/UL
IMM GRANULOCYTES NFR BLD AUTO: 0 %
LYMPHOCYTES # BLD: 0 K/UL (ref 0.8–3.5)
LYMPHOCYTES NFR BLD: 1 % (ref 12–49)
MAGNESIUM SERPL-MCNC: 2.1 MG/DL (ref 1.6–2.4)
MCH RBC QN AUTO: 26.1 PG (ref 26–34)
MCHC RBC AUTO-ENTMCNC: 29.3 G/DL (ref 30–36.5)
MCV RBC AUTO: 89.2 FL (ref 80–99)
METAMYELOCYTES NFR BLD MANUAL: 2 %
MONOCYTES # BLD: 0.2 K/UL (ref 0–1)
MONOCYTES NFR BLD: 6 % (ref 5–13)
MYELOCYTES NFR BLD MANUAL: 1 %
NEUTS BAND NFR BLD MANUAL: 10 % (ref 0–6)
NEUTS SEG # BLD: 3.3 K/UL (ref 1.8–8)
NEUTS SEG NFR BLD: 80 % (ref 32–75)
NRBC # BLD: 0.05 K/UL (ref 0–0.01)
NRBC BLD-RTO: 1.3 PER 100 WBC
PLATELET # BLD AUTO: 241 K/UL (ref 150–400)
PLATELET COMMENTS,PCOM: ABNORMAL
PMV BLD AUTO: 9.8 FL (ref 8.9–12.9)
POTASSIUM SERPL-SCNC: 4.2 MMOL/L (ref 3.5–5.1)
PROCALCITONIN SERPL-MCNC: 0.29 NG/ML
RBC # BLD AUTO: 3.14 M/UL (ref 3.8–5.2)
RBC MORPH BLD: ABNORMAL
RBC MORPH BLD: ABNORMAL
SODIUM SERPL-SCNC: 137 MMOL/L (ref 136–145)
WBC # BLD AUTO: 3.7 K/UL (ref 3.6–11)
WBC MORPH BLD: ABNORMAL

## 2023-02-06 PROCEDURE — 71046 X-RAY EXAM CHEST 2 VIEWS: CPT

## 2023-02-06 PROCEDURE — 74011250636 HC RX REV CODE- 250/636: Performed by: INTERNAL MEDICINE

## 2023-02-06 PROCEDURE — 86140 C-REACTIVE PROTEIN: CPT

## 2023-02-06 PROCEDURE — 74011250637 HC RX REV CODE- 250/637: Performed by: INTERNAL MEDICINE

## 2023-02-06 PROCEDURE — 74011000258 HC RX REV CODE- 258: Performed by: INTERNAL MEDICINE

## 2023-02-06 PROCEDURE — 71045 X-RAY EXAM CHEST 1 VIEW: CPT

## 2023-02-06 PROCEDURE — 82550 ASSAY OF CK (CPK): CPT

## 2023-02-06 PROCEDURE — 80048 BASIC METABOLIC PNL TOTAL CA: CPT

## 2023-02-06 PROCEDURE — 93306 TTE W/DOPPLER COMPLETE: CPT

## 2023-02-06 PROCEDURE — 85025 COMPLETE CBC W/AUTO DIFF WBC: CPT

## 2023-02-06 PROCEDURE — 99232 SBSQ HOSP IP/OBS MODERATE 35: CPT | Performed by: INTERNAL MEDICINE

## 2023-02-06 PROCEDURE — 83735 ASSAY OF MAGNESIUM: CPT

## 2023-02-06 PROCEDURE — 74011000250 HC RX REV CODE- 250: Performed by: INTERNAL MEDICINE

## 2023-02-06 PROCEDURE — 84145 PROCALCITONIN (PCT): CPT

## 2023-02-06 PROCEDURE — 36415 COLL VENOUS BLD VENIPUNCTURE: CPT

## 2023-02-06 PROCEDURE — 65270000029 HC RM PRIVATE

## 2023-02-06 PROCEDURE — 74011636637 HC RX REV CODE- 636/637: Performed by: INTERNAL MEDICINE

## 2023-02-06 RX ORDER — DIPHENHYDRAMINE HCL 25 MG
25 CAPSULE ORAL
Status: DISCONTINUED | OUTPATIENT
Start: 2023-02-06 | End: 2023-02-16 | Stop reason: HOSPADM

## 2023-02-06 RX ORDER — HYDROMORPHONE HYDROCHLORIDE 1 MG/ML
0.5 INJECTION, SOLUTION INTRAMUSCULAR; INTRAVENOUS; SUBCUTANEOUS
Status: DISCONTINUED | OUTPATIENT
Start: 2023-02-06 | End: 2023-02-11

## 2023-02-06 RX ORDER — METOPROLOL TARTRATE 25 MG/1
12.5 TABLET, FILM COATED ORAL EVERY 12 HOURS
Status: DISCONTINUED | OUTPATIENT
Start: 2023-02-06 | End: 2023-02-07

## 2023-02-06 RX ADMIN — HYDROXYCHLOROQUINE SULFATE 200 MG: 200 TABLET, FILM COATED ORAL at 11:18

## 2023-02-06 RX ADMIN — HYDROMORPHONE HYDROCHLORIDE 0.5 MG: 1 INJECTION, SOLUTION INTRAMUSCULAR; INTRAVENOUS; SUBCUTANEOUS at 20:15

## 2023-02-06 RX ADMIN — FOLIC ACID 2 MG: 1 TABLET ORAL at 11:18

## 2023-02-06 RX ADMIN — GABAPENTIN 600 MG: 300 CAPSULE ORAL at 11:18

## 2023-02-06 RX ADMIN — DIPHENHYDRAMINE HYDROCHLORIDE 25 MG: 25 CAPSULE ORAL at 18:26

## 2023-02-06 RX ADMIN — SODIUM CHLORIDE, PRESERVATIVE FREE 10 ML: 5 INJECTION INTRAVENOUS at 21:32

## 2023-02-06 RX ADMIN — DAPTOMYCIN 350 MG: 500 INJECTION, POWDER, LYOPHILIZED, FOR SOLUTION INTRAVENOUS at 21:32

## 2023-02-06 RX ADMIN — SODIUM CHLORIDE 1.5 G: 900 INJECTION INTRAVENOUS at 05:06

## 2023-02-06 RX ADMIN — DULOXETINE HYDROCHLORIDE 30 MG: 30 CAPSULE, DELAYED RELEASE ORAL at 20:15

## 2023-02-06 RX ADMIN — SODIUM CHLORIDE 125 ML/HR: 9 INJECTION, SOLUTION INTRAVENOUS at 05:06

## 2023-02-06 RX ADMIN — GABAPENTIN 600 MG: 300 CAPSULE ORAL at 20:15

## 2023-02-06 RX ADMIN — RIOCIGUAT 2.5 MG: 2.5 TABLET, FILM COATED ORAL at 16:17

## 2023-02-06 RX ADMIN — SODIUM CHLORIDE 1.5 G: 900 INJECTION INTRAVENOUS at 11:30

## 2023-02-06 RX ADMIN — SODIUM CHLORIDE, PRESERVATIVE FREE 10 ML: 5 INJECTION INTRAVENOUS at 05:06

## 2023-02-06 RX ADMIN — AMBRISENTAN 5 MG: 5 TABLET, FILM COATED ORAL at 11:16

## 2023-02-06 RX ADMIN — HYDROMORPHONE HYDROCHLORIDE 1 MG: 1 INJECTION, SOLUTION INTRAMUSCULAR; INTRAVENOUS; SUBCUTANEOUS at 11:18

## 2023-02-06 RX ADMIN — METOPROLOL TARTRATE 12.5 MG: 25 TABLET, FILM COATED ORAL at 16:17

## 2023-02-06 RX ADMIN — DULOXETINE HYDROCHLORIDE 30 MG: 30 CAPSULE, DELAYED RELEASE ORAL at 11:18

## 2023-02-06 RX ADMIN — PANTOPRAZOLE SODIUM 40 MG: 40 TABLET, DELAYED RELEASE ORAL at 11:19

## 2023-02-06 RX ADMIN — RIOCIGUAT 2.5 MG: 2.5 TABLET, FILM COATED ORAL at 21:35

## 2023-02-06 RX ADMIN — SODIUM CHLORIDE 1.5 G: 900 INJECTION INTRAVENOUS at 17:17

## 2023-02-06 RX ADMIN — RIOCIGUAT 2.5 MG: 2.5 TABLET, FILM COATED ORAL at 11:16

## 2023-02-06 RX ADMIN — SODIUM CHLORIDE, PRESERVATIVE FREE 10 ML: 5 INJECTION INTRAVENOUS at 13:18

## 2023-02-06 RX ADMIN — PREDNISONE 30 MG: 20 TABLET ORAL at 21:32

## 2023-02-06 RX ADMIN — METHYLPREDNISOLONE SODIUM SUCCINATE 20 MG: 40 INJECTION, POWDER, FOR SOLUTION INTRAMUSCULAR; INTRAVENOUS at 18:25

## 2023-02-06 NOTE — PROGRESS NOTES
CARDIOLOGY CONSULTATION      REASON FOR CONSULT: Pulm HTN    REQUESTING PROVIDER: ER    CHIEF COMPLAINT:  Leg Pain     HISTORY OF PRESENT ILLNESS:  Nigel Lopez is a 34y.o. year-old female with past medical history significant for Pulm HTN (seen at [address] [place] [address]). RA, p/w leg pain. Found to have leg cellulitis. Denies any CP, SOB, palpitations. 2/6/23: no CP. Mild SOB. Pitting edema     INPATIENT MEDICATIONS:  Home medications reviewed.     Current Facility-Administered Medications:     metoprolol tartrate (LOPRESSOR) tablet 12.5 mg, 12.5 mg, Oral, Q12H, Luz Watson MD    riociguat (ADEMPAS) tablet 2.5 mg , 2.5 mg, Oral, TID, Walter Ansari MD, 2.5 mg at 02/06/23 1116    DULoxetine (CYMBALTA) capsule 30 mg, 30 mg, Oral, BID, Walter Ansari MD, 30 mg at 36/28/13 4264    folic acid (FOLVITE) tablet 2 mg, 2 mg, Oral, DAILY, Walter Ansari MD, 2 mg at 02/06/23 1118    gabapentin (NEURONTIN) capsule 600 mg, 600 mg, Oral, BID, Cl Ansari MD, 600 mg at 02/06/23 1118    hydrOXYchloroQUINE (PLAQUENIL) tablet 200 mg, 200 mg, Oral, DAILY, Grzegorz Wilkins MD, 200 mg at 02/06/23 1118    Ambrisentan tab 5 mg - Patient supplied (Patient Supplied), 5 mg, Oral, QAM, Walter Ansari MD, 5 mg at 02/06/23 1116    pantoprazole (PROTONIX) tablet 40 mg, 40 mg, Oral, DAILY, Grzegorz Wilkins MD, 40 mg at 02/06/23 1119    predniSONE (DELTASONE) tablet 30 mg, 30 mg, Oral, QHS, Walter Ansari MD, 30 mg at 02/05/23 2110    tofacitinib Tb24 1 Tablet  (Patient Supplied), 1 Tablet, Oral, DAILY, Grzegorz Wilkins MD, 1 Tablet at 02/06/23 1120    sodium chloride (NS) flush 5-40 mL, 5-40 mL, IntraVENous, Q8H, Coty, Cl Villa MD, 10 mL at 02/06/23 1318    sodium chloride (NS) flush 5-40 mL, 5-40 mL, IntraVENous, PRN, Walter Ansari MD    acetaminophen (TYLENOL) tablet 650 mg, 650 mg, Oral, Q6H PRN, 650 mg at 02/04/23 1724 **OR** acetaminophen (TYLENOL) suppository 650 mg, 650 mg, Rectal, Q6H PRN, Grzegorz Wilkins MD    polyethylene glycol (Audria Fent) packet 17 g, 17 g, Oral, DAILY PRN, Marimar Ansari Do, MD    promethazine (PHENERGAN) 12.5 mg in 0.9% sodium chloride 50 mL IVPB, 12.5 mg, IntraVENous, Q4H PRN, Kelsey Luque MD, Last Rate: 200 mL/hr at 02/04/23 1056, 12.5 mg at 02/04/23 1056    oxyCODONE-acetaminophen (PERCOCET) 5-325 mg per tablet 1 Tablet, 1 Tablet, Oral, Q6H PRN, Jena Muller MD, 1 Tablet at 02/05/23 2110    ampicillin-sulbactam (UNASYN) 1.5 g in 0.9% sodium chloride (MBP/ADV) 50 mL MBP, 1.5 g, IntraVENous, Q6H, Libertad Watson MD, Last Rate: 100 mL/hr at 02/06/23 1130, 1.5 g at 02/06/23 1130    sodium chloride (NS) flush 5-10 mL, 5-10 mL, IntraVENous, PRN, Kelsey Luque MD, 10 mL at 02/04/23 0559     ALLERGIES:  Allergies reviewed with the patient,  Allergies   Allergen Reactions    Doxycycline Nausea and Vomiting    Heparin Anaphylaxis    Remicade [Infliximab] Anaphylaxis    Vancomycin Hives    Rituximab Itching    Percocet [Oxycodone-Acetaminophen] Nausea and Vomiting     Side effect    . FAMILY HISTORY:  Family history reviewed. SOCIAL HISTORY:  Notable for no  tobacco use, no heavy alcohol or illicit drug use. REVIEW OF SYSTEMS:  Complete review of systems performed, pertinents noted above, all other systems are negative. PHYSICAL EXAMINATION:  Cardiovascular exam has a heart with a RRR, normal S1 and S2. No murmur present. There are no rubs or gallops. Good peripheral pulses. No jugular venous distension. No carotid bruits are present. Respiratory exam reveals clear lung fields, no rales or rhonchi. Gastrointestinal exam has soft, nontender abdomen with normal bowel sounds. Lymphatic exam reveals No edema and No varicosities. Visit Vitals  BP (!) 147/97 (BP 1 Location: Left upper arm)   Pulse (!) 120   Temp 98.3 °F (36.8 °C)   Resp 18   Ht 4' 11\" (1.499 m)   Wt 67.1 kg (147 lb 14.9 oz)   SpO2 92%   BMI 29.88 kg/m²         Recent labs results and imaging reviewed.   Notable findings include   Lab Results Component Value Date/Time    WBC 3.7 02/06/2023 11:26 AM    HGB 8.2 (L) 02/06/2023 11:26 AM    HCT 28.0 (L) 02/06/2023 11:26 AM    PLATELET 178 62/48/6180 11:26 AM    MCV 89.2 02/06/2023 11:26 AM     Lab Results   Component Value Date/Time    Troponin-I, Qt. <0.05 02/12/2021 02:34 PM      Lab Results   Component Value Date/Time    NT pro-BNP 1,062 (H) 11/02/2022 01:30 AM     .     1) Pulm HTN: Unclear how severe. She follows with [facility].   - Echo with low EF (known), 35-40%. - C/w Pulm HTN medications  - Can start her back on her home lasix   2) HTN: Controlled   3) Cellulitis: Abx per primary team     Thank you for involving us in the care of this patient.     Mariluz Peña MD  2/6/2023

## 2023-02-06 NOTE — PROGRESS NOTES
Progress Note    Patient: Nigel Lopez MRN: [id number]  SSN: [id number]    YOB: 1990  Age: 34 y.o. Sex: female      Admit Date: 2/3/2023    LOS: 3 days     Subjective:   Patient immunocompromised with SLE/RA, followed for right leg cellulitis. She remains afebrile with normal WBC but with left shift. CRP and procal decreasing. She was on Unasyn but developed SOB and it was discontinued. She is still complaining of SOB at this time. No wheezes or rash. Her mother states that this happens when she is pain. She has dilaudid ordered along with Percocet. Objective:     Vitals:    02/06/23 1051 02/06/23 1130 02/06/23 1450 02/06/23 1742   BP:  (!) 151/97 (!) 147/97 (!) 156/92   Pulse:  (!) 131 (!) 120    Resp:  22 18    Temp:  98.6 °F (37 °C) 98.3 °F (36.8 °C) 98.8 °F (37.1 °C)   SpO2: 95% 94% 92% 95%   Weight:       Height:            Intake and Output:  Current Shift: 02/06 0701 - 02/06 1900  In: 240 [P.O.:240]  Out: 400 [Urine:400]  Last three shifts: 02/04 1901 - 02/06 0700  In: -   Out: 800 [Urine:800]    Physical Exam:   Vitals and nursing note reviewed. Constitutional:       General: She is not in mild respiratory  distress. Appearance: She is ill-appearing. HENT: Nasal O2 cannula 2L/min     Head: Normocephalic. Right Ear: External ear normal.      Left Ear: External ear normal.      Nose: Nose normal.      Mouth/Throat:      Pharynx: Oropharynx is clear. Eyes:      Pupils: Pupils are equal, round, and reactive to light. Cardiovascular:      Rate and Rhythm: Normal rate and regular rhythm. Heart sounds: No murmur heard. Pulmonary: clear bilaterally  Abdominal:      General: Bowel sounds are normal. There is no distension. Palpations: Abdomen is soft. Tenderness: There is no abdominal tenderness. There is no guarding. Genitourinary:     Comments: No Mckeon  Musculoskeletal:      Cervical back: Neck supple.       Right lower leg: Edema (Generalized erythema, warmth, swelling and pain right medial leg has DECREASED present. Left lower leg: No edema (multiple well-healed scars). Skin:     Findings: Erythema present. No rash. Neurological:      General: No focal deficit present. Mental Status: She is alert and oriented to person, place, and time. Psychiatric:    anxious    Lab/Data Review:     WBC 3,700 with 10% bands    ESR >140  CRP 15.20 <23.30  Procal 0.29 <0.36    ASO pending  Dnase B Ab pending    Blood cultures (2/3) No growth 1 day    Assessment:     Active Problems:    Cellulitis (8/7/2021)    Cellulitis right lower extremity, etiology unclear  Sepsis with fever, elevated CRP and procal  Elevated ESR  Immunosuppression on Plaquenil, Prednisone and Tofacitinib  Systemic Lupus Erythematosus  Rheumatoid arthritis  Allergies to Doxycycline and Vancomycin   8. ?Adverse reaction to Unasyn with SOB    Comment:  Still feel that Streptococci and Staphylococci are most likely responsible for her cellulitis, therefore, will switch to Daptomycin (allergic to Vancomycin).     Plan:   Unasyn discontinued due to SOB  Start IV Daptomycin 6 mg/kg IV daily  In am, repeat CBC, procal and CRP  Follow-up ASO titer and Dnase B antibody  Follow-up blood cultures  Urinalysis with reflex urine culture still not done       Signed By: Keenan Lesches, MD     February 6, 2023

## 2023-02-06 NOTE — PROGRESS NOTES
Hospitalist Progress Note    Daily Progress Note: 2/6/2023 9:11 AM    Assessment/Plan with MDM & Differential Considerations     # Sepsis d/t Significant RLE Cellulitis extending groin to knee medially with prior hx of such + Immunocompromised state d/t RA/Lupus with active treatment    - (Received Ancef in ER) --> IV Unasyn (allergic to Vanc)     Note: had some sob after Unasyn this evening, 2/6/23; unclear if hypersensitivity. Hold. CXR. Solu--medrol x1, Benadryl prn.     - CRP 23 --> 15  - Procal 0.36 --> .29  - ESR > 120  - s/p IVF (judicious d/t CHF hx)  - Stress Dose Prednisone  - Venous Duplex Negative for DVT  - C/s ID for 2nd opinion on Abx coverage needs; Dr. Steffi Wright  - C/s Ortho for RLE eval 2/6/23    # Hx Pulm HTN with Chronic Systolic CHF & Mod-Severe TR with Chronic Right Heart Failure (not in overt decompensation but mild pleural effusion)    - C/s Cardiology  - Echo, 2/6/23:    Left Ventricle: Normal left ventricular systolic function with a visually estimated EF of 35 - 40%. Left ventricle size is normal. Severely increased wall thickness. Moderate hypokinesis of the following segments: mid anteroseptal. Normal diastolic function. Tricuspid Valve: Moderate to severe regurgitation. # Tachycardia, sinus  - add BB low dose    # Chronic Anemia d/t Autoimmune Diseases    DVT Prophylaxis: Allergic to Heparin  GI Prophylaxis: Protonix  Code Status: Full Code  Care Plan discussed with: patient    Disposition: Continue inpt mgmt >48h. C/s ID, Card, Echo    Admission Hx/HPI per Dr. Aaron Screen:  Bill Servin is a 34 y.o. female with PMH of lupus, rheumatoid arthritis, pulmonary hypertension and osteonecrosis. She presented to the ED with chief complaint of right leg pain and redness that started about 4 days ago. It became progressively worsened and spread to her upper thigh. Severe pain, worsened with movement. Also endorses fever and chills. In the ED, noted febrile and tachycardia .  Lab work revealed leukocytosis.   ----------------------------------------    Subjective: Pain less but redness increasing in thigh area. No n/v, sob, cp.     Current Facility-Administered Medications   Medication Dose Route Frequency    metoprolol tartrate (LOPRESSOR) tablet 12.5 mg  12.5 mg Oral Q12H    0.9% sodium chloride infusion  125 mL/hr IntraVENous CONTINUOUS    riociguat (ADEMPAS) tablet 2.5 mg   2.5 mg Oral TID    DULoxetine (CYMBALTA) capsule 30 mg  30 mg Oral BID    folic acid (FOLVITE) tablet 2 mg  2 mg Oral DAILY    gabapentin (NEURONTIN) capsule 600 mg  600 mg Oral BID    hydrOXYchloroQUINE (PLAQUENIL) tablet 200 mg  200 mg Oral DAILY    Ambrisentan tab 5 mg - Patient supplied (Patient Supplied)  5 mg Oral QAM    pantoprazole (PROTONIX) tablet 40 mg  40 mg Oral DAILY    predniSONE (DELTASONE) tablet 30 mg  30 mg Oral QHS    tofacitinib Tb24 1 Tablet  (Patient Supplied)  1 Tablet Oral DAILY    sodium chloride (NS) flush 5-40 mL  5-40 mL IntraVENous Q8H    sodium chloride (NS) flush 5-40 mL  5-40 mL IntraVENous PRN    acetaminophen (TYLENOL) tablet 650 mg  650 mg Oral Q6H PRN    Or    acetaminophen (TYLENOL) suppository 650 mg  650 mg Rectal Q6H PRN    polyethylene glycol (MIRALAX) packet 17 g  17 g Oral DAILY PRN    promethazine (PHENERGAN) 12.5 mg in 0.9% sodium chloride 50 mL IVPB  12.5 mg IntraVENous Q4H PRN    oxyCODONE-acetaminophen (PERCOCET) 5-325 mg per tablet 1 Tablet  1 Tablet Oral Q6H PRN    ampicillin-sulbactam (UNASYN) 1.5 g in 0.9% sodium chloride (MBP/ADV) 50 mL MBP  1.5 g IntraVENous Q6H    sodium chloride (NS) flush 5-10 mL  5-10 mL IntraVENous PRN       Objective     Visit Vitals  BP (!) 147/97 (BP 1 Location: Left upper arm)   Pulse (!) 120   Temp 98.3 °F (36.8 °C)   Resp 18   Ht 4' 11\" (1.499 m)   Wt 67.1 kg (147 lb 14.9 oz)   SpO2 92%   BMI 29.88 kg/m²    O2 Flow Rate (L/min): 2 l/min O2 Device: None (Room air)    Temp (24hrs), Av.4 °F (36.9 °C), Min:97.7 °F (36.5 °C), Max:99.1 °F (37.3 °C)      02/06 0701 - 02/06 1900  In: 240 [P.O.:240]  Out: 400 [Urine:400]  02/04 1901 - 02/06 0700  In: -   Out: 800 [Urine:800]      PERTINENT PHYSICAL EXAM    Constitutional: NAD, Awake, conversant, comfortable    HEENT: Dry    Neck: Supple     Cardiovascular: S1S2    Respiratory:  CTA ant bilat; vesicular breath sounds, no overt wheeze     GI: Soft, NT; no guarding/rigidity; + bowel sounds    Neurological:  AxOx3; No new focal weakness; No overt tremors    Psychiatric: Appropriate mood; oriented, coherent/cogent    MS/Skin: Extensive erythema RLE (groin/thigh medially to knee); soft; no fluctuance or tension    Vascular: Palpable pulses;  No significant edema      Data Review    Recent Results (from the past 24 hour(s))   C REACTIVE PROTEIN, QT    Collection Time: 02/05/23  3:54 PM   Result Value Ref Range    C-Reactive protein 23.30 (H) 0.00 - 0.60 mg/dL   PROCALCITONIN    Collection Time: 02/05/23  3:54 PM   Result Value Ref Range    Procalcitonin 0.36 (H) 0 ng/mL   HCG URINE, QL    Collection Time: 02/05/23 10:13 PM   Result Value Ref Range    HCG urine, QL Negative Negative     ECHO ADULT COMPLETE    Collection Time: 02/06/23 10:10 AM   Result Value Ref Range    LV EDV A4C 31 mL    LV ESV A4C 18 mL    IVSd 1.6 0.6 - 0.9 cm    LVIDd 4.8 3.9 - 5.3 cm    LVIDs 4.3 cm    LVOT Diameter 1.8 cm    LVOT Mean Gradient 1 mmHg    LVOT VTI 9.7 cm    LVOT Peak Velocity 0.7 m/s    LVOT Peak Gradient 2 mmHg    LVPWd 1.7 0.6 - 0.9 cm    LV E' Lateral Velocity 6 cm/s    LV E' Septal Velocity 6 cm/s    LV Ejection Fraction A4C 42 %    LVOT Area 2.5 cm2    LVOT SV 24.7 ml    AV Mean Gradient 2 mmHg    AV VTI 13.8 cm    AV Mean Velocity 0.7 m/s    AV Peak Velocity 1.0 m/s    AV Peak Gradient 4 mmHg    AV Area by VTI 1.8 cm2    AV Area by Peak Velocity 1.7 cm2    Aortic Root 3.2 cm    Ascending Aorta 2.7 cm    LA Diameter 3.9 cm    PV Mean Gradient 3 mmHg    PV VTI 16.8 cm    PV Mean Velocity 0.7 m/s    PV Max Velocity 1.1 m/s PV Peak Gradient 5 mmHg    TR Max Velocity 3.59 m/s    TR Peak Gradient 52 mmHg    Fractional Shortening 2D 10 28 - 44 %    LV ESV Index A4C 11 mL/m2    LV EDV Index A4C 19 mL/m2    LVIDd Index 2.96 cm/m2    LVIDs Index 2.65 cm/m2    LV RWT Ratio 0.71     LV Mass 2D 350.7 (A) 67 - 162 g    LV Mass 2D Index 216.5 43 - 95 g/m2    LVOT Stroke Volume Index 15.2 mL/m2    LA Size Index 2.41 cm/m2    LA/AO Root Ratio 1.22     Ao Root Index 1.98 cm/m2    Ascending Aorta Index 1.67 cm/m2    AV Velocity Ratio 0.70     LVOT:AV VTI Index 0.70     SY/BSA VTI 1.1 cm2/m2    SY/BSA Peak Velocity 1.0 cm2/m2    RV Basal Dimension 3.2 cm    RV Mid Dimension 2.8 cm    Est. RA Pressure 3 mmHg    RVSP 55 mmHg   CBC WITH AUTOMATED DIFF    Collection Time: 02/06/23 11:26 AM   Result Value Ref Range    WBC 3.7 3.6 - 11.0 K/uL    RBC 3.14 (L) 3.80 - 5.20 M/uL    HGB 8.2 (L) 11.5 - 16.0 g/dL    HCT 28.0 (L) 35.0 - 47.0 %    MCV 89.2 80.0 - 99.0 FL    MCH 26.1 26.0 - 34.0 PG    MCHC 29.3 (L) 30.0 - 36.5 g/dL    RDW 20.8 (H) 11.5 - 14.5 %    PLATELET 198 030 - 728 K/uL    MPV 9.8 8.9 - 12.9 FL    NRBC 1.3 (H) 0.0  WBC    ABSOLUTE NRBC 0.05 (H) 0.00 - 0.01 K/uL    NEUTROPHILS 80 (H) 32 - 75 %    BAND NEUTROPHILS 10 (H) 0 - 6 %    LYMPHOCYTES 1 (L) 12 - 49 %    MONOCYTES 6 5 - 13 %    EOSINOPHILS 0 0 - 7 %    BASOPHILS 0 0 - 1 %    METAMYELOCYTES 2 (H) 0 %    MYELOCYTES 1 (H) 0 %    IMMATURE GRANULOCYTES 0 %    ABS. NEUTROPHILS 3.3 1.8 - 8.0 K/UL    ABS. LYMPHOCYTES 0.0 (L) 0.8 - 3.5 K/UL    ABS. MONOCYTES 0.2 0.0 - 1.0 K/UL    ABS. EOSINOPHILS 0.0 0.0 - 0.4 K/UL    ABS. BASOPHILS 0.0 0.0 - 0.1 K/UL    ABS. IMM.  GRANS. 0.0 K/UL    DF Manual      PLATELET COMMENTS Large Platelets      RBC COMMENTS Anisocytosis  1+        RBC COMMENTS Hypochromia  1+        WBC COMMENTS Toxic Granulation     METABOLIC PANEL, BASIC    Collection Time: 02/06/23 11:26 AM   Result Value Ref Range    Sodium 137 136 - 145 mmol/L    Potassium 4.2 3.5 - 5.1 mmol/L Chloride 104 97 - 108 mmol/L    CO2 27 21 - 32 mmol/L    Anion gap 6 5 - 15 mmol/L    Glucose 108 (H) 65 - 100 mg/dL    BUN 10 6 - 20 mg/dL    Creatinine 0.38 (L) 0.55 - 1.02 mg/dL    BUN/Creatinine ratio 26 (H) 12 - 20      eGFR >60 >60 ml/min/1.73m2    Calcium 8.0 (L) 8.5 - 10.1 mg/dL   MAGNESIUM    Collection Time: 02/06/23 11:26 AM   Result Value Ref Range    Magnesium 2.1 1.6 - 2.4 mg/dL   C REACTIVE PROTEIN, QT    Collection Time: 02/06/23 11:26 AM   Result Value Ref Range    C-Reactive protein 15.20 (H) 0.00 - 0.60 mg/dL   PROCALCITONIN    Collection Time: 02/06/23 11:26 AM   Result Value Ref Range    Procalcitonin 0.29 (H) 0 ng/mL       XR CHEST PA LAT   Final Result      No significant change. Cardiomegaly and chronic interstitial opacities. DUPLEX LOWER EXT VENOUS BILAT   Final Result      XR CHEST PORT   Final Result      Stable exam. Unchanged interstitial lung abnormalities and small pleural   effusions.               ________________________________________  Ayde Sepulveda MD

## 2023-02-06 NOTE — PROGRESS NOTES
Patient short of breath after starting unasyn. Dr. Lianne Salinas notified. Orders to stop antibiotic and notify Dr. Marah Bacon. Stat CXR also ordered.

## 2023-02-06 NOTE — ROUTINE PROCESS
Bedside and Verbal shift change report given to Og Berrios (oncoming nurse) by Portia Paul RN  (offgoing nurse). Report included the following information Kardex and MAR.

## 2023-02-06 NOTE — PROGRESS NOTES
Patient DCP will be home with her mom and dad. Patient currently on IV abx, but unsure if patient will require IV abx at discharge. CM will continue to follow for ID recommendations for abx.

## 2023-02-07 ENCOUNTER — APPOINTMENT (OUTPATIENT)
Dept: GENERAL RADIOLOGY | Age: 33
End: 2023-02-07
Attending: ORTHOPAEDIC SURGERY
Payer: MEDICARE

## 2023-02-07 LAB
ALBUMIN SERPL-MCNC: 2.1 G/DL (ref 3.5–5)
ALBUMIN/GLOB SERPL: 0.5 (ref 1.1–2.2)
ALP SERPL-CCNC: 260 U/L (ref 45–117)
ALT SERPL-CCNC: 74 U/L (ref 12–78)
ANION GAP SERPL CALC-SCNC: 4 MMOL/L (ref 5–15)
APPEARANCE UR: CLEAR
ASO AB SERPL-ACNC: 23 IU/ML (ref 0–200)
AST SERPL W P-5'-P-CCNC: 48 U/L (ref 15–37)
BACTERIA URNS QL MICRO: NEGATIVE /HPF
BASOPHILS # BLD: 0 K/UL (ref 0–0.1)
BASOPHILS NFR BLD: 0 % (ref 0–1)
BILIRUB SERPL-MCNC: 0.4 MG/DL (ref 0.2–1)
BILIRUB UR QL: 1
BUN SERPL-MCNC: 13 MG/DL (ref 6–20)
BUN/CREAT SERPL: 33 (ref 12–20)
CA-I BLD-MCNC: 8.5 MG/DL (ref 8.5–10.1)
CHLORIDE SERPL-SCNC: 103 MMOL/L (ref 97–108)
CO2 SERPL-SCNC: 28 MMOL/L (ref 21–32)
COLOR UR: YELLOW
CREAT SERPL-MCNC: 0.4 MG/DL (ref 0.55–1.02)
CRP SERPL-MCNC: 10.2 MG/DL (ref 0–0.6)
DIFFERENTIAL METHOD BLD: ABNORMAL
EOSINOPHIL # BLD: 0 K/UL (ref 0–0.4)
EOSINOPHIL NFR BLD: 0 % (ref 0–7)
EPITH CASTS URNS QL MICRO: ABNORMAL /LPF
ERYTHROCYTE [DISTWIDTH] IN BLOOD BY AUTOMATED COUNT: 20.6 % (ref 11.5–14.5)
ERYTHROCYTE [SEDIMENTATION RATE] IN BLOOD: 120 MM/HR (ref 0–20)
GLOBULIN SER CALC-MCNC: 4.1 G/DL (ref 2–4)
GLUCOSE SERPL-MCNC: 118 MG/DL (ref 65–100)
GLUCOSE UR STRIP.AUTO-MCNC: NEGATIVE MG/DL
HCT VFR BLD AUTO: 25 % (ref 35–47)
HGB BLD-MCNC: 7.3 G/DL (ref 11.5–16)
HGB UR QL STRIP: ABNORMAL
IMM GRANULOCYTES # BLD AUTO: 0.1 K/UL (ref 0–0.04)
IMM GRANULOCYTES NFR BLD AUTO: 2 % (ref 0–0.5)
KETONES UR QL STRIP.AUTO: NEGATIVE MG/DL
LEUKOCYTE ESTERASE UR QL STRIP.AUTO: NEGATIVE
LYMPHOCYTES # BLD: 0.2 K/UL (ref 0.8–3.5)
LYMPHOCYTES NFR BLD: 6 % (ref 12–49)
MCH RBC QN AUTO: 26.1 PG (ref 26–34)
MCHC RBC AUTO-ENTMCNC: 29.2 G/DL (ref 30–36.5)
MCV RBC AUTO: 89.3 FL (ref 80–99)
MONOCYTES # BLD: 0.2 K/UL (ref 0–1)
MONOCYTES NFR BLD: 5 % (ref 5–13)
MUCOUS THREADS URNS QL MICRO: ABNORMAL /LPF
NEUTS SEG # BLD: 3.2 K/UL (ref 1.8–8)
NEUTS SEG NFR BLD: 87 % (ref 32–75)
NITRITE UR QL STRIP.AUTO: NEGATIVE
NRBC # BLD: 0.09 K/UL (ref 0–0.01)
NRBC BLD-RTO: 2.5 PER 100 WBC
PH UR STRIP: 5 (ref 5–8)
PLATELET # BLD AUTO: 252 K/UL (ref 150–400)
PMV BLD AUTO: 10.2 FL (ref 8.9–12.9)
POTASSIUM SERPL-SCNC: 4 MMOL/L (ref 3.5–5.1)
PROCALCITONIN SERPL-MCNC: 0.18 NG/ML
PROT SERPL-MCNC: 6.2 G/DL (ref 6.4–8.2)
PROT UR STRIP-MCNC: >300 MG/DL
RBC # BLD AUTO: 2.8 M/UL (ref 3.8–5.2)
RBC #/AREA URNS HPF: ABNORMAL /HPF (ref 0–5)
SODIUM SERPL-SCNC: 135 MMOL/L (ref 136–145)
SP GR UR REFRACTOMETRY: 1.02 (ref 1–1.03)
UA: UC IF INDICATED,UAUC: ABNORMAL
UROBILINOGEN UR QL STRIP.AUTO: 8 EU/DL (ref 0.1–1)
WBC # BLD AUTO: 3.7 K/UL (ref 3.6–11)
WBC URNS QL MICRO: ABNORMAL /HPF (ref 0–4)

## 2023-02-07 PROCEDURE — 74011000250 HC RX REV CODE- 250: Performed by: INTERNAL MEDICINE

## 2023-02-07 PROCEDURE — 85652 RBC SED RATE AUTOMATED: CPT

## 2023-02-07 PROCEDURE — 74011000258 HC RX REV CODE- 258: Performed by: INTERNAL MEDICINE

## 2023-02-07 PROCEDURE — 80053 COMPREHEN METABOLIC PANEL: CPT

## 2023-02-07 PROCEDURE — 74011250636 HC RX REV CODE- 250/636: Performed by: INTERNAL MEDICINE

## 2023-02-07 PROCEDURE — 74011250637 HC RX REV CODE- 250/637: Performed by: STUDENT IN AN ORGANIZED HEALTH CARE EDUCATION/TRAINING PROGRAM

## 2023-02-07 PROCEDURE — 74011250637 HC RX REV CODE- 250/637: Performed by: INTERNAL MEDICINE

## 2023-02-07 PROCEDURE — 74011636637 HC RX REV CODE- 636/637: Performed by: INTERNAL MEDICINE

## 2023-02-07 PROCEDURE — 85025 COMPLETE CBC W/AUTO DIFF WBC: CPT

## 2023-02-07 PROCEDURE — 84145 PROCALCITONIN (PCT): CPT

## 2023-02-07 PROCEDURE — 73552 X-RAY EXAM OF FEMUR 2/>: CPT

## 2023-02-07 PROCEDURE — 77010033678 HC OXYGEN DAILY

## 2023-02-07 PROCEDURE — 94762 N-INVAS EAR/PLS OXIMTRY CONT: CPT

## 2023-02-07 PROCEDURE — 86140 C-REACTIVE PROTEIN: CPT

## 2023-02-07 PROCEDURE — 81001 URINALYSIS AUTO W/SCOPE: CPT

## 2023-02-07 PROCEDURE — 99232 SBSQ HOSP IP/OBS MODERATE 35: CPT | Performed by: INTERNAL MEDICINE

## 2023-02-07 PROCEDURE — 65270000029 HC RM PRIVATE

## 2023-02-07 RX ORDER — PREDNISONE 5 MG/1
10 TABLET ORAL DAILY
Status: DISCONTINUED | OUTPATIENT
Start: 2023-02-08 | End: 2023-02-07

## 2023-02-07 RX ORDER — METOPROLOL TARTRATE 25 MG/1
25 TABLET, FILM COATED ORAL EVERY 12 HOURS
Status: DISCONTINUED | OUTPATIENT
Start: 2023-02-07 | End: 2023-02-16 | Stop reason: HOSPADM

## 2023-02-07 RX ORDER — PREDNISONE 5 MG/1
5 TABLET ORAL EVERY EVENING
Status: DISCONTINUED | OUTPATIENT
Start: 2023-02-07 | End: 2023-02-07

## 2023-02-07 RX ADMIN — SODIUM CHLORIDE, PRESERVATIVE FREE 10 ML: 5 INJECTION INTRAVENOUS at 13:22

## 2023-02-07 RX ADMIN — GABAPENTIN 600 MG: 300 CAPSULE ORAL at 08:41

## 2023-02-07 RX ADMIN — METOPROLOL TARTRATE 25 MG: 25 TABLET, FILM COATED ORAL at 20:18

## 2023-02-07 RX ADMIN — HYDROMORPHONE HYDROCHLORIDE 0.5 MG: 1 INJECTION, SOLUTION INTRAMUSCULAR; INTRAVENOUS; SUBCUTANEOUS at 13:43

## 2023-02-07 RX ADMIN — RIOCIGUAT 2.5 MG: 2.5 TABLET, FILM COATED ORAL at 18:24

## 2023-02-07 RX ADMIN — HYDROXYCHLOROQUINE SULFATE 200 MG: 200 TABLET, FILM COATED ORAL at 08:40

## 2023-02-07 RX ADMIN — SODIUM CHLORIDE, PRESERVATIVE FREE 10 ML: 5 INJECTION INTRAVENOUS at 22:11

## 2023-02-07 RX ADMIN — PANTOPRAZOLE SODIUM 40 MG: 40 TABLET, DELAYED RELEASE ORAL at 08:41

## 2023-02-07 RX ADMIN — AMBRISENTAN 5 MG: 5 TABLET, FILM COATED ORAL at 08:41

## 2023-02-07 RX ADMIN — METOPROLOL TARTRATE 12.5 MG: 25 TABLET, FILM COATED ORAL at 08:41

## 2023-02-07 RX ADMIN — RIOCIGUAT 2.5 MG: 2.5 TABLET, FILM COATED ORAL at 20:20

## 2023-02-07 RX ADMIN — DULOXETINE HYDROCHLORIDE 30 MG: 30 CAPSULE, DELAYED RELEASE ORAL at 20:18

## 2023-02-07 RX ADMIN — DAPTOMYCIN 350 MG: 500 INJECTION, POWDER, LYOPHILIZED, FOR SOLUTION INTRAVENOUS at 20:35

## 2023-02-07 RX ADMIN — DULOXETINE HYDROCHLORIDE 30 MG: 30 CAPSULE, DELAYED RELEASE ORAL at 08:40

## 2023-02-07 RX ADMIN — GABAPENTIN 600 MG: 300 CAPSULE ORAL at 20:20

## 2023-02-07 RX ADMIN — FOLIC ACID 2 MG: 1 TABLET ORAL at 08:41

## 2023-02-07 RX ADMIN — RIOCIGUAT 2.5 MG: 2.5 TABLET, FILM COATED ORAL at 08:41

## 2023-02-07 RX ADMIN — SODIUM CHLORIDE, PRESERVATIVE FREE 10 ML: 5 INJECTION INTRAVENOUS at 05:41

## 2023-02-07 RX ADMIN — HYDROMORPHONE HYDROCHLORIDE 0.5 MG: 1 INJECTION, SOLUTION INTRAMUSCULAR; INTRAVENOUS; SUBCUTANEOUS at 20:23

## 2023-02-07 RX ADMIN — PREDNISONE 25 MG: 5 TABLET ORAL at 20:22

## 2023-02-07 RX ADMIN — DIPHENHYDRAMINE HYDROCHLORIDE 25 MG: 25 CAPSULE ORAL at 00:33

## 2023-02-07 RX ADMIN — ACETAMINOPHEN 650 MG: 325 TABLET ORAL at 18:27

## 2023-02-07 NOTE — PROGRESS NOTES
Right chest wall port accessed per order, Power Loc 20g/1 inch, port dressing in place with sterile technique, blood return obtained, patient tolerated well.

## 2023-02-07 NOTE — PROGRESS NOTES
CARDIOLOGY CONSULTATION      REASON FOR CONSULT: Pulm HTN    REQUESTING PROVIDER: ER    CHIEF COMPLAINT:  Leg Pain     HISTORY OF PRESENT ILLNESS:  Naty Downs is a 34y.o. year-old female with past medical history significant for Pulm HTN (seen at [facility]). RA, p/w leg pain. Found to have leg cellulitis. Denies any CP, SOB, palpitations. 2/6/23: no CP. Mild SOB. Pitting edema   2/7/23: BP elevated. INPATIENT MEDICATIONS:  Home medications reviewed.     Current Facility-Administered Medications:     predniSONE (DELTASONE) tablet 25 mg, 25 mg, Oral, QHS, Jesus Amaya MD    metoprolol tartrate (LOPRESSOR) tablet 25 mg, 25 mg, Oral, Q12H, Aurora Mcdonough MD    HYDROmorphone (DILAUDID) syringe 0.5 mg, 0.5 mg, IntraVENous, Q6H PRN, Gayatri Mcdonough MD, 0.5 mg at 02/06/23 2015    diphenhydrAMINE (BENADRYL) capsule 25 mg, 25 mg, Oral, Q6H PRN, Gayatri Mcdonough MD, 25 mg at 02/07/23 0033    DAPTOmycin (CUBICIN) 350 mg in 0.9% sodium chloride 50 mL IVPB, 6 mg/kg (Adjusted), IntraVENous, Q24H, [place] MD, Last Rate: 100 mL/hr at 02/06/23 2132, 350 mg at 02/06/23 2132    riociguat (ADEMPAS) tablet 2.5 mg , 2.5 mg, Oral, TID, Margarita Roberts MD, 2.5 mg at 02/07/23 0841    DULoxetine (CYMBALTA) capsule 30 mg, 30 mg, Oral, BID, Reba Ansari MD, 30 mg at 69/70/90 4894    folic acid (FOLVITE) tablet 2 mg, 2 mg, Oral, DAILY, Reba Ansari MD, 2 mg at 02/07/23 0841    gabapentin (NEURONTIN) capsule 600 mg, 600 mg, Oral, BID, Reba Ansari MD, 600 mg at 02/07/23 0841    hydrOXYchloroQUINE (PLAQUENIL) tablet 200 mg, 200 mg, Oral, DAILY, Reba Ansari MD, 200 mg at 02/07/23 0840    Ambrisentan tab 5 mg - Patient supplied (Patient Supplied), 5 mg, Oral, QAM, Reba Ansari MD, 5 mg at 02/07/23 0841    pantoprazole (PROTONIX) tablet 40 mg, 40 mg, Oral, DAILY, Reba Ansari MD, 40 mg at 02/07/23 0841    tofacitinib Tb24 1 Tablet  (Patient Supplied), 1 Tablet, Oral, Keely Miller MD, 1 Tablet at 02/07/23 0841    sodium chloride (NS) flush 5-40 mL, 5-40 mL, IntraVENous, Q8H, Cl Ansari MD, 10 mL at 02/07/23 1322    sodium chloride (NS) flush 5-40 mL, 5-40 mL, IntraVENous, PRN, Gaston Ansari MD    acetaminophen (TYLENOL) tablet 650 mg, 650 mg, Oral, Q6H PRN, 650 mg at 02/04/23 1724 **OR** acetaminophen (TYLENOL) suppository 650 mg, 650 mg, Rectal, Q6H PRN, Gaston Ansari MD    polyethylene glycol (MIRALAX) packet 17 g, 17 g, Oral, DAILY PRN, Kai Clinton MD    promethazine (PHENERGAN) 12.5 mg in 0.9% sodium chloride 50 mL IVPB, 12.5 mg, IntraVENous, Q4H PRN, Gasotn Ansari MD, Last Rate: 200 mL/hr at 02/04/23 1056, 12.5 mg at 02/04/23 1056    oxyCODONE-acetaminophen (PERCOCET) 5-325 mg per tablet 1 Tablet, 1 Tablet, Oral, Q6H PRN, Harvinder Wang MD, 1 Tablet at 02/05/23 2110    sodium chloride (NS) flush 5-10 mL, 5-10 mL, IntraVENous, PRN, Kai Clinton MD, 10 mL at 02/04/23 0559     ALLERGIES:  Allergies reviewed with the patient,  Allergies   Allergen Reactions    Doxycycline Nausea and Vomiting    Heparin Anaphylaxis    Remicade [Infliximab] Anaphylaxis    Vancomycin Hives    Rituximab Itching    Percocet [Oxycodone-Acetaminophen] Nausea and Vomiting     Side effect    . FAMILY HISTORY:  Family history reviewed. SOCIAL HISTORY:  Notable for no  tobacco use, no heavy alcohol or illicit drug use. REVIEW OF SYSTEMS:  Complete review of systems performed, pertinents noted above, all other systems are negative. PHYSICAL EXAMINATION:  Cardiovascular exam has a heart with a RRR, normal S1 and S2. No murmur present. There are no rubs or gallops. Good peripheral pulses. No jugular venous distension. No carotid bruits are present. Respiratory exam reveals clear lung fields, no rales or rhonchi. Gastrointestinal exam has soft, nontender abdomen with normal bowel sounds. Lymphatic exam reveals No edema and No varicosities.     Visit Vitals  BP (!) 150/93 (BP 1 Location: Right upper arm, BP Patient Position: At rest)   Pulse (!) 120   Temp 98.4 °F (36.9 °C)   Resp 22   Ht 4' 11\" (1.499 m)   Wt 65.5 kg (144 lb 6.4 oz)   SpO2 93%   BMI 29.17 kg/m²         Recent labs results and imaging reviewed. Notable findings include   Lab Results   Component Value Date/Time    WBC 3.7 02/07/2023 06:06 AM    HGB 7.3 (L) 02/07/2023 06:06 AM    HCT 25.0 (L) 02/07/2023 06:06 AM    PLATELET 920 44/13/2040 06:06 AM    MCV 89.3 02/07/2023 06:06 AM     Lab Results   Component Value Date/Time    CK 48 02/06/2023 09:06 PM    Troponin-I, Qt. <0.05 02/12/2021 02:34 PM      Lab Results   Component Value Date/Time    NT pro-BNP 1,062 (H) 11/02/2022 01:30 AM     .     1) Pulm HTN: Unclear how severe. She follows with [facility].   - Echo with low EF (known), 35-40%. - C/w Pulm HTN medications  - Increased metoprolol to 25mg BID  2) HTN: Controlled   3) Cellulitis: Abx per primary team     Will sign off now. Can follow as outpatient with her cardiac team at [facility]. Thank you for involving us in the care of this patient.     Nimisha Allen MD  2/7/2023

## 2023-02-07 NOTE — PROGRESS NOTES
Problem: Falls - Risk of  Goal: *Absence of Falls  Description: Document Bony Peng Fall Risk and appropriate interventions in the flowsheet. Outcome: Progressing Towards Goal  Note: Fall Risk Interventions:                                Problem: Patient Education: Go to Patient Education Activity  Goal: Patient/Family Education  Outcome: Progressing Towards Goal     Problem: Pressure Injury - Risk of  Goal: *Prevention of pressure injury  Description: Document Delbert Scale and appropriate interventions in the flowsheet.   Outcome: Progressing Towards Goal  Note: Pressure Injury Interventions:  Sensory Interventions: Assess changes in LOC    Moisture Interventions: Minimize layers    Activity Interventions: PT/OT evaluation    Mobility Interventions: PT/OT evaluation    Nutrition Interventions: Offer support with meals,snacks and hydration                     Problem: Patient Education: Go to Patient Education Activity  Goal: Patient/Family Education  Outcome: Progressing Towards Goal

## 2023-02-07 NOTE — PROGRESS NOTES
Progress Note    Patient: Naveen Riley MRN: [id number]  SSN: [id number]    YOB: 1990  Age: 34 y.o. Sex: female      Admit Date: 2/3/2023    LOS: 4 days     Subjective:   Patient immunocompromised with SLE/RA, followed for right leg cellulitis. She remains afebrile with normal WBC but with left shift. CRP and procal decreasing. She was on Unasyn but developed SOB and it was discontinued. She is still complaining of SOB at this time. No wheezes or rash. Her mother states that this happens when she is pain. She has dilaudid ordered along with Percocet. She was seen by Orthopedics. XR showed possible AVN right distal femur. Objective:     Vitals:    02/07/23 0714 02/07/23 1045 02/07/23 1242 02/07/23 1429   BP: 133/83 (!) 142/101 (!) 150/93    Pulse: (!) 113 (!) 109 (!) 120 (!) 116   Resp: (!) 32 (!) 39 22    Temp: 98.2 °F (36.8 °C) 98.1 °F (36.7 °C) 98.4 °F (36.9 °C)    SpO2: 92% 91% 93%    Weight:       Height:            Intake and Output:  Current Shift: No intake/output data recorded. Last three shifts: 02/05 1901 - 02/07 0700  In: 240 [P.O.:240]  Out: 550 [Urine:550]    Physical Exam:   Vitals and nursing note reviewed. Constitutional:       General: She is not in mild respiratory  distress. Appearance: She is ill-appearing. HENT: Nasal O2 cannula 2L/min     Head: Normocephalic. Right Ear: External ear normal.      Left Ear: External ear normal.      Nose: Nose normal.      Mouth/Throat:      Pharynx: Oropharynx is clear. Eyes:      Pupils: Pupils are equal, round, and reactive to light. Cardiovascular:      Rate and Rhythm: Normal rate and regular rhythm. Heart sounds: No murmur heard. Pulmonary: clear bilaterally  Abdominal:      General: Bowel sounds are normal. There is no distension. Palpations: Abdomen is soft. Tenderness: There is no abdominal tenderness. There is no guarding.    Genitourinary:     Comments: No Mckeon  Musculoskeletal: Cervical back: Neck supple. Right lower leg: Edema (Generalized erythema, warmth, swelling and pain right medial leg has DECREASED present. Left lower leg: No edema (multiple well-healed scars). Skin:     Findings: Erythema present. No rash. Neurological:      General: No focal deficit present. Mental Status: She is alert and oriented to person, place, and time. Psychiatric:    anxious    Lab/Data Review:     WBC 3,700 with 10% bands     < >140  CRP 10.20 <15.20 <23.30  Procal 0.18 <0.29 <0.36    ASO pending  Dnase B Ab pending    Blood cultures (2/3) No growth 3 days    Assessment:     Active Problems:    Cellulitis (8/7/2021)  Cellulitis right lower extremity, etiology unclear, on IV Daptomycin  Sepsis with fever, elevated CRP and procal  Elevated ESR  Immunosuppression on Plaquenil, Prednisone and Tofacitinib  Systemic Lupus Erythematosus  Rheumatoid arthritis  Allergies to Doxycycline and Vancomycin   8. ?Adverse reaction to Unasyn with SOB    Comment:  Still feel that Streptococci and Staphylococci are most likely responsible for her cellulitis, therefore, will switch to Daptomycin (allergic to Vancomycin).  Urinalysis unremarkable    Plan:   Unasyn discontinued due to SOB  Continue IV Daptomycin 6 mg/kg IV daily  In am, repeat CBC, procal and CRP  Follow-up ASO titer and Dnase B antibody  Follow-up blood cultures         Signed By: Chau Chen MD     February 7, 2023

## 2023-02-07 NOTE — CONSULTS
ORTHOPEDIC CONSULT    Patient: Jobie Meigs MRN: [id number]  SSN: [id number]    YOB: 1990  Age: 34 y.o. Sex: female      Subjective:      Jobie Meigs is a 34 y.o. female who is being seen in orthopedic consult for cellulitis of her right lower extremity. The patient denies any history of injury or trauma to her right lower extremity. She states she has had a similar presentation in the past and was treated with antibiotics. This last occurred in August 2021. She was seen by infectious disease and by Dr. Sheridan Alcantar, general surgery. Patient has a history of lupus as well as rheumatoid arthritis. Patient has the redness of her right leg started around February 1. She states it progressively got worse and because moderate to severe pain of her right lower extremity. She denies any numbness or tingling of right lower extremity. She denies any right hip or right knee pain. She states she did have a fever at home. She denies any recent illnesses. Patient states the redness and pain of her right lower extremity has improved since she has been in the hospital.  She denies any other musculoskeletal complaints at this time. Past Medical History:   Diagnosis Date    Lupus ([address]     Osteonecrosis ([address]     Pulmonary HTN ([address]     RA (rheumatoid arthritis) ([address]      Past Surgical History:   Procedure Laterality Date    HX CYST REMOVAL      HX ORTHOPAEDIC      x2 left knee sxs    HX ORTHOPAEDIC      bilateral ankle sxs      Family History   Problem Relation Age of Onset    Hypertension Mother     Cancer Paternal Grandmother      Social History     Tobacco Use    Smoking status: Never    Smokeless tobacco: Never   Substance Use Topics    Alcohol use: Never      Prior to Admission medications    Medication Sig Start Date End Date Taking? Authorizing Provider   folic acid (FOLVITE) 1 mg tablet Take 2 mg by mouth daily.  7/21/22  Yes Other, MD Sherman   tofacitinib (Xeljanz XR) 11 mg Tb24 Xeljanz XR 11 mg tablet,extended release   TAKE ONE TABLET BY MOUTH EVERY DAY   Yes Other, MD Sherman   gabapentin (NEURONTIN) 600 mg tablet Take 300 mg by mouth two (2) times a day. Yes Other, MD Sherman   Adempas 2.5 mg tab tablet 2.5 mg three (3) times daily. Indications: pulmonary arterial hypertension 12/8/20  Yes Other, MD Sherman   predniSONE (DELTASONE) 5 mg tablet Take 5-10 mg by mouth daily. 10 mg in am and 5 mg at night  Indications: lupus 10/21/20  Yes Other, MD Sherman   pantoprazole (PROTONIX) 40 mg tablet TAKE 1 TABLET BY MOUTH EVERY DAY 11/15/20  Yes Other, MD Sherman   hydrOXYchloroQUINE (PLAQUENIL) 200 mg tablet Take 200 mg by mouth daily. 12/14/20  Yes Other, MD Sherman   furosemide (LASIX) 20 mg tablet Take 20 mg by mouth daily. 12/17/20  Yes Other, MD Sherman   DULoxetine (CYMBALTA) 30 mg capsule TAKE 1 CAPSULE BY MOUTH TWICE A DAY 10/25/20  Yes Other, MD Sherman   Letairis 5 mg tablet 5 mg daily. In AM 12/8/20  Yes Other, MD Sherman   methotrexate (RHEUMATREX) 2.5 mg tablet TAKE 4 TABLETS (10 MG TOTAL) BY MOUTH 1 TIME PER WEEK 8/16/22   Other, MD Sherman   cholecalciferol (VITAMIN D3) (5000 Units/125 mcg) tab tablet Take 5,000 Units by mouth every Monday. Indications: osteoporosis, a condition of weak bones    Provider, Historical   spironolactone (ALDACTONE) 25 mg tablet 25 mg daily. At bedtime 12/13/20   Other, MD Sherman       Allergies   Allergen Reactions    Doxycycline Nausea and Vomiting    Heparin Anaphylaxis    Remicade [Infliximab] Anaphylaxis    Vancomycin Hives    Rituximab Itching    Percocet [Oxycodone-Acetaminophen] Nausea and Vomiting     Side effect       Review of Systems:  Review of Systems   Constitutional:  Positive for fever. HENT: Negative. Eyes: Negative. Respiratory: Negative. Cardiovascular: Negative. Gastrointestinal: Negative. Genitourinary: Negative. Musculoskeletal:  Positive for joint pain.         Right lower extremity   Skin:         Erythema right lower extremity Neurological: Negative. Endo/Heme/Allergies: Negative. Psychiatric/Behavioral: Negative.          Objective:     Current Facility-Administered Medications   Medication Dose Route Frequency    predniSONE (DELTASONE) tablet 25 mg  25 mg Oral QHS    metoprolol tartrate (LOPRESSOR) tablet 25 mg  25 mg Oral Q12H    HYDROmorphone (DILAUDID) syringe 0.5 mg  0.5 mg IntraVENous Q6H PRN    diphenhydrAMINE (BENADRYL) capsule 25 mg  25 mg Oral Q6H PRN    DAPTOmycin (CUBICIN) 350 mg in 0.9% sodium chloride 50 mL IVPB  6 mg/kg (Adjusted) IntraVENous Q24H    riociguat (ADEMPAS) tablet 2.5 mg   2.5 mg Oral TID    DULoxetine (CYMBALTA) capsule 30 mg  30 mg Oral BID    folic acid (FOLVITE) tablet 2 mg  2 mg Oral DAILY    gabapentin (NEURONTIN) capsule 600 mg  600 mg Oral BID    hydrOXYchloroQUINE (PLAQUENIL) tablet 200 mg  200 mg Oral DAILY    Ambrisentan tab 5 mg - Patient supplied (Patient Supplied)  5 mg Oral QAM    pantoprazole (PROTONIX) tablet 40 mg  40 mg Oral DAILY    tofacitinib Tb24 1 Tablet  (Patient Supplied)  1 Tablet Oral DAILY    sodium chloride (NS) flush 5-40 mL  5-40 mL IntraVENous Q8H    sodium chloride (NS) flush 5-40 mL  5-40 mL IntraVENous PRN    acetaminophen (TYLENOL) tablet 650 mg  650 mg Oral Q6H PRN    Or    acetaminophen (TYLENOL) suppository 650 mg  650 mg Rectal Q6H PRN    polyethylene glycol (MIRALAX) packet 17 g  17 g Oral DAILY PRN    promethazine (PHENERGAN) 12.5 mg in 0.9% sodium chloride 50 mL IVPB  12.5 mg IntraVENous Q4H PRN    oxyCODONE-acetaminophen (PERCOCET) 5-325 mg per tablet 1 Tablet  1 Tablet Oral Q6H PRN    sodium chloride (NS) flush 5-10 mL  5-10 mL IntraVENous PRN      Vitals:    02/06/23 2310 02/07/23 0329 02/07/23 0714 02/07/23 1045   BP: (!) 134/92 125/83 133/83 (!) 142/101   Pulse: (!) 111 (!) 107 (!) 113 (!) 109   Resp: 18 8 (!) 32 (!) 39   Temp: 98.6 °F (37 °C) 98.6 °F (37 °C) 98.2 °F (36.8 °C) 98.1 °F (36.7 °C)   SpO2: 95% 92% 92% 91%   Weight:  65.5 kg (144 lb 6.4 oz) Height:            Alert and oriented x3, No apparent distress    Physical Exam:  Right lower extremity: There is mild erythema seen in the posterior aspect of her right leg extending from her proximal thigh to the proximal calf region. No fluctuance or induration. There was some increased warmth in this region. No calf pain to palpation. There was mottling of her skin throughout her extremities. DP/PT pulse are palpable. Cap refill is 2 seconds. EHL/DF/PF is 5 out of 5. Negative Homans. Full range of motion right knee with minimal discomfort. Good active range of motion of right hip without discomfort. No tenderness to internal/external rotation. She is able to straight leg raise actively past 60 degrees without discomfort. Right lower extremity peers neurovascularly intact. Labs:  CBC:  Recent Labs     02/07/23  0606 02/06/23  1126 02/05/23  1053   WBC 3.7 3.7 3.8   RBC 2.80* 3.14* 3.36*   HGB 7.3* 8.2* 8.9*   HCT 25.0* 28.0* 30.0*   MCV 89.3 89.2 89.3   RDW 20.6* 20.8* 21.0*    241 279     CHEMISTRIES:  Recent Labs     02/07/23  0606 02/06/23  1126 02/05/23  1053   * 137 133*   K 4.0 4.2 4.6    104 97   CO2 28 27 31   BUN 13 10 12   CREA 0.40* 0.38* 0.41*   CA 8.5 8.0* 8.2*   MG  --  2.1  --    PT/INR:No results for input(s): INR, INREXT in the last 72 hours. No lab exists for component: PROTIME  APTT:No results for input(s): APTT in the last 72 hours. LIVER PROFILE:  Recent Labs     02/07/23  0606   AST 48*   ALT 74       IMAGING:  X-rays of her femur are reviewed total hip arthroplasty in place. 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 There is some soft tissue calcification which is diffusely present in the skin  FINDINGS: Two views of the right femur demonstrate diffuse calcifications  throughout the soft tissues. There is a right hip arthroplasty without  dislocation. 8.1 cm curvilinear sclerotic lesion in the distal femoral shaft may  represent an area of infarction. IMPRESSION  1. Extensive dermal calcifications  2. Possible AVN of the distal right femur. .  Assessment/Plan:     Hospital Problems  Date Reviewed: 11/18/2021            Codes Class Noted POA    Cellulitis ICD-10-CM: L03.90  ICD-9-CM: 682.9  8/7/2021 Unknown       Cellulitis right lower extremity    At this time no acute orthopedic intervention needed. Orthopedics will continue to monitor as needed. Continue with antibiotic therapy as prescribed by ID. The patient can ambulate weightbearing as tolerated. This patient was examined direct consult with Dr. Prema Xavier. Thank you for the courtesy of this consult. Signed By: Carlos Zambrano PA-C     February 7, 2023    Orthopedic MD examined the patient, history was taken physical exam was carried out ROS and x-rays reviewed. Patient is immune compromised and presents with this is a cellulitis of the proximal calf. There is no obvious localization. Patient was educated that hopefully IV antibiotics and local warm compresses will help and resolve the situation. If there is any further localization or pointing or worsening of symptoms patient may need reevaluation. If any incisions were made with immune compromised status will be hard to heal them specially with patient's current condition of skin calcification. Patient will be followed up while in-house with you.

## 2023-02-07 NOTE — PROGRESS NOTES
Pt admitted at 1240. Pt is A&Ox4 and a one assist with stand pivots. HR currently 116, which patient reports as her baseline. Two nurse skin assessment complete. Right leg red and warm to touch. There appears to be healed wound on L shin. Pt provided dilaudid as ordered. LDA patent with blood return. Pt on 1L NC. Pt has no complaints at this time.

## 2023-02-07 NOTE — PROGRESS NOTES
TRANSFER - OUT REPORT:    Verbal report given to Claudio(name) on Ric EDWARD Butts  being transferred to (unit) for routine progression of care       Report consisted of patients Situation, Background, Assessment and   Recommendations(SBAR). Information from the following report(s) SBAR, MAR, Recent Results, and Cardiac Rhythm Sinus tachycardia  was reviewed with the receiving nurse. Lines:   Venous Access Device port 02/06/23 Upper chest (subclavicular area, right (Active)   Central Line Being Utilized Yes 02/06/23 2000   Criteria for Appropriate Use Limited/no vessel suitable for conventional peripheral access 02/07/23 0732   Site Assessment Clean, dry, & intact 02/07/23 0732   Dressing Status Clean, dry, & intact 02/07/23 0732   Dressing Type Transparent 02/07/23 0732   Alcohol Cap Used Yes 02/07/23 0732        Opportunity for questions and clarification was provided.       Patient transported with:   O2 @ 1 liters  Patient-specific medications from Pharmacy  Registered Nurse

## 2023-02-07 NOTE — PROGRESS NOTES
Hospitalist Progress Note               Daily Progress Note: 2/7/2023      Subjective:   Still with pain and discomfort especially at the back of her leg. Able to stand and ambulate. Eating well. Good bowel movements. Hospital course to date:    Hospital day 4 admitted for leg cellulitis known to have pulmonary hypertension, low ejection fraction and currently on prednisone and daptomycin.         Problem List:  Problem List as of 2/7/2023 Date Reviewed: 11/18/2021            Codes Class Noted - Resolved    Cellulitis ICD-10-CM: L03.90  ICD-9-CM: 682.9  8/7/2021 - Present        Sepsis ([facility] ICD-10-CM: A41.9  ICD-9-CM: 038.9, 995.91  8/7/2021 - Present           Medications reviewed  Current Facility-Administered Medications   Medication Dose Route Frequency    predniSONE (DELTASONE) tablet 25 mg  25 mg Oral QHS    metoprolol tartrate (LOPRESSOR) tablet 25 mg  25 mg Oral Q12H    HYDROmorphone (DILAUDID) syringe 0.5 mg  0.5 mg IntraVENous Q6H PRN    diphenhydrAMINE (BENADRYL) capsule 25 mg  25 mg Oral Q6H PRN    DAPTOmycin (CUBICIN) 350 mg in 0.9% sodium chloride 50 mL IVPB  6 mg/kg (Adjusted) IntraVENous Q24H    riociguat (ADEMPAS) tablet 2.5 mg   2.5 mg Oral TID    DULoxetine (CYMBALTA) capsule 30 mg  30 mg Oral BID    folic acid (FOLVITE) tablet 2 mg  2 mg Oral DAILY    gabapentin (NEURONTIN) capsule 600 mg  600 mg Oral BID    hydrOXYchloroQUINE (PLAQUENIL) tablet 200 mg  200 mg Oral DAILY    Ambrisentan tab 5 mg - Patient supplied (Patient Supplied)  5 mg Oral QAM    pantoprazole (PROTONIX) tablet 40 mg  40 mg Oral DAILY    tofacitinib Tb24 1 Tablet  (Patient Supplied)  1 Tablet Oral DAILY    sodium chloride (NS) flush 5-40 mL  5-40 mL IntraVENous Q8H    sodium chloride (NS) flush 5-40 mL  5-40 mL IntraVENous PRN    acetaminophen (TYLENOL) tablet 650 mg  650 mg Oral Q6H PRN    Or    acetaminophen (TYLENOL) suppository 650 mg  650 mg Rectal Q6H PRN    polyethylene glycol (MIRALAX) packet 17 g  17 g Oral DAILY PRN    promethazine (PHENERGAN) 12.5 mg in 0.9% sodium chloride 50 mL IVPB  12.5 mg IntraVENous Q4H PRN    oxyCODONE-acetaminophen (PERCOCET) 5-325 mg per tablet 1 Tablet  1 Tablet Oral Q6H PRN    sodium chloride (NS) flush 5-10 mL  5-10 mL IntraVENous PRN       Review of Systems:   Pertinent items are noted in HPI. Objective:   Physical Exam:     Visit Vitals  /80 (BP 1 Location: Left upper arm, BP Patient Position: At rest)   Pulse (!) 115   Temp 98.6 °F (37 °C)   Resp 22   Ht 4' 11\" (1.499 m)   Wt 65.5 kg (144 lb 6.4 oz)   SpO2 92%   BMI 29.17 kg/m²    O2 Flow Rate (L/min): 1 l/min O2 Device: Nasal cannula    Temp (24hrs), Av.4 °F (36.9 °C), Min:98.1 °F (36.7 °C), Max:98.6 °F (37 °C)    No intake/output data recorded.  1901 -  0700  In: 240 [P.O.:240]  Out: 550 [Urine:550]    General:   Awake and alert   Lungs:   Clear to auscultation bilaterally. Chest wall:  No tenderness or deformity. Heart:  Regular rate and rhythm, S1, S2 normal, no murmur, click, rub or gallop. Abdomen:   Soft, non-tender. Bowel sounds normal. No masses,  No organomegaly. Extremities: Extremities normal, atraumatic, no cyanosis or edema. Pulses: 2+ and symmetric all extremities. Skin: Skin color, texture, turgor normal. No rashes or lesions   Neurologic: CNII-XII intact.   No gross focal deficits     Erythema warmth swelling and pain right medial leg-decreased    Data Review:       Recent Days:  Recent Labs     23  0606 23  1126 23  1053   WBC 3.7 3.7 3.8   HGB 7.3* 8.2* 8.9*   HCT 25.0* 28.0* 30.0*    241 279     Recent Labs     23  0606 23  1126 23  1053   * 137 133*   K 4.0 4.2 4.6    104 97   CO2 28 27 31   * 108* 104*   BUN 13 10 12   CREA 0.40* 0.38* 0.41*   CA 8.5 8.0* 8.2*   MG  --  2.1  --    ALB 2.1*  --   --    TBILI 0.4  --   --    ALT 74  --   --      No results for input(s): PH, PCO2, PO2, HCO3, FIO2 in the last 72 hours. 24 Hour Results:  Recent Results (from the past 24 hour(s))   CK    Collection Time: 02/06/23  9:06 PM   Result Value Ref Range    CK 48 26 - 192 U/L   CBC WITH AUTOMATED DIFF    Collection Time: 02/07/23  6:06 AM   Result Value Ref Range    WBC 3.7 3.6 - 11.0 K/uL    RBC 2.80 (L) 3.80 - 5.20 M/uL    HGB 7.3 (L) 11.5 - 16.0 g/dL    HCT 25.0 (L) 35.0 - 47.0 %    MCV 89.3 80.0 - 99.0 FL    MCH 26.1 26.0 - 34.0 PG    MCHC 29.2 (L) 30.0 - 36.5 g/dL    RDW 20.6 (H) 11.5 - 14.5 %    PLATELET 964 530 - 310 K/uL    MPV 10.2 8.9 - 12.9 FL    NRBC 2.5 (H) 0.0  WBC    ABSOLUTE NRBC 0.09 (H) 0.00 - 0.01 K/uL    NEUTROPHILS 87 (H) 32 - 75 %    LYMPHOCYTES 6 (L) 12 - 49 %    MONOCYTES 5 5 - 13 %    EOSINOPHILS 0 0 - 7 %    BASOPHILS 0 0 - 1 %    IMMATURE GRANULOCYTES 2 (H) 0 - 0.5 %    ABS. NEUTROPHILS 3.2 1.8 - 8.0 K/UL    ABS. LYMPHOCYTES 0.2 (L) 0.8 - 3.5 K/UL    ABS. MONOCYTES 0.2 0.0 - 1.0 K/UL    ABS. EOSINOPHILS 0.0 0.0 - 0.4 K/UL    ABS. BASOPHILS 0.0 0.0 - 0.1 K/UL    ABS. IMM. GRANS. 0.1 (H) 0.00 - 0.04 K/UL    DF AUTOMATED     METABOLIC PANEL, COMPREHENSIVE    Collection Time: 02/07/23  6:06 AM   Result Value Ref Range    Sodium 135 (L) 136 - 145 mmol/L    Potassium 4.0 3.5 - 5.1 mmol/L    Chloride 103 97 - 108 mmol/L    CO2 28 21 - 32 mmol/L    Anion gap 4 (L) 5 - 15 mmol/L    Glucose 118 (H) 65 - 100 mg/dL    BUN 13 6 - 20 mg/dL    Creatinine 0.40 (L) 0.55 - 1.02 mg/dL    BUN/Creatinine ratio 33 (H) 12 - 20      eGFR >60 >60 ml/min/1.73m2    Calcium 8.5 8.5 - 10.1 mg/dL    Bilirubin, total 0.4 0.2 - 1.0 mg/dL    AST (SGOT) 48 (H) 15 - 37 U/L    ALT (SGPT) 74 12 - 78 U/L    Alk.  phosphatase 260 (H) 45 - 117 U/L    Protein, total 6.2 (L) 6.4 - 8.2 g/dL    Albumin 2.1 (L) 3.5 - 5.0 g/dL    Globulin 4.1 (H) 2.0 - 4.0 g/dL    A-G Ratio 0.5 (L) 1.1 - 2.2     C REACTIVE PROTEIN, QT    Collection Time: 02/07/23  6:06 AM   Result Value Ref Range    C-Reactive protein 10.20 (H) 0.00 - 0.60 mg/dL PROCALCITONIN    Collection Time: 02/07/23  6:06 AM   Result Value Ref Range    Procalcitonin 0.18 (H) 0 ng/mL   SED RATE (ESR)    Collection Time: 02/07/23  6:06 AM   Result Value Ref Range    Sed rate, automated 120 (H) 0 - 20 mm/hr   URINALYSIS W/ REFLEX CULTURE    Collection Time: 02/07/23  6:14 AM    Specimen: Urine   Result Value Ref Range    Color Yellow      Appearance Clear Clear      Specific gravity 1.020 1.003 - 1.030      pH (UA) 5.0 5.0 - 8.0      Protein >300 (A) Negative mg/dL    Glucose Negative Negative mg/dL    Ketone Negative Negative mg/dL    Bilirubin 1 (A) Negative      Blood Small (A) Negative      Urobilinogen 8.0 (H) 0.1 - 1.0 EU/dL    Nitrites Negative Negative      Leukocyte Esterase Negative Negative      Bacteria Negative Negative /hpf    UA:UC IF INDICATED Culture not indicated by UA result Culture not indicated by UA result      WBC 0-4 0 - 4 /hpf    RBC 0-5 0 - 5 /hpf    Epithelial cells Moderate (A) Few /lpf    Mucus Trace /lpf       XR FEMUR RT 2 VS   Final Result   1. Extensive dermal calcifications   2. Possible AVN of the distal right femur. XR CHEST PORT   Final Result   1. Cardiomegaly and pulmonary vascular congestion. 2. Diffuse heterotopic soft tissue calcification. XR CHEST PA LAT   Final Result      No significant change. Cardiomegaly and chronic interstitial opacities. DUPLEX LOWER EXT VENOUS BILAT   Final Result      XR CHEST PORT   Final Result      Stable exam. Unchanged interstitial lung abnormalities and small pleural   effusions. Assessment:    Cellulitis right lower extremity  History of SLE on Plaquenil, prednisone and tofacitinib  Rheumatoid arthritis  adverse reaction to Unasyn  Pulmonary hypertension    Discussion/MDM: Patient with multiple medical comorbidities, each with high likelihood for morbidity and mortality if left untreated.   Patient being treated with medication that requires intensive monitoring due to increased risk for toxicity. I have reviewed patient's presenting subjective and objective findings, as well as all laboratory studies, imaging studies, and vital signs to date as well as treatment rendered and patient's response to those treatments. In addition, prior medical, surgical and relevant social and family histories were reviewed. Plan:  Daptomycin started  Follow pulmonology markers  Await UA plus urine culture  Clinical improvement noted  Appreciate cardiology input      Care Plan discussed with: Patient/Family    Code status: full    Social determinants of health:    Disposition: home    Total time spent with patient: 25 minutes.     Nikolay Roque MD

## 2023-02-08 LAB
BASOPHILS # BLD: 0 K/UL (ref 0–0.1)
BASOPHILS NFR BLD: 0 % (ref 0–1)
CRP SERPL-MCNC: 6.32 MG/DL (ref 0–0.6)
DIFFERENTIAL METHOD BLD: ABNORMAL
EOSINOPHIL # BLD: 0 K/UL (ref 0–0.4)
EOSINOPHIL NFR BLD: 0 % (ref 0–7)
ERYTHROCYTE [DISTWIDTH] IN BLOOD BY AUTOMATED COUNT: 21.2 % (ref 11.5–14.5)
HCT VFR BLD AUTO: 27 % (ref 35–47)
HGB BLD-MCNC: 7.9 G/DL (ref 11.5–16)
IMM GRANULOCYTES # BLD AUTO: 0.1 K/UL (ref 0–0.04)
IMM GRANULOCYTES NFR BLD AUTO: 4 % (ref 0–0.5)
LYMPHOCYTES # BLD: 0.2 K/UL (ref 0.8–3.5)
LYMPHOCYTES NFR BLD: 6 % (ref 12–49)
MCH RBC QN AUTO: 26.4 PG (ref 26–34)
MCHC RBC AUTO-ENTMCNC: 29.3 G/DL (ref 30–36.5)
MCV RBC AUTO: 90.3 FL (ref 80–99)
MONOCYTES # BLD: 0.1 K/UL (ref 0–1)
MONOCYTES NFR BLD: 3 % (ref 5–13)
NEUTS SEG # BLD: 3.2 K/UL (ref 1.8–8)
NEUTS SEG NFR BLD: 87 % (ref 32–75)
NRBC # BLD: 0.17 K/UL (ref 0–0.01)
NRBC BLD-RTO: 4.7 PER 100 WBC
PLATELET # BLD AUTO: 268 K/UL (ref 150–400)
PMV BLD AUTO: 10.1 FL (ref 8.9–12.9)
PROCALCITONIN SERPL-MCNC: 0.11 NG/ML
RBC # BLD AUTO: 2.99 M/UL (ref 3.8–5.2)
STREP DNASE B SER-ACNC: <78 U/ML (ref 0–120)
WBC # BLD AUTO: 3.6 K/UL (ref 3.6–11)

## 2023-02-08 PROCEDURE — 99232 SBSQ HOSP IP/OBS MODERATE 35: CPT | Performed by: INTERNAL MEDICINE

## 2023-02-08 PROCEDURE — 74011250636 HC RX REV CODE- 250/636: Performed by: INTERNAL MEDICINE

## 2023-02-08 PROCEDURE — 86140 C-REACTIVE PROTEIN: CPT

## 2023-02-08 PROCEDURE — 77010033678 HC OXYGEN DAILY

## 2023-02-08 PROCEDURE — 94762 N-INVAS EAR/PLS OXIMTRY CONT: CPT

## 2023-02-08 PROCEDURE — 36415 COLL VENOUS BLD VENIPUNCTURE: CPT

## 2023-02-08 PROCEDURE — 74011000250 HC RX REV CODE- 250: Performed by: INTERNAL MEDICINE

## 2023-02-08 PROCEDURE — 85025 COMPLETE CBC W/AUTO DIFF WBC: CPT

## 2023-02-08 PROCEDURE — 65270000029 HC RM PRIVATE

## 2023-02-08 PROCEDURE — 74011000258 HC RX REV CODE- 258: Performed by: INTERNAL MEDICINE

## 2023-02-08 PROCEDURE — 74011250637 HC RX REV CODE- 250/637: Performed by: STUDENT IN AN ORGANIZED HEALTH CARE EDUCATION/TRAINING PROGRAM

## 2023-02-08 PROCEDURE — 74011250637 HC RX REV CODE- 250/637: Performed by: INTERNAL MEDICINE

## 2023-02-08 PROCEDURE — 84145 PROCALCITONIN (PCT): CPT

## 2023-02-08 PROCEDURE — 94761 N-INVAS EAR/PLS OXIMETRY MLT: CPT

## 2023-02-08 PROCEDURE — 74011636637 HC RX REV CODE- 636/637: Performed by: INTERNAL MEDICINE

## 2023-02-08 RX ORDER — KETOROLAC TROMETHAMINE 30 MG/ML
30 INJECTION, SOLUTION INTRAMUSCULAR; INTRAVENOUS
Status: DISPENSED | OUTPATIENT
Start: 2023-02-08 | End: 2023-02-09

## 2023-02-08 RX ADMIN — HYDROMORPHONE HYDROCHLORIDE 0.5 MG: 1 INJECTION, SOLUTION INTRAMUSCULAR; INTRAVENOUS; SUBCUTANEOUS at 03:17

## 2023-02-08 RX ADMIN — METOPROLOL TARTRATE 25 MG: 25 TABLET, FILM COATED ORAL at 20:48

## 2023-02-08 RX ADMIN — GABAPENTIN 600 MG: 300 CAPSULE ORAL at 09:14

## 2023-02-08 RX ADMIN — PANTOPRAZOLE SODIUM 40 MG: 40 TABLET, DELAYED RELEASE ORAL at 09:14

## 2023-02-08 RX ADMIN — SODIUM CHLORIDE, PRESERVATIVE FREE 10 ML: 5 INJECTION INTRAVENOUS at 13:39

## 2023-02-08 RX ADMIN — PREDNISONE 25 MG: 5 TABLET ORAL at 20:49

## 2023-02-08 RX ADMIN — GABAPENTIN 600 MG: 300 CAPSULE ORAL at 20:48

## 2023-02-08 RX ADMIN — RIOCIGUAT 2.5 MG: 2.5 TABLET, FILM COATED ORAL at 20:46

## 2023-02-08 RX ADMIN — HYDROXYCHLOROQUINE SULFATE 200 MG: 200 TABLET, FILM COATED ORAL at 09:14

## 2023-02-08 RX ADMIN — RIOCIGUAT 2.5 MG: 2.5 TABLET, FILM COATED ORAL at 17:17

## 2023-02-08 RX ADMIN — DULOXETINE HYDROCHLORIDE 30 MG: 30 CAPSULE, DELAYED RELEASE ORAL at 09:14

## 2023-02-08 RX ADMIN — KETOROLAC TROMETHAMINE 30 MG: 30 INJECTION, SOLUTION INTRAMUSCULAR; INTRAVENOUS at 13:38

## 2023-02-08 RX ADMIN — FOLIC ACID 2 MG: 1 TABLET ORAL at 09:14

## 2023-02-08 RX ADMIN — KETOROLAC TROMETHAMINE 30 MG: 30 INJECTION, SOLUTION INTRAMUSCULAR; INTRAVENOUS at 19:46

## 2023-02-08 RX ADMIN — AMBRISENTAN 5 MG: 5 TABLET, FILM COATED ORAL at 09:12

## 2023-02-08 RX ADMIN — DULOXETINE HYDROCHLORIDE 30 MG: 30 CAPSULE, DELAYED RELEASE ORAL at 20:48

## 2023-02-08 RX ADMIN — DAPTOMYCIN 350 MG: 500 INJECTION, POWDER, LYOPHILIZED, FOR SOLUTION INTRAVENOUS at 20:49

## 2023-02-08 RX ADMIN — SODIUM CHLORIDE, PRESERVATIVE FREE 10 ML: 5 INJECTION INTRAVENOUS at 20:47

## 2023-02-08 RX ADMIN — RIOCIGUAT 2.5 MG: 2.5 TABLET, FILM COATED ORAL at 09:12

## 2023-02-08 RX ADMIN — METOPROLOL TARTRATE 25 MG: 25 TABLET, FILM COATED ORAL at 09:14

## 2023-02-08 NOTE — PROGRESS NOTES
Hospitalist Progress Note               Daily Progress Note: 2/8/2023      Subjective:   She was seen and examined. Accompanied by her parents today. Pain is still there but a bit better  Ambulating within the room. Requesting me if she can get Toradol for pain  Mother informs me that she thinks that the IV line was infiltrated when the antibiotic was being used and may have caused her trouble. I explained to her that we think Unasyn is causing the adverse reaction of shortness of breath    Hospital course to date:    Hospital day 5   admitted for leg cellulitis known to have pulmonary hypertension, low ejection fraction and currently on prednisone and daptomycin.         Problem List:  Problem List as of 2/8/2023 Date Reviewed: 11/18/2021            Codes Class Noted - Resolved    Cellulitis ICD-10-CM: L03.90  ICD-9-CM: 682.9  8/7/2021 - Present        Sepsis ([facility] [address] ICD-10-CM: A41.9  ICD-9-CM: 038.9, 995.91  8/7/2021 - Present           Medications reviewed  Current Facility-Administered Medications   Medication Dose Route Frequency    ketorolac (TORADOL) injection 30 mg  30 mg IntraVENous Q6H PRN    predniSONE (DELTASONE) tablet 25 mg  25 mg Oral QHS    metoprolol tartrate (LOPRESSOR) tablet 25 mg  25 mg Oral Q12H    HYDROmorphone (DILAUDID) syringe 0.5 mg  0.5 mg IntraVENous Q6H PRN    diphenhydrAMINE (BENADRYL) capsule 25 mg  25 mg Oral Q6H PRN    DAPTOmycin (CUBICIN) 350 mg in 0.9% sodium chloride 50 mL IVPB  6 mg/kg (Adjusted) IntraVENous Q24H    riociguat (ADEMPAS) tablet 2.5 mg   2.5 mg Oral TID    DULoxetine (CYMBALTA) capsule 30 mg  30 mg Oral BID    folic acid (FOLVITE) tablet 2 mg  2 mg Oral DAILY    gabapentin (NEURONTIN) capsule 600 mg  600 mg Oral BID    hydrOXYchloroQUINE (PLAQUENIL) tablet 200 mg  200 mg Oral DAILY    Ambrisentan tab 5 mg - Patient supplied (Patient Supplied)  5 mg Oral QAM    pantoprazole (PROTONIX) tablet 40 mg  40 mg Oral DAILY    tofacitinib Tb24 1 Tablet  (Patient Supplied)  1 Tablet Oral DAILY    sodium chloride (NS) flush 5-40 mL  5-40 mL IntraVENous Q8H    sodium chloride (NS) flush 5-40 mL  5-40 mL IntraVENous PRN    acetaminophen (TYLENOL) tablet 650 mg  650 mg Oral Q6H PRN    Or    acetaminophen (TYLENOL) suppository 650 mg  650 mg Rectal Q6H PRN    polyethylene glycol (MIRALAX) packet 17 g  17 g Oral DAILY PRN    promethazine (PHENERGAN) 12.5 mg in 0.9% sodium chloride 50 mL IVPB  12.5 mg IntraVENous Q4H PRN    oxyCODONE-acetaminophen (PERCOCET) 5-325 mg per tablet 1 Tablet  1 Tablet Oral Q6H PRN    sodium chloride (NS) flush 5-10 mL  5-10 mL IntraVENous PRN       Review of Systems:   Pertinent items are noted in HPI. Objective:   Physical Exam:     Visit Vitals  BP (!) 146/103   Pulse 96   Temp 98.4 °F (36.9 °C)   Resp 28   Ht 4' 11\" (1.499 m)   Wt 65.5 kg (144 lb 6.4 oz)   SpO2 96%   BMI 29.17 kg/m²    O2 Flow Rate (L/min): 1 l/min O2 Device: Nasal cannula    Temp (24hrs), Av.5 °F (36.9 °C), Min:98.4 °F (36.9 °C), Max:98.7 °F (37.1 °C)    701 -  1900  In: -   Out: 118 [BBQA]   1901 -  0700  In: -   Out: 150 [Urine:150]    General:   Awake and alert   Lungs:   Clear to auscultation bilaterally. Chest wall:  No tenderness or deformity. Heart:  Regular rate and rhythm, S1, S2 normal, no murmur, click, rub or gallop. Abdomen:   Soft, non-tender. Bowel sounds normal. No masses,  No organomegaly. Extremities: Extremities normal, atraumatic, no cyanosis or edema. Pulses: 2+ and symmetric all extremities. Skin: Skin color, texture, turgor normal. No rashes or lesions   Neurologic: CNII-XII intact.   No gross focal deficits     Erythema warmth swelling and pain right medial leg-decreased, minimal tenderness on palpation    Data Review:       Recent Days:  Recent Labs     23  0606 23  1126   WBC 3.7 3.7   HGB 7.3* 8.2*   HCT 25.0* 28.0*    241     Recent Labs     23  0606 23  1126   * 137   K 4.0 4.2    104   CO2 28 27   * 108*   BUN 13 10   CREA 0.40* 0.38*   CA 8.5 8.0*   MG  --  2.1   ALB 2.1*  --    TBILI 0.4  --    ALT 74  --      No results for input(s): PH, PCO2, PO2, HCO3, FIO2 in the last 72 hours. 24 Hour Results:  Recent Results (from the past 24 hour(s))   C REACTIVE PROTEIN, QT    Collection Time: 02/08/23  5:50 AM   Result Value Ref Range    C-Reactive protein 6.32 (H) 0.00 - 0.60 mg/dL   PROCALCITONIN    Collection Time: 02/08/23  5:50 AM   Result Value Ref Range    Procalcitonin 0.11 (H) 0 ng/mL       XR FEMUR RT 2 VS   Final Result   1. Extensive dermal calcifications   2. Possible AVN of the distal right femur. XR CHEST PORT   Final Result   1. Cardiomegaly and pulmonary vascular congestion. 2. Diffuse heterotopic soft tissue calcification. XR CHEST PA LAT   Final Result      No significant change. Cardiomegaly and chronic interstitial opacities. DUPLEX LOWER EXT VENOUS BILAT   Final Result      XR CHEST PORT   Final Result      Stable exam. Unchanged interstitial lung abnormalities and small pleural   effusions. Assessment:    Cellulitis right lower extremity  History of SLE on Plaquenil, prednisone and tofacitinib  Rheumatoid arthritis  adverse reaction to Unasyn  Pulmonary hypertension    Discussion/MDM: Patient with multiple medical comorbidities, each with high likelihood for morbidity and mortality if left untreated. Patient being treated with medication that requires intensive monitoring due to increased risk for toxicity. I have reviewed patient's presenting subjective and objective findings, as well as all laboratory studies, imaging studies, and vital signs to date as well as treatment rendered and patient's response to those treatments. In addition, prior medical, surgical and relevant social and family histories were reviewed. Plan: Will obtain stat labs today.   Procalcitonin at 0.11  CRP trending down  Await anti-DNase B and antistreptolysin B  Will give one time dose of toradol as per request.  PT / OT today  Discharge planning tomorrow    Care Plan discussed with: Patient/Family    Code status: full    Social determinants of health:    Disposition: home    Total time spent with patient: 25 minutes.     Adriana Serrano MD

## 2023-02-08 NOTE — PROGRESS NOTES
ORTH PROGRESS NOTE    2023  Admit Date:   2/3/2023    Right leg cellulitis with immune [id number]    Subjective:    Bill Servin is out of bed sitting in the commode seat. Patient says overall she feels better. She denies any fever or chills. Her appetite is improved. She says the pain in the right leg is less. PT/OT:   Gait:                    Vital Signs:    Patient Vitals for the past 8 hrs:   BP Temp Pulse Resp SpO2   23 1450 121/74 98.2 °F (36.8 °C) (!) 104 28 94 %   23 1145 135/85 98 °F (36.7 °C) (!) 107 27 96 %     Temp (24hrs), Av.4 °F (36.9 °C), Min:98 °F (36.7 °C), Max:98.7 °F (37.1 °C)    Right thigh redness is not increasing but has slightly decreased. There is no induration in the thigh. Right posterior calf which had the area of induration is less swollen less tender but some induration is still present. Deeper calf is nontender. No obvious localization palpated/fluctuant area palpated.     Pain Control:   Pain Assessment  Pain Scale 1: Numeric (0 - 10)  Pain Intensity 1: 7  Pain Onset 1: acute  Pain Location 1: Leg, Foot, Other (comment) (left leg)  Pain Orientation 1: Right  Pain Description 1: Tightness, Aching  Pain Intervention(s) 1: Medication (see MAR)    Meds:    Current Facility-Administered Medications   Medication Dose Route Frequency    ketorolac (TORADOL) injection 30 mg  30 mg IntraVENous Q6H PRN    predniSONE (DELTASONE) tablet 25 mg  25 mg Oral QHS    metoprolol tartrate (LOPRESSOR) tablet 25 mg  25 mg Oral Q12H    HYDROmorphone (DILAUDID) syringe 0.5 mg  0.5 mg IntraVENous Q6H PRN    diphenhydrAMINE (BENADRYL) capsule 25 mg  25 mg Oral Q6H PRN    DAPTOmycin (CUBICIN) 350 mg in 0.9% sodium chloride 50 mL IVPB  6 mg/kg (Adjusted) IntraVENous Q24H    riociguat (ADEMPAS) tablet 2.5 mg   2.5 mg Oral TID    DULoxetine (CYMBALTA) capsule 30 mg  30 mg Oral BID    folic acid (FOLVITE) tablet 2 mg  2 mg Oral DAILY    gabapentin (NEURONTIN) capsule 600 mg  600 mg Oral BID    hydrOXYchloroQUINE (PLAQUENIL) tablet 200 mg  200 mg Oral DAILY    Ambrisentan tab 5 mg - Patient supplied (Patient Supplied)  5 mg Oral QAM    pantoprazole (PROTONIX) tablet 40 mg  40 mg Oral DAILY    tofacitinib Tb24 1 Tablet  (Patient Supplied)  1 Tablet Oral DAILY    sodium chloride (NS) flush 5-40 mL  5-40 mL IntraVENous Q8H    sodium chloride (NS) flush 5-40 mL  5-40 mL IntraVENous PRN    acetaminophen (TYLENOL) tablet 650 mg  650 mg Oral Q6H PRN    Or    acetaminophen (TYLENOL) suppository 650 mg  650 mg Rectal Q6H PRN    polyethylene glycol (MIRALAX) packet 17 g  17 g Oral DAILY PRN    promethazine (PHENERGAN) 12.5 mg in 0.9% sodium chloride 50 mL IVPB  12.5 mg IntraVENous Q4H PRN    oxyCODONE-acetaminophen (PERCOCET) 5-325 mg per tablet 1 Tablet  1 Tablet Oral Q6H PRN    sodium chloride (NS) flush 5-10 mL  5-10 mL IntraVENous PRN       LAB:    Recent Labs     02/08/23  1131   HCT 27.0*   HGB 7.9*   Procalcitonin and C-reactive protein decreasing. 24 Hour Assessment Issues:    Oriented    Discharge Planning:  As per primary team    Transfuse PRBC's: None    Assessment & Physician's Comment:  Neurovascular checks within normal limits  Orientation:  Oriented  Continue local monitoring of the right calf    Active Problems:    Cellulitis (8/7/2021)        Plan:  Continue monitoring right calf clinically. IV antibiotics per ID team-Dr. Karen Raza. Continue topical warm compresses. We will follow up the patient with you.     Katie Rodríguez MD

## 2023-02-08 NOTE — PROGRESS NOTES
2427: Chart reviewed. Per notes, patient on IV ABX (Dapto). [facility] have accepted patient when medically cleared. CM will continue to follow patient and recs of medical team.    9183: If patient needs IV ABX upon discharge, per [facility]:    \"Drug would be billed through the Rx plan and subject to co-pays and supplies are covered at 100%. Once we have an Rx we can provide est. co-pay amounts. \"

## 2023-02-08 NOTE — ROUTINE PROCESS
Patient has redness and swelling to her right leg from thigh to lower extremity. She is being treated for cellulitis. Kamlesh Abel Is the second skin assessment RN.

## 2023-02-09 LAB
ALBUMIN SERPL-MCNC: 2.1 G/DL (ref 3.5–5)
ANION GAP SERPL CALC-SCNC: 5 MMOL/L (ref 5–15)
BASOPHILS # BLD: 0 K/UL (ref 0–0.1)
BASOPHILS NFR BLD: 0 % (ref 0–1)
BUN SERPL-MCNC: 17 MG/DL (ref 6–20)
BUN/CREAT SERPL: 40 (ref 12–20)
CA-I BLD-MCNC: 8.9 MG/DL (ref 8.5–10.1)
CHLORIDE SERPL-SCNC: 105 MMOL/L (ref 97–108)
CO2 SERPL-SCNC: 28 MMOL/L (ref 21–32)
CREAT SERPL-MCNC: 0.42 MG/DL (ref 0.55–1.02)
CRP SERPL-MCNC: 3.52 MG/DL (ref 0–0.6)
DIFFERENTIAL METHOD BLD: ABNORMAL
EOSINOPHIL # BLD: 0 K/UL (ref 0–0.4)
EOSINOPHIL NFR BLD: 0 % (ref 0–7)
ERYTHROCYTE [DISTWIDTH] IN BLOOD BY AUTOMATED COUNT: 21.1 % (ref 11.5–14.5)
GLUCOSE SERPL-MCNC: 103 MG/DL (ref 65–100)
HCT VFR BLD AUTO: 23.1 % (ref 35–47)
HGB BLD-MCNC: 6.6 G/DL (ref 11.5–16)
IMM GRANULOCYTES # BLD AUTO: 0.1 K/UL (ref 0–0.04)
IMM GRANULOCYTES NFR BLD AUTO: 2 % (ref 0–0.5)
LYMPHOCYTES # BLD: 0.2 K/UL (ref 0.8–3.5)
LYMPHOCYTES NFR BLD: 4 % (ref 12–49)
MCH RBC QN AUTO: 26.1 PG (ref 26–34)
MCHC RBC AUTO-ENTMCNC: 28.6 G/DL (ref 30–36.5)
MCV RBC AUTO: 91.3 FL (ref 80–99)
MONOCYTES # BLD: 0.1 K/UL (ref 0–1)
MONOCYTES NFR BLD: 2 % (ref 5–13)
NEUTS SEG # BLD: 5.1 K/UL (ref 1.8–8)
NEUTS SEG NFR BLD: 92 % (ref 32–75)
NRBC # BLD: 0.18 K/UL (ref 0–0.01)
NRBC BLD-RTO: 3.2 PER 100 WBC
PHOSPHATE SERPL-MCNC: 3.4 MG/DL (ref 2.6–4.7)
PLATELET # BLD AUTO: 254 K/UL (ref 150–400)
PMV BLD AUTO: 10 FL (ref 8.9–12.9)
POTASSIUM SERPL-SCNC: 4.1 MMOL/L (ref 3.5–5.1)
PROCALCITONIN SERPL-MCNC: 0.09 NG/ML
RBC # BLD AUTO: 2.53 M/UL (ref 3.8–5.2)
SODIUM SERPL-SCNC: 138 MMOL/L (ref 136–145)
WBC # BLD AUTO: 5.6 K/UL (ref 3.6–11)

## 2023-02-09 PROCEDURE — 99232 SBSQ HOSP IP/OBS MODERATE 35: CPT | Performed by: INTERNAL MEDICINE

## 2023-02-09 PROCEDURE — 86900 BLOOD TYPING SEROLOGIC ABO: CPT

## 2023-02-09 PROCEDURE — 74011250636 HC RX REV CODE- 250/636: Performed by: INTERNAL MEDICINE

## 2023-02-09 PROCEDURE — 74011250637 HC RX REV CODE- 250/637: Performed by: INTERNAL MEDICINE

## 2023-02-09 PROCEDURE — 74011000258 HC RX REV CODE- 258: Performed by: INTERNAL MEDICINE

## 2023-02-09 PROCEDURE — 86225 DNA ANTIBODY NATIVE: CPT

## 2023-02-09 PROCEDURE — 86923 COMPATIBILITY TEST ELECTRIC: CPT

## 2023-02-09 PROCEDURE — 36415 COLL VENOUS BLD VENIPUNCTURE: CPT

## 2023-02-09 PROCEDURE — 86038 ANTINUCLEAR ANTIBODIES: CPT

## 2023-02-09 PROCEDURE — 84145 PROCALCITONIN (PCT): CPT

## 2023-02-09 PROCEDURE — 80069 RENAL FUNCTION PANEL: CPT

## 2023-02-09 PROCEDURE — 65270000029 HC RM PRIVATE

## 2023-02-09 PROCEDURE — 74011000250 HC RX REV CODE- 250: Performed by: INTERNAL MEDICINE

## 2023-02-09 PROCEDURE — 74011636637 HC RX REV CODE- 636/637: Performed by: INTERNAL MEDICINE

## 2023-02-09 PROCEDURE — 74011250637 HC RX REV CODE- 250/637: Performed by: STUDENT IN AN ORGANIZED HEALTH CARE EDUCATION/TRAINING PROGRAM

## 2023-02-09 PROCEDURE — 86160 COMPLEMENT ANTIGEN: CPT

## 2023-02-09 PROCEDURE — 85025 COMPLETE CBC W/AUTO DIFF WBC: CPT

## 2023-02-09 PROCEDURE — 86140 C-REACTIVE PROTEIN: CPT

## 2023-02-09 RX ORDER — SODIUM CHLORIDE 9 MG/ML
250 INJECTION, SOLUTION INTRAVENOUS AS NEEDED
Status: DISCONTINUED | OUTPATIENT
Start: 2023-02-09 | End: 2023-02-16 | Stop reason: HOSPADM

## 2023-02-09 RX ORDER — FUROSEMIDE 10 MG/ML
40 INJECTION INTRAMUSCULAR; INTRAVENOUS 2 TIMES DAILY
Status: DISCONTINUED | OUTPATIENT
Start: 2023-02-09 | End: 2023-02-10

## 2023-02-09 RX ADMIN — DAPTOMYCIN 350 MG: 500 INJECTION, POWDER, LYOPHILIZED, FOR SOLUTION INTRAVENOUS at 22:29

## 2023-02-09 RX ADMIN — FOLIC ACID 2 MG: 1 TABLET ORAL at 10:04

## 2023-02-09 RX ADMIN — RIOCIGUAT 2.5 MG: 2.5 TABLET, FILM COATED ORAL at 10:05

## 2023-02-09 RX ADMIN — SODIUM CHLORIDE, PRESERVATIVE FREE 10 ML: 5 INJECTION INTRAVENOUS at 22:29

## 2023-02-09 RX ADMIN — GABAPENTIN 600 MG: 300 CAPSULE ORAL at 10:03

## 2023-02-09 RX ADMIN — DULOXETINE HYDROCHLORIDE 30 MG: 30 CAPSULE, DELAYED RELEASE ORAL at 10:04

## 2023-02-09 RX ADMIN — HYDROMORPHONE HYDROCHLORIDE 0.5 MG: 1 INJECTION, SOLUTION INTRAMUSCULAR; INTRAVENOUS; SUBCUTANEOUS at 10:04

## 2023-02-09 RX ADMIN — RIOCIGUAT 2.5 MG: 2.5 TABLET, FILM COATED ORAL at 21:43

## 2023-02-09 RX ADMIN — HYDROMORPHONE HYDROCHLORIDE 0.5 MG: 1 INJECTION, SOLUTION INTRAMUSCULAR; INTRAVENOUS; SUBCUTANEOUS at 02:54

## 2023-02-09 RX ADMIN — RIOCIGUAT 2.5 MG: 2.5 TABLET, FILM COATED ORAL at 17:00

## 2023-02-09 RX ADMIN — HYDROMORPHONE HYDROCHLORIDE 0.5 MG: 1 INJECTION, SOLUTION INTRAMUSCULAR; INTRAVENOUS; SUBCUTANEOUS at 17:00

## 2023-02-09 RX ADMIN — ACETAMINOPHEN 650 MG: 325 TABLET ORAL at 21:42

## 2023-02-09 RX ADMIN — METOPROLOL TARTRATE 25 MG: 25 TABLET, FILM COATED ORAL at 10:04

## 2023-02-09 RX ADMIN — SODIUM CHLORIDE, PRESERVATIVE FREE 10 ML: 5 INJECTION INTRAVENOUS at 17:02

## 2023-02-09 RX ADMIN — KETOROLAC TROMETHAMINE 30 MG: 30 INJECTION, SOLUTION INTRAMUSCULAR; INTRAVENOUS at 07:20

## 2023-02-09 RX ADMIN — PREDNISONE 25 MG: 5 TABLET ORAL at 21:42

## 2023-02-09 RX ADMIN — AMBRISENTAN 5 MG: 5 TABLET, FILM COATED ORAL at 10:05

## 2023-02-09 RX ADMIN — FUROSEMIDE 40 MG: 10 INJECTION, SOLUTION INTRAMUSCULAR; INTRAVENOUS at 21:43

## 2023-02-09 RX ADMIN — PANTOPRAZOLE SODIUM 40 MG: 40 TABLET, DELAYED RELEASE ORAL at 10:04

## 2023-02-09 RX ADMIN — GABAPENTIN 600 MG: 300 CAPSULE ORAL at 21:42

## 2023-02-09 RX ADMIN — METOPROLOL TARTRATE 25 MG: 25 TABLET, FILM COATED ORAL at 21:42

## 2023-02-09 RX ADMIN — HYDROXYCHLOROQUINE SULFATE 200 MG: 200 TABLET, FILM COATED ORAL at 10:16

## 2023-02-09 RX ADMIN — HYDROMORPHONE HYDROCHLORIDE 0.5 MG: 1 INJECTION, SOLUTION INTRAMUSCULAR; INTRAVENOUS; SUBCUTANEOUS at 22:54

## 2023-02-09 RX ADMIN — ACETAMINOPHEN 650 MG: 325 TABLET ORAL at 04:28

## 2023-02-09 RX ADMIN — DIPHENHYDRAMINE HYDROCHLORIDE 25 MG: 25 CAPSULE ORAL at 04:28

## 2023-02-09 RX ADMIN — KETOROLAC TROMETHAMINE 30 MG: 30 INJECTION, SOLUTION INTRAMUSCULAR; INTRAVENOUS at 01:20

## 2023-02-09 RX ADMIN — FUROSEMIDE 40 MG: 10 INJECTION, SOLUTION INTRAMUSCULAR; INTRAVENOUS at 12:09

## 2023-02-09 RX ADMIN — DULOXETINE HYDROCHLORIDE 30 MG: 30 CAPSULE, DELAYED RELEASE ORAL at 21:43

## 2023-02-09 NOTE — PROGRESS NOTES
DCP:     -[facility] for outpatient IV ABX. Teaching has been provided to patient.  -[facility] [place]. CM met with patient and confirmed that she does have O2 at home PRN through [address] [place] [address] will continue to follow for medical clearance.

## 2023-02-09 NOTE — MED STUDENT NOTES
Progress Note    Patient: Armand Lui MRN: [id number]  SSN: [id number]    YOB: 1990  Age: 34 y.o. Sex: female      Admit Date: 2/3/2023    LOS: 6 days     Subjective:     34year old female past medical history of lupus, rheumatoid arthritis, pulmonary hypertension, and osteonecrosis. Originally presented to the Ed with right leg pain and redness that started 4 days prior. It worsened and spread to include her upper thigh. She reported the pain worsened with movement and was associated with fever and chills. In the Ed she was noted to have fever and tachycardia with leukocytosis. Today day 6 of clinical course: she is examined in bed. She reports to feeling worse today and is tearful. She is able to use the restroom as needed. She reports the leg pain to be worse and associated with additional swelling. She denies chest pain, shortness of breath at rest, headache. She endorses difficulty breathing with exertion. 2/3/2023 - Arrives to ED complaining of constant leg pain. Admitted for cellulitis. 2/4/2023 - Some improvement following administration of Unasyn. 2/5/2023 - Unasyn continuing secondary to sepsis. 2/6/2023 - Some shortness of breath, CXR reveals small pleural effusion, unasyn held. Some LE edema found, recommend starting home lasix by cardio. Daptomycin replaced unasyn. 2/7/2023 - Patient doing well on daptomycin, blood pressure found to be elevated. Orthopedics examined right hip after XR showed potential AVN of right distal femur. 2/8/2023 - Pain has improved in right hip. Objective:     Vitals:    02/08/23 1946 02/09/23 0816 02/09/23 1000 02/09/23 1209   BP: 124/76 117/76  131/78   Pulse: (!) 108 96  (!) 105   Resp: 28 22     Temp: 97.7 °F (36.5 °C) 100.3 °F (37.9 °C) 98.5 °F (36.9 °C)    SpO2: 96% 98%     Weight:       Height:            Intake and Output:  Current Shift: No intake/output data recorded.   Last three shifts: 02/07 1901 - 02/09 0700  In: 700 [P.O.:700]  Out: 1000 [Urine:1000]    Physical Exam:   Physical Exam  Constitutional:       General: She is in acute distress. Appearance: She is normal weight. She is ill-appearing. HENT:      Head: Normocephalic. Comments: Bilateral parotitis     Nose: Nose normal.      Mouth/Throat:      Mouth: Mucous membranes are moist.      Pharynx: Oropharynx is clear. Eyes:      Conjunctiva/sclera: Conjunctivae normal.      Pupils: Pupils are equal, round, and reactive to light. Comments: Bilateral swelling     Cardiovascular:      Rate and Rhythm: Normal rate and regular rhythm. Pulses: Normal pulses. Heart sounds: Normal heart sounds. Pulmonary:      Effort: Pulmonary effort is normal.      Breath sounds: Normal breath sounds. Abdominal:      General: Abdomen is flat. Bowel sounds are normal.      Palpations: Abdomen is soft. Musculoskeletal:         General: Swelling and tenderness present. Cervical back: Normal range of motion. Right lower leg: Edema present. Comments: Right lower extremity   Skin:     General: Skin is warm and dry. Findings: Erythema present. Comments: Right lower extremity   Neurological:      General: No focal deficit present. Mental Status: She is alert and oriented to person, place, and time. Mental status is at baseline. Psychiatric:         Mood and Affect: Mood normal.         Behavior: Behavior normal.         Thought Content: Thought content normal.         Judgment: Judgment normal.         Lab/Data Review: All lab results for the last 24 hours reviewed. Assessment:     Active Problems:    Cellulitis (8/7/2021)    Normocytic Anemia  Rheumatoid arthritis  Lupus  Adverse reaction to Unasyn  Pulmonary Hypertension    Plan:     Cellulitis: Continue Daptomycin. Keep pain under control. Restart furosemide 40mg IV BID. Anemia: administer 1 unit packed RBC. Follow up labs. RA: Continue Prednisone.   Lupus: Continue prenisone, plaquenil, tofacitinib, pending labs for specific antibodies (JINA, complement, dsDNA), consult rheumatology. Pulmonary hypertension: continue metoprolol.     Signed By: Keiry Shankar     February 9, 2023

## 2023-02-09 NOTE — PROGRESS NOTES
PT consult received and acknowledged. Held PT eval at this time as per nsg pt having inc pain today and not feeling well, advised to hold PT. Will attempt to check back later when pt is able to participate more with therapy.

## 2023-02-09 NOTE — PROGRESS NOTES
Progress Note    Patient: Sharee Choi MRN: [id number]  SSN: [id number]    YOB: 1990  Age: 34 y.o. Sex: female      Admit Date: 2/3/2023    LOS: 6 days     Subjective:     Please see details as below documented. Complaints of worsening since yesterday. Increased pain and swelling. States that she was previously on Lasix and spironolactone which she has not been received in the hospital.  Does not want to take Toradol anymore as she understands her blood count has dropped. Otherwise pain is better controlled      ( See below from med student note)  34year old female past medical history of lupus, rheumatoid arthritis, pulmonary hypertension, and osteonecrosis. Originally presented to the Ed with right leg pain and redness that started 4 days prior. It worsened and spread to include her upper thigh. She reported the pain worsened with movement and was associated with fever and chills. In the Ed she was noted to have fever and tachycardia with leukocytosis. Today day 6 of clinical course: she is examined in bed. She reports to feeling worse today and is tearful. She is able to use the restroom as needed. She reports the leg pain to be worse and associated with additional swelling. She denies chest pain, shortness of breath at rest, headache. She endorses difficulty breathing with exertion. 2/3/2023 - Arrives to ED complaining of constant leg pain. Admitted for cellulitis. 2/4/2023 - Some improvement following administration of Unasyn. 2/5/2023 - Unasyn continuing secondary to sepsis. 2/6/2023 - Some shortness of breath, CXR reveals small pleural effusion, unasyn held. Some LE edema found, recommend starting home lasix by cardio. Daptomycin replaced unasyn. 2/7/2023 - Patient doing well on daptomycin, blood pressure found to be elevated. Orthopedics examined right hip after XR showed potential AVN of right distal femur. 2/8/2023 - Pain has improved in right hip.       Objective: Vitals:    02/08/23 1946 02/09/23 0816 02/09/23 1000 02/09/23 1209   BP: 124/76 117/76  131/78   Pulse: (!) 108 96  (!) 105   Resp: 28 22     Temp: 97.7 °F (36.5 °C) 100.3 °F (37.9 °C) 98.5 °F (36.9 °C)    SpO2: 96% 98%     Weight:       Height:            Intake and Output:  Current Shift: No intake/output data recorded. Last three shifts: 02/07 1901 - 02/09 0700  In: 700 [P.O.:700]  Out: 1000 [Urine:1000]    Physical Exam:   Physical Exam  Constitutional:       General: She is in acute distress. Appearance: She is normal weight. She is ill-appearing. HENT:      Head: Normocephalic. Comments: Bilateral parotitis     Nose: Nose normal.      Mouth/Throat:      Mouth: Mucous membranes are moist.      Pharynx: Oropharynx is clear. Eyes:      Conjunctiva/sclera: Conjunctivae normal.      Pupils: Pupils are equal, round, and reactive to light. Comments: Bilateral swelling     Cardiovascular:      Rate and Rhythm: Normal rate and regular rhythm. Pulses: Normal pulses. Heart sounds: Normal heart sounds. Pulmonary:      Effort: Pulmonary effort is normal.      Breath sounds: Normal breath sounds. Abdominal:      General: Abdomen is flat. Bowel sounds are normal.      Palpations: Abdomen is soft. Musculoskeletal:         General: Swelling and tenderness present. Cervical back: Normal range of motion. Right lower leg: Edema present. Comments: Right lower extremity   Skin:     General: Skin is warm and dry. Findings: Erythema present. Comments: Right lower extremity   Neurological:      General: No focal deficit present. Mental Status: She is alert and oriented to person, place, and time. Mental status is at baseline. Psychiatric:         Mood and Affect: Mood normal.         Behavior: Behavior normal.         Thought Content: Thought content normal.         Judgment: Judgment normal.     Increased edema    Lab/Data Review:   All lab results for the last 24 hours reviewed. Assessment:     Active Problems:    Cellulitis (8/7/2021)    Acute on chronic anemia  Cellulitis-with edema-appears worse  Currently tissue disorder-history of SLE and rheumatoid arthritis in the past  Pulmonary hypertension  Bilateral parotitis        Plan:     Continue Daptomycin. Leukocytosis, CRP and procalcitonin levels are not downtrending. We will appreciate infectious disease follow-up   Keep pain under control. Restart furosemide 40mg IV BID. administer 1 unit packed RBC. Follow up labs. RA: Continue Prednisone 25.mg daily . Lupus: Continue prenisone, plaquenil, tofacitinib, pending labs for specific antibodies (JINA, complement, dsDNA), consult rheumatology regarding further guidance  Pulmonary hypertension: continue adempas as prescribed before.

## 2023-02-09 NOTE — PROGRESS NOTES
Progress Note    Patient: Javier Nair MRN: [id number]  SSN: [id number]    YOB: 1990  Age: 34 y.o. Sex: female      Admit Date: 2/3/2023    LOS: 6 days     Subjective:     Patient is a 26-year-old lady with a history of lupus rheumatoid arthritis pulmonary hypertension and osteonecrosis of the talus, has been follow-up at [facility], has been admitted with right leg pain and redness that started about 5 days ago, is worsened and she was admitted to the hospital, patient is better yesterday with IV antibiotics but pain seems to have gotten worse, the swelling is also gotten worse mainly over the back of the knee, able to move the toes no numbness,    Objective:     Vitals:    02/08/23 1946 02/09/23 0816 02/09/23 1000 02/09/23 1209   BP: 124/76 117/76  131/78   Pulse: (!) 108 96  (!) 105   Resp: 28 22     Temp: 97.7 °F (36.5 °C) 100.3 °F (37.9 °C) 98.5 °F (36.9 °C)    SpO2: 96% 98%     Weight:       Height:            Intake and Output:  Current Shift: No intake/output data recorded. Last three shifts: 02/07 1901 - 02/09 0700  In: 700 [P.O.:700]  Out: 1000 [Urine:1000]    Physical Exam:   General:  Alert, cooperative, no distress, appears stated age. Eyes:  Conjunctivae/corneas clear. PERRL, EOMs intact. Fundi benign   Ears:  Normal TMs and external ear canals both ears. Nose: Nares normal. Septum midline. Mucosa normal. No drainage or sinus tenderness. Mouth/Throat: Lips, mucosa, and tongue normal. Teeth and gums normal.   Neck: Supple, symmetrical, trachea midline, no adenopathy, thyroid: no enlargment/tenderness/nodules, no carotid bruit and no JVD. Back:   Symmetric, no curvature. ROM normal. No CVA tenderness. Lungs:   Clear to auscultation bilaterally. Heart:  Regular rate and rhythm, S1, S2 normal, no murmur, click, rub or gallop. Abdomen:   Soft, non-tender. Bowel sounds normal. No masses,  No organomegaly.    Extremities: Examination of the right leg shows calf and posterior thigh erythema and redness, there is indurated swelling over the calf, there seems to be fullness over the popliteal area, no definite area of abscess is felt, able to move the toes without any significant pain, able to move the ankle without any significant pain, good pulses   Pulses: 2+ and symmetric all extremities. Skin: Skin color, texture, turgor normal. No rashes or lesions   Lymph nodes: Cervical, supraclavicular, and axillary nodes normal.   Neurologic: CNII-XII intact. Normal strength, sensation and reflexes throughout. Lab/Data Review: All lab results for the last 24 hours reviewed.   Hemoglobin is 6.6, CRP is elevated          Assessment:     Active Problems:    Cellulitis (8/7/2021)    History of lupus,    Plan:     I recommend an MRI of the contrast to assess the popliteal fossa, patient may be developing an abscess there, we will continue with IV antibiotics, will monitor it, if she does not improve then she may need exploration, will get an idea with the MRI    Signed By: Daria Radford MD     February 9, 2023

## 2023-02-09 NOTE — PROGRESS NOTES
STAT MRI was placed for this patient. Patient will need her [address] cath de accessed at this time. PICC team consult was placed. Patient has concerns about de accessing cath for her ABX treatment.

## 2023-02-09 NOTE — MED STUDENT NOTES
Progress Note    Patient: Low Kraft MRN: [id number]  SSN: [id number]    YOB: 1990  Age: 34 y.o. Sex: female      Admit Date: 2/3/2023    LOS: 6 days     Subjective:     34year old female past medical history of lupus, rheumatoid arthritis, pulmonary hypertension, and osteonecrosis. Originally presented to the Ed with right leg pain and redness that started 4 days prior. It worsened and spread to include her upper thigh. She reported the pain worsened with movement and was associated with fever and chills. In the Ed she was noted to have fever and tachycardia with leukocytosis. Today, she reports the leg pain to be worse and associated with additional swelling. She denies chest pain, shortness of breath at rest, headache. She endorses difficulty breathing with exertion. Objective:     Vitals:    02/08/23 1450 02/08/23 1946 02/09/23 0816 02/09/23 1000   BP: 121/74 124/76 117/76    Pulse: (!) 104 (!) 108 96    Resp: 28 28 22    Temp: 98.2 °F (36.8 °C) 97.7 °F (36.5 °C) 100.3 °F (37.9 °C) 98.5 °F (36.9 °C)   SpO2: 94% 96% 98%    Weight:       Height:            Intake and Output:  Current Shift: No intake/output data recorded. Last three shifts: 02/07 1901 - 02/09 0700  In: 700 [P.O.:700]  Out: 1000 [Urine:1000]    Physical Exam:   Physical Exam  Constitutional:       General: She is in acute distress. Appearance: She is normal weight. She is ill-appearing. HENT:      Head: Normocephalic. Comments: Bilateral parotitis     Nose: Nose normal.      Mouth/Throat:      Mouth: Mucous membranes are moist.      Pharynx: Oropharynx is clear. Eyes:      Conjunctiva/sclera: Conjunctivae normal.      Pupils: Pupils are equal, round, and reactive to light. Comments: Bilateral swelling     Cardiovascular:      Rate and Rhythm: Normal rate and regular rhythm. Pulses: Normal pulses. Heart sounds: Normal heart sounds.    Pulmonary:      Effort: Pulmonary effort is normal. Breath sounds: Normal breath sounds. Abdominal:      General: Abdomen is flat. Bowel sounds are normal.      Palpations: Abdomen is soft. Musculoskeletal:         General: Swelling and tenderness present. Cervical back: Normal range of motion. Right lower leg: Edema present. Comments: Right lower extremity   Skin:     General: Skin is warm and dry. Findings: Erythema present. Comments: Right lower extremity   Neurological:      General: No focal deficit present. Mental Status: She is alert and oriented to person, place, and time. Mental status is at baseline. Psychiatric:         Mood and Affect: Mood normal.         Behavior: Behavior normal.         Thought Content: Thought content normal.         Judgment: Judgment normal.         Lab/Data Review: All lab results for the last 24 hours reviewed. Assessment:     Active Problems:    Cellulitis (8/7/2021)      Normocytic Anemia  Rheumatoid arthritis  Lupus  Adverse reaction to Unasyn  Pulmonary Hypertension    Plan:     Cellulitis: Continue Daptomycin. Keep pain under control. Restart furosemide 40mg IV BID. Anemia: administer 1 unit packed RBC. Follow up labs. RA: Continue Prednisone. SLE: Continue prenisone, plaquenil, tofacitinib, pending labs for specific antibodies (JINA, complement, dsDNA)  Pulmonary hypertension: continue metoprolol.     Signed By: Samantha Reveles     February 9, 2023

## 2023-02-10 ENCOUNTER — APPOINTMENT (OUTPATIENT)
Dept: MRI IMAGING | Age: 33
End: 2023-02-10
Attending: ORTHOPAEDIC SURGERY
Payer: MEDICARE

## 2023-02-10 LAB
BACTERIA SPEC CULT: NORMAL
BASOPHILS # BLD: 0 K/UL (ref 0–0.1)
BASOPHILS NFR BLD: 0 % (ref 0–1)
CRP SERPL-MCNC: 10 MG/DL (ref 0–0.6)
DIFFERENTIAL METHOD BLD: ABNORMAL
EOSINOPHIL # BLD: 0 K/UL (ref 0–0.4)
EOSINOPHIL NFR BLD: 0 % (ref 0–7)
ERYTHROCYTE [DISTWIDTH] IN BLOOD BY AUTOMATED COUNT: 19.2 % (ref 11.5–14.5)
HCT VFR BLD AUTO: 24.5 % (ref 35–47)
HGB BLD-MCNC: 7.6 G/DL (ref 11.5–16)
IMM GRANULOCYTES # BLD AUTO: 0.1 K/UL (ref 0–0.04)
IMM GRANULOCYTES NFR BLD AUTO: 2 % (ref 0–0.5)
LYMPHOCYTES # BLD: 0.3 K/UL (ref 0.8–3.5)
LYMPHOCYTES NFR BLD: 4 % (ref 12–49)
MCH RBC QN AUTO: 27.6 PG (ref 26–34)
MCHC RBC AUTO-ENTMCNC: 31 G/DL (ref 30–36.5)
MCV RBC AUTO: 89.1 FL (ref 80–99)
MONOCYTES # BLD: 0.1 K/UL (ref 0–1)
MONOCYTES NFR BLD: 1 % (ref 5–13)
NEUTS SEG # BLD: 6.5 K/UL (ref 1.8–8)
NEUTS SEG NFR BLD: 93 % (ref 32–75)
NRBC # BLD: 0.17 K/UL (ref 0–0.01)
NRBC BLD-RTO: 2.4 PER 100 WBC
PLATELET # BLD AUTO: 234 K/UL (ref 150–400)
PMV BLD AUTO: 10.4 FL (ref 8.9–12.9)
PROCALCITONIN SERPL-MCNC: 0.08 NG/ML
RBC # BLD AUTO: 2.75 M/UL (ref 3.8–5.2)
SPECIAL REQUESTS,SREQ: NORMAL
WBC # BLD AUTO: 7.1 K/UL (ref 3.6–11)

## 2023-02-10 PROCEDURE — 74011000250 HC RX REV CODE- 250: Performed by: INTERNAL MEDICINE

## 2023-02-10 PROCEDURE — 36430 TRANSFUSION BLD/BLD COMPNT: CPT

## 2023-02-10 PROCEDURE — 74011250636 HC RX REV CODE- 250/636: Performed by: INTERNAL MEDICINE

## 2023-02-10 PROCEDURE — 74011250636 HC RX REV CODE- 250/636: Performed by: STUDENT IN AN ORGANIZED HEALTH CARE EDUCATION/TRAINING PROGRAM

## 2023-02-10 PROCEDURE — 36415 COLL VENOUS BLD VENIPUNCTURE: CPT

## 2023-02-10 PROCEDURE — 74011000258 HC RX REV CODE- 258: Performed by: INTERNAL MEDICINE

## 2023-02-10 PROCEDURE — 74011000250 HC RX REV CODE- 250: Performed by: STUDENT IN AN ORGANIZED HEALTH CARE EDUCATION/TRAINING PROGRAM

## 2023-02-10 PROCEDURE — 65270000029 HC RM PRIVATE

## 2023-02-10 PROCEDURE — 94762 N-INVAS EAR/PLS OXIMTRY CONT: CPT

## 2023-02-10 PROCEDURE — A9577 INJ MULTIHANCE: HCPCS | Performed by: STUDENT IN AN ORGANIZED HEALTH CARE EDUCATION/TRAINING PROGRAM

## 2023-02-10 PROCEDURE — 97530 THERAPEUTIC ACTIVITIES: CPT

## 2023-02-10 PROCEDURE — 99232 SBSQ HOSP IP/OBS MODERATE 35: CPT | Performed by: INTERNAL MEDICINE

## 2023-02-10 PROCEDURE — 85025 COMPLETE CBC W/AUTO DIFF WBC: CPT

## 2023-02-10 PROCEDURE — 74011000258 HC RX REV CODE- 258: Performed by: STUDENT IN AN ORGANIZED HEALTH CARE EDUCATION/TRAINING PROGRAM

## 2023-02-10 PROCEDURE — 73720 MRI LWR EXTREMITY W/O&W/DYE: CPT

## 2023-02-10 PROCEDURE — 86140 C-REACTIVE PROTEIN: CPT

## 2023-02-10 PROCEDURE — 97161 PT EVAL LOW COMPLEX 20 MIN: CPT

## 2023-02-10 PROCEDURE — 74011636637 HC RX REV CODE- 636/637: Performed by: INTERNAL MEDICINE

## 2023-02-10 PROCEDURE — P9016 RBC LEUKOCYTES REDUCED: HCPCS

## 2023-02-10 PROCEDURE — 30233N1 TRANSFUSION OF NONAUTOLOGOUS RED BLOOD CELLS INTO PERIPHERAL VEIN, PERCUTANEOUS APPROACH: ICD-10-PCS | Performed by: INTERNAL MEDICINE

## 2023-02-10 PROCEDURE — 74011250637 HC RX REV CODE- 250/637: Performed by: STUDENT IN AN ORGANIZED HEALTH CARE EDUCATION/TRAINING PROGRAM

## 2023-02-10 PROCEDURE — 84145 PROCALCITONIN (PCT): CPT

## 2023-02-10 PROCEDURE — 74011250637 HC RX REV CODE- 250/637: Performed by: INTERNAL MEDICINE

## 2023-02-10 RX ORDER — PREDNISONE 5 MG/1
20 TABLET ORAL
Status: DISCONTINUED | OUTPATIENT
Start: 2023-02-10 | End: 2023-02-13

## 2023-02-10 RX ADMIN — ALTEPLASE 2 MG: 2.2 INJECTION, POWDER, LYOPHILIZED, FOR SOLUTION INTRAVENOUS at 12:06

## 2023-02-10 RX ADMIN — AMBRISENTAN 5 MG: 5 TABLET, FILM COATED ORAL at 09:00

## 2023-02-10 RX ADMIN — GABAPENTIN 600 MG: 300 CAPSULE ORAL at 21:05

## 2023-02-10 RX ADMIN — SODIUM CHLORIDE, PRESERVATIVE FREE 10 ML: 5 INJECTION INTRAVENOUS at 17:14

## 2023-02-10 RX ADMIN — PANTOPRAZOLE SODIUM 40 MG: 40 TABLET, DELAYED RELEASE ORAL at 08:59

## 2023-02-10 RX ADMIN — FOLIC ACID 2 MG: 1 TABLET ORAL at 08:59

## 2023-02-10 RX ADMIN — HYDROMORPHONE HYDROCHLORIDE 0.5 MG: 1 INJECTION, SOLUTION INTRAMUSCULAR; INTRAVENOUS; SUBCUTANEOUS at 09:08

## 2023-02-10 RX ADMIN — HYDROXYCHLOROQUINE SULFATE 200 MG: 200 TABLET, FILM COATED ORAL at 08:58

## 2023-02-10 RX ADMIN — DAPTOMYCIN 350 MG: 500 INJECTION, POWDER, LYOPHILIZED, FOR SOLUTION INTRAVENOUS at 22:02

## 2023-02-10 RX ADMIN — DULOXETINE HYDROCHLORIDE 30 MG: 30 CAPSULE, DELAYED RELEASE ORAL at 21:05

## 2023-02-10 RX ADMIN — METOPROLOL TARTRATE 25 MG: 25 TABLET, FILM COATED ORAL at 21:05

## 2023-02-10 RX ADMIN — HYDROMORPHONE HYDROCHLORIDE 0.5 MG: 1 INJECTION, SOLUTION INTRAMUSCULAR; INTRAVENOUS; SUBCUTANEOUS at 21:17

## 2023-02-10 RX ADMIN — RIOCIGUAT 2.5 MG: 2.5 TABLET, FILM COATED ORAL at 08:59

## 2023-02-10 RX ADMIN — GABAPENTIN 600 MG: 300 CAPSULE ORAL at 08:58

## 2023-02-10 RX ADMIN — GADOBENATE DIMEGLUMINE 13 ML: 529 INJECTION, SOLUTION INTRAVENOUS at 16:20

## 2023-02-10 RX ADMIN — METOPROLOL TARTRATE 25 MG: 25 TABLET, FILM COATED ORAL at 08:59

## 2023-02-10 RX ADMIN — SODIUM CHLORIDE, PRESERVATIVE FREE 1 G: 5 INJECTION INTRAVENOUS at 17:13

## 2023-02-10 RX ADMIN — PREDNISONE 20 MG: 5 TABLET ORAL at 21:05

## 2023-02-10 RX ADMIN — DULOXETINE HYDROCHLORIDE 30 MG: 30 CAPSULE, DELAYED RELEASE ORAL at 08:59

## 2023-02-10 RX ADMIN — RIOCIGUAT 2.5 MG: 2.5 TABLET, FILM COATED ORAL at 21:05

## 2023-02-10 RX ADMIN — MEROPENEM 1 G: 1 INJECTION, POWDER, FOR SOLUTION INTRAVENOUS at 21:05

## 2023-02-10 RX ADMIN — SODIUM CHLORIDE, PRESERVATIVE FREE 10 ML: 5 INJECTION INTRAVENOUS at 05:39

## 2023-02-10 RX ADMIN — SODIUM CHLORIDE, PRESERVATIVE FREE 10 ML: 5 INJECTION INTRAVENOUS at 21:11

## 2023-02-10 RX ADMIN — RIOCIGUAT 2.5 MG: 2.5 TABLET, FILM COATED ORAL at 17:15

## 2023-02-10 NOTE — PROGRESS NOTES
Vascular Access Team Consult Note: received consult for PIV placement. Client has port access for central access 2/6/23 by MERVIN Torres RN. Per chart review, client has had PIV placed overnight by nursing. Please re-consult PICC Team in Psychiatric for any further vascular access needs.

## 2023-02-10 NOTE — PROGRESS NOTES
Progress Note    Patient: Alfredo Delgado MRN: [id number]  SSN: [id number]    YOB: 1990  Age: 34 y.o. Sex: female      Admit Date: 2/3/2023    LOS: 7 days     Subjective:   Patient immunocompromised with SLE/RA, followed for right leg cellulitis. She remains afebrile with normal WBC but with left shift. CRP and procal decreasing. Patient still having severe pain in the back of her right knee. MRI scan ordered by Orthopedics. Objective:     Vitals:    02/10/23 0115 02/10/23 0130 02/10/23 0145 02/10/23 0340   BP: 117/72 (!) 124/90 120/76 129/80   Pulse: 99 97 (!) 101 94   Resp: 18 18 19 20   Temp: 98.7 °F (37.1 °C) 98.6 °F (37 °C) 98.6 °F (37 °C) 98 °F (36.7 °C)   SpO2: 95% 94% 95% 96%   Weight:       Height:            Intake and Output:  Current Shift: 02/09 1901 - 02/10 0700  In: 350.8   Out: 1150 [Urine:1150]  Last three shifts: 02/08 0701 - 02/09 1900  In: 700 [P.O.:700]  Out: 1250 [Urine:1250]    Physical Exam:   Vitals and nursing note reviewed. Constitutional:       General: She is not in mild respiratory  distress. Appearance: She is ill-appearing. HENT: Nasal O2 cannula 2L/min     Head: Normocephalic. Right Ear: External ear normal.      Left Ear: External ear normal.      Nose: Nose normal.      Mouth/Throat:      Pharynx: Oropharynx is clear. Eyes:      Pupils: Pupils are equal, round, and reactive to light. Cardiovascular:      Rate and Rhythm: Normal rate and regular rhythm. Heart sounds: No murmur heard. Pulmonary: clear bilaterally  Abdominal:      General: Bowel sounds are normal. There is no distension. Palpations: Abdomen is soft. Tenderness: There is no abdominal tenderness. There is no guarding. Genitourinary:     Comments: No Mckeon  Musculoskeletal:      Cervical back: Neck supple. Right lower leg: Edema (Generalized erythema, warmth, swelling and pain right medial leg has DECREASED present.       Left lower leg: No edema (multiple well-healed scars). Skin:     Findings: Erythema present. No rash. Neurological:      General: No focal deficit present. Mental Status: She is alert and oriented to person, place, and time. Psychiatric:    anxious    Lab/Data Review:     WBC 5,600       < >140  CRP 3.52 <6.32 <10.20 <15.20 <23.30  Procal 0.09 <0.11 <0.18 <0.29 <0.36    ASO Negative  Dnase B Ab Negative    Blood cultures (2/3) No growth 5 days    Assessment:     Active Problems:    Cellulitis (8/7/2021)  Cellulitis right lower extremity, etiology unclear, on IV Daptomycin  Sepsis with fever, elevated CRP and procal  Elevated ESR  Immunosuppression on Plaquenil, Prednisone and Tofacitinib  Systemic Lupus Erythematosus  Rheumatoid arthritis  Allergies to Doxycycline and Vancomycin   8. ?Adverse reaction to Unasyn with SOB    Comment:   Patient still has significant erythema and swelling right leg despite decreasing CRP and procal.  Unable to explain popliteal pain.      Plan:   Continue IV Daptomycin 6 mg/kg IV daily for 10 more days as outpatient (Rx placed on chart)  In am, repeat CBC, procal and CRP  Follow-up blood cultures  MRI scan right knee as ordered         Signed By: Nikolay Andrew MD     February 10, 2023

## 2023-02-10 NOTE — PROGRESS NOTES
Hospitalist Progress Note    NAME: Kashif Martinez   :  1990   MRN:  [id number]       Assessment / Plan:    Right lower extremity cellulitis:  -Started the patient on daptomycin and meropenem.  -Patient allergic to vancomycin and doxycycline. -ID on board. Right knee pain:  -Orthopedic surgery consulted. -Await MRI tibia/fibula    Anemia of chronic disease:  -Hemoglobin 7.6.  -Transfuse for hemoglobin less than 7. Hyponatremia: Present on admission  -Improved. Systemic lupus erythematosus:  Rheumatoid arthritis:  -Continue hydroxychloroquine, prednisone, tofacitinib    Pulmonary hypertension:  -Ambrisentan  -Adempas    25.0 - 29.9 Overweight / Body mass index is 28.85 kg/m². Estimated discharge date: 2023  Barriers: Clinical improvement, Ortho evaluation    Code status: Full  Prophylaxis: Hep SQ  Recommended Disposition:  Await PT eval     Subjective:     Patient complaining of worsening right lower extremity pain. After discussion with ID, started patient on meropenem. Continue vancomycin. Await MRI and ortho evaluation    Objective:     VITALS:   Last 24hrs VS reviewed since prior progress note.  Most recent are:  Patient Vitals for the past 24 hrs:   Temp Pulse Resp BP SpO2   02/10/23 0856 98.5 °F (36.9 °C) (!) 109 20 132/87 95 %   02/10/23 0340 98 °F (36.7 °C) 94 20 129/80 96 %   02/10/23 0145 98.6 °F (37 °C) (!) 101 19 120/76 95 %   02/10/23 0130 98.6 °F (37 °C) 97 18 (!) 124/90 94 %   02/10/23 0115 98.7 °F (37.1 °C) 99 18 117/72 95 %   02/10/23 0100 98.7 °F (37.1 °C) (!) 110 19 123/80 95 %   02/10/23 0015 98.9 °F (37.2 °C) (!) 103 20 117/81 91 %   23 2120 100 °F (37.8 °C) (!) 130 18 139/81 99 %       Intake/Output Summary (Last 24 hours) at 2/10/2023 1657  Last data filed at 2/10/2023 0144  Gross per 24 hour   Intake 350.83 ml   Output 2000 ml   Net -1649.17 ml        I had a face to face encounter and independently examined this patient on 2/10/2023, as outlined below:  PHYSICAL EXAM:  General: WD, WN. Alert, cooperative, no acute distress    EENT:  EOMI. Anicteric sclerae. MMM  Resp:  CTA bilaterally, no wheezing or rales. No accessory muscle use  CV:  Regular  rhythm,  No edema  GI:  Soft, Non distended, Non tender. +Bowel sounds  Neurologic:  Alert and oriented X 3, normal speech,   Psych:   Good insight. Not anxious nor agitated  Skin:  No rashes. No jaundice  Extremities : Swollen, erythematous, warm right lower extremity. Symptoms worsening. Reviewed most current lab test results and cultures  YES  Reviewed most current radiology test results   YES  Review and summation of old records today    NO  Reviewed patient's current orders and MAR    YES  PMH/SH reviewed - no change compared to H&P  ________________________________________________________________________  Care Plan discussed with:    Comments   Patient New Rola     Consultant  NURYS NUNO Multidiciplinary team rounds were held today with , nursing, pharmacist and clinical coordinator. Patient's plan of care was discussed; medications were reviewed and discharge planning was addressed. I spent a total of 50 minutes on the day of the visit. This includes face to face time and non-face to face time preparing to see the patient such as review of tests, obtaining and/or reviewing separately obtained history, documenting clinical information in the electronic or other health record, independently interpreting results and communication results to the patient/family/caregiver, or care coordinator. Bobby Martínez MD     Procedures: see electronic medical records for all procedures/Xrays and details which were not copied into this note but were reviewed prior to creation of Plan. LABS:  I reviewed today's most current labs and imaging studies.   Pertinent labs include:  Recent Labs     02/10/23  0647 02/09/23  0744 02/08/23  1131   WBC 7.1 5.6 3. 6   HGB 7.6* 6.6* 7.9*   HCT 24.5* 23.1* 27.0*    254 268     Recent Labs     02/09/23  0744      K 4.1      CO2 28   *   BUN 17   CREA 0.42*   CA 8.9   PHOS 3.4   ALB 2.1*       Signed: Sarthak Castrejon MD

## 2023-02-10 NOTE — PROGRESS NOTES
Problem: Falls - Risk of  Goal: *Absence of Falls  Description: Document Renuka Livingston Fall Risk and appropriate interventions in the flowsheet. Outcome: Progressing Towards Goal  Note: Fall Risk Interventions:                                Problem: Patient Education: Go to Patient Education Activity  Goal: Patient/Family Education  Outcome: Progressing Towards Goal     Problem: Pressure Injury - Risk of  Goal: *Prevention of pressure injury  Description: Document Delbert Scale and appropriate interventions in the flowsheet.   Outcome: Progressing Towards Goal  Note: Pressure Injury Interventions:  Sensory Interventions: Minimize linen layers, Keep linens dry and wrinkle-free    Moisture Interventions: Minimize layers    Activity Interventions: Increase time out of bed    Mobility Interventions: HOB 30 degrees or less    Nutrition Interventions: Document food/fluid/supplement intake    Friction and Shear Interventions: Minimize layers, Feet elevated on foot rest, Lift sheet, HOB 30 degrees or less                Problem: Patient Education: Go to Patient Education Activity  Goal: Patient/Family Education  Outcome: Progressing Towards Goal

## 2023-02-10 NOTE — PROGRESS NOTES
Patient port cath was de accessed today for MRI. Patient will need port access prior to discharging. Awaiting MRI results.

## 2023-02-10 NOTE — CONSULTS
Consult Date: 2/10/2023    IP CONSULT TO RHEUMATOLOGY  Consult performed by: Roberto King MD  Consult ordered by: Odette Peters MD  Reason for consult: Systemic Lupus and Rheumatoid Arthritis  Assessment/Recommendations: This is a 35 yo female with history of systemic lupus and rheumatoid arthritis admitted for right lower extremity cellulitis on treatment with antibiotics. Labs are pending to assess lupus disease activity. We will monitor for the results of these. I will add antibodies for rheumatoid arthritis to confirm this diagnosis as well. Acute phase reactants have been elevated, which are likely multifactorial due to infection as well. I advise that she hold [employer] at this time in light of current cellulitis. She may continue with HCQ and prednisone, however I am going to decrease the dose to 20 mg. This should still suppress activity from autoimmune disease. Subjective     This is a 35 yo female with history of systemic lupus and rheumatoid arthritis on treatment with HCQ, prednisone, and xeljanz along with osteonecrosis who presented to the ED with complaints of pains and swelling of the right lower extremity diagnosed as cellulitis. She was admitted for evaluation and treatment of this. She has been placed on antibiotics under the direction of infectious disease. She has had ongoing pains in the lower extremities and has been evaluated by orthopedics who have ordered MRI imaging of the affected joints. The concern is for involvement of osteonecrosis. The rheumatology service is consulted to evaluate the patient and make treatment recommendations. The patient corroborates the above diagnoses at the time of interview. She follows with Dr. Rose Marie Morales at [facility] rheumatology. She denies recent flares of symptoms prior to the onset of cellulitis. She has pain in the right hip and right leg but denies other joint pains at this time.   She has hyperpigmented rash on the extremities, but denies mouth sores. She has stiffness of the joints. She has history of raynauds as well, and had surgery on the left hand for this in the past, with great improvement.       Past Medical History:   Diagnosis Date    Lupus ([facility] [address]     Osteonecrosis ([facility] [address]     Pulmonary HTN ([facility] [address]     RA (rheumatoid arthritis) ([facility] [address]       Past Surgical History:   Procedure Laterality Date    HX CYST REMOVAL      HX ORTHOPAEDIC      x2 left knee sxs    HX ORTHOPAEDIC      bilateral ankle sxs     Family History   Problem Relation Age of Onset    Hypertension Mother     Cancer Paternal Grandmother       Social History     Tobacco Use    Smoking status: Never    Smokeless tobacco: Never   Substance Use Topics    Alcohol use: Never       Current Facility-Administered Medications   Medication Dose Route Frequency Provider Last Rate Last Admin    0.9% sodium chloride infusion 250 mL  250 mL IntraVENous PRN Kamala Amaya MD        furosemide (LASIX) injection 40 mg  40 mg IntraVENous BID Mayra Lesches, MD   40 mg at 02/09/23 2143    predniSONE (DELTASONE) tablet 25 mg  25 mg Oral QHS Mayra Lesches, MD   25 mg at 02/09/23 2142    metoprolol tartrate (LOPRESSOR) tablet 25 mg  25 mg Oral Q12H Meridee Phalen, MD   25 mg at 02/10/23 0859    HYDROmorphone (DILAUDID) syringe 0.5 mg  0.5 mg IntraVENous Q6H PRN Mallory Stringer MD   0.5 mg at 02/10/23 0908    diphenhydrAMINE (BENADRYL) capsule 25 mg  25 mg Oral Q6H PRN Mallory Stringer MD   25 mg at 02/09/23 0428    DAPTOmycin (CUBICIN) 350 mg in 0.9% sodium chloride 50 mL IVPB  6 mg/kg (Adjusted) IntraVENous Q24H Clemencia Shepherd  mL/hr at 02/09/23 2229 350 mg at 02/09/23 2229    riociguat (ADEMPAS) tablet 2.5 mg   2.5 mg Oral TID Kevin Herman MD   2.5 mg at 02/10/23 0859    DULoxetine (CYMBALTA) capsule 30 mg  30 mg Oral BID Kevin Herman MD   30 mg at 40/99/82 3660    folic acid (FOLVITE) tablet 2 mg  2 mg Oral DAILY Kevin Herman MD   2 mg at 02/10/23 5520 gabapentin (NEURONTIN) capsule 600 mg  600 mg Oral BID Riky Vazquez MD   600 mg at 02/10/23 2334    hydrOXYchloroQUINE (PLAQUENIL) tablet 200 mg  200 mg Oral DAILY Riky Vazquez MD   200 mg at 02/10/23 1505    Ambrisentan tab 5 mg - Patient supplied (Patient Supplied)  5 mg Oral QAM Riky Vazquez MD   5 mg at 02/10/23 0900    pantoprazole (PROTONIX) tablet 40 mg  40 mg Oral DAILY Riky Vazquez MD   40 mg at 02/10/23 0859    tofacitinib Tb24 1 Tablet  (Patient Supplied)  1 Tablet Oral DAILY Riky Vazquez MD   1 Tablet at 02/10/23 0900    sodium chloride (NS) flush 5-40 mL  5-40 mL IntraVENous Q8H Coty, Nia Poole MD   10 mL at 02/10/23 0539    sodium chloride (NS) flush 5-40 mL  5-40 mL IntraVENous PRN Riky Vazquez MD        acetaminophen (TYLENOL) tablet 650 mg  650 mg Oral Q6H PRN Riky Vazquez MD   650 mg at 02/09/23 2142    Or    acetaminophen (TYLENOL) suppository 650 mg  650 mg Rectal Q6H PRN Riky Vazquez MD        polyethylene glycol (MIRALAX) packet 17 g  17 g Oral DAILY PRN Riky Vazquez MD        promethazine (PHENERGAN) 12.5 mg in 0.9% sodium chloride 50 mL IVPB  12.5 mg IntraVENous Q4H PRN Riky Vazquez  mL/hr at 02/04/23 1056 12.5 mg at 02/04/23 1056    oxyCODONE-acetaminophen (PERCOCET) 5-325 mg per tablet 1 Tablet  1 Tablet Oral Q6H PRN Jeevan Sanchez MD   1 Tablet at 02/05/23 2110    sodium chloride (NS) flush 5-10 mL  5-10 mL IntraVENous PRN Riky Vazquez MD   10 mL at 02/04/23 0559        Review of Systems   Constitutional:  Positive for activity change. HENT: Negative. Eyes: Negative. Respiratory:  Positive for shortness of breath. Cardiovascular: Negative. Gastrointestinal: Negative. Endocrine: Negative. Genitourinary: Negative. Musculoskeletal:  Positive for arthralgias and myalgias. Skin:  Positive for rash. Allergic/Immunologic: Negative. Neurological: Negative. Hematological: Negative. Psychiatric/Behavioral: Negative.        Objective Vital signs for last 24 hours:  Visit Vitals  /87   Pulse (!) 109   Temp 98.5 °F (36.9 °C)   Resp 20   Ht 4' 11\" (1.499 m)   Wt 64.8 kg (142 lb 13.7 oz)   SpO2 95%   BMI 28.85 kg/m²       Intake/Output this shift:  Current Shift: No intake/output data recorded. Last 3 Shifts: 02/08 1901 - 02/10 0700  In: 1050.8 [P.O.:700]  Out: 2950 [Urine:2950]    Data Review:   Recent Results (from the past 24 hour(s))   TYPE & SCREEN    Collection Time: 02/09/23  2:59 PM   Result Value Ref Range    Crossmatch Expiration 02/12/2023,2359     ABO/Rh(D) O Positive     Antibody screen Negative     Unit number [id number]     Blood component type RC LR     Unit division 00     Status of unit [address] to transfuse     Crossmatch result Compatible    CBC WITH AUTOMATED DIFF    Collection Time: 02/10/23  6:47 AM   Result Value Ref Range    WBC 7.1 3.6 - 11.0 K/uL    RBC 2.75 (L) 3.80 - 5.20 M/uL    HGB 7.6 (L) 11.5 - 16.0 g/dL    HCT 24.5 (L) 35.0 - 47.0 %    MCV 89.1 80.0 - 99.0 FL    MCH 27.6 26.0 - 34.0 PG    MCHC 31.0 30.0 - 36.5 g/dL    RDW 19.2 (H) 11.5 - 14.5 %    PLATELET 319 954 - 667 K/uL    MPV 10.4 8.9 - 12.9 FL    NRBC 2.4 (H) 0.0  WBC    ABSOLUTE NRBC 0.17 (H) 0.00 - 0.01 K/uL    NEUTROPHILS 93 (H) 32 - 75 %    LYMPHOCYTES 4 (L) 12 - 49 %    MONOCYTES 1 (L) 5 - 13 %    EOSINOPHILS 0 0 - 7 %    BASOPHILS 0 0 - 1 %    IMMATURE GRANULOCYTES 2 (H) 0 - 0.5 %    ABS. NEUTROPHILS 6.5 1.8 - 8.0 K/UL    ABS. LYMPHOCYTES 0.3 (L) 0.8 - 3.5 K/UL    ABS. MONOCYTES 0.1 0.0 - 1.0 K/UL    ABS. EOSINOPHILS 0.0 0.0 - 0.4 K/UL    ABS. BASOPHILS 0.0 0.0 - 0.1 K/UL    ABS. IMM.  GRANS. 0.1 (H) 0.00 - 0.04 K/UL    DF AUTOMATED     C REACTIVE PROTEIN, QT    Collection Time: 02/10/23  6:47 AM   Result Value Ref Range    C-Reactive protein 10.00 (H) 0.00 - 0.60 mg/dL   PROCALCITONIN    Collection Time: 02/10/23  6:47 AM   Result Value Ref Range    Procalcitonin 0.08 (H) 0 ng/mL       Physical Exam  HENT: Head: Normocephalic. Eyes:      Pupils: Pupils are equal, round, and reactive to light. Cardiovascular:      Rate and Rhythm: Regular rhythm. Pulmonary:      Effort: Pulmonary effort is normal.   Abdominal:      General: Abdomen is flat. Genitourinary:     Comments: deferred  Musculoskeletal:         General: Swelling and tenderness present. Cervical back: Normal range of motion. Right lower leg: Edema present. Skin:     Comments: Hyperpigmented rash on the bilateral upper and lower extremities. Neurological:      General: No focal deficit present. Mental Status: She is alert.    Psychiatric:         Mood and Affect: Mood normal.

## 2023-02-10 NOTE — PROGRESS NOTES
Comprehensive Nutrition Assessment    Type and Reason for Visit: Initial, RD nutrition re-screen/LOS    Nutrition Recommendations/Plan:   Add Ensure HP 1x/day   Continue w/ current diet order   Continue to monitor and document intake, wt in I/Os     Malnutrition Assessment:  Malnutrition Status:       Context:        Findings of the 6 clinical characteristics of malnutrition:   Energy Intake:     Weight Loss: Body Fat Loss:   ,     Muscle Mass Loss:   ,    Fluid Accumulation:   ,     Strength:        Nutrition Assessment:    Admitted w/ cellulitis. RD review for LOS, pt denies any wt or appteite changes. Requested written educational handouts on healthy diet changes. Per pt, eats a full breakfast w/ daughter, then snacks on chips, cookies, throughout the day PTA. Pt reports eating 26-50% of meals since admission d/t poor appetite. OSY=904-257#, no wt loss, wt increase likely d/t fluid retention. No concerns for malnutrition at this time d/t stable wt, PO intake. Will add ONS d/t PO <50% x7 days. Encouraged to eat >50% of meals and order from menu. Labs: H/H 7.6/24.5, Crp 10.00. Meds: folic acid, lasix, neurontin, cubicin, protonix, lopressor, plaquenil, prednisone, tofacitinib, adempas. Nutrition Related Findings:    NFPE deferred. -n/v/c/d, lbm 1/31? . 1+ edema noted to LLE, nonpitting edema to RLE. Wound Type: None    Current Nutrition Intake & Therapies:  ADULT DIET Regular    Anthropometric Measures:  Height: 4' 11\" (149.9 cm)  Ideal Body Weight (IBW): 95 lbs (43 kg)  Admission Body Weight: 142 lb 13.7 oz  Current Body Wt:   ,   IBW. Current BMI (kg/m2):    Weight Adjustment: No adjustment     BMI Category: Overweight (BMI 25.0-29. 9)    Estimated Daily Nutrient Needs:  Energy Requirements Based On: Kcal/kg  Weight Used for Energy Requirements: Current  Energy (kcal/day): 3205-9930 kcal/kg (25-30 kcal/kg)     Protein (g/day): 65-71 g pro (1.0-1.1g/kg)  Method Used for Fluid Requirements: 1 ml/kcal  Fluid (ml/day): 7861-3930 g/kg    Nutrition Diagnosis:   Inadequate oral intake related to psychological cause or life stress, early satiety as evidenced by intake 26-50%    Nutrition Interventions:   Food and/or Nutrient Delivery: Continue current diet, Start oral nutrition supplement  Nutrition Education/Counseling: No recommendations at this time  Coordination of Nutrition Care: Continue to monitor while inpatient  Plan of Care discussed with: Pt, RN    Goals:     Goals: Meet at least 75% of estimated needs, PO intake 50% or greater       Nutrition Monitoring and Evaluation:   Behavioral-Environmental Outcomes: None identified  Food/Nutrient Intake Outcomes: Supplement intake, Food and nutrient intake  Physical Signs/Symptoms Outcomes: Meal time behavior, Weight    Discharge Planning:     Too soon to determine    Sue Call, RD  Contact: Ext [phone] or via iGlue No

## 2023-02-10 NOTE — PROGRESS NOTES
Problem: Mobility Impaired (Adult and Pediatric)  Goal: *Acute Goals and Plan of Care (Insert Text)  Description: I with LE HEP x7 days  Mod I with bed mob x7 days  SBA with all transfers x7 days  Amb 50-75ft with RW and SBAx1 x7 days    Pt stated goal: to go home  Outcome: Not Met    PHYSICAL THERAPY EVALUATION  Patient: Sharee Choi (1 y.o. female)  Date: 2/10/2023  Primary Diagnosis: Cellulitis [L03.90]       Precautions: In place during session: oxygen via NC 1 L    ASSESSMENT    Pt is a 34 y.o. female admitted to hospital with R LE cellulitis presents to PT with decreased bed mob, transfers, LE strength, gt, activity tolerance, and overall functional mobility. Initial doppler neg for DVT B LE, pt awaiting MRI for ? Abscess in popliteal fossa per ortho MD note. Noted inc edema and redness in R LE, trent in lower leg during session today. Pt states she has been getting up to chair and BSC in room with assist.  Pt semi supine in bed upon PT arrival, agreeable to evaluation. Pt A&O x 4. PLOF listed below. Pt currently on 1 L O2 via NC, does not wear home OC    Based on the objective data described below, the patient presents with generalized weakness, impaired functional mobility, impaired amb, impaired balance, and decreased overall functional mobility. (See below for objective details and assist levels). Overall pt tolerated session fair today with overall SBA with bed mob, CGA with transfers. Pt was able to amb approx 5ft from bed to chair with RW and CGAx1. Pt amb with step to gt pattern, not placing much weight through R LE. No LOB. Pt will benefit from continued skilled PT to address above deficits and return to PLOF. Current PT DC recommendation Home with Home Health Therapy once medically appropriate.        PLAN :  Recommendations and Planned Interventions: bed mobility training, transfer training, gait training, therapeutic exercises, patient and family training/education, and therapeutic activities      Recommend for staff: Out of bed to chair for meals, Encourage HEP in prep for ADLs/mobility, and Amb to bathroom for toileting with gt belt and AD    Frequency/Duration: Patient will be followed by physical therapy:  2-3x/week to address goals.     Recommendation for discharge: (in order for the patient to meet his/her long term goals)  Home with Home Health Therapy             SUBJECTIVE:   Patient semi supine in bed upon PT arrival, agreeable to work with PT    OBJECTIVE DATA SUMMARY:   HISTORY:    Past Medical History:   Diagnosis Date    Lupus ([facility]     Osteonecrosis ([facility] [facility]     Pulmonary HTN ([facility] [address] [facility]     [facility]oid [facility]      Past Surgical History:   Procedure Laterality Date    HX CYST REMOVAL      HX ORTHOPAEDIC      x2 left knee sxs    HX ORTHOPAEDIC      bilateral ankle sxs       Home Situation  Home Environment: Private residence  # Steps to Enter: 5 (3 on one entrance, 5 at another)  Rails to LxDATA: Yes  Hand Rails : Bilateral  One/Two Story Residence: One story  Living Alone: No  Support Systems: Parent(s)  Patient Expects to be Discharged to[de-identified] Home with home health  Current DME Used/Available at Home: Walker, cane, rollator    Personal factors and/or comorbidities impacting plan of care:     Home Situation  Home Environment: Private residence  # Steps to Enter: 5 (3 on one entrance, 5 at another)  Rails to LxDATA: Yes  [employer] : Bilateral  One/Two Story Residence: One story  Living Alone: No  Support Systems: Parent(s)  Patient Expects to be Discharged to[de-identified] Home with home health  Current DME Used/Available at Home: Walker    EXAMINATION/PRESENTATION/DECISION MAKING:   Critical Behavior:  Neurologic State: Alert  Orientation Level: Oriented X4  Cognition: Appropriate decision making       Skin:  inc redness and edema noted R LE    Edema: noted R LE    Range Of Motion:  AROM: Generally decreased, functional   L LE grossly functional, R LE grossly decreased Strength:    Strength: Generally decreased, functional  R LE grossly 3-/5  L LE grossly 4/5                      Tone & Sensation:        Sensation: Intact to LT B LE       Functional Mobility:  Bed Mobility:     Supine to Sit: Stand-by assistance     Scooting: Contact guard assistance (assist with R LE management)    Transfers:  Sit to Stand: Contact guard assistance  Stand to Sit: Contact guard assistance  Stand Pivot Transfers: Contact guard assistance                    Balance:   Sitting: Intact  Standing: Impaired; With support  Standing - Static: Fair;Constant support  Standing - Dynamic : Fair;Constant support    Ambulation/Gait Training:  Distance (ft): 5 Feet (ft)  Assistive Device: Walker, rolling;Gait belt  Ambulation - Level of Assistance: Contact guard assistance            Functional Measure:  [facility] AM-PAC 6 Clicks         Basic Mobility Inpatient Short Form  How much difficulty does the patient currently have. .. Unable A Lot A Little None   1. Turning over in bed (including adjusting bedclothes, sheets and blankets)? [] 1   [] 2   [x] 3   [] 4   2. Sitting down on and standing up from a chair with arms ( e.g., wheelchair, bedside commode, etc.)   [] 1   [] 2   [x] 3   [] 4   3. Moving from lying on back to sitting on the side of the bed? [] 1   [] 2   [x] 3   [] 4          How much help from another person does the patient currently need. .. Total A Lot A Little None   4. Moving to and from a bed to a chair (including a wheelchair)? [] 1   [] 2   [x] 3   [] 4   5. Need to walk in hospital room? [] 1   [] 2   [x] 3   [] 4   6. Climbing 3-5 steps with a railing? [] 1   [] 2   [x] 3   [] 4   © 2007, Trustees of [facility], under license to [employer]. All rights reserved     Score:  Initial: 18 Most Recent: X (Date: -- )   Interpretation of Tool:  Represents activities that are increasingly more difficult (i.e. Bed mobility, Transfers, Gait).   Score 24 23 22-20 19-15 14-10 9-7 6   Modifier CH CI CJ CK CL CM CN           Physical Therapy Evaluation Charge Determination   History Examination Presentation Decision-Making   MEDIUM  Complexity : 1-2 comorbidities / personal factors will impact the outcome/ POC  MEDIUM Complexity : 3 Standardized tests and measures addressing body structure, function, activity limitation and / or participation in recreation  LOW Complexity : Stable, uncomplicated  Other Functional Measure [facility] 6 MED      Based on the above components, the patient evaluation is determined to be of the following complexity level: LOW     Pain Rating:  Pt c/o discomfort in R LE, but had just received pain meds     Activity Tolerance:   Fair    After treatment patient left in no apparent distress:   Bed locked and in lowest position Sitting in chair and Call bell within reach           COMMUNICATION/EDUCATION:   The patients plan of care was discussed with: Occupational therapist and Case management. Patient/family agree to work toward stated goals and plan of care.       Thank you for this referral.  Glenny Schultz   Time Calculation: 20 mins

## 2023-02-10 NOTE — PROGRESS NOTES
Progress Note    Patient: Praveen Ugarte MRN: [id number]  SSN: [id number]    YOB: 1990  Age: 34 y.o. Sex: female      Admit Date: 2/3/2023    LOS: 7 days     Subjective:   Patient immunocompromised with SLE/RA, followed for right leg cellulitis. She remains afebrile with normal WBC and decreasing procal but  today her CRP increased. Patient still having severe pain in the back of her right knee. MRI scan right leg resulted below. Objective:     Vitals:    02/10/23 0145 02/10/23 0340 02/10/23 0856 02/10/23 0959   BP: 120/76 129/80 132/87    Pulse: (!) 101 94 (!) 109    Resp: 19 20 20    Temp: 98.6 °F (37 °C) 98 °F (36.7 °C) 98.5 °F (36.9 °C)    SpO2: 95% 96% 95%    Weight:       Height:    4' 11\" (1.499 m)        Intake and Output:  Current Shift: No intake/output data recorded. Last three shifts: 02/08 1901 - 02/10 0700  In: 1050.8 [P.O.:700]  Out: 2950 [Urine:2950]    Physical Exam:   Vitals and nursing note reviewed. Constitutional:       General: She is not in mild respiratory  distress. Appearance: She is ill-appearing. HENT: Nasal O2 cannula 2L/min  Eyes:      Pupils: Pupils are equal, round, and reactive to light. Cardiovascular:      Rate and Rhythm: Normal rate and regular rhythm. Heart sounds: No murmur heard. Pulmonary: clear bilaterally  Genitourinary:     Comments: No Mckeon  Musculoskeletal:      Cervical back: Neck supple. Right lower leg: Edema (Generalized erythema, warmth, swelling and pain right medial leg has DECREASED present. Left lower leg: No edema (multiple well-healed scars). Skin:     Findings: Erythema present. No rash. Neurological:      General: No focal deficit present. Mental Status: She is alert and oriented to person, place, and time.    Psychiatric: normal behavior    Lab/Data Review:     WBC 7,100       < >140  CRP 10.00 <3.52 <6.32 <10.20 <15.20 <23.30  Procal 0.08 <0.09 <0.11 <0.18 <0.29 <0.36    ASO Negative  Dnase B Ab Negative    Blood cultures (2/3) No growth FINAL    MRI right leg (2/10)  1. Large complex collection of popliteal subcutaneous fat overlying the  superficial fascia of the gastrocnemius and biceps femoris muscles, possibly  involving the biceps muscle. Areas of intermediate and increased signal on T1  images are shown. Increased T1 signal can be seen in the setting of hemorrhagic  or proteinaceous material. Ultrasound correlation and aspiration suggested based  on ultrasound imaging results. 2. Nonspecific elongated nonenhancing collection within proximal medial  gastrocnemius muscle. 3. Multifocal osteonecrosis. Assessment:     Active Problems:    Cellulitis (8/7/2021)  Cellulitis right lower extremity, etiology unclear, on IV Daptomycin  Sepsis with fever, elevated CRP and procal  Elevated ESR  Immunosuppression on Plaquenil, Prednisone and Tofacitinib  Systemic Lupus Erythematosus  Rheumatoid arthritis  Allergies to Doxycycline and Vancomycin   8. ?Adverse reaction to Unasyn with SOB    Comment:   Patient still has significant erythema and swelling right leg despite decreasing procal, however, CRP increased significantly today. I am not entirely what to make of the findings on MRI scan but  aspiration was mentioned for fluid collection.    Plan:   Continue IV Daptomycin 6 mg/kg IV daily   Reasonable to expand coverage with Meropenem (patient had reaction to Unasyn)  In am, repeat CBC, procal and CRP  Follow-up blood cultures  Defer to Orthopedics regarding findings on MRI scan       Signed By: Dixie Marroquin MD     February 10, 2023

## 2023-02-11 ENCOUNTER — ANESTHESIA EVENT (OUTPATIENT)
Dept: SURGERY | Age: 33
DRG: 872 | End: 2023-02-11
Payer: MEDICARE

## 2023-02-11 LAB
ABO + RH BLD: NORMAL
ANA SER QL: POSITIVE
ANION GAP SERPL CALC-SCNC: 4 MMOL/L (ref 5–15)
BLD PROD TYP BPU: NORMAL
BLOOD GROUP ANTIBODIES SERPL: NEGATIVE
BPU ID: NORMAL
BUN SERPL-MCNC: 16 MG/DL (ref 6–20)
BUN/CREAT SERPL: 36 (ref 12–20)
CA-I BLD-MCNC: 8.9 MG/DL (ref 8.5–10.1)
CHLORIDE SERPL-SCNC: 103 MMOL/L (ref 97–108)
CO2 SERPL-SCNC: 29 MMOL/L (ref 21–32)
CREAT SERPL-MCNC: 0.45 MG/DL (ref 0.55–1.02)
CROSSMATCH RESULT,%XM: NORMAL
DSDNA AB SER-ACNC: 4 IU/ML (ref 0–9)
ERYTHROCYTE [DISTWIDTH] IN BLOOD BY AUTOMATED COUNT: 19.9 % (ref 11.5–14.5)
GLUCOSE SERPL-MCNC: 98 MG/DL (ref 65–100)
HCT VFR BLD AUTO: 24.8 % (ref 35–47)
HGB BLD-MCNC: 7.6 G/DL (ref 11.5–16)
MAGNESIUM SERPL-MCNC: 2.4 MG/DL (ref 1.6–2.4)
MCH RBC QN AUTO: 27.6 PG (ref 26–34)
MCHC RBC AUTO-ENTMCNC: 30.6 G/DL (ref 30–36.5)
MCV RBC AUTO: 90.2 FL (ref 80–99)
NRBC # BLD: 0.24 K/UL (ref 0–0.01)
NRBC BLD-RTO: 3.6 PER 100 WBC
PLATELET # BLD AUTO: 265 K/UL (ref 150–400)
PMV BLD AUTO: 10.3 FL (ref 8.9–12.9)
POTASSIUM SERPL-SCNC: 4.2 MMOL/L (ref 3.5–5.1)
RBC # BLD AUTO: 2.75 M/UL (ref 3.8–5.2)
RHEUMATOID FACT SERPL-ACNC: <10 IU/ML
SODIUM SERPL-SCNC: 136 MMOL/L (ref 136–145)
SPECIMEN EXP DATE BLD: NORMAL
STATUS OF UNIT,%ST: NORMAL
TRANSFUSION STATUS PATIENT QL: NORMAL
UNIT DIVISION, %UDIV: 0
WBC # BLD AUTO: 6.7 K/UL (ref 3.6–11)

## 2023-02-11 PROCEDURE — 99232 SBSQ HOSP IP/OBS MODERATE 35: CPT | Performed by: INTERNAL MEDICINE

## 2023-02-11 PROCEDURE — 74011250636 HC RX REV CODE- 250/636: Performed by: INTERNAL MEDICINE

## 2023-02-11 PROCEDURE — 86235 NUCLEAR ANTIGEN ANTIBODY: CPT

## 2023-02-11 PROCEDURE — 74011000258 HC RX REV CODE- 258: Performed by: STUDENT IN AN ORGANIZED HEALTH CARE EDUCATION/TRAINING PROGRAM

## 2023-02-11 PROCEDURE — 80048 BASIC METABOLIC PNL TOTAL CA: CPT

## 2023-02-11 PROCEDURE — 74011250637 HC RX REV CODE- 250/637: Performed by: STUDENT IN AN ORGANIZED HEALTH CARE EDUCATION/TRAINING PROGRAM

## 2023-02-11 PROCEDURE — 65270000029 HC RM PRIVATE

## 2023-02-11 PROCEDURE — 36415 COLL VENOUS BLD VENIPUNCTURE: CPT

## 2023-02-11 PROCEDURE — 82272 OCCULT BLD FECES 1-3 TESTS: CPT

## 2023-02-11 PROCEDURE — 83735 ASSAY OF MAGNESIUM: CPT

## 2023-02-11 PROCEDURE — 86431 RHEUMATOID FACTOR QUANT: CPT

## 2023-02-11 PROCEDURE — 85027 COMPLETE CBC AUTOMATED: CPT

## 2023-02-11 PROCEDURE — 74011636637 HC RX REV CODE- 636/637: Performed by: INTERNAL MEDICINE

## 2023-02-11 PROCEDURE — 74011250637 HC RX REV CODE- 250/637: Performed by: INTERNAL MEDICINE

## 2023-02-11 PROCEDURE — 74011250636 HC RX REV CODE- 250/636: Performed by: STUDENT IN AN ORGANIZED HEALTH CARE EDUCATION/TRAINING PROGRAM

## 2023-02-11 PROCEDURE — 86200 CCP ANTIBODY: CPT

## 2023-02-11 PROCEDURE — 74011000258 HC RX REV CODE- 258: Performed by: INTERNAL MEDICINE

## 2023-02-11 PROCEDURE — 74011000250 HC RX REV CODE- 250: Performed by: INTERNAL MEDICINE

## 2023-02-11 RX ORDER — HYDROMORPHONE HYDROCHLORIDE 1 MG/ML
0.5 INJECTION, SOLUTION INTRAMUSCULAR; INTRAVENOUS; SUBCUTANEOUS
Status: DISPENSED | OUTPATIENT
Start: 2023-02-11 | End: 2023-02-13

## 2023-02-11 RX ADMIN — DULOXETINE HYDROCHLORIDE 30 MG: 30 CAPSULE, DELAYED RELEASE ORAL at 08:31

## 2023-02-11 RX ADMIN — ACETAMINOPHEN 650 MG: 325 TABLET ORAL at 16:36

## 2023-02-11 RX ADMIN — HYDROMORPHONE HYDROCHLORIDE 0.5 MG: 1 INJECTION, SOLUTION INTRAMUSCULAR; INTRAVENOUS; SUBCUTANEOUS at 11:55

## 2023-02-11 RX ADMIN — ACETAMINOPHEN 650 MG: 325 TABLET ORAL at 08:28

## 2023-02-11 RX ADMIN — RIOCIGUAT 2.5 MG: 2.5 TABLET, FILM COATED ORAL at 16:36

## 2023-02-11 RX ADMIN — DAPTOMYCIN 350 MG: 500 INJECTION, POWDER, LYOPHILIZED, FOR SOLUTION INTRAVENOUS at 23:42

## 2023-02-11 RX ADMIN — RIOCIGUAT 2.5 MG: 2.5 TABLET, FILM COATED ORAL at 22:58

## 2023-02-11 RX ADMIN — FOLIC ACID 2 MG: 1 TABLET ORAL at 08:31

## 2023-02-11 RX ADMIN — DULOXETINE HYDROCHLORIDE 30 MG: 30 CAPSULE, DELAYED RELEASE ORAL at 22:57

## 2023-02-11 RX ADMIN — HYDROXYCHLOROQUINE SULFATE 200 MG: 200 TABLET, FILM COATED ORAL at 08:31

## 2023-02-11 RX ADMIN — MEROPENEM 1 G: 1 INJECTION, POWDER, FOR SOLUTION INTRAVENOUS at 16:36

## 2023-02-11 RX ADMIN — PREDNISONE 20 MG: 5 TABLET ORAL at 22:57

## 2023-02-11 RX ADMIN — HYDROMORPHONE HYDROCHLORIDE 0.5 MG: 1 INJECTION, SOLUTION INTRAMUSCULAR; INTRAVENOUS; SUBCUTANEOUS at 18:38

## 2023-02-11 RX ADMIN — GABAPENTIN 600 MG: 300 CAPSULE ORAL at 22:57

## 2023-02-11 RX ADMIN — MEROPENEM 1 G: 1 INJECTION, POWDER, FOR SOLUTION INTRAVENOUS at 22:41

## 2023-02-11 RX ADMIN — MEROPENEM 1 G: 1 INJECTION, POWDER, FOR SOLUTION INTRAVENOUS at 05:46

## 2023-02-11 RX ADMIN — PANTOPRAZOLE SODIUM 40 MG: 40 TABLET, DELAYED RELEASE ORAL at 08:31

## 2023-02-11 RX ADMIN — HYDROMORPHONE HYDROCHLORIDE 0.5 MG: 1 INJECTION, SOLUTION INTRAMUSCULAR; INTRAVENOUS; SUBCUTANEOUS at 05:50

## 2023-02-11 RX ADMIN — SODIUM CHLORIDE, PRESERVATIVE FREE 10 ML: 5 INJECTION INTRAVENOUS at 05:45

## 2023-02-11 RX ADMIN — SODIUM CHLORIDE, PRESERVATIVE FREE 10 ML: 5 INJECTION INTRAVENOUS at 22:58

## 2023-02-11 RX ADMIN — RIOCIGUAT 2.5 MG: 2.5 TABLET, FILM COATED ORAL at 08:33

## 2023-02-11 RX ADMIN — AMBRISENTAN 5 MG: 5 TABLET, FILM COATED ORAL at 08:28

## 2023-02-11 RX ADMIN — GABAPENTIN 600 MG: 300 CAPSULE ORAL at 08:31

## 2023-02-11 RX ADMIN — METOPROLOL TARTRATE 25 MG: 25 TABLET, FILM COATED ORAL at 08:31

## 2023-02-11 RX ADMIN — METOPROLOL TARTRATE 25 MG: 25 TABLET, FILM COATED ORAL at 22:57

## 2023-02-11 RX ADMIN — HYDROMORPHONE HYDROCHLORIDE 0.5 MG: 1 INJECTION, SOLUTION INTRAMUSCULAR; INTRAVENOUS; SUBCUTANEOUS at 22:58

## 2023-02-11 NOTE — PROGRESS NOTES
Progress Note  Date:2023       Room:[facility]  Patient Name:Ric Orozco     YOB: 1990     Age:32 y.o. This is a 33 yo female with history of systemic lupus, calcinosis and rheumatoid arthritis on treatment with Plaquenil, Prednisone, and Debe David. She also has a history of pulmonary HTN and osteonecrosis. She presented to the ED with complaints of pains and swelling of the right lower extremity diagnosed as cellulitis and was admitted evaluation and treatment of cellulitis. She has been placed on antibiotics under the direction of infectious disease. She reports pain and swelling in her right knee and orthopedics ordered an MRI and we will defer to ortho regarding the findings . Today she reports pain and swelling in her right knee and ankle and generalize stiffness. She denies pain and swelling elsewhere. She denies sicca symptoms, SOB, CP, heartburn, mouth sores, nasal sores or rashes. Subjective    Subjective:  Symptoms:  No shortness of breath, cough, chest pain or diarrhea. Diet:  No nausea or vomiting. Review of Systems   Constitutional:  Negative for chills and fever. HENT:  Negative for mouth sores, sore throat and trouble swallowing. Eyes:  Negative for pain and redness. Respiratory:  Negative for cough and shortness of breath. Cardiovascular:  Negative for chest pain. Gastrointestinal:  Negative for abdominal pain, constipation, diarrhea, nausea and vomiting. Genitourinary:  Negative for dysuria and hematuria. Musculoskeletal:  Positive for arthralgias and joint swelling. Skin:  Negative for rash. Hematological:  Bruises/bleeds easily.    Objective         Vitals Last 24 Hours:  TEMPERATURE:  Temp  Av.3 °F (36.8 °C)  Min: 98 °F (36.7 °C)  Max: 98.6 °F (37 °C)  RESPIRATIONS RANGE: Resp  Av  Min: 15  Max: 19  PULSE OXIMETRY RANGE: SpO2  Av.3 %  Min: 94 %  Max: 95 %  PULSE RANGE: Pulse  Av.3  Min: 94 Max: 110  BLOOD PRESSURE RANGE: Systolic (54ZSL), ZZU:029 , Min:124 , FJS:093   ; Diastolic (11BPJ), ORQ:80, Min:77, Max:87    I/O (24Hr): Intake/Output Summary (Last 24 hours) at 2/11/2023 1005  Last data filed at 2/11/2023 0554  Gross per 24 hour   Intake 840 ml   Output 850 ml   Net -10 ml     Objective:  General Appearance: In no acute distress. Vital signs: (most recent): Blood pressure 124/77, pulse 94, temperature 98.6 °F (37 °C), resp. rate 15, height 4' 11\" (1.499 m), weight 64.8 kg (142 lb 13.7 oz), SpO2 94 %. No fever. Lungs:  Normal effort and normal respiratory rate. Breath sounds clear to auscultation. Heart: Normal rate. Abdomen: Abdomen is soft. Bowel sounds are normal.   There is no abdominal tenderness. Extremities: (Right lower extremity swelling, particularly at right knee  Right knee tender  Tapered digits)  Neurological: Patient is alert. Pupils:  Pupils are equal, round, and reactive to light. Skin:  Warm and dry. (Calcifications below skin on left thigh  Hyperpigmentation lower extremities  Scattered bruising left upper arm)  Labs/Imaging/Diagnostics    Labs:  CBC:  Recent Labs     02/10/23  0647 02/09/23  0744 02/08/23  1131   WBC 7.1 5.6 3.6   RBC 2.75* 2.53* 2.99*   HGB 7.6* 6.6* 7.9*   HCT 24.5* 23.1* 27.0*   MCV 89.1 91.3 90.3   RDW 19.2* 21.1* 21.2*    254 268     CHEMISTRIES:  Recent Labs     02/09/23  0744      K 4.1      CO2 28   BUN 17   CA 8.9   PHOS 3.4   PT/INR:No results for input(s): INR, INREXT in the last 72 hours. No lab exists for component: PROTIME  APTT:No results for input(s): APTT in the last 72 hours. LIVER PROFILE:No results for input(s): AST, ALT in the last 72 hours. No lab exists for component: Allyson Riggers, ALKPHOS  Lab Results   Component Value Date/Time    ALT (SGPT) 74 02/07/2023 06:06 AM    AST (SGOT) 48 (H) 02/07/2023 06:06 AM    Alk.  phosphatase 260 (H) 02/07/2023 06:06 AM    Bilirubin, direct 0.1 12/04/2021 08:50 PM    Bilirubin, total 0.4 02/07/2023 06:06 AM       Imaging Last 24 Hours:  MRI TIB/FIB RT W  WO CONT    Result Date: 2/10/2023  INDICATION: Possible abscess COMPARISON: Radiographs 2/7/2023 lower extremity Doppler ultrasound dated 2/4/2023 EXAM: MRI of the right leg with the following sequences: Coronal T1, STIR, post IV contrast-enhanced T1 fat saturated; sagittal STIR; axial T1, T2, post IV contrast-enhanced T1 fat saturated. IV contrast: 13 mL MultiHance. FINDINGS: There is a large collection with marginal enhancement in the subcutaneous fat extending from the level of the distal long femoral diametaphysis through the proximal third of the right leg. It predominates posteriorly and posterolaterally overlying the gastrocnemius muscles and abutting and possibly extending within the distal biceps femoris muscle. There is heterogeneous intrinsic signal within the collection, showing mixed intermediate and increased T1 signal and areas of intermediate and increased T2 signal. The increased T1 signal could be of hemorrhagic or proteinaceous basis of both. The areas of increased T1 and T2 signal correspond with each other. The presence of intermediate T2 signal is of uncertain significance; pain unusual for fluid content unless it were a subacute hemorrhagic The collection's overall dimensions are up to 9.2 cm transverse, up to 3.7 cm AP and 14 cm craniocaudal. A nonspecific elongated focus of T1 hypointense and T2 hyperintense nonenhancing signal consistent with fluid of undetermined cause is shown within the proximal medial gastrocnemius muscle. It measures 6.8 cm in craniocaudal dimension and 1.3 cm in transverse dimension. No other soft tissue mass is suggested. There is a small knee effusion. There are multiple areas of osteonecrosis demonstrated in the distal femoral metaphysis and diaphysis, medial femoral condyle, patella, proximal tibial metaphysis and epiphysis, and distal tibial epiphysis. 1. Large complex collection of popliteal subcutaneous fat overlying the superficial fascia of the gastrocnemius and biceps femoris muscles, possibly involving the biceps muscle. Areas of intermediate and increased signal on T1 images are shown. Increased T1 signal can be seen in the setting of hemorrhagic or proteinaceous material. Ultrasound correlation and aspiration suggested based on ultrasound imaging results. 2. Nonspecific elongated nonenhancing collection within proximal medial gastrocnemius muscle. 3. Multifocal osteonecrosis. Assessment//Plan       Assessment & Plan    This is a 33 yo female with history of systemic lupus and rheumatoid arthritis admitted for right lower extremity cellulitis on treatment with antibiotics. Labs for lupus and RA are pending. Patient reports a history of calcinosis and SCL 70 and Centromere Ab ordered. We will monitor for the results of these lab results. Acute phase reactants have been elevated, which are likely due to infection. I have ordered to hold Christel Craft at this time while she is being treated for cellulitis. She may continue with Plaquenil and Prednisone at the current doses. This case was reviewed and discussed with Dr. Goldie Lewis.      CHONG GamboaRAJINDER [facility]  Rheumatology        Electronically signed by Clarisa Perez NP on 2/11/2023 at 10:05 AM

## 2023-02-11 NOTE — PROGRESS NOTES
Hospitalist Progress Note    NAME: Mike Ruiz   :  1990   MRN:  [id number]            Subjective:     Chief Complaint / Reason for Physician Visit  Patient seen and evaluated at bedside, overnight events reviewed, patient currently has no new complaints,. Discussed with RN events overnight. Review of Systems:  Symptom Y/N Comments  Symptom Y/N Comments   Fever/Chills N   Chest Pain N    Poor Appetite Y   Edema N    Cough N   Abdominal Pain N    Sputum N   Joint Pain N    SOB/JACQUES N   Pruritis/Rash N    Nausea/vomit N   Tolerating PT/OT NA    Diarrhea N   Tolerating Diet Y    Constipation N   Other       Could NOT obtain due to:      Objective:     VITALS:   Last 24hrs VS reviewed since prior progress note. Most recent are:  Patient Vitals for the past 24 hrs:   Temp Pulse Resp BP SpO2   23 0839 -- 94 -- -- 94 %   23 0826 98.6 °F (37 °C) 97 15 124/77 95 %   02/10/23 2011 98 °F (36.7 °C) (!) 110 19 130/87 94 %       Intake/Output Summary (Last 24 hours) at 2023 1136  Last data filed at 2023 0554  Gross per 24 hour   Intake 840 ml   Output 850 ml   Net -10 ml        PHYSICAL EXAM:  General: Patient appears comfortable  EENT:  EOMI. Anicteric sclerae. MMM  Resp:  CTA bilaterally, no wheezing or rales. No accessory muscle use  CV:  Regular  rhythm, s1/s2 no m/r/g No edema  GI:  Soft, Non distended, Non tender. +Bowel sounds  Neurologic:  Alert and oriented X 3, normal speech,   Psych:   Good insight. Not anxious nor agitated  Skin:  No rashes. No jaundice    Procedures: see electronic medical records for all procedures/Xrays and details which were not copied into this note but were reviewed prior to creation of Plan. LABS:  I reviewed today's most current labs and imaging studies.   Pertinent labs include:  Recent Labs     02/10/23  0647 23  0744   WBC 7.1 5.6   HGB 7.6* 6.6*   HCT 24.5* 23.1*    254     Recent Labs     23  0744      K 4.1    CO2 28   *   BUN 17   CREA 0.42*   CA 8.9   PHOS 3.4   ALB 2.1*       Signed: Grisel Fontenot MD    MRI tib/fib right w/wo contrast:   IMPRESSION  1. Large complex collection of popliteal subcutaneous fat overlying the  superficial fascia of the gastrocnemius and biceps femoris muscles, possibly  involving the biceps muscle. Areas of intermediate and increased signal on T1  images are shown. Increased T1 signal can be seen in the setting of hemorrhagic  or proteinaceous material. Ultrasound correlation and aspiration suggested based  on ultrasound imaging results. 2. Nonspecific elongated nonenhancing collection within proximal medial  gastrocnemius muscle. 3. Multifocal osteonecrosis.     Reviewed most current lab test results and cultures  YES  Reviewed most current radiology test results   YES  Review and summation of old records today    NO  Reviewed patient's current orders and MAR    YES  PMH/ reviewed - no change compared to H&P      Assessment / Plan:  Right lower extremity cellulitis-of note patient has right lower extremity cellulitis, currently remains hemodynamically stable at this time, does not meet sepsis criteria at this time  Patient remains afebrile at this time  Continue daptomycin and meropenem for antibiotic coverage  Follow CBC  Obtain inflammatory markers  Infection disease consult appreciated, continue to follow recommendations    Popliteal collection-could be infectious versus hematoma, patient has been evaluated by orthopedic surgery, patient scheduled for surgical exploration, likely this evening versus tomorrow  Continue current treatment  Orthopedic surgery consult appreciated, continue to follow recommendations    Anemia of chronic disease-of note patient required a blood transfusion 2 days back with appropriate response  Obtain repeat CBC for further evaluation  Maintain active type and screen  Obtain stool for occult blood to rule out GI bleed    History of pulmonary artery hypertension-continue home medications    History of SLE-continue current medications    Prophylaxis-SCDs  FEN-regular diet, replete potassium and magnesium  Full code, will clarify about surrogate decision-maker  Disposition-pending clinical improvement, surgical exploration of popliteal collection, clarification of antibiotics by infectious disease, likely home with home health in 48 hours        25.0 - 29.9 Overweight / Body mass index is 28.85 kg/m². Code status: Full  Prophylaxis: SCD's  Recommended Disposition:  PT, OT, RN     ________________________________________________________________________  Care Plan discussed with:    Comments   Patient x    Family      RN x    Care Manager x    Consultant  x                     x Multidiciplinary team rounds were held today with , nursing, pharmacist and clinical coordinator. Patient's plan of care was discussed; medications were reviewed and discharge planning was addressed.      ________________________________________________________________________  Total NON critical care TIME:  35   Minutes        Comments   >50% of visit spent in counseling and coordination of care x    ________________________________________________________________________  John Dominguez MD

## 2023-02-11 NOTE — PROGRESS NOTES
Problem: Falls - Risk of  Goal: *Absence of Falls  Description: Document Dharmesh Gee Fall Risk and appropriate interventions in the flowsheet. Outcome: Progressing Towards Goal  Note: Fall Risk Interventions:                                Problem: Patient Education: Go to Patient Education Activity  Goal: Patient/Family Education  Outcome: Progressing Towards Goal     Problem: Pressure Injury - Risk of  Goal: *Prevention of pressure injury  Description: Document Delbert Scale and appropriate interventions in the flowsheet.   Outcome: Progressing Towards Goal  Note: Pressure Injury Interventions:  Sensory Interventions: Minimize linen layers, Keep linens dry and wrinkle-free    Moisture Interventions: Minimize layers    Activity Interventions: Increase time out of bed    Mobility Interventions: HOB 30 degrees or less    Nutrition Interventions: Document food/fluid/supplement intake    Friction and Shear Interventions: Minimize layers, Feet elevated on foot rest, HOB 30 degrees or less                Problem: Patient Education: Go to Patient Education Activity  Goal: Patient/Family Education  Outcome: Progressing Towards Goal     Problem: Patient Education: Go to Patient Education Activity  Goal: Patient/Family Education  Outcome: Progressing Towards Goal

## 2023-02-11 NOTE — PROGRESS NOTES
Infectious Disease Progress Note           Subjective:   Pleasant 34 y.o BF seen on f/u for Dr Concetta Khan. She is being followed by ID for right leg cellulitis complicated by Large complex collection of popliteal subcutaneous fat per MRI 02/10. She is febrile w a normal WBC on routine labs. Blood Cx from  is staying neg. She is on meropenem and Daptomycin, has documented allergies to Unasyn, Doxycycline and Vancomycin. She reported right posterior leg pain during my assessment. Pt tells me she is scheduled for I&D of her right leg by ortho on   Objective:   Physical Exam:     Visit Vitals  /77 (BP 1 Location: Right upper arm, BP Patient Position: At rest)   Pulse 94   Temp 98.6 °F (37 °C)   Resp 15   Ht 4' 11\" (1.499 m)   Wt 142 lb 13.7 oz (64.8 kg)   SpO2 94%   BMI 28.85 kg/m²    O2 Flow Rate (L/min): 1 l/min O2 Device: None (Room air)    Temp (24hrs), Av.3 °F (36.8 °C), Min:98 °F (36.7 °C), Max:98.6 °F (37 °C)    No intake/output data recorded.     1901 -  0700  In: 1550.8 [P.O.:1200]  Out: 2850 [Urine:2850]    General: NAD, AAOx 4, OOB to chair   HEENT: REAL, Moist mucosa   Lungs: CTA b/l, no wheeze/rhonchi   Heart: S1S2+, RRR, no murmur  Abdo: Soft, NT, ND, +BS   : No indwelling gorman cath   Exts: chronic hyperpigmented skin changes involving left leg, right leg swelling indurated area on posterior knee, + warmth and tenderness   Skin: right chest wall portacath       Data Review:       Recent Days:  Recent Labs     23  1213 02/10/23  0647 23  0744   WBC 6.7 7.1 5.6   HGB 7.6* 7.6* 6.6*   HCT 24.8* 24.5* 23.1*    234 254     Recent Labs     23  1213 23  0744   BUN 16 17   CREA 0.45* 0.42*       Lab Results   Component Value Date/Time    C-Reactive protein 10.00 (H) 02/10/2023 06:47 AM        Microbiology     Results       Procedure Component Value Units Date/Time    CULTURE, BLOOD, PAIRED [[id number]] Collected: 23 1550    Order Status: Completed Specimen: Blood Updated: 02/10/23 0654     Special Requests: No Special Requests        Culture result: No growth 6 days                  Diagnostics   CXR Results  (Last 48 hours)      None               Assessment/Plan     Right leg cellulitis, complicated by Large complex collection of popliteal subcutaneous fat on MRI 02/10        Afebrile w a normal WBC on routine labs         Scheduled for I&D on 02/12 by ortho per pt         Right leg swelling, tenderness/induration and warmth on posterior knee on exam         On empiric Daptomycin and Meropenem, reported dyspnea on Unasyn         Routine labs in AM, including CRP, CPK and Procal     2. H/o rheumatoid arthritis/Raynauds and SLE      On Plaquenil, prednisone and Tofactinib as out pt     3. Right leg pain, mgt per primary     4. Hyperpigmented skin changes involving exts, chronic per pt     5.  Immunocompromised       Jluis Morse MD    2/11/2023

## 2023-02-11 NOTE — PROGRESS NOTES
Progress Note    Patient: Sagar Vigil MRN: [id number]  SSN: [id number]    YOB: 1990  Age: 34 y.o. Sex: female      Admit Date: 2/3/2023    LOS: 8 days     Subjective:     Patient is a 23-year-old lady with a history of lupus rheumatoid arthritis pulmonary hypertension and osteonecrosis of the talus, has been follow-up at [facility], has been admitted with right leg pain and redness that started about 7-10 days ago, is worsened and she was admitted to the hospital, patient is better yesterday with IV antibiotics but pain seems to have gotten worse, the swelling is also gotten worse mainly over the back of the knee, able to move the toes no numbness. The patient had an MRI of her right leg yesterday, which was available for review. Objective:     Vitals:    02/10/23 2011 02/11/23 0530 02/11/23 0826 02/11/23 0839   BP: 130/87  124/77    Pulse: (!) 110  97 94   Resp: 19  15    Temp: 98 °F (36.7 °C)  98.6 °F (37 °C)    SpO2: 94%  95% 94%   Weight:  64.8 kg (142 lb 13.7 oz)     Height:            Intake and Output:  Current Shift: No intake/output data recorded. Last three shifts: 02/09 1901 - 02/11 0700  In: 1550.8 [P.O.:1200]  Out: 2850 [Urine:2850]    Physical Exam:   General:  Alert, cooperative, no distress, appears stated age. Eyes:  Conjunctivae/corneas clear. PERRL, EOMs intact. Fundi benign   Ears:  Normal TMs and external ear canals both ears. Nose: Nares normal. Septum midline. Mucosa normal. No drainage or sinus tenderness. Mouth/Throat: Lips, mucosa, and tongue normal. Teeth and gums normal.   Neck: Supple, symmetrical, trachea midline, no adenopathy, thyroid: no enlargment/tenderness/nodules, no carotid bruit and no JVD. Back:   Symmetric, no curvature. ROM normal. No CVA tenderness. Lungs:   Clear to auscultation bilaterally. Heart:  Regular rate and rhythm, S1, S2 normal, no murmur, click, rub or gallop. Abdomen:   Soft, non-tender.  Bowel sounds normal. No masses,  No organomegaly. Extremities: Examination of the right leg shows calf and posterior thigh erythema and redness, there is indurated swelling over the calf, there seems to be fullness over the popliteal area, 4 to 6 inches area of fluctuation was suspected and appreciated over the proximal calf, and popliteal area, able to move the toes without any significant pain, able to move the ankle without any significant pain, good pulses   Pulses: 2+ and symmetric all extremities. Skin: Skin color, texture, turgor normal. No rashes or lesions   Lymph nodes: Cervical, supraclavicular, and axillary nodes normal.   Neurologic: CNII-XII intact. Normal strength, sensation and reflexes throughout. Lab/Data Review: All lab results for the last 24 hours reviewed. Hemoglobin is 6.6, CRP is elevated    MRI: (2/10/23) -right leg    IMPRESSION  1. Large complex collection of popliteal subcutaneous fat overlying the  superficial fascia of the gastrocnemius and biceps femoris muscles, possibly involving the biceps muscle. Areas of intermediate and increased signal on T1 images are shown. Increased T1 signal can be seen in the setting of hemorrhagic or proteinaceous material. Ultrasound correlation and aspiration suggested based on ultrasound imaging results. 2. Nonspecific elongated nonenhancing collection within proximal medial  gastrocnemius muscle. 3. Multifocal osteonecrosis. Assessment:     Active Problems:    Cellulitis (8/7/2021)  History of lupus,    Plan:     Based on the patient's clinical presentation, physical findings and MRI study, the patient likely has a collection in the popliteal and proximal calf region, differential diagnosis abscess/hematoma. I had a detailed discussion with the patient and her sister about the findings, and my diagnostic impressions. Natural history and treatment options were discussed.   I have offered and suggested surgical exploration with evacuation of the collection under anesthesia, with preliminary wide bore needle aspiration. Potential risks and anticipated benefits were discussed. Wound healing will be with secondary intention, involving wound packing and daily dressing changes in the foreseeable future. Potential risks were discussed and acknowledged, including but not limited to anesthetic and medical complications, neurovascular injury, blood loss, and possible need for further surgery in the future. The patient voiced her wish to proceed with the surgical plan. Patient surgery has been scheduled for tomorrow morning.     Signed By: Fazal Choudhary MD     February 11, 2023

## 2023-02-11 NOTE — ANESTHESIA PREPROCEDURE EVALUATION
Relevant Problems   No relevant active problems       Anesthetic History   No history of anesthetic complications            Review of Systems / Medical History  Patient summary reviewed, nursing notes reviewed and pertinent labs reviewed    Pulmonary                Comments: LUPUS. Snoring. Neuro/Psych   Within defined limits           Cardiovascular                    Comments: PULM. HTN. '    ECHO 2-6-23: Interpretation Summary    Left Ventricle: Normal left ventricular systolic function with a visually estimated EF of 35 - 40%. Left ventricle size is normal. Severely increased wall thickness. Moderate hypokinesis of the following segments: mid anteroseptal. Normal diastolic function.   Tricuspid Valve: Moderate to severe regurgitation. EKG 2-4-23: Sinus tachycardia   Incomplete right bundle branch block   Minimal voltage criteria for LVH, may be normal variant   Borderline ECG   When compared with ECG of 02-NOV-2022 01:39,   T wave inversion no longer evident in Anterior leads. GI/Hepatic/Renal                Endo/Other        Arthritis (RHEUMATOID ARTHRITIS. ) and anemia    Comments: OSTEONECROSIS. SEPSIS. Other Findings   Comments: CELLULITIS OF RIGHT LEG. Right leg swelling.           Physical Exam    Airway  Mallampati: II  TM Distance: 4 - 6 cm  Neck ROM: normal range of motion   Mouth opening: Normal     Cardiovascular    Rhythm: regular  Rate: normal         Dental  No notable dental hx       Pulmonary  Breath sounds clear to auscultation               Abdominal  GI exam deferred       Other Findings            Anesthetic Plan    ASA: 3, emergent  Anesthesia type: MAC    Monitoring Plan: Continuous noninvasive hemodynamic monitoring      Induction: Intravenous  Anesthetic plan and risks discussed with: Patient

## 2023-02-12 ENCOUNTER — ANESTHESIA (OUTPATIENT)
Dept: SURGERY | Age: 33
DRG: 872 | End: 2023-02-12
Payer: MEDICARE

## 2023-02-12 LAB
ANION GAP SERPL CALC-SCNC: 3 MMOL/L (ref 5–15)
BASOPHILS # BLD: 0 K/UL (ref 0–0.1)
BASOPHILS NFR BLD: 0 % (ref 0–1)
BUN SERPL-MCNC: 14 MG/DL (ref 6–20)
BUN/CREAT SERPL: 52 (ref 12–20)
C3 SERPL-MCNC: 185 MG/DL (ref 82–167)
C4 SERPL-MCNC: 38 MG/DL (ref 12–38)
CA-I BLD-MCNC: 8.6 MG/DL (ref 8.5–10.1)
CHLORIDE SERPL-SCNC: 104 MMOL/L (ref 97–108)
CK SERPL-CCNC: 45 U/L (ref 26–192)
CO2 SERPL-SCNC: 29 MMOL/L (ref 21–32)
COLLECT DATE STL: 2024
CREAT SERPL-MCNC: 0.27 MG/DL (ref 0.55–1.02)
CRP SERPL-MCNC: 2.73 MG/DL (ref 0–0.6)
DIFFERENTIAL METHOD BLD: ABNORMAL
EOSINOPHIL # BLD: 0 K/UL (ref 0–0.4)
EOSINOPHIL NFR BLD: 0 % (ref 0–7)
ERYTHROCYTE [DISTWIDTH] IN BLOOD BY AUTOMATED COUNT: 20 % (ref 11.5–14.5)
ERYTHROCYTE [SEDIMENTATION RATE] IN BLOOD: 95 MM/HR (ref 0–20)
GLUCOSE SERPL-MCNC: 83 MG/DL (ref 65–100)
HCT VFR BLD AUTO: 23.1 % (ref 35–47)
HEMOCCULT SP1 STL QL: POSITIVE
HGB BLD-MCNC: 6.9 G/DL (ref 11.5–16)
IMM GRANULOCYTES # BLD AUTO: 0.1 K/UL (ref 0–0.04)
IMM GRANULOCYTES NFR BLD AUTO: 1 % (ref 0–0.5)
LYMPHOCYTES # BLD: 0.2 K/UL (ref 0.8–3.5)
LYMPHOCYTES NFR BLD: 4 % (ref 12–49)
MAGNESIUM SERPL-MCNC: 2.4 MG/DL (ref 1.6–2.4)
MCH RBC QN AUTO: 27.5 PG (ref 26–34)
MCHC RBC AUTO-ENTMCNC: 29.9 G/DL (ref 30–36.5)
MCV RBC AUTO: 92 FL (ref 80–99)
MONOCYTES # BLD: 0.2 K/UL (ref 0–1)
MONOCYTES NFR BLD: 3 % (ref 5–13)
NEUTS SEG # BLD: 5.2 K/UL (ref 1.8–8)
NEUTS SEG NFR BLD: 92 % (ref 32–75)
NRBC # BLD: 0.19 K/UL (ref 0–0.01)
NRBC BLD-RTO: 3.4 PER 100 WBC
PLATELET # BLD AUTO: 286 K/UL (ref 150–400)
PMV BLD AUTO: 10.8 FL (ref 8.9–12.9)
POTASSIUM SERPL-SCNC: 4.4 MMOL/L (ref 3.5–5.1)
PROCALCITONIN SERPL-MCNC: <0.05 NG/ML
RBC # BLD AUTO: 2.51 M/UL (ref 3.8–5.2)
SODIUM SERPL-SCNC: 136 MMOL/L (ref 136–145)
WBC # BLD AUTO: 5.6 K/UL (ref 3.6–11)

## 2023-02-12 PROCEDURE — 86140 C-REACTIVE PROTEIN: CPT

## 2023-02-12 PROCEDURE — 82550 ASSAY OF CK (CPK): CPT

## 2023-02-12 PROCEDURE — 87176 TISSUE HOMOGENIZATION CULTR: CPT

## 2023-02-12 PROCEDURE — 0J9L3ZZ DRAINAGE OF RIGHT UPPER LEG SUBCUTANEOUS TISSUE AND FASCIA, PERCUTANEOUS APPROACH: ICD-10-PCS | Performed by: ORTHOPAEDIC SURGERY

## 2023-02-12 PROCEDURE — 74011250636 HC RX REV CODE- 250/636: Performed by: INTERNAL MEDICINE

## 2023-02-12 PROCEDURE — 77030040361 HC SLV COMPR DVT MDII -B: Performed by: ORTHOPAEDIC SURGERY

## 2023-02-12 PROCEDURE — 74011250637 HC RX REV CODE- 250/637: Performed by: STUDENT IN AN ORGANIZED HEALTH CARE EDUCATION/TRAINING PROGRAM

## 2023-02-12 PROCEDURE — 76210000006 HC OR PH I REC 0.5 TO 1 HR: Performed by: ORTHOPAEDIC SURGERY

## 2023-02-12 PROCEDURE — 86141 C-REACTIVE PROTEIN HS: CPT

## 2023-02-12 PROCEDURE — 87205 SMEAR GRAM STAIN: CPT

## 2023-02-12 PROCEDURE — 84145 PROCALCITONIN (PCT): CPT

## 2023-02-12 PROCEDURE — 36415 COLL VENOUS BLD VENIPUNCTURE: CPT

## 2023-02-12 PROCEDURE — 85652 RBC SED RATE AUTOMATED: CPT

## 2023-02-12 PROCEDURE — 74011250636 HC RX REV CODE- 250/636: Performed by: NURSE ANESTHETIST, CERTIFIED REGISTERED

## 2023-02-12 PROCEDURE — 74011250637 HC RX REV CODE- 250/637: Performed by: INTERNAL MEDICINE

## 2023-02-12 PROCEDURE — 74011636637 HC RX REV CODE- 636/637: Performed by: INTERNAL MEDICINE

## 2023-02-12 PROCEDURE — 85025 COMPLETE CBC W/AUTO DIFF WBC: CPT

## 2023-02-12 PROCEDURE — 74011000258 HC RX REV CODE- 258: Performed by: STUDENT IN AN ORGANIZED HEALTH CARE EDUCATION/TRAINING PROGRAM

## 2023-02-12 PROCEDURE — 76010000138 HC OR TIME 0.5 TO 1 HR: Performed by: ORTHOPAEDIC SURGERY

## 2023-02-12 PROCEDURE — 74011000250 HC RX REV CODE- 250: Performed by: NURSE ANESTHETIST, CERTIFIED REGISTERED

## 2023-02-12 PROCEDURE — 74011000250 HC RX REV CODE- 250: Performed by: INTERNAL MEDICINE

## 2023-02-12 PROCEDURE — 65270000029 HC RM PRIVATE

## 2023-02-12 PROCEDURE — 80048 BASIC METABOLIC PNL TOTAL CA: CPT

## 2023-02-12 PROCEDURE — 2709999900 HC NON-CHARGEABLE SUPPLY: Performed by: ORTHOPAEDIC SURGERY

## 2023-02-12 PROCEDURE — 87076 CULTURE ANAEROBE IDENT EACH: CPT

## 2023-02-12 PROCEDURE — 74011250636 HC RX REV CODE- 250/636: Performed by: STUDENT IN AN ORGANIZED HEALTH CARE EDUCATION/TRAINING PROGRAM

## 2023-02-12 PROCEDURE — 76060000032 HC ANESTHESIA 0.5 TO 1 HR: Performed by: ORTHOPAEDIC SURGERY

## 2023-02-12 PROCEDURE — 77030019895 HC PCKNG STRP IODO -A: Performed by: ORTHOPAEDIC SURGERY

## 2023-02-12 PROCEDURE — 99232 SBSQ HOSP IP/OBS MODERATE 35: CPT | Performed by: INTERNAL MEDICINE

## 2023-02-12 PROCEDURE — 83735 ASSAY OF MAGNESIUM: CPT

## 2023-02-12 PROCEDURE — 74011000258 HC RX REV CODE- 258: Performed by: INTERNAL MEDICINE

## 2023-02-12 RX ORDER — SODIUM CHLORIDE 0.9 % (FLUSH) 0.9 %
5-40 SYRINGE (ML) INJECTION AS NEEDED
Status: CANCELLED | OUTPATIENT
Start: 2023-02-12

## 2023-02-12 RX ORDER — HYDROMORPHONE HYDROCHLORIDE 1 MG/ML
INJECTION, SOLUTION INTRAMUSCULAR; INTRAVENOUS; SUBCUTANEOUS AS NEEDED
Status: DISCONTINUED | OUTPATIENT
Start: 2023-02-12 | End: 2023-02-12 | Stop reason: HOSPADM

## 2023-02-12 RX ORDER — FENTANYL CITRATE 50 UG/ML
50 INJECTION, SOLUTION INTRAMUSCULAR; INTRAVENOUS
Status: CANCELLED | OUTPATIENT
Start: 2023-02-12

## 2023-02-12 RX ORDER — MIDAZOLAM HYDROCHLORIDE 1 MG/ML
INJECTION, SOLUTION INTRAMUSCULAR; INTRAVENOUS AS NEEDED
Status: DISCONTINUED | OUTPATIENT
Start: 2023-02-12 | End: 2023-02-12 | Stop reason: HOSPADM

## 2023-02-12 RX ORDER — SODIUM CHLORIDE 0.9 % (FLUSH) 0.9 %
5-40 SYRINGE (ML) INJECTION EVERY 8 HOURS
Status: CANCELLED | OUTPATIENT
Start: 2023-02-12

## 2023-02-12 RX ORDER — EPHEDRINE SULFATE/0.9% NACL/PF 50 MG/5 ML
5 SYRINGE (ML) INTRAVENOUS AS NEEDED
Status: CANCELLED | OUTPATIENT
Start: 2023-02-12

## 2023-02-12 RX ORDER — FENTANYL CITRATE 50 UG/ML
INJECTION, SOLUTION INTRAMUSCULAR; INTRAVENOUS AS NEEDED
Status: DISCONTINUED | OUTPATIENT
Start: 2023-02-12 | End: 2023-02-12 | Stop reason: HOSPADM

## 2023-02-12 RX ORDER — DEXMEDETOMIDINE HYDROCHLORIDE 100 UG/ML
INJECTION, SOLUTION INTRAVENOUS AS NEEDED
Status: DISCONTINUED | OUTPATIENT
Start: 2023-02-12 | End: 2023-02-12 | Stop reason: HOSPADM

## 2023-02-12 RX ORDER — ONDANSETRON 2 MG/ML
4 INJECTION INTRAMUSCULAR; INTRAVENOUS AS NEEDED
Status: CANCELLED | OUTPATIENT
Start: 2023-02-12

## 2023-02-12 RX ORDER — DIPHENHYDRAMINE HYDROCHLORIDE 50 MG/ML
12.5 INJECTION, SOLUTION INTRAMUSCULAR; INTRAVENOUS AS NEEDED
Status: CANCELLED | OUTPATIENT
Start: 2023-02-12 | End: 2023-02-12

## 2023-02-12 RX ORDER — HYDROMORPHONE HYDROCHLORIDE 1 MG/ML
0.5 INJECTION, SOLUTION INTRAMUSCULAR; INTRAVENOUS; SUBCUTANEOUS
Status: CANCELLED | OUTPATIENT
Start: 2023-02-12

## 2023-02-12 RX ORDER — LIDOCAINE HYDROCHLORIDE 10 MG/ML
0.1 INJECTION, SOLUTION EPIDURAL; INFILTRATION; INTRACAUDAL; PERINEURAL AS NEEDED
Status: CANCELLED | OUTPATIENT
Start: 2023-02-12

## 2023-02-12 RX ORDER — SODIUM CHLORIDE, SODIUM LACTATE, POTASSIUM CHLORIDE, CALCIUM CHLORIDE 600; 310; 30; 20 MG/100ML; MG/100ML; MG/100ML; MG/100ML
INJECTION, SOLUTION INTRAVENOUS
Status: DISCONTINUED | OUTPATIENT
Start: 2023-02-12 | End: 2023-02-12 | Stop reason: HOSPADM

## 2023-02-12 RX ORDER — FENTANYL CITRATE 50 UG/ML
50 INJECTION, SOLUTION INTRAMUSCULAR; INTRAVENOUS AS NEEDED
Status: CANCELLED | OUTPATIENT
Start: 2023-02-12

## 2023-02-12 RX ORDER — PROPOFOL 10 MG/ML
INJECTION, EMULSION INTRAVENOUS
Status: DISCONTINUED | OUTPATIENT
Start: 2023-02-12 | End: 2023-02-12 | Stop reason: HOSPADM

## 2023-02-12 RX ADMIN — PREDNISONE 20 MG: 5 TABLET ORAL at 21:19

## 2023-02-12 RX ADMIN — FOLIC ACID 2 MG: 1 TABLET ORAL at 12:12

## 2023-02-12 RX ADMIN — PROPOFOL 100 MCG/KG/MIN: 10 INJECTION, EMULSION INTRAVENOUS at 08:13

## 2023-02-12 RX ADMIN — MEROPENEM 1 G: 1 INJECTION, POWDER, FOR SOLUTION INTRAVENOUS at 05:48

## 2023-02-12 RX ADMIN — SODIUM CHLORIDE, PRESERVATIVE FREE 10 ML: 5 INJECTION INTRAVENOUS at 14:00

## 2023-02-12 RX ADMIN — DEXMEDETOMIDINE HYDROCHLORIDE 20 MCG: 100 INJECTION, SOLUTION INTRAVENOUS at 08:06

## 2023-02-12 RX ADMIN — AMBRISENTAN 5 MG: 5 TABLET, FILM COATED ORAL at 12:14

## 2023-02-12 RX ADMIN — METOPROLOL TARTRATE 25 MG: 25 TABLET, FILM COATED ORAL at 21:19

## 2023-02-12 RX ADMIN — HYDROMORPHONE HYDROCHLORIDE 1 MG: 1 INJECTION, SOLUTION INTRAMUSCULAR; INTRAVENOUS; SUBCUTANEOUS at 08:46

## 2023-02-12 RX ADMIN — HYDROMORPHONE HYDROCHLORIDE 0.5 MG: 1 INJECTION, SOLUTION INTRAMUSCULAR; INTRAVENOUS; SUBCUTANEOUS at 12:12

## 2023-02-12 RX ADMIN — MEROPENEM 1 G: 1 INJECTION, POWDER, FOR SOLUTION INTRAVENOUS at 13:45

## 2023-02-12 RX ADMIN — DULOXETINE HYDROCHLORIDE 30 MG: 30 CAPSULE, DELAYED RELEASE ORAL at 21:19

## 2023-02-12 RX ADMIN — SODIUM CHLORIDE, PRESERVATIVE FREE 10 ML: 5 INJECTION INTRAVENOUS at 22:11

## 2023-02-12 RX ADMIN — MEROPENEM 1 G: 1 INJECTION, POWDER, FOR SOLUTION INTRAVENOUS at 23:15

## 2023-02-12 RX ADMIN — SODIUM CHLORIDE, POTASSIUM CHLORIDE, SODIUM LACTATE AND CALCIUM CHLORIDE: 600; 310; 30; 20 INJECTION, SOLUTION INTRAVENOUS at 08:05

## 2023-02-12 RX ADMIN — MIDAZOLAM HYDROCHLORIDE 2 MG: 2 INJECTION, SOLUTION INTRAMUSCULAR; INTRAVENOUS at 08:06

## 2023-02-12 RX ADMIN — RIOCIGUAT 2.5 MG: 2.5 TABLET, FILM COATED ORAL at 21:20

## 2023-02-12 RX ADMIN — DEXMEDETOMIDINE HYDROCHLORIDE 10 MCG: 100 INJECTION, SOLUTION INTRAVENOUS at 08:42

## 2023-02-12 RX ADMIN — DEXMEDETOMIDINE HYDROCHLORIDE 10 MCG: 100 INJECTION, SOLUTION INTRAVENOUS at 08:33

## 2023-02-12 RX ADMIN — FENTANYL CITRATE 50 MCG: 50 INJECTION, SOLUTION INTRAMUSCULAR; INTRAVENOUS at 08:20

## 2023-02-12 RX ADMIN — HYDROXYCHLOROQUINE SULFATE 200 MG: 200 TABLET, FILM COATED ORAL at 12:13

## 2023-02-12 RX ADMIN — PROPOFOL 120 MCG/KG/MIN: 10 INJECTION, EMULSION INTRAVENOUS at 07:08

## 2023-02-12 RX ADMIN — OXYCODONE AND ACETAMINOPHEN 1 TABLET: 5; 325 TABLET ORAL at 19:19

## 2023-02-12 RX ADMIN — DAPTOMYCIN 350 MG: 500 INJECTION, POWDER, LYOPHILIZED, FOR SOLUTION INTRAVENOUS at 22:05

## 2023-02-12 RX ADMIN — HYDROMORPHONE HYDROCHLORIDE 0.5 MG: 1 INJECTION, SOLUTION INTRAMUSCULAR; INTRAVENOUS; SUBCUTANEOUS at 03:59

## 2023-02-12 RX ADMIN — GABAPENTIN 600 MG: 300 CAPSULE ORAL at 12:13

## 2023-02-12 RX ADMIN — GABAPENTIN 600 MG: 300 CAPSULE ORAL at 21:19

## 2023-02-12 RX ADMIN — PANTOPRAZOLE SODIUM 40 MG: 40 TABLET, DELAYED RELEASE ORAL at 12:13

## 2023-02-12 RX ADMIN — HYDROMORPHONE HYDROCHLORIDE 0.5 MG: 1 INJECTION, SOLUTION INTRAMUSCULAR; INTRAVENOUS; SUBCUTANEOUS at 22:10

## 2023-02-12 RX ADMIN — DULOXETINE HYDROCHLORIDE 30 MG: 30 CAPSULE, DELAYED RELEASE ORAL at 12:13

## 2023-02-12 RX ADMIN — RIOCIGUAT 2.5 MG: 2.5 TABLET, FILM COATED ORAL at 17:01

## 2023-02-12 RX ADMIN — FENTANYL CITRATE 50 MCG: 50 INJECTION, SOLUTION INTRAMUSCULAR; INTRAVENOUS at 08:17

## 2023-02-12 RX ADMIN — ACETAMINOPHEN 650 MG: 325 TABLET ORAL at 14:03

## 2023-02-12 RX ADMIN — HYDROMORPHONE HYDROCHLORIDE 0.5 MG: 1 INJECTION, SOLUTION INTRAMUSCULAR; INTRAVENOUS; SUBCUTANEOUS at 16:58

## 2023-02-12 NOTE — ANESTHESIA POSTPROCEDURE EVALUATION
Procedure(s):  INCISION AND DRAINAGE RIGHT LEG. MAC    Anesthesia Post Evaluation        Patient location during evaluation: PACU  Patient participation: complete - patient participated  Level of consciousness: awake  Pain score: 0  Pain management: adequate  Airway patency: patent  Anesthetic complications: no  Cardiovascular status: acceptable  Respiratory status: acceptable  Hydration status: acceptable  Post anesthesia nausea and vomiting:  controlled  Final Post Anesthesia Temperature Assessment:  Normothermia (36.0-37.5 degrees C)      INITIAL Post-op Vital signs:   Vitals Value Taken Time   /98 02/12/23 0930   Temp 36.3 °C (97.4 °F) 02/12/23 0902   Pulse 91 02/12/23 0939   Resp 16 02/12/23 0939   SpO2 91 % 02/12/23 0941   Vitals shown include unvalidated device data.

## 2023-02-12 NOTE — PROGRESS NOTES
Hospitalist Progress Note    Subjective:   Daily Progress Note: 2/12/2023 10:00 AM    Hospital Course: Patient is a 35 yo female with history of systemic lupus, calcinosis and rheumatoid arthritis on treatment with Plaquenil, Prednisone, and Vipin Harrier. She also has a history of pulmonary HTN and osteonecrosis. She presented to the ED with complaints of pains and swelling of the right lower extremity diagnosed as cellulitis and was admitted evaluation and treatment of cellulitis on 2/3/2023. Malathi Fuentes She has been placed on antibiotics under the direction of infectious disease, which included IV meropenem and daptomycin. MRI shows right leg cellulitis subcutaneous fat. Rheumatology and Ortho PDX consulted. Patient underwent I&D of the right leg complicated by large complex collection of popliteal subtaneous fat. Orthopedics and rheumatology consulted along with infectious disease. Patient underwent a I&D of right leg abscess/hematoma by orthopedics on 2/12/2023. Subjective: Patient seen in room after procedure. She was lethargic.   No acute issues overnight    Current Facility-Administered Medications   Medication Dose Route Frequency    HYDROmorphone (DILAUDID) syringe 0.5 mg  0.5 mg IntraVENous Q4H PRN    predniSONE (DELTASONE) tablet 20 mg  20 mg Oral QHS    meropenem (MERREM) 1 g in 0.9% sodium chloride (MBP/ADV) 50 mL MBP  1 g IntraVENous Q8H    0.9% sodium chloride infusion 250 mL  250 mL IntraVENous PRN    metoprolol tartrate (LOPRESSOR) tablet 25 mg  25 mg Oral Q12H    diphenhydrAMINE (BENADRYL) capsule 25 mg  25 mg Oral Q6H PRN    DAPTOmycin (CUBICIN) 350 mg in 0.9% sodium chloride 50 mL IVPB  6 mg/kg (Adjusted) IntraVENous Q24H    riociguat (ADEMPAS) tablet 2.5 mg   2.5 mg Oral TID    DULoxetine (CYMBALTA) capsule 30 mg  30 mg Oral BID    folic acid (FOLVITE) tablet 2 mg  2 mg Oral DAILY    gabapentin (NEURONTIN) capsule 600 mg  600 mg Oral BID    hydrOXYchloroQUINE (PLAQUENIL) tablet 200 mg  200 mg Oral DAILY    Ambrisentan tab 5 mg - Patient supplied (Patient Supplied)  5 mg Oral QAM    pantoprazole (PROTONIX) tablet 40 mg  40 mg Oral DAILY    [Held by provider] tofacitinib Tb24 1 Tablet  (Patient Supplied)  1 Tablet Oral DAILY    sodium chloride (NS) flush 5-40 mL  5-40 mL IntraVENous Q8H    sodium chloride (NS) flush 5-40 mL  5-40 mL IntraVENous PRN    acetaminophen (TYLENOL) tablet 650 mg  650 mg Oral Q6H PRN    Or    acetaminophen (TYLENOL) suppository 650 mg  650 mg Rectal Q6H PRN    polyethylene glycol (MIRALAX) packet 17 g  17 g Oral DAILY PRN    promethazine (PHENERGAN) 12.5 mg in 0.9% sodium chloride 50 mL IVPB  12.5 mg IntraVENous Q4H PRN    oxyCODONE-acetaminophen (PERCOCET) 5-325 mg per tablet 1 Tablet  1 Tablet Oral Q6H PRN    sodium chloride (NS) flush 5-10 mL  5-10 mL IntraVENous PRN        Review of Systems  Constitutional: No fevers, No chills, No sweats, ++fatigue, ++ Weakness  Eyes: No redness  Ears, nose, mouth, throat, and face: No nasal congestion, No sore throat, No voice change  Respiratory: No Shortness of Breath, No cough, No wheezing  Cardiovascular: No chest pain, No palpitations, No extremity edema  Gastrointestinal: No nausea, No vomiting, No diarrhea, No abdominal pain  Genitourinary: No frequency, No dysuria, No hematuria  Integument/breast: No skin lesion(s)   Neurological: No Confusion, No headaches, No dizziness      Objective:     Visit Vitals  BP (P) 130/87   Pulse 76   Temp 97.4 °F (36.3 °C)   Resp 19   Ht 4' 11\" (1.499 m)   Wt 64.8 kg (142 lb 13.7 oz)   SpO2 95%   BMI 28.85 kg/m²    O2 Flow Rate (L/min): (P) 3 l/min O2 Device: (P) Nasal cannula    Temp (24hrs), Av.8 °F (36.6 °C), Min:97.4 °F (36.3 °C), Max:98.2 °F (36.8 °C)       0701 -  1900  In: 250 [I.V.:250]  Out: 350   02/10 1901 -  0700  In: -   Out: 550 [Urine:550]    PHYSICAL EXAM:  Constitutional: No acute distress  Skin: Extremities and face reveal no rashes.  Right leg wrapped in a dressing which is clean,dry, intact   HEENT: Sclerae anicteric. Extra-occular muscles are intact. No oral ulcers. The neck is supple and no masses. Cardiovascular: Regular rate and rhythm. Respiratory:  Clear breath sounds bilaterally with no wheezes, rales, or rhonchi. GI: Abdomen nondistended, soft, and nontender. Normal active bowel sounds. Musculoskeletal: No pitting edema of the lower legs.  Able to move all ext  Neurological: Lethargic due to anesthesia but responds appropriately  Psychiatric: Mood appears appropriate       Data Review    Recent Results (from the past 24 hour(s))   RHEUMATOID FACTOR, QT    Collection Time: 02/11/23 12:13 PM   Result Value Ref Range    Rheumatoid factor <10 <15 IU/mL   CBC W/O DIFF    Collection Time: 02/11/23 12:13 PM   Result Value Ref Range    WBC 6.7 3.6 - 11.0 K/uL    RBC 2.75 (L) 3.80 - 5.20 M/uL    HGB 7.6 (L) 11.5 - 16.0 g/dL    HCT 24.8 (L) 35.0 - 47.0 %    MCV 90.2 80.0 - 99.0 FL    MCH 27.6 26.0 - 34.0 PG    MCHC 30.6 30.0 - 36.5 g/dL    RDW 19.9 (H) 11.5 - 14.5 %    PLATELET 417 520 - 457 K/uL    MPV 10.3 8.9 - 12.9 FL    NRBC 3.6 (H) 0.0  WBC    ABSOLUTE NRBC 0.24 (H) 0.00 - 9.64 K/uL   METABOLIC PANEL, BASIC    Collection Time: 02/11/23 12:13 PM   Result Value Ref Range    Sodium 136 136 - 145 mmol/L    Potassium 4.2 3.5 - 5.1 mmol/L    Chloride 103 97 - 108 mmol/L    CO2 29 21 - 32 mmol/L    Anion gap 4 (L) 5 - 15 mmol/L    Glucose 98 65 - 100 mg/dL    BUN 16 6 - 20 mg/dL    Creatinine 0.45 (L) 0.55 - 1.02 mg/dL    BUN/Creatinine ratio 36 (H) 12 - 20      eGFR >60 >60 ml/min/1.73m2    Calcium 8.9 8.5 - 10.1 mg/dL   MAGNESIUM    Collection Time: 02/11/23 12:13 PM   Result Value Ref Range    Magnesium 2.4 1.6 - 2.4 mg/dL   OCCULT BLOOD, STOOL    Collection Time: 02/11/23  3:00 PM   Result Value Ref Range    Occult Blood,day 1 Positive (A) Negative      Day 1 date: 2,024         Assessment/Plan:      Right lower extremity cellulitis complicated with large fluid popliteal collection status post I&D day #1        -Patient does not meet sepsis criteria. Afebrile.        -Patient on IV daptomycin meropenem. Infectious disease consulted      -MRI shows popliteal fluid collection. Status post I&D by orthopedics. 2.   Anemia of chronic disease      -Required 2 units of packed red blood cells. -Occult blood positive. Hemoglobin is pending for today. If     continues to decrease will consult GI       - On Protonix       3. Pulmonary Hypertension      - Continue ambrisenan and riociguant  4. History of SLE      -Rheumatology consulted      -Prednisone, Plaquenil    Dispo: 48 hours. Barriers include clearance from consulting docs, recommendations for outpatient antibiotic therapy. Suspect home with home health for wound care    Full code    DVT prophylaxis: Hold due to recent surgery. May restart Lovenox pending hemoglobin. No SCDs due to recent surgery  GI prophylaxis: Protonix    Care Plan discussed with: Patient/Family, Nurse, and     Total time spent with patient: 34 minutes.

## 2023-02-12 NOTE — PROGRESS NOTES
Problem: Pressure Injury - Risk of  Goal: *Prevention of pressure injury  Description: Document Delbert Scale and appropriate interventions in the flowsheet.   Outcome: Progressing Towards Goal  Note: Pressure Injury Interventions:  Sensory Interventions: Assess changes in LOC    Moisture Interventions: Absorbent underpads    Activity Interventions: PT/OT evaluation    Mobility Interventions: HOB 30 degrees or less    Nutrition Interventions: Document food/fluid/supplement intake    Friction and Shear Interventions: HOB 30 degrees or less no compalints  REVIEW OF SYSTEMS:  GEN: no fever,    no chills  RESP: no SOB,   no cough  CVS: no chest pain,   no palpitations  GI: no abdominal pain,   no nausea,   no vomiting,   no constipation,   no diarrhea  : no dysuria,   no frequency  NEURO: no headache,   no dizziness  PSYCH: no depression,   not anxious  Derm : no rash    MEDICATIONS  (STANDING):  ALBUTerol    90 MICROgram(s) HFA Inhaler 1 Puff(s) Inhalation every 4 hours  ALBUTerol/ipratropium for Nebulization 3 milliLiter(s) Nebulizer every 6 hours  allopurinol 300 milliGRAM(s) Oral daily  amLODIPine   Tablet 10 milliGRAM(s) Oral daily  chlorhexidine 4% Liquid 1 Application(s) Topical <User Schedule>  citalopram 40 milliGRAM(s) Oral daily  heparin  Injectable 5000 Unit(s) SubCutaneous every 12 hours  levETIRAcetam  Solution 750 milliGRAM(s) Oral two times a day  levothyroxine 50 MICROGram(s) Oral daily  metoprolol tartrate 25 milliGRAM(s) Oral two times a day  pantoprazole   Suspension 40 milliGRAM(s) Oral daily  tiotropium 18 MICROgram(s) Capsule 1 Capsule(s) Inhalation daily  vancomycin  IVPB 1000 milliGRAM(s) IV Intermittent every 12 hours    MEDICATIONS  (PRN):  guaiFENesin    Syrup 100 milliGRAM(s) Oral every 6 hours PRN Cough      Vital Signs Last 24 Hrs  T(C): 36.1 (18 Oct 2018 05:01), Max: 37.2 (17 Oct 2018 11:02)  T(F): 97 (18 Oct 2018 05:01), Max: 99 (17 Oct 2018 18:15)  HR: 98 (18 Oct 2018 05:01) (89 - 110)  BP: 134/72 (18 Oct 2018 05:01) (100/68 - 159/88)  BP(mean): --  RR: 20 (18 Oct 2018 05:01) (18 - 20)  SpO2: 95% (18 Oct 2018 05:01) (90% - 95%)  CAPILLARY BLOOD GLUCOSE        I&O's Summary    17 Oct 2018 07:01  -  18 Oct 2018 07:00  --------------------------------------------------------  IN: 1450 mL / OUT: 0 mL / NET: 1450 mL        PHYSICAL EXAM:  HEAD:  Atraumatic, Normocephalic  NECK: Supple, No   JVD  CHEST/LUNG:   no     rales,     no,    rhonchi  HEART: Regular rate and rhythm;         murmur  ABDOMEN: Soft, Nontender, ;   EXTREMITIES:  no      edema  NEUROLOGY:  alert    LABS:                        11.4   13.8  )-----------( 176      ( 17 Oct 2018 09:53 )             34.2     10-17    140  |  105  |  19  ----------------------------<  185<H>  3.3<L>   |  19<L>  |  0.66    Ca    8.8      17 Oct 2018 09:53                ABG - ( 17 Oct 2018 20:42 )  pH, Arterial: 7.48  pH, Blood: x     /  pCO2: 34    /  pO2: 69    / HCO3: 25    / Base Excess: 2.3   /  SaO2: 95                10-17 @ 09:06  2.8  70    Hemoglobin A1C, Whole Blood: 5.6 % (10-01 @ 07:40)    Thyroid Stimulating Hormone, Serum: 1.30 uIU/mL (10-01 @ 07:40)          Consultant(s) Notes Reviewed:      Care Discussed with Consultants/Other Providers:

## 2023-02-12 NOTE — ROUTINE PROCESS
TRANSFER - OUT REPORT:    Verbal report given to Irene(name) on Ric Orozco  being transferred to (unit) for routine post - op       Report consisted of patients Situation, Background, Assessment and   Recommendations(SBAR). Information from the following report(s) SBAR, OR Summary, Procedure Summary, Intake/Output, and Dual Neuro Assessment was reviewed with the receiving nurse. Opportunity for questions and clarification was provided.       Patient transported with:   Monitor  O2 @ 3 liters  Registered Nurse Brittani Khan and Mike Salgado

## 2023-02-12 NOTE — PROGRESS NOTES
Bedside shift change report given to Joce RN (oncoming nurse) by Juliette Alicia (offgoing nurse). Report included the following information SBAR.

## 2023-02-12 NOTE — PROGRESS NOTES
Infectious Disease Progress Note           Subjective:   Pt seen and examined at bedside. Underwent I&D w drainage of infected hematoma, pt was seen post, appeared lethargic, mother at bedside.    Objective:   Physical Exam:     Visit Vitals  /81 (BP 1 Location: Left upper arm, BP Patient Position: At rest;Lying right side)   Pulse (!) 102   Temp 97.8 °F (36.6 °C)   Resp 17   Ht 4' 11\" (1.499 m)   Wt 142 lb 13.7 oz (64.8 kg)   SpO2 100%   BMI 28.85 kg/m²    O2 Flow Rate (L/min): 3 l/min O2 Device: Nasal cannula    Temp (24hrs), Av.7 °F (36.5 °C), Min:97.2 °F (36.2 °C), Max:98.2 °F (36.8 °C)    701 - 1900  In: 250 [I.V.:250]  Out: 1000 [Urine:650]   02/10 1901 -  0700  In: -   Out: 550 [Urine:550]    General: NAD, lethargic, resting post-op   HEENT: REAL, Moist mucosa   Lungs: CTA b/l, no wheeze/rhonchi   Heart: S1S2+, RRR, no murmur  Abdo: Soft, NT, ND, +BS   : No indwelling gorman cath   Exts: chronic hyperpigmented skin changes involving b/l legs, right leg dressing in place   Skin: right chest wall portacath       Data Review:       Recent Days:  Recent Labs     23  1203 23  1213 02/10/23  0647   WBC 5.6 6.7 7.1   HGB 6.9* 7.6* 7.6*   HCT 23.1* 24.8* 24.5*    265 234       Recent Labs     23  1203 23  1213   BUN 14 16   CREA 0.27* 0.45*         Lab Results   Component Value Date/Time    C-Reactive protein 2.73 (H) 2023 12:03 PM          Microbiology     Results       Procedure Component Value Units Date/Time    CULTURE, WOUND Florette Figures STAIN [[id number]]     Order Status: Sent Specimen: Wound from Leg     CULTURE, TISSUE Florette Figures STAIN [[id number]]     Order Status: Sent Specimen: Tissue     CULTURE, BLOOD, PAIRED [[id number]] Collected: 23 1550    Order Status: Completed Specimen: Blood Updated: 02/10/23 0654     Special Requests: No Special Requests        Culture result: No growth 6 days                  Diagnostics   CXR Results  (Last 48 hours)      None               Assessment/Plan     Right leg cellulitis, complicated by Large complex collection of popliteal subcutaneous fat on MRI 02/10        Remains afebrile w a normal WBC on routine labs         S/p I&D of right leg w drainage of infected hematoma today by ortho         Continue on Daptomycin and meropenem pending intra-op Cx         Routine labs in AM    2. H/o rheumatoid arthritis/Raynauds and SLE      On Plaquenil, prednisone and Tofactinib as out pt       Tofactinib on hold due to acute infection     3. Right leg pain, due to # 1, continue symptomatic mgt     4.  Hyperpigmented skin changes involving exts, chronic per pt     Carlos A Kang MD    2/12/2023

## 2023-02-12 NOTE — OP NOTES
Operative Note    Patient: Nallely Alonso  YOB: 1990  MRN: [id number]    Date of Procedure: 2/12/2023     Pre-Op Diagnosis: 1) Right leg abscess/hematoma  2) Right lower extremity cellulitis    Additional diagnoses: 1) Lupus  2) Rheumatoid arthritis  3) Pulmonary hypertension  4) Generalized calcinosis  5) Steroid immunosuppression    Post-Op Diagnosis: Same as preoperative diagnosis. Procedure(s):  INCISION AND DRAINAGE OF RIGHT LEG INFECTED HEMATOMA    Surgeon(s):  Graham Douglas MD    Surgical Assistant: Louie Messer    Anesthesia: MAC by Dr. Eloisa Bingham and Gabby Antoine    Estimated Blood Loss (mL):  less than 50     Complications: None    Specimens:   ID Type Source Tests Collected by Time Destination   1 : Right Leg Wound Culture Wound Leg, Right CULTURE, ANAEROBIC, CULTURE, WOUND W Tonia Connor MD 2/12/2023 8411 Microbiology   2 : Right Leg Tissue Culture Tissue Tissue CULTURE, ANAEROBIC, CULTURE, TISSUE W Tonia Connor MD 2/12/2023 0840 Microbiology      Implants: * No implants in log *    Drains: * No LDAs found *    Findings: 1) approximately 400 cc of copious blood clots and hematoma, with suspicion of mild purulence, evacuated from extrafascial subcutaneous plane  2) hematoma/abscess cavity extended to proximal thigh, on the lateral aspect, as well as around the posterior aspect of the popliteal fossa and proximal leg  3) no active bleeding or drainage noted post evacuation    Detailed Description of Procedure: The patient and operative site were identified in the preoperative holding area. All questions were answered. After induction of anesthesia the patient was positioned in a slight left floppy lateral position with a bump under the right buttock. Standard prepping and draping of the right lower extremity was performed into a surgical field.   After timeout was called, I performed a diagnostic aspiration with an 18-gauge needle on the posterior lateral aspect of the proximal leg, at the site of the maximal fluctuation. Dark altered blood was encountered, suggestive of a collection subcutaneously. I made a 1 and half inch transverse incision over the site, in line with the skin creases. Blunt dissection immediately let us into the hematoma cavity. Copious blood clots and hematoma was evacuated. Findings were as noted above. After thorough and complete evacuation, the cavity was irrigated copiously. 1 bottle of 1 inch iodoform gauze was used to pack the entire extent of the cavity. An absorbent dressing was applied followed by a bulky compressive Ace wrap dressing. The patient tolerated the procedure well and was transferred to the recovery room in a stable condition. Postoperatively, daily wound packing change has been requested, and a wound nurse consult has been placed to evaluate for possible negative pressure wound VAC dressing. Intraoperative cultures will be monitored, and antibiotic and medical treatment may be modified accordingly.     Electronically Signed by Mark Barroso MD on 2/12/2023 at 8:58 AM

## 2023-02-12 NOTE — PROGRESS NOTES
Pt asked that Percocet be taken off her allergy list as she has tolerated it on current admission   She is okay w receiving Percocet for break through pain

## 2023-02-12 NOTE — PROGRESS NOTES
OT eval order received and acknowledged, attempted at [phone] however pt in OR for I&D of R leg abscess. Will continue to follow pt and attempt OT eval at a later time as medically appropriate. Thank you.

## 2023-02-13 LAB
ANION GAP SERPL CALC-SCNC: 5 MMOL/L (ref 5–15)
BASOPHILS # BLD: 0 K/UL (ref 0–0.1)
BASOPHILS NFR BLD: 0 % (ref 0–1)
BUN SERPL-MCNC: 10 MG/DL (ref 6–20)
BUN/CREAT SERPL: 22 (ref 12–20)
CA-I BLD-MCNC: 8.9 MG/DL (ref 8.5–10.1)
CCP IGA+IGG SERPL IA-ACNC: 4 UNITS (ref 0–19)
CHLORIDE SERPL-SCNC: 102 MMOL/L (ref 97–108)
CK SERPL-CCNC: 57 U/L (ref 26–192)
CO2 SERPL-SCNC: 30 MMOL/L (ref 21–32)
CREAT SERPL-MCNC: 0.46 MG/DL (ref 0.55–1.02)
CRP SERPL HS-MCNC: >9.5 MG/L
CRP SERPL-MCNC: 4.91 MG/DL (ref 0–0.6)
DIFFERENTIAL METHOD BLD: ABNORMAL
EOSINOPHIL # BLD: 0 K/UL (ref 0–0.4)
EOSINOPHIL NFR BLD: 0 % (ref 0–7)
ERYTHROCYTE [DISTWIDTH] IN BLOOD BY AUTOMATED COUNT: 20.3 % (ref 11.5–14.5)
ERYTHROCYTE [SEDIMENTATION RATE] IN BLOOD: >140 MM/HR (ref 0–20)
GLUCOSE SERPL-MCNC: 107 MG/DL (ref 65–100)
HCT VFR BLD AUTO: 24 % (ref 35–47)
HGB BLD-MCNC: 7 G/DL (ref 11.5–16)
IMM GRANULOCYTES # BLD AUTO: 0 K/UL
IMM GRANULOCYTES NFR BLD AUTO: 0 %
LYMPHOCYTES # BLD: 0.2 K/UL (ref 0.8–3.5)
LYMPHOCYTES NFR BLD: 4 % (ref 12–49)
MCH RBC QN AUTO: 27.6 PG (ref 26–34)
MCHC RBC AUTO-ENTMCNC: 29.2 G/DL (ref 30–36.5)
MCV RBC AUTO: 94.5 FL (ref 80–99)
METAMYELOCYTES NFR BLD MANUAL: 2 %
MONOCYTES # BLD: 0.1 K/UL (ref 0–1)
MONOCYTES NFR BLD: 1 % (ref 5–13)
MYELOCYTES NFR BLD MANUAL: 2 %
NEUTS SEG # BLD: 4.9 K/UL (ref 1.8–8)
NEUTS SEG NFR BLD: 91 % (ref 32–75)
NRBC # BLD: 0.32 K/UL
NRBC # BLD: 0.4 K/UL (ref 0–0.01)
NRBC BLD MANUAL-RTO: 6 PER 100 WBC
NRBC BLD-RTO: 7 PER 100 WBC
PLATELET # BLD AUTO: 327 K/UL (ref 150–400)
PMV BLD AUTO: 10 FL (ref 8.9–12.9)
POTASSIUM SERPL-SCNC: 4.1 MMOL/L (ref 3.5–5.1)
RBC # BLD AUTO: 2.54 M/UL (ref 3.8–5.2)
RBC MORPH BLD: ABNORMAL
SODIUM SERPL-SCNC: 137 MMOL/L (ref 136–145)
WBC # BLD AUTO: 5.4 K/UL (ref 3.6–11)

## 2023-02-13 PROCEDURE — 74011636637 HC RX REV CODE- 636/637: Performed by: NURSE PRACTITIONER

## 2023-02-13 PROCEDURE — 74011250636 HC RX REV CODE- 250/636: Performed by: LICENSED PRACTICAL NURSE

## 2023-02-13 PROCEDURE — 74011000258 HC RX REV CODE- 258: Performed by: STUDENT IN AN ORGANIZED HEALTH CARE EDUCATION/TRAINING PROGRAM

## 2023-02-13 PROCEDURE — 74011250636 HC RX REV CODE- 250/636: Performed by: STUDENT IN AN ORGANIZED HEALTH CARE EDUCATION/TRAINING PROGRAM

## 2023-02-13 PROCEDURE — 86140 C-REACTIVE PROTEIN: CPT

## 2023-02-13 PROCEDURE — 86235 NUCLEAR ANTIGEN ANTIBODY: CPT

## 2023-02-13 PROCEDURE — 99232 SBSQ HOSP IP/OBS MODERATE 35: CPT | Performed by: INTERNAL MEDICINE

## 2023-02-13 PROCEDURE — 74011000250 HC RX REV CODE- 250: Performed by: INTERNAL MEDICINE

## 2023-02-13 PROCEDURE — 97530 THERAPEUTIC ACTIVITIES: CPT

## 2023-02-13 PROCEDURE — 74011000258 HC RX REV CODE- 258: Performed by: INTERNAL MEDICINE

## 2023-02-13 PROCEDURE — 82550 ASSAY OF CK (CPK): CPT

## 2023-02-13 PROCEDURE — 65270000029 HC RM PRIVATE

## 2023-02-13 PROCEDURE — 74011250636 HC RX REV CODE- 250/636: Performed by: INTERNAL MEDICINE

## 2023-02-13 PROCEDURE — 80048 BASIC METABOLIC PNL TOTAL CA: CPT

## 2023-02-13 PROCEDURE — 97605 NEG PRS WND THER DME<=50SQCM: CPT

## 2023-02-13 PROCEDURE — 74011250637 HC RX REV CODE- 250/637: Performed by: INTERNAL MEDICINE

## 2023-02-13 PROCEDURE — 85652 RBC SED RATE AUTOMATED: CPT

## 2023-02-13 PROCEDURE — 77010033678 HC OXYGEN DAILY

## 2023-02-13 PROCEDURE — 94762 N-INVAS EAR/PLS OXIMTRY CONT: CPT

## 2023-02-13 PROCEDURE — 36415 COLL VENOUS BLD VENIPUNCTURE: CPT

## 2023-02-13 PROCEDURE — 74011250637 HC RX REV CODE- 250/637: Performed by: STUDENT IN AN ORGANIZED HEALTH CARE EDUCATION/TRAINING PROGRAM

## 2023-02-13 PROCEDURE — 86147 CARDIOLIPIN ANTIBODY EA IG: CPT

## 2023-02-13 PROCEDURE — 85025 COMPLETE CBC W/AUTO DIFF WBC: CPT

## 2023-02-13 PROCEDURE — 85613 RUSSELL VIPER VENOM DILUTED: CPT

## 2023-02-13 PROCEDURE — 97165 OT EVAL LOW COMPLEX 30 MIN: CPT

## 2023-02-13 RX ORDER — HYDROMORPHONE HYDROCHLORIDE 1 MG/ML
0.5 INJECTION, SOLUTION INTRAMUSCULAR; INTRAVENOUS; SUBCUTANEOUS
Status: DISPENSED | OUTPATIENT
Start: 2023-02-13 | End: 2023-02-15

## 2023-02-13 RX ORDER — PREDNISONE 5 MG/1
15 TABLET ORAL
Status: DISCONTINUED | OUTPATIENT
Start: 2023-02-13 | End: 2023-02-16 | Stop reason: HOSPADM

## 2023-02-13 RX ADMIN — MEROPENEM 1 G: 1 INJECTION, POWDER, FOR SOLUTION INTRAVENOUS at 21:11

## 2023-02-13 RX ADMIN — SODIUM CHLORIDE, PRESERVATIVE FREE 10 ML: 5 INJECTION INTRAVENOUS at 13:16

## 2023-02-13 RX ADMIN — PANTOPRAZOLE SODIUM 40 MG: 40 TABLET, DELAYED RELEASE ORAL at 10:07

## 2023-02-13 RX ADMIN — ACETAMINOPHEN 650 MG: 325 TABLET ORAL at 17:47

## 2023-02-13 RX ADMIN — MEROPENEM 1 G: 1 INJECTION, POWDER, FOR SOLUTION INTRAVENOUS at 13:16

## 2023-02-13 RX ADMIN — SODIUM CHLORIDE, PRESERVATIVE FREE 10 ML: 5 INJECTION INTRAVENOUS at 21:11

## 2023-02-13 RX ADMIN — GABAPENTIN 600 MG: 300 CAPSULE ORAL at 21:11

## 2023-02-13 RX ADMIN — RIOCIGUAT 2.5 MG: 2.5 TABLET, FILM COATED ORAL at 21:10

## 2023-02-13 RX ADMIN — HYDROMORPHONE HYDROCHLORIDE 0.5 MG: 1 INJECTION, SOLUTION INTRAMUSCULAR; INTRAVENOUS; SUBCUTANEOUS at 14:56

## 2023-02-13 RX ADMIN — PREDNISONE 15 MG: 5 TABLET ORAL at 21:11

## 2023-02-13 RX ADMIN — METOPROLOL TARTRATE 25 MG: 25 TABLET, FILM COATED ORAL at 10:07

## 2023-02-13 RX ADMIN — HYDROXYCHLOROQUINE SULFATE 200 MG: 200 TABLET, FILM COATED ORAL at 10:19

## 2023-02-13 RX ADMIN — SODIUM CHLORIDE, PRESERVATIVE FREE 10 ML: 5 INJECTION INTRAVENOUS at 06:33

## 2023-02-13 RX ADMIN — DULOXETINE HYDROCHLORIDE 30 MG: 30 CAPSULE, DELAYED RELEASE ORAL at 10:07

## 2023-02-13 RX ADMIN — AMBRISENTAN 5 MG: 5 TABLET, FILM COATED ORAL at 10:07

## 2023-02-13 RX ADMIN — HYDROMORPHONE HYDROCHLORIDE 0.5 MG: 1 INJECTION, SOLUTION INTRAMUSCULAR; INTRAVENOUS; SUBCUTANEOUS at 05:01

## 2023-02-13 RX ADMIN — OXYCODONE AND ACETAMINOPHEN 1 TABLET: 5; 325 TABLET ORAL at 12:57

## 2023-02-13 RX ADMIN — GABAPENTIN 600 MG: 300 CAPSULE ORAL at 10:07

## 2023-02-13 RX ADMIN — FOLIC ACID 2 MG: 1 TABLET ORAL at 10:07

## 2023-02-13 RX ADMIN — MEROPENEM 1 G: 1 INJECTION, POWDER, FOR SOLUTION INTRAVENOUS at 06:33

## 2023-02-13 RX ADMIN — RIOCIGUAT 2.5 MG: 2.5 TABLET, FILM COATED ORAL at 15:12

## 2023-02-13 RX ADMIN — HYDROMORPHONE HYDROCHLORIDE 0.5 MG: 1 INJECTION, SOLUTION INTRAMUSCULAR; INTRAVENOUS; SUBCUTANEOUS at 10:19

## 2023-02-13 RX ADMIN — HYDROMORPHONE HYDROCHLORIDE 0.5 MG: 1 INJECTION, SOLUTION INTRAMUSCULAR; INTRAVENOUS; SUBCUTANEOUS at 21:15

## 2023-02-13 RX ADMIN — METOPROLOL TARTRATE 25 MG: 25 TABLET, FILM COATED ORAL at 21:11

## 2023-02-13 RX ADMIN — DAPTOMYCIN 350 MG: 500 INJECTION, POWDER, LYOPHILIZED, FOR SOLUTION INTRAVENOUS at 21:10

## 2023-02-13 RX ADMIN — DULOXETINE HYDROCHLORIDE 30 MG: 30 CAPSULE, DELAYED RELEASE ORAL at 21:11

## 2023-02-13 RX ADMIN — RIOCIGUAT 2.5 MG: 2.5 TABLET, FILM COATED ORAL at 10:08

## 2023-02-13 NOTE — PROGRESS NOTES
Progress Note    Patient: Bill Servin MRN: [id number]  SSN: [id number]    YOB: 1990  Age: 34 y.o. Sex: female      Admit Date: 2/3/2023    LOS: 10 days     Subjective:   Patient immunocompromised with SLE/RA, followed for right leg cellulitis. MRI showed abscess which has now been incised and drained with cultures pending. She remains afebrile with normal WBC and decreasing procal but  0her CRP remains elevated. She is resting comfortably at this time. Father at bedside. Objective:     Vitals:    02/12/23 1558 02/12/23 1943 02/13/23 0225 02/13/23 1547   BP: 134/81 126/78 123/78 124/77   Pulse: (!) 102 (!) 115 95 (!) 106   Resp: 17 18 18 15   Temp: 97.8 °F (36.6 °C) 97.9 °F (36.6 °C) 98.6 °F (37 °C) 98.4 °F (36.9 °C)   SpO2: 100% 96% 97% 98%   Weight:       Height:            Intake and Output:  Current Shift: No intake/output data recorded. Last three shifts: 02/11 1901 - 02/13 0700  In: 644.3 [P.O.:250; I.V.:394.3]  Out: 1500 [Urine:1150]    Physical Exam:   Vitals and nursing note reviewed. Constitutional:       General: She is not in mild respiratory  distress. Appearance: She is ill-appearing. HENT: Nasal O2 cannula 2L/min  Eyes:      Pupils: Pupils are equal, round, and reactive to light. Cardiovascular:      Rate and Rhythm: Normal rate and regular rhythm. Heart sounds: No murmur heard. Pulmonary: clear bilaterally  Genitourinary:     Comments: No Mckeon  Musculoskeletal: .      Right lower leg with bulky gauze dressing at this time, not removed:       Left lower leg: No edema (multiple well-healed scars). Skin:     Findings: Erythema present. No rash. Neurological:      General: No focal deficit present. Mental Status: She is alert and oriented to person, place, and time.    Psychiatric: normal behavior    Lab/Data Review:     WBC 5,400      ESR >140 < 120 < >140  CRP 4.91 <2.73 <10.00 <3.52 <6.32 <10.20 <15.20 <23.30  Procal <0.05 <0.08 <0.09 <0.11 <0.18 <0.29 <0.36    ASO Negative  Dnase B Ab Negative    Blood cultures (2/3) No growth FINAL  Wound culture right leg (2/12) Pending  Wound culture right leg (2/12) Pending    MRI right leg (2/10)   1. Large complex collection of popliteal subcutaneous fat overlying the  superficial fascia of the gastrocnemius and biceps femoris muscles, possibly  involving the biceps muscle. Areas of intermediate and increased signal on T1  images are shown. Increased T1 signal can be seen in the setting of hemorrhagic  or proteinaceous material. Ultrasound correlation and aspiration suggested based  on ultrasound imaging results. 2. Nonspecific elongated nonenhancing collection within proximal medial  gastrocnemius muscle. 3. Multifocal osteonecrosis. Assessment:     Active Problems:    Cellulitis (8/7/2021)  Cellulitis right lower extremity with abscess/hematoma, status post I&D, cultures pending, on IV Daptomycin and Meropenem  Sepsis with fever, elevated CRP and procal  Elevated ESR  Immunosuppression on Plaquenil, Prednisone and Tofacitinib  Systemic Lupus Erythematosus  Rheumatoid arthritis  Allergies to Doxycycline and Vancomycin   8. ?Adverse reaction to Unasyn with SOB    Comment:   Abscess would explain her suboptimal response to  antibiotics alone. Meropenem is probably not necessary but would waiting for pending cultures for surgery.     Plan:   Continue IV Daptomycin 6 mg/kg IV daily and Meropenem  In am, repeat CBC, procal and CRP  Follow-up tissue cultures         Signed By: Ryan Hughes MD     February 13, 2023

## 2023-02-13 NOTE — PROGRESS NOTES
Hospitalist Progress Note            Daily Progress Note: 2/13/2023 9:28 AM  Hospital course:   Patient is a 33 yo female with history of systemic lupus, calcinosis and rheumatoid arthritis on treatment with Plaquenil, Prednisone, and Cliffton Short. She also has a history of pulmonary HTN and osteonecrosis. She presented to the ED with complaints of pains and swelling of the right lower extremity diagnosed as cellulitis and was admitted evaluation and treatment of cellulitis on 2/3/2023. Eliazar Grande She has been placed on antibiotics under the direction of infectious disease, which included IV meropenem and daptomycin. MRI shows right leg cellulitis subcutaneous fat. Rheumatology and Ortho PDX consulted. Patient underwent a I&D of right leg abscess/hematoma by orthopedics on 2/12/2023. Subjective:     Seen and examined at bedside. She reports feeling fatigued. Denies other complaints at this time spoke at length about treatment plans and goals. Assessment/Plan:   Active Problems:    Cellulitis (8/7/2021)    Right lower extremity cellulitis complicated with large fluid popliteal collection status post I&D day #1  --Patient does not meet sepsis criteria. Afebrile. --Patient on IV daptomycin meropenem. Infectious disease following  -MRI shows popliteal fluid collection. Status post I&D by orthopedics. Anemia of chronic disease  --Occult blood positive. --Hgb remians borderline. Consult placed to GI (). will continue to monitor   -- On Protonix         Pulmonary Hypertension  - Continue ambrisenan and riociguant    History of SLE  -Rheumatology consulted  -Prednisone, Plaquenil        DVT Prophylaxis: Held secondary to possible bleed  Code Status: Full Code  POA/NOK:    This care involved high complexity medical decision making.  I personally:  Reviewed the flow sheet and previous days notes  Reviewed and summarized records/history from previous days note or discussions with staff and family  Reviewed High Risk Drug therapy requiring intensive monitoring for toxicity to include but is not limited to steroids, pressors and antibiotics  Reviewed and/or clinical laboratory tests  Reviewed images and/or ordered radiology tests  Reviewed the patient's EKG/telemetry  Discussed my assessment/management with: Nursing, Patient, Family, and Hospitalist colleagues for coordination of care.      Disposition and discharge barriers:   IV abx  Intra op cultures  Clearance from [facility]  discussed with: Patient, nursing, case management    Current Facility-Administered Medications   Medication Dose Route Frequency    HYDROmorphone (DILAUDID) syringe 0.5 mg  0.5 mg IntraVENous Q4H PRN    predniSONE (DELTASONE) tablet 20 mg  20 mg Oral QHS    meropenem (MERREM) 1 g in 0.9% sodium chloride (MBP/ADV) 50 mL MBP  1 g IntraVENous Q8H    0.9% sodium chloride infusion 250 mL  250 mL IntraVENous PRN    metoprolol tartrate (LOPRESSOR) tablet 25 mg  25 mg Oral Q12H    diphenhydrAMINE (BENADRYL) capsule 25 mg  25 mg Oral Q6H PRN    DAPTOmycin (CUBICIN) 350 mg in 0.9% sodium chloride 50 mL IVPB  6 mg/kg (Adjusted) IntraVENous Q24H    riociguat (ADEMPAS) tablet 2.5 mg   2.5 mg Oral TID    DULoxetine (CYMBALTA) capsule 30 mg  30 mg Oral BID    folic acid (FOLVITE) tablet 2 mg  2 mg Oral DAILY    gabapentin (NEURONTIN) capsule 600 mg  600 mg Oral BID    hydrOXYchloroQUINE (PLAQUENIL) tablet 200 mg  200 mg Oral DAILY    Ambrisentan tab 5 mg - Patient supplied (Patient Supplied)  5 mg Oral QAM    pantoprazole (PROTONIX) tablet 40 mg  40 mg Oral DAILY    [Held by provider] tofacitinib Tb24 1 Tablet  (Patient Supplied)  1 Tablet Oral DAILY    sodium chloride (NS) flush 5-40 mL  5-40 mL IntraVENous Q8H    sodium chloride (NS) flush 5-40 mL  5-40 mL IntraVENous PRN    acetaminophen (TYLENOL) tablet 650 mg  650 mg Oral Q6H PRN    Or    acetaminophen (TYLENOL) suppository 650 mg  650 mg Rectal Q6H PRN    polyethylene glycol (MIRALAX) packet 17 g  17 g Oral DAILY PRN    promethazine (PHENERGAN) 12.5 mg in 0.9% sodium chloride 50 mL IVPB  12.5 mg IntraVENous Q4H PRN    oxyCODONE-acetaminophen (PERCOCET) 5-325 mg per tablet 1 Tablet  1 Tablet Oral Q6H PRN    sodium chloride (NS) flush 5-10 mL  5-10 mL IntraVENous PRN        REVIEW OF SYSTEMS    Review of Systems   Constitutional:  Positive for malaise/fatigue. Respiratory:  Negative for cough, shortness of breath and wheezing. Cardiovascular:  Negative for chest pain and leg swelling. Gastrointestinal:  Negative for abdominal pain, heartburn, nausea and vomiting. Musculoskeletal:  Negative for back pain and myalgias. Neurological:  Negative for dizziness and headaches. Objective:     Visit Vitals  /78   Pulse 95   Temp 98.6 °F (37 °C)   Resp 18   Ht 4' 11\" (1.499 m)   Wt 64.8 kg (142 lb 13.7 oz)   SpO2 97%   BMI 28.85 kg/m²    O2 Flow Rate (L/min): 3 l/min O2 Device: Nasal cannula    Temp (24hrs), Av.7 °F (36.5 °C), Min:97.2 °F (36.2 °C), Max:98.6 °F (37 °C)        PHYSICAL EXAM:    Physical Exam  Vitals and nursing note reviewed. Constitutional:       General: She is not in acute distress. Appearance: Normal appearance. She is ill-appearing. HENT:      Head: Normocephalic and atraumatic. Cardiovascular:      Rate and Rhythm: Normal rate and regular rhythm. Heart sounds: No murmur heard. Pulmonary:      Effort: Pulmonary effort is normal. No respiratory distress. Breath sounds: Normal breath sounds. No wheezing. Abdominal:      General: Abdomen is flat. There is no distension. Palpations: Abdomen is soft. Tenderness: There is no abdominal tenderness. Skin:     Comments: R. Leg dressing in place. Neurological:      General: No focal deficit present. Mental Status: She is alert. Motor: Weakness present.         Data Review    Recent Results (from the past 24 hour(s))   METABOLIC PANEL, BASIC    Collection Time: 23 12:03 PM Result Value Ref Range    Sodium 136 136 - 145 mmol/L    Potassium 4.4 3.5 - 5.1 mmol/L    Chloride 104 97 - 108 mmol/L    CO2 29 21 - 32 mmol/L    Anion gap 3 (L) 5 - 15 mmol/L    Glucose 83 65 - 100 mg/dL    BUN 14 6 - 20 mg/dL    Creatinine 0.27 (L) 0.55 - 1.02 mg/dL    BUN/Creatinine ratio 52 (H) 12 - 20      eGFR >60 >60 ml/min/1.73m2    Calcium 8.6 8.5 - 10.1 mg/dL   MAGNESIUM    Collection Time: 02/12/23 12:03 PM   Result Value Ref Range    Magnesium 2.4 1.6 - 2.4 mg/dL   PROCALCITONIN    Collection Time: 02/12/23 12:03 PM   Result Value Ref Range    Procalcitonin <0.05 (H) 0 ng/mL   CBC WITH AUTOMATED DIFF    Collection Time: 02/12/23 12:03 PM   Result Value Ref Range    WBC 5.6 3.6 - 11.0 K/uL    RBC 2.51 (L) 3.80 - 5.20 M/uL    HGB 6.9 (L) 11.5 - 16.0 g/dL    HCT 23.1 (L) 35.0 - 47.0 %    MCV 92.0 80.0 - 99.0 FL    MCH 27.5 26.0 - 34.0 PG    MCHC 29.9 (L) 30.0 - 36.5 g/dL    RDW 20.0 (H) 11.5 - 14.5 %    PLATELET 545 612 - 564 K/uL    MPV 10.8 8.9 - 12.9 FL    NRBC 3.4 (H) 0.0  WBC    ABSOLUTE NRBC 0.19 (H) 0.00 - 0.01 K/uL    NEUTROPHILS 92 (H) 32 - 75 %    LYMPHOCYTES 4 (L) 12 - 49 %    MONOCYTES 3 (L) 5 - 13 %    EOSINOPHILS 0 0 - 7 %    BASOPHILS 0 0 - 1 %    IMMATURE GRANULOCYTES 1 (H) 0 - 0.5 %    ABS. NEUTROPHILS 5.2 1.8 - 8.0 K/UL    ABS. LYMPHOCYTES 0.2 (L) 0.8 - 3.5 K/UL    ABS. MONOCYTES 0.2 0.0 - 1.0 K/UL    ABS. EOSINOPHILS 0.0 0.0 - 0.4 K/UL    ABS. BASOPHILS 0.0 0.0 - 0.1 K/UL    ABS. IMM. GRANS. 0.1 (H) 0.00 - 0.04 K/UL    DF AUTOMATED     C REACTIVE PROTEIN, QT    Collection Time: 02/12/23 12:03 PM   Result Value Ref Range    C-Reactive protein 2.73 (H) 0.00 - 0.60 mg/dL   CK    Collection Time: 02/12/23 12:03 PM   Result Value Ref Range    CK 45 26 - 192 U/L   SED RATE (ESR)    Collection Time: 02/12/23 12:03 PM   Result Value Ref Range    Sed rate, automated 95 (H) 0 - 20 mm/hr       MRI TIB/FIB RT W  WO CONT   Final Result   1.  Large complex collection of popliteal subcutaneous fat overlying the   superficial fascia of the gastrocnemius and biceps femoris muscles, possibly   involving the biceps muscle. Areas of intermediate and increased signal on T1   images are shown. Increased T1 signal can be seen in the setting of hemorrhagic   or proteinaceous material. Ultrasound correlation and aspiration suggested based   on ultrasound imaging results. 2. Nonspecific elongated nonenhancing collection within proximal medial   gastrocnemius muscle. 3. Multifocal osteonecrosis. XR FEMUR RT 2 VS   Final Result   1. Extensive dermal calcifications   2. Possible AVN of the distal right femur. XR CHEST PORT   Final Result   1. Cardiomegaly and pulmonary vascular congestion. 2. Diffuse heterotopic soft tissue calcification. XR CHEST PA LAT   Final Result      No significant change. Cardiomegaly and chronic interstitial opacities. DUPLEX LOWER EXT VENOUS BILAT   Final Result      XR CHEST PORT   Final Result      Stable exam. Unchanged interstitial lung abnormalities and small pleural   effusions. Intake and Output:  Current Shift: No intake/output data recorded. Last three shifts: 02/11 1901 - 02/13 0700  In: 644.3 [P.O.:250; I.V.:394.3]  Out: 1500 [Urine:1150]      Lab/Data Review:  Recent Labs     02/12/23  1203 02/11/23  1213   WBC 5.6 6.7   HGB 6.9* 7.6*   HCT 23.1* 24.8*    265     Recent Labs     02/12/23  1203 02/11/23  1213    136   K 4.4 4.2    103   CO2 29 29   GLU 83 98   BUN 14 16   CREA 0.27* 0.45*   CA 8.6 8.9   MG 2.4 2.4     No results for input(s): PH, PCO2, PO2, HCO3, FIO2 in the last 72 hours.   Recent Results (from the past 24 hour(s))   METABOLIC PANEL, BASIC    Collection Time: 02/12/23 12:03 PM   Result Value Ref Range    Sodium 136 136 - 145 mmol/L    Potassium 4.4 3.5 - 5.1 mmol/L    Chloride 104 97 - 108 mmol/L    CO2 29 21 - 32 mmol/L    Anion gap 3 (L) 5 - 15 mmol/L    Glucose 83 65 - 100 mg/dL    BUN 14 6 - 20 mg/dL Creatinine 0.27 (L) 0.55 - 1.02 mg/dL    BUN/Creatinine ratio 52 (H) 12 - 20      eGFR >60 >60 ml/min/1.73m2    Calcium 8.6 8.5 - 10.1 mg/dL   MAGNESIUM    Collection Time: 02/12/23 12:03 PM   Result Value Ref Range    Magnesium 2.4 1.6 - 2.4 mg/dL   PROCALCITONIN    Collection Time: 02/12/23 12:03 PM   Result Value Ref Range    Procalcitonin <0.05 (H) 0 ng/mL   CBC WITH AUTOMATED DIFF    Collection Time: 02/12/23 12:03 PM   Result Value Ref Range    WBC 5.6 3.6 - 11.0 K/uL    RBC 2.51 (L) 3.80 - 5.20 M/uL    HGB 6.9 (L) 11.5 - 16.0 g/dL    HCT 23.1 (L) 35.0 - 47.0 %    MCV 92.0 80.0 - 99.0 FL    MCH 27.5 26.0 - 34.0 PG    MCHC 29.9 (L) 30.0 - 36.5 g/dL    RDW 20.0 (H) 11.5 - 14.5 %    PLATELET 916 263 - 924 K/uL    MPV 10.8 8.9 - 12.9 FL    NRBC 3.4 (H) 0.0  WBC    ABSOLUTE NRBC 0.19 (H) 0.00 - 0.01 K/uL    NEUTROPHILS 92 (H) 32 - 75 %    LYMPHOCYTES 4 (L) 12 - 49 %    MONOCYTES 3 (L) 5 - 13 %    EOSINOPHILS 0 0 - 7 %    BASOPHILS 0 0 - 1 %    IMMATURE GRANULOCYTES 1 (H) 0 - 0.5 %    ABS. NEUTROPHILS 5.2 1.8 - 8.0 K/UL    ABS. LYMPHOCYTES 0.2 (L) 0.8 - 3.5 K/UL    ABS. MONOCYTES 0.2 0.0 - 1.0 K/UL    ABS. EOSINOPHILS 0.0 0.0 - 0.4 K/UL    ABS. BASOPHILS 0.0 0.0 - 0.1 K/UL    ABS. IMM.  GRANS. 0.1 (H) 0.00 - 0.04 K/UL    DF AUTOMATED     C REACTIVE PROTEIN, QT    Collection Time: 02/12/23 12:03 PM   Result Value Ref Range    C-Reactive protein 2.73 (H) 0.00 - 0.60 mg/dL   CK    Collection Time: 02/12/23 12:03 PM   Result Value Ref Range    CK 45 26 - 192 U/L   SED RATE (ESR)    Collection Time: 02/12/23 12:03 PM   Result Value Ref Range    Sed rate, automated 95 (H) 0 - 20 mm/hr           _____________________________________________________________________________  Time spent in direct care including coordination of service, review of data and examination: 50 minutes    ______________________________________________________________________________    ERICA Vigil    This is dictation was done by dragon, computer voice recognition software. Quite often unanticipated grammatical, syntax, homophones and other interpretive errors or inadvertently transcribed by the computer software. Please excuse errors that have escaped final proofreading. Thank you.

## 2023-02-13 NOTE — PROGRESS NOTES
1640: Chart reviewed. Per notes, patient s/p I&D right leg infected hematoma and on IV ABX (Dapto). When medically cleared, patient to discharge home with [facility] and [facility] for IV ABX. Patient has CW port access for home antibiotics. Patient has been educated for home infusion. CM will continue to follow patient and recs of medical team.    2694: Updates attached in [address].

## 2023-02-13 NOTE — PROGRESS NOTES
Problem: Falls - Risk of  Goal: *Absence of Falls  Description: Document [place] Fall Risk and appropriate interventions in the flowsheet.   Outcome: Progressing Towards Goal  Note: Fall Risk Interventions:

## 2023-02-13 NOTE — PROGRESS NOTES
Progress Note  Date:2023       Room:[facility]  Patient Name:Ric Orozco     YOB: 1990     Age:32 y.o. Subjective    Subjective   Patient is postop day #1 after evacuation and drainage of a large hematoma from her right leg, popliteal fossa and the thigh, possibly infected. The patient feels better. Review of Systems  Objective         Vitals Last 24 Hours:  TEMPERATURE:  Temp  Av.3 °F (36.8 °C)  Min: 97.9 °F (36.6 °C)  Max: 98.6 °F (37 °C)  RESPIRATIONS RANGE: Resp  Av  Min: 15  Max: 18  PULSE OXIMETRY RANGE: SpO2  Av %  Min: 96 %  Max: 98 %  PULSE RANGE: Pulse  Av.3  Min: 95  Max: 115  BLOOD PRESSURE RANGE: Systolic (99FLU), BPI:676 , Min:123 , ZAH:898   ; Diastolic (22YBX), GOY:12, Min:77, Max:78    I/O (24Hr): Intake/Output Summary (Last 24 hours) at 2023 1845  Last data filed at 2023 0507  Gross per 24 hour   Intake --   Output 500 ml   Net -500 ml     Objective  On examination the patient was pleasant and cooperative, alert and oriented, comfortably lying in bed. General physical examination was unchanged. Local examination of the right lower extremity showed a bulky compressive absorbent dressing from the toes to the mid thigh. There was no soakage or drainage. Labs/Imaging/Diagnostics    Labs:  CBC:  Recent Labs     23  1111 23  1203 23  1213   WBC 5.4 5.6 6.7   RBC 2.54* 2.51* 2.75*   HGB 7.0* 6.9* 7.6*   HCT 24.0* 23.1* 24.8*   MCV 94.5 92.0 90.2   RDW 20.3* 20.0* 19.9*    286 265     CHEMISTRIES:  Recent Labs     23  1111 23  1203 23  1213    136 136   K 4.1 4.4 4.2    104 103   CO2 30 29 29   BUN 10 14 16   CA 8.9 8.6 8.9   MG  --  2.4 2.4   PT/INR:No results for input(s): INR, INREXT in the last 72 hours. No lab exists for component: PROTIME  APTT:No results for input(s): APTT in the last 72 hours.   LIVER PROFILE:No results for input(s): AST, ALT in the last 72 hours. No lab exists for component: Flex Saez YESSIPHOS  Lab Results   Component Value Date/Time    ALT (SGPT) 74 02/07/2023 06:06 AM    AST (SGOT) 48 (H) 02/07/2023 06:06 AM    Alk. phosphatase 260 (H) 02/07/2023 06:06 AM    Bilirubin, direct 0.1 12/04/2021 08:50 PM    Bilirubin, total 0.4 02/07/2023 06:06 AM       Imaging Last 24 Hours:  No results found. Assessment//Plan   Active Problems:    Cellulitis (8/7/2021)      Assessment & Plan  The patient will need daily wound packing change and dressing changes, until wound care team recommends whether negative pressure wound VAC dressing would be appropriate. Post discharge management of the patient would be best carried out at the wound healing center of [facility]. Patient is cleared for discharge from orthopedic viewpoint.     Electronically signed by Yen Ross MD on 2/13/2023 at 6:45 PM

## 2023-02-13 NOTE — PROGRESS NOTES
OCCUPATIONAL THERAPY EVALUATION  Patient: Marivel Gill (89 y.o. female)  Date: 2/13/2023  Primary Diagnosis: Cellulitis [L03.90]  Procedure(s) (LRB):  INCISION AND DRAINAGE RIGHT LEG (Right) 1 Day Post-Op   Precautions: falls     In place during session: Central Venous Line, external urinary catheter    ASSESSMENT  Pt is a 34 y.o. female presenting to [facility] with c/o RLE infection with hematoma, admitted 2/3/23 and currently being treated for s/p incision and drainage RLE. Pt received semi-supine in bed upon arrival, AXO x4, and agreeable to OT evaluation. Based on current observations, pt presents with deficits in generalized strength/AROM, bed mobility, static/dynamic sitting balance, static/dynamic standing balance (see PT note for gait details), functional activity tolerance and pain currently impacting overall performance of ADLs and functional transfers/mobility (see below for objective details and assist levels). Pt Min A for supine:sit with assistance with RLE due to pain from incision. Completed changing gown with min A due to lines EOB. Min A for bed:chair with RW. In chair, completed brushing teeth with SUP. With lunch tray end of session, able to complete feeding I'ly. Pt would benefit from continued skilled OT services to address current impairments and improve IND and safety with self cares and functional transfers/mobility. Current OT d/c recommendation Home with Home Health Therapy once medically appropriate. Other factors to consider for discharge: family/social support, DME, time since onset, severity of deficits, functional baseline     Patient will benefit from skilled therapy intervention to address the above noted impairments.        PLAN :  Recommendations and Planned Interventions: self care training, functional mobility training, therapeutic exercise, endurance activities, and patient education    Recommend with staff: Out of bed to chair for meals    Recommend next session: Toileting, UB dressing, LB dressing , Seated grooming, and Standing grooming    Frequency/Duration: Patient will be followed by occupational therapy:  3-5x/week to address goals. Recommendation for discharge: (in order for the patient to meet his/her long term goals)  Home with [address] [place] [address]    This discharge recommendation:  Has been made in collaboration with the attending provider and/or case management    IF patient discharges home will need the following DME: patient owns DME required for discharge       SUBJECTIVE:   Patient stated I want get moving.     OBJECTIVE DATA SUMMARY:   HISTORY:   Past Medical History:   Diagnosis Date    Lupus ([facility] [address]     Osteonecrosis ([facility] [address]     Pulmonary HTN ([facility] [address]     RA (rheumatoid arthritis) ([facility] [address]      Past Surgical History:   Procedure Laterality Date    HX CYST REMOVAL      HX ORTHOPAEDIC      x2 left knee sxs    HX ORTHOPAEDIC      bilateral ankle sxs       Per patient:   Home Situation  Home Environment: Private residence  # Steps to Enter: 5 (3 on one entrance, 5 at another)  Rails to [employer]: Yes  Hand Rails : Bilateral  One/Two Story Residence: One story  Living Alone: No  Support Systems: Parent(s)  Patient Expects to be Discharged to[de-identified] Home with home health  Current DME Used/Available at Home: Walker    Hand dominance: Right    EXAMINATION OF PERFORMANCE DEFICITS:  Cognitive/Behavioral Status:  Neurologic State: Alert  Orientation Level: Oriented X4  Cognition: Appropriate decision making; Follows commands                 Hearing: Auditory  Auditory Impairment: None    Vision/Perceptual:                                     Range of Motion:  AROM: Generally decreased, functional                         Strength:  Strength: Generally decreased, functional                Coordination:     Fine Motor Skills-Upper: Left Intact; Right Intact    Gross Motor Skills-Upper: Left Intact; Right Intact    Tone & Sensation:     Sensation: Intact Balance:  Sitting: Intact; With support  Standing: Impaired; With support  Standing - Static: Fair  Standing - Dynamic : Fair    Functional Mobility and Transfers for ADLs:  Bed Mobility:  Rolling: Minimum assistance  Supine to Sit: Minimum assistance  Scooting: Minimum assistance    Transfers:  Sit to Stand: Minimum assistance  Stand to Sit: Minimum assistance  Bed to Chair: Minimum assistance      ADL Intervention and task modifications:  Feeding  Food to Mouth: Independent  Drink to Mouth: Independent    Grooming  Washing Face: Supervision  Washing Hands: Supervision  Brushing Teeth: Supervision              Upper Body [address]: Contact guard assistance    Lower Body Dressing Assistance  Socks: Minimum assistance              Therapeutic Exercise:  Pt will benefit from BUE HEP to improve participation in ADLs and mobility. Plan will be initiated at next session. Functional Measure:    [facility] AM-PACTM \"6 Clicks\"                                                       Daily Activity Inpatient Short Form  How much help from another person does the patient currently need. .. Total; A Lot A Little None   1. Putting on and taking off regular lower body clothing? []  1 [x]  2 []  3 []  4   2. Bathing (including washing, rinsing, drying)? []  1 []  2 [x]  3 []  4   3. Toileting, which includes using toilet, bedpan or urinal? [] 1 []  2 [x]  3 []  4   4. Putting on and taking off regular upper body clothing? []  1 []  2 [x]  3 []  4   5. Taking care of personal grooming such as brushing teeth? []  1 []  2 []  3 [x]  4   6. Eating meals? []  1 []  2 []  3 [x]  4   © 2007, Trustees of [facility], under license to [employer]. All rights reserved     Score: 19/24     Interpretation of Tool:  Represents clinically-significant functional categories (i.e. Activities of daily living).   Percentage of Impairment CH    0%   CI    1-19% CJ    20-39% CK    40-59% CL    60-79% CM    80-99% CN     100%   [facility]  Score 6-24 24 23 20-22 15-19 10-14 7-9 6     Occupational Therapy Evaluation Charge Determination   History Examination Decision-Making   LOW Complexity : Brief history review  LOW Complexity : 1-3 performance deficits relating to physical, cognitive , or psychosocial skils that result in activity limitations and / or participation restrictions  MEDIUM Complexity : Patient may present with comorbidities that affect occupational performnce. Miniml to moderate modification of tasks or assistance (eg, physical or verbal ) with assesment(s) is necessary to enable patient to complete evaluation       Based on the above components, the patient evaluation is determined to be of the following complexity level: LOW   Pain Ratin/10 RLE    Activity Tolerance:   Fair    After treatment patient left in no apparent distress:    Sitting in chair and Call bell within reach. COMMUNICATION/EDUCATION:   The patients plan of care was discussed with: Physical therapist.     Patient/family have participated as able in goal setting and plan of care. and Patient/family agree to work toward stated goals and plan of care. This patients plan of care is appropriate for delegation to hospitals. PT/OT sessions occurred together for increased safety of pt and clinician. Thank you for this referral.  Hillary Parnell OT  Time Calculation: 35 mins   Problem: Self Care Deficits Care Plan (Adult)  Goal: *Acute Goals and Plan of Care (Insert Text)  Description: FUNCTIONAL STATUS PRIOR TO ADMISSION: Pt was Mod I with SPC PTA. HOME SUPPORT: Lives with family. Mom is home throughout the day for assistance. Occupational Therapy Goals  Initiated 2023    Pt stated goal To get stronger.   Pt will be Mod I sup <> sit in prep for EOB ADLs  Pt will be Mod I grooming standing sink side LRAD  Pt will be Mod I UB dressing sitting EOB/long sit   Pt will be Mod I LE dressing sitting EOB/long sit  Pt will be Mod I sit <>  prep for toileting LRAD  Pt will be Mod I toileting/toilet transfer/cloth mgmt LRAD    Outcome: Not Met

## 2023-02-13 NOTE — ROUTINE PROCESS
Bedside shift change report given to [address] [place] [address] (oncoming nurse) by Kostas Obrien RN (offgoing nurse). Report included the following information SBAR, Intake/Output, MAR, and Recent Results.

## 2023-02-13 NOTE — PROGRESS NOTES
Progress Note  Date:2023       Room:[facility]  Patient Name:Ric Orozco     YOB: 1990     Age:32 y.o. This is a 35 yo female with history of systemic lupus, calcinosis and rheumatoid arthritis on treatment with Plaquenil, Prednisone, and Cliffton Short. She also has a history of pulmonary HTN and osteonecrosis. She presented to the ED with complaints of pains and swelling of the right lower extremity diagnosed as cellulitis and was admitted evaluation and treatment of cellulitis. She is s/p and I&D of an infected hematoma performed yesterday that resulted in 400cc of blood clots and hematoma. She is on antibiotics under the direction of infectious disease. Today she reports she has had pain and swelling in her right thigh and lower leg and it's been well controlled with prn pain medications. She denies pain and swelling elsewhere. She denies sicca symptoms, SOB, CP, heartburn, mouth sores, nasal sores or rashes. Subjective    Subjective:  Symptoms:  No shortness of breath, cough, chest pain or diarrhea. Diet:  No nausea or vomiting. Review of Systems   Constitutional:  Negative for chills and fever. HENT:  Negative for mouth sores, sore throat and trouble swallowing. Eyes:  Negative for pain and redness. Respiratory:  Negative for cough and shortness of breath. Cardiovascular:  Negative for chest pain. Gastrointestinal:  Negative for abdominal pain, constipation, diarrhea, nausea and vomiting. Genitourinary:  Negative for dysuria and hematuria. Musculoskeletal:  Positive for arthralgias and joint swelling. Skin:  Negative for rash. Hematological:  Bruises/bleeds easily.    Objective         Vitals Last 24 Hours:  TEMPERATURE:  Temp  Av.3 °F (36.8 °C)  Min: 97.9 °F (36.6 °C)  Max: 98.6 °F (37 °C)  RESPIRATIONS RANGE: Resp  Av  Min: 15  Max: 18  PULSE OXIMETRY RANGE: SpO2  Av %  Min: 96 %  Max: 98 %  PULSE RANGE: Pulse  Avg: 105.3  Min: 95  Max: 115  BLOOD PRESSURE RANGE: Systolic (77IBP), YNQ:435 , Min:123 , HHK:663   ; Diastolic (84VFX), BGN:51, Min:77, Max:78    I/O (24Hr): Intake/Output Summary (Last 24 hours) at 2/13/2023 1718  Last data filed at 2/13/2023 0507  Gross per 24 hour   Intake --   Output 500 ml   Net -500 ml       Objective:  General Appearance: In no acute distress. Vital signs: (most recent): Blood pressure 124/77, pulse (!) 106, temperature 98.4 °F (36.9 °C), resp. rate 15, height 4' 11\" (1.499 m), weight 64.8 kg (142 lb 13.7 oz), SpO2 98 %. No fever. Lungs:  Normal effort and normal respiratory rate. Breath sounds clear to auscultation. Heart: Normal rate. Abdomen: Abdomen is soft. Bowel sounds are normal.   There is no abdominal tenderness. Extremities: (Right lower extremity swelling, particularly at right knee  Right knee tender  Right ankle tender  Tapered digits)  Neurological: Patient is alert. Pupils:  Pupils are equal, round, and reactive to light. Skin:  Warm and dry. (Calcifications below skin on bilateral lower extremities  Hyperpigmentation lower extremities  Scattered bruising left upper arm  Dressing to right knee D&I)  Labs/Imaging/Diagnostics    Labs:  CBC:  Recent Labs     02/13/23  1111 02/12/23  1203 02/11/23  1213   WBC 5.4 5.6 6.7   RBC 2.54* 2.51* 2.75*   HGB 7.0* 6.9* 7.6*   HCT 24.0* 23.1* 24.8*   MCV 94.5 92.0 90.2   RDW 20.3* 20.0* 19.9*    286 265       CHEMISTRIES:  Recent Labs     02/13/23  1111 02/12/23  1203 02/11/23  1213    136 136   K 4.1 4.4 4.2    104 103   CO2 30 29 29   BUN 10 14 16   CA 8.9 8.6 8.9   MG  --  2.4 2.4     PT/INR:No results for input(s): INR, INREXT, INREXT in the last 72 hours. No lab exists for component: PROTIME  APTT:No results for input(s): APTT in the last 72 hours. LIVER PROFILE:No results for input(s): AST, ALT in the last 72 hours.     No lab exists for component: Paul Crane, [facility]  Lab Results Component Value Date/Time    ALT (SGPT) 74 02/07/2023 06:06 AM    AST (SGOT) 48 (H) 02/07/2023 06:06 AM    Alk. phosphatase 260 (H) 02/07/2023 06:06 AM    Bilirubin, direct 0.1 12/04/2021 08:50 PM    Bilirubin, total 0.4 02/07/2023 06:06 AM       Imaging Last 24 Hours:  No results found. Assessment//Plan     Assessment & Plan    This is a 35 yo female with history of systemic lupus and rheumatoid arthritis admitted for right lower extremity cellulitis on treatment with antibiotics. Labs for lupus are pending. RF is normal and CCP is pending. Patient reports a history of calcinosis and SCL 70 and Centromere Ab ordered and results are pending as well. We will monitor for the results of these lab tests. Acute phase reactants have been elevated, which are likely due to infection. Karin Scheuermann has been held and we will hold Plaquenil at this time too and decrease her Prednisone to 15mg every day while she is being treated for infection. She is s/p and I&D of an infected hematoma performed yesterday that resulted in 400cc of blood clots and hematoma and I have ordered lupus anticoagulant and phospholipid antibodies. This case was reviewed and discussed with Dr. Sajan Lozano.      CHONG Armenta [facility]  Rheumatology        Electronically signed by Carmen Dubon NP on 2/13/2023 at 5:25 PM

## 2023-02-13 NOTE — WOUND CARE
Negative Pressure    NAME:  Donna Fan  YOB: 1990  MEDICAL RECORD NUMBER:  [id number]  DATE:  2/3/2023    Applied Negative Pressure to surgical incision to RLE posterior. [x] Applied skin barrier prep to deneen-wound. [x] Cut strips of plastic drape to picture frame wound so that deneen-wound is     covered with the drape. [] If bridging dressing to less prominent site, cover any intact skin that will come in contact with the Negative Pressure Therapy sponge, gauze or channel drain with plastic drape. The sponge should never touch intact skin. [x] Cut sponge, gauze or channel drain to size which will fit into the wound/ulcer bed without being forced. [x] Be sure the sponge is large enough to hold the entire round plastic flange which is attached to the tubing. Never allow flange to be larger than the sponge or it will produce suction damaging intact skin. Total number of individual pieces of foam used within the wound bed: 2 x black foam including piece for track pad attachment. [] If bridging the dressing away from the primary site, be sure the bridge leads to a piece of sponge large enough to hold the entire flange without allowing any of the flange to overlap onto intact skin. [x] Covered sponge, gauze or channel drain with plastic drape. [x] Cut a hole in this plastic drape directly over the sponge the same size as the plastic drain tubing. [x] Removed plastic liner from flange and apply it directly over the hole you cut. [x] Removed the plastic cover from the flange. [x] Attached the tubing to the wound/ulcer Negative Pressure Therapy and turn it on to be sure a vacuum is created and that there are no leaks. [] If air leaks occur, use plastic drape to patch them. [x] Secured Negative Pressure Therapy dressing with ace wrap loosely if located on an extremity. Maintain tubing outside of ace wrap. Tubing must not exert pressure on intact skin.     Applied per [facility]win Zayas Guidelines  Patient tolerated well after receiving IV pain medication. Surgical dressing irrigated with NS and gently removed. Wound was irrigated with NS. No active bleeding noted. Periwound is edematous and indurated. NPWT applied and seal was achieved with no leaks. Placed a ABD pad between track pad tubing and skin. Wrapped with kerlix and 2 x ACE wrap ensuring thigh was also wrapped. RLE was elevated with pillows. Next dressing change while in patient will be on Thursday 2/16/23 by [facility]. Paperwork for home wound vac is in the patient's chart for Dr. Fran Womack. Of note, patient complains of tenderness at distal aspect of lower leg before ankle. Patient stated it may be due to calcium deposits. No wound or erythema noted but area feels bumpy to touch. For discharge, remove NPWT and apply a wet dry. HH Rn will reapply NPWT when patient is at home. If wound vac is not working properly: 1) Check to see if track pad tubing is clogged. If so, remove track pad, apply film over exposed foam, cut dime size hole in film, and apply a new track pad. 2) If film is leaking, find area with leak and reinforce with film. 3) If leak cannot be fixed, remove dressing, apply wet/dry dressing, and inform the WCN. Media Information  Document Information    Photographic Image:  Mobile Media Capture   RLE Posterior   02/13/2023 16:55   Attached To:    Hospital Encounter on 2/3/23     Source Information    Tawnya Henson RN  [address] [facility]       Electronically signed by Jake Branch RN on 2/13/2023 at 5:06 PM

## 2023-02-13 NOTE — PROGRESS NOTES
PHYSICAL THERAPY REEVALUATION  Patient: Naveen Riley (45 y.o. female)  Date: 2/13/2023  Primary Diagnosis: Cellulitis [L03.90]  Procedure(s) (LRB):  INCISION AND DRAINAGE RIGHT LEG (Right) 1 Day Post-Op   Precautions: Fall         ASSESSMENT  Patient was seen for reassessment with OT  s/p I&D rt leg in popliteal fossa 2/12/2023 patient initially was seen 2/3/2023 Based on the objective data described below, the patient presents with decreased rom,, strength and endurance to activities and pain limiting the function. Patient also has multiple medical comorbidity including RA,LUPUS. She is limited to mobility at baseline but was able to perform ambulation with walker and sup at time. She needs her pace to perform task due to joint limitation and pain. Patient is very willing to work with therapy. Patient able to perform bed mob, transfers with min to mod assist with additional time and light assist.Limited ambulation due to rt leg pain,s/p procedure. patient lives with parents and they are able to assist her. Patient on O2. Other factors to consider for discharge: patient can be assisted at home and preferred to go home with [facility]     Patient will benefit from skilled therapy intervention to address the above noted impairments. PLAN :  Recommendations and Planned Interventions: bed mobility training, transfer training, gait training, therapeutic exercises, patient and family training/education, and therapeutic activities      Frequency/Duration: Patient will be followed by physical therapy:  2-3x/week to address goals. Recommendation for discharge: (in order for the patient to meet his/her long term goals)  Home with [address] [place] [address]    This discharge recommendation:  Has been made in collaboration with the attending provider and/or case management    Equipment recommendations for successful discharge (if) home: patient owns DME required for discharge         SUBJECTIVE:   Patient stated I am ok.     OBJECTIVE DATA SUMMARY:   HISTORY:    Past Medical History:   Diagnosis Date    Lupus ([facility] [address]     Osteonecrosis ([facility] [address]     Pulmonary HTN ([facility] [address]     RA (rheumatoid arthritis) ([facility] [address]      Past Surgical History:   Procedure Laterality Date    HX CYST REMOVAL      HX ORTHOPAEDIC      x2 left knee sxs    HX ORTHOPAEDIC      bilateral ankle sxs     Hospital course since last seen and reason for reevaluation: patient had another procedure for I&D yesterday after last PT session. Personal factors and/or comorbidities impacting plan of care:     Home Situation  Home Environment: Private residence  # Steps to Enter: 5 (3 on one entrance, 5 at another)  Rails to Enter: Yes  Hand Rails : Bilateral  One/Two Story Residence: One story  Living Alone: No  Support Systems: Parent(s)  Patient Expects to be Discharged to[de-identified] Home with home health  Current DME Used/Available at Home: [employer]    EXAMINATION/PRESENTATION/DECISION MAKING:   Critical Behavior:  Neurologic State: Alert  Orientation Level: Oriented X4  Cognition: Appropriate decision making     Hearing: Auditory  Auditory Impairment: None  Skin:  dressing intact  Edema:   Range Of Motion:  AROM: Generally decreased, functional                       Strength:    Strength: Generally decreased, functional                    Tone & Sensation:                  Sensation: Intact                  Functional Mobility:  Bed Mobility:  Rolling: Minimum assistance  Supine to Sit: Minimum assistance     Scooting: Minimum assistance  Transfers:  Sit to Stand: Minimum assistance  Stand to Sit: Minimum assistance        Bed to Chair: Minimum assistance              Balance:   Sitting: Intact; With support  Standing: Impaired;Pull to stand; With support  Standing - Static: Fair  Standing - Dynamic : Fair  Ambulation/Gait Training:  Distance (ft): 5 Feet (ft)  Assistive Device: Gait belt;Walker, rolling  Ambulation - Level of Assistance: Minimal assistance                                          Functional Measure:  [facility] AM-PAC®      Basic Mobility Inpatient Short Form (6-Clicks) Version 2  How much HELP from another person do you currently need. .. (If the patient hasn't done an activity recently, how much help from another person do you think they would need if they tried?) Total A Lot A Little None   1. Turning from your back to your side while in a flat bed without using bedrails? []  1 []  2 [x]  3  []  4   2. Moving from lying on your back to sitting on the side of a flat bed without using bedrails? []  1 []  2 [x]  3  []  4   3. Moving to and from a bed to a chair (including a wheelchair)? []  1 []  2 [x]  3  []  4   4. Standing up from a chair using your arms (e.g. wheelchair or bedside chair)? []  1 []  2 [x]  3  []  4   5. Walking in hospital room? []  1 []  2 [x]  3  []  4   6. Climbing 3-5 steps with a railing? []  1 []  2 [x]  3  []  4     Raw Score: 18/24                            Cutoff score ?171,2,3 had higher odds of discharging home with home health or need of SNF/IPR. [facility], Renate Roblero, Rola Subramanianuncion Ramesh. Validity of the -PAC 6-Clicks Inpatient Daily Activity and Basic Mobility Short Forms. Physical Therapy Mar 2014, [phone]; DOI: 10.2522/ptj.46244599  2. Grace Dai. Association of AM-PAC \"6-Clicks\" Basic Mobility and Daily Activity Scores With Discharge Destination. Phys Ther. 2021 Apr 4;101(4):yxqk855. doi: 10.1093/ptj/wyqq263. PMID: 35007524. EVARISTO Brower, Cayla EDWARD, Soledad Lees, Zoe S. Activity Measure for Post-Acute Care \"6-Clicks\" Basic Mobility Scores Predict Discharge Destination After Acute Care Hospitalization in Select Patient Groups: A Retrospective, Observational Study. Arch Rehabil Res Clin Transl. 2022 Jul 16;4(3):434674. doi: 10.1016/j.arrct. 1254.007860. PMID: 27654992; PMCID: [id number]. 4. Murleen Soulier, Coster W, Marta MOLINA.  AM-PAC Short Forms Manual 4.0. Revised 2020. Pain Ratin/10 rt leg    Activity Tolerance:   Fair  Please refer to the flowsheet for vital signs taken during this treatment. After treatment patient left in no apparent distress:   Sitting in chair and Call bell within reach    COMMUNICATION/EDUCATION:   The patients plan of care was discussed with: Occupational therapist, Registered nurse, and Case management. Fall prevention education was provided and the patient/caregiver indicated understanding., Patient/family have participated as able in goal setting and plan of care. , and Patient/family agree to work toward stated goals and plan of care.     Thank you for this referral.  Dale Schirmer PT   Time Calculation: 31 mins

## 2023-02-14 LAB
ANION GAP SERPL CALC-SCNC: 3 MMOL/L (ref 5–15)
BUN SERPL-MCNC: 9 MG/DL (ref 6–20)
BUN/CREAT SERPL: 24 (ref 12–20)
CA-I BLD-MCNC: 8.6 MG/DL (ref 8.5–10.1)
CENTROMERE B AB SER-ACNC: <0.2 AI (ref 0–0.9)
CHLORIDE SERPL-SCNC: 99 MMOL/L (ref 97–108)
CO2 SERPL-SCNC: 30 MMOL/L (ref 21–32)
CREAT SERPL-MCNC: 0.38 MG/DL (ref 0.55–1.02)
CRP SERPL-MCNC: 10.6 MG/DL (ref 0–0.6)
ENA SCL70 AB SER-ACNC: <0.2 AI (ref 0–0.9)
ERYTHROCYTE [DISTWIDTH] IN BLOOD BY AUTOMATED COUNT: 21 % (ref 11.5–14.5)
GLUCOSE SERPL-MCNC: 97 MG/DL (ref 65–100)
HCT VFR BLD AUTO: 23.2 % (ref 35–47)
HGB BLD-MCNC: 7 G/DL (ref 11.5–16)
MCH RBC QN AUTO: 27.8 PG (ref 26–34)
MCHC RBC AUTO-ENTMCNC: 30.2 G/DL (ref 30–36.5)
MCV RBC AUTO: 92.1 FL (ref 80–99)
NRBC # BLD: 0.14 K/UL (ref 0–0.01)
NRBC BLD-RTO: 2 PER 100 WBC
PLATELET # BLD AUTO: 301 K/UL (ref 150–400)
PMV BLD AUTO: 10.3 FL (ref 8.9–12.9)
POTASSIUM SERPL-SCNC: 4.2 MMOL/L (ref 3.5–5.1)
RBC # BLD AUTO: 2.52 M/UL (ref 3.8–5.2)
SODIUM SERPL-SCNC: 132 MMOL/L (ref 136–145)
WBC # BLD AUTO: 7 K/UL (ref 3.6–11)

## 2023-02-14 PROCEDURE — 65270000029 HC RM PRIVATE

## 2023-02-14 PROCEDURE — 74011000258 HC RX REV CODE- 258: Performed by: INTERNAL MEDICINE

## 2023-02-14 PROCEDURE — 74011000250 HC RX REV CODE- 250: Performed by: INTERNAL MEDICINE

## 2023-02-14 PROCEDURE — 74011000258 HC RX REV CODE- 258: Performed by: STUDENT IN AN ORGANIZED HEALTH CARE EDUCATION/TRAINING PROGRAM

## 2023-02-14 PROCEDURE — 74011636637 HC RX REV CODE- 636/637: Performed by: NURSE PRACTITIONER

## 2023-02-14 PROCEDURE — 99232 SBSQ HOSP IP/OBS MODERATE 35: CPT | Performed by: INTERNAL MEDICINE

## 2023-02-14 PROCEDURE — 86225 DNA ANTIBODY NATIVE: CPT

## 2023-02-14 PROCEDURE — 74011250636 HC RX REV CODE- 250/636: Performed by: INTERNAL MEDICINE

## 2023-02-14 PROCEDURE — 85027 COMPLETE CBC AUTOMATED: CPT

## 2023-02-14 PROCEDURE — 74011250636 HC RX REV CODE- 250/636: Performed by: STUDENT IN AN ORGANIZED HEALTH CARE EDUCATION/TRAINING PROGRAM

## 2023-02-14 PROCEDURE — 80048 BASIC METABOLIC PNL TOTAL CA: CPT

## 2023-02-14 PROCEDURE — 74011250636 HC RX REV CODE- 250/636: Performed by: LICENSED PRACTICAL NURSE

## 2023-02-14 PROCEDURE — 86140 C-REACTIVE PROTEIN: CPT

## 2023-02-14 PROCEDURE — 86038 ANTINUCLEAR ANTIBODIES: CPT

## 2023-02-14 PROCEDURE — 74011250637 HC RX REV CODE- 250/637: Performed by: INTERNAL MEDICINE

## 2023-02-14 PROCEDURE — 36415 COLL VENOUS BLD VENIPUNCTURE: CPT

## 2023-02-14 PROCEDURE — 74011250637 HC RX REV CODE- 250/637: Performed by: STUDENT IN AN ORGANIZED HEALTH CARE EDUCATION/TRAINING PROGRAM

## 2023-02-14 RX ORDER — LIDOCAINE HYDROCHLORIDE 20 MG/ML
15 SOLUTION OROPHARYNGEAL AS NEEDED
Status: DISCONTINUED | OUTPATIENT
Start: 2023-02-14 | End: 2023-02-16 | Stop reason: HOSPADM

## 2023-02-14 RX ADMIN — GABAPENTIN 600 MG: 300 CAPSULE ORAL at 20:02

## 2023-02-14 RX ADMIN — HYDROMORPHONE HYDROCHLORIDE 0.5 MG: 1 INJECTION, SOLUTION INTRAMUSCULAR; INTRAVENOUS; SUBCUTANEOUS at 08:45

## 2023-02-14 RX ADMIN — PANTOPRAZOLE SODIUM 40 MG: 40 TABLET, DELAYED RELEASE ORAL at 08:46

## 2023-02-14 RX ADMIN — MEROPENEM 1 G: 1 INJECTION, POWDER, FOR SOLUTION INTRAVENOUS at 21:55

## 2023-02-14 RX ADMIN — DULOXETINE HYDROCHLORIDE 30 MG: 30 CAPSULE, DELAYED RELEASE ORAL at 20:02

## 2023-02-14 RX ADMIN — SODIUM CHLORIDE, PRESERVATIVE FREE 10 ML: 5 INJECTION INTRAVENOUS at 05:37

## 2023-02-14 RX ADMIN — RIOCIGUAT 2.5 MG: 2.5 TABLET, FILM COATED ORAL at 21:55

## 2023-02-14 RX ADMIN — SODIUM CHLORIDE, PRESERVATIVE FREE 10 ML: 5 INJECTION INTRAVENOUS at 21:56

## 2023-02-14 RX ADMIN — MEROPENEM 1 G: 1 INJECTION, POWDER, FOR SOLUTION INTRAVENOUS at 05:36

## 2023-02-14 RX ADMIN — RIOCIGUAT 2.5 MG: 2.5 TABLET, FILM COATED ORAL at 08:45

## 2023-02-14 RX ADMIN — HYDROMORPHONE HYDROCHLORIDE 0.5 MG: 1 INJECTION, SOLUTION INTRAMUSCULAR; INTRAVENOUS; SUBCUTANEOUS at 17:36

## 2023-02-14 RX ADMIN — HYDROMORPHONE HYDROCHLORIDE 0.5 MG: 1 INJECTION, SOLUTION INTRAMUSCULAR; INTRAVENOUS; SUBCUTANEOUS at 02:12

## 2023-02-14 RX ADMIN — AMBRISENTAN 5 MG: 5 TABLET, FILM COATED ORAL at 08:44

## 2023-02-14 RX ADMIN — GABAPENTIN 600 MG: 300 CAPSULE ORAL at 08:46

## 2023-02-14 RX ADMIN — METOPROLOL TARTRATE 25 MG: 25 TABLET, FILM COATED ORAL at 08:45

## 2023-02-14 RX ADMIN — OXYCODONE AND ACETAMINOPHEN 1 TABLET: 5; 325 TABLET ORAL at 19:56

## 2023-02-14 RX ADMIN — DAPTOMYCIN 350 MG: 500 INJECTION, POWDER, LYOPHILIZED, FOR SOLUTION INTRAVENOUS at 21:55

## 2023-02-14 RX ADMIN — PREDNISONE 15 MG: 5 TABLET ORAL at 21:55

## 2023-02-14 RX ADMIN — METOPROLOL TARTRATE 25 MG: 25 TABLET, FILM COATED ORAL at 20:02

## 2023-02-14 RX ADMIN — SODIUM CHLORIDE, PRESERVATIVE FREE 10 ML: 5 INJECTION INTRAVENOUS at 17:50

## 2023-02-14 RX ADMIN — RIOCIGUAT 2.5 MG: 2.5 TABLET, FILM COATED ORAL at 17:47

## 2023-02-14 RX ADMIN — MEROPENEM 1 G: 1 INJECTION, POWDER, FOR SOLUTION INTRAVENOUS at 17:36

## 2023-02-14 RX ADMIN — HYDROMORPHONE HYDROCHLORIDE 0.5 MG: 1 INJECTION, SOLUTION INTRAMUSCULAR; INTRAVENOUS; SUBCUTANEOUS at 21:56

## 2023-02-14 RX ADMIN — FOLIC ACID 2 MG: 1 TABLET ORAL at 08:45

## 2023-02-14 RX ADMIN — DULOXETINE HYDROCHLORIDE 30 MG: 30 CAPSULE, DELAYED RELEASE ORAL at 08:46

## 2023-02-14 NOTE — PROGRESS NOTES
5047: Chart reviewed. Per notes, patient s/p I&D right leg infected hematoma and on IV ABX (Dapto and Merrem). Wound vac applied 02/13/2023. When medically cleared, patient to discharge home with [facility] and [facility] for IV ABX. Patient has CW port access for home antibiotics. Patient has been educated for home infusion. Updates attached in [address] CM will continue to follow patient and recs of medical team.    [phone]: CM spoke with ID who is waiting on cultures to determine if Gaylia Dire will be needed at discharge. Wound vac orders on chart for MD signature.

## 2023-02-14 NOTE — MED STUDENT NOTES
Gastroenterology Consult     Referring Physician: Hemalatha Rodriguez MD     Consult Date: 2/14/2023     Subjective:     Chief Complaint: Low Hgb    History of Present Illness: Sagar Vigil is a 34 y.o. female who is seen in consultation for low Hgb and positive FOBT. Patient was admitted to the hospital with cellulitis    Patient has a history of osteonecrosis, rheumatoid arthritis, calcinosis, and systemic lupus who was admitted 11 days ago for cellulitis. Patient is referred to GI for low Hgb and positive FOBT on 2/11/23. Patient reports last endoscopy at a young age and colonoscopy last year which was significant for polyps. Patient is seen and examined in [address]. Denies aspirin or anticoagulant use. Family history is significant for colon cancer in her grandmother. Denies abdominal pain, nausea, vomiting, blood in stool. Confirms last bowel movement 2 days ago and is soft in consistency.     Today's labs: Hgb stable (7.0)    Past Medical History:   Diagnosis Date    Lupus ([facility] [address]     Osteonecrosis ([facility])     Pulmonary HTN ([facility])     RA (rheumatoid arthritis) ([facility] [address]      Past Surgical History:   Procedure Laterality Date    HX CYST REMOVAL      HX ORTHOPAEDIC      x2 left knee sxs    HX ORTHOPAEDIC      bilateral ankle sxs      Family History   Problem Relation Age of Onset    Hypertension Mother     Cancer Paternal Grandmother      Social History     Tobacco Use    Smoking status: Never    Smokeless tobacco: Never   Substance Use Topics    Alcohol use: Never      Allergies   Allergen Reactions    Doxycycline Nausea and Vomiting    Heparin Anaphylaxis    Remicade [Infliximab] Anaphylaxis    Vancomycin Hives    Rituximab Itching     Current Facility-Administered Medications   Medication Dose Route Frequency    HYDROmorphone (DILAUDID) syringe 0.5 mg  0.5 mg IntraVENous Q4H PRN    predniSONE (DELTASONE) tablet 15 mg  15 mg Oral QHS    meropenem (MERREM) 1 g in 0.9% sodium chloride (MBP/ADV) 50 mL MBP  1 g IntraVENous Q8H    0.9% sodium chloride infusion 250 mL  250 mL IntraVENous PRN    metoprolol tartrate (LOPRESSOR) tablet 25 mg  25 mg Oral Q12H    diphenhydrAMINE (BENADRYL) capsule 25 mg  25 mg Oral Q6H PRN    DAPTOmycin (CUBICIN) 350 mg in 0.9% sodium chloride 50 mL IVPB  6 mg/kg (Adjusted) IntraVENous Q24H    riociguat (ADEMPAS) tablet 2.5 mg   2.5 mg Oral TID    DULoxetine (CYMBALTA) capsule 30 mg  30 mg Oral BID    folic acid (FOLVITE) tablet 2 mg  2 mg Oral DAILY    gabapentin (NEURONTIN) capsule 600 mg  600 mg Oral BID    [Held by provider] hydrOXYchloroQUINE (PLAQUENIL) tablet 200 mg  200 mg Oral DAILY    Ambrisentan tab 5 mg - Patient supplied (Patient Supplied)  5 mg Oral QAM    pantoprazole (PROTONIX) tablet 40 mg  40 mg Oral DAILY    [Held by provider] tofacitinib Tb24 1 Tablet  (Patient Supplied)  1 Tablet Oral DAILY    sodium chloride (NS) flush 5-40 mL  5-40 mL IntraVENous Q8H    sodium chloride (NS) flush 5-40 mL  5-40 mL IntraVENous PRN    acetaminophen (TYLENOL) tablet 650 mg  650 mg Oral Q6H PRN    Or    acetaminophen (TYLENOL) suppository 650 mg  650 mg Rectal Q6H PRN    polyethylene glycol (MIRALAX) packet 17 g  17 g Oral DAILY PRN    promethazine (PHENERGAN) 12.5 mg in 0.9% sodium chloride 50 mL IVPB  12.5 mg IntraVENous Q4H PRN    oxyCODONE-acetaminophen (PERCOCET) 5-325 mg per tablet 1 Tablet  1 Tablet Oral Q6H PRN    sodium chloride (NS) flush 5-10 mL  5-10 mL IntraVENous PRN        Review of Systems:  A detailed 10 organ review of systems is obtained with pertinent positives as listed in the History of Present Illness and Past Medical History. All others are negative. Objective:     Physical Exam:  Visit Vitals  /87   Pulse (!) 112   Temp 98.2 °F (36.8 °C)   Resp 18   Ht 4' 11\" (1.499 m)   Wt 64.8 kg (142 lb 13.7 oz)   SpO2 97%   BMI 28.85 kg/m²        Skin:  Extremities and face reveal no rashes. No cormier erythema. No telangiectasias on the chest wall. HEENT: Sclerae anicteric. Extra-occular muscles are intact. No oral ulcers. No abnormal pigmentation of the lips. The neck is supple. Cardiovascular: Regular rate and rhythm. No murmurs, gallops, or rubs. PMI nondisplaced. Carotids without bruits. Respiratory:  Comfortable breathing with no accessory muscle use. Clear breath sounds with no wheezes, rales, or rhonchi. GI:  Abdomen distended, hard, and nontender. Calcified nodules were found underneath the skin of the generalized abdominal area. Hypoactive active bowel sounds. No enlargement of the liver or spleen. No masses palpable. Rectal:  Deferred  Musculoskeletal:  No pitting edema of the lower legs. Extremities have good range of motion. No costovertebral tenderness. Neurological:  Gross memory appears intact. Patient is alert and oriented. Psychiatric:  Mood appears appropriate with judgement intact. Lymphatic:  No cervical or supraclavicular adenopathy.     Lab/Data Review:  Recent Results (from the past 24 hour(s))   METABOLIC PANEL, BASIC    Collection Time: 02/13/23 11:11 AM   Result Value Ref Range    Sodium 137 136 - 145 mmol/L    Potassium 4.1 3.5 - 5.1 mmol/L    Chloride 102 97 - 108 mmol/L    CO2 30 21 - 32 mmol/L    Anion gap 5 5 - 15 mmol/L    Glucose 107 (H) 65 - 100 mg/dL    BUN 10 6 - 20 mg/dL    Creatinine 0.46 (L) 0.55 - 1.02 mg/dL    BUN/Creatinine ratio 22 (H) 12 - 20      eGFR >60 >60 ml/min/1.73m2    Calcium 8.9 8.5 - 10.1 mg/dL   CBC WITH AUTOMATED DIFF    Collection Time: 02/13/23 11:11 AM   Result Value Ref Range    WBC 5.4 3.6 - 11.0 K/uL    RBC 2.54 (L) 3.80 - 5.20 M/uL    HGB 7.0 (L) 11.5 - 16.0 g/dL    HCT 24.0 (L) 35.0 - 47.0 %    MCV 94.5 80.0 - 99.0 FL    MCH 27.6 26.0 - 34.0 PG    MCHC 29.2 (L) 30.0 - 36.5 g/dL    RDW 20.3 (H) 11.5 - 14.5 %    PLATELET 695 979 - 834 K/uL    MPV 10.0 8.9 - 12.9 FL    NRBC 7.0 (H) 0.0  WBC    ABSOLUTE NRBC 0.40 (H) 0.00 - 0.01 K/uL    NEUTROPHILS 91 (H) 32 - 75 %    LYMPHOCYTES 4 (L) 12 - 49 %    MONOCYTES 1 (L) 5 - 13 %    EOSINOPHILS 0 0 - 7 %    BASOPHILS 0 0 - 1 %    METAMYELOCYTES 2 (H) 0 %    MYELOCYTES 2 (H) 0 %    NRBC 6.0  WBC    IMMATURE GRANULOCYTES 0 %    ABS. NEUTROPHILS 4.9 1.8 - 8.0 K/UL    ABS. LYMPHOCYTES 0.2 (L) 0.8 - 3.5 K/UL    ABS. MONOCYTES 0.1 0.0 - 1.0 K/UL    ABS. EOSINOPHILS 0.0 0.0 - 0.4 K/UL    ABS. BASOPHILS 0.0 0.0 - 0.1 K/UL    ABSOLUTE NRBC 0.32 K/uL    ABS. IMM.  GRANS. 0.0 K/UL    DF Manual      RBC COMMENTS Anisocytosis  1+        RBC COMMENTS Macrocytosis  1+        RBC COMMENTS Hypochromia  1+        RBC COMMENTS Polychromasia  1+       C REACTIVE PROTEIN, QT    Collection Time: 02/13/23 11:11 AM   Result Value Ref Range    C-Reactive protein 4.91 (H) 0.00 - 0.60 mg/dL   CK    Collection Time: 02/13/23 11:11 AM   Result Value Ref Range    CK 57 26 - 192 U/L   SED RATE (ESR)    Collection Time: 02/13/23 11:11 AM   Result Value Ref Range    Sed rate, automated >140 (H) 0 - 20 mm/hr   CBC W/O DIFF    Collection Time: 02/14/23  8:24 AM   Result Value Ref Range    WBC 7.0 3.6 - 11.0 K/uL    RBC 2.52 (L) 3.80 - 5.20 M/uL    HGB 7.0 (L) 11.5 - 16.0 g/dL    HCT 23.2 (L) 35.0 - 47.0 %    MCV 92.1 80.0 - 99.0 FL    MCH 27.8 26.0 - 34.0 PG    MCHC 30.2 30.0 - 36.5 g/dL    RDW 21.0 (H) 11.5 - 14.5 %    PLATELET 518 918 - 109 K/uL    MPV 10.3 8.9 - 12.9 FL    NRBC 2.0 (H) 0.0  WBC    ABSOLUTE NRBC 0.14 (H) 0.00 - 5.61 K/uL   METABOLIC PANEL, BASIC    Collection Time: 02/14/23  8:24 AM   Result Value Ref Range    Sodium 132 (L) 136 - 145 mmol/L    Potassium 4.2 3.5 - 5.1 mmol/L    Chloride 99 97 - 108 mmol/L    CO2 30 21 - 32 mmol/L    Anion gap 3 (L) 5 - 15 mmol/L    Glucose 97 65 - 100 mg/dL    BUN 9 6 - 20 mg/dL    Creatinine 0.38 (L) 0.55 - 1.02 mg/dL    BUN/Creatinine ratio 24 (H) 12 - 20      eGFR >60 >60 ml/min/1.73m2    Calcium 8.6 8.5 - 10.1 mg/dL   C REACTIVE PROTEIN, QT    Collection Time: 02/14/23  8:24 AM   Result Value Ref Range    C-Reactive protein 10.60 (H) 0.00 - 0.60 mg/dL        MRI TIB/FIB RT W  WO CONT   Final Result   1. Large complex collection of popliteal subcutaneous fat overlying the   superficial fascia of the gastrocnemius and biceps femoris muscles, possibly   involving the biceps muscle. Areas of intermediate and increased signal on T1   images are shown. Increased T1 signal can be seen in the setting of hemorrhagic   or proteinaceous material. Ultrasound correlation and aspiration suggested based   on ultrasound imaging results. 2. Nonspecific elongated nonenhancing collection within proximal medial   gastrocnemius muscle. 3. Multifocal osteonecrosis. XR FEMUR RT 2 VS   Final Result   1. Extensive dermal calcifications   2. Possible AVN of the distal right femur. XR CHEST PORT   Final Result   1. Cardiomegaly and pulmonary vascular congestion. 2. Diffuse heterotopic soft tissue calcification. XR CHEST PA LAT   Final Result      No significant change. Cardiomegaly and chronic interstitial opacities. DUPLEX LOWER EXT VENOUS BILAT   Final Result      XR CHEST PORT   Final Result      Stable exam. Unchanged interstitial lung abnormalities and small pleural   effusions. Assessment/Plan:     Active Problems:    Cellulitis (8/7/2021)    -Transfuse pRBC as necessary to maintain Hgb  -If symptoms worsen, schedule colonoscopy    IP CONSULT TO INFECTIOUS DISEASES  IP CONSULT TO RHEUMATOLOGY  IP CONSULT TO GASTROENTEROLOGY  IP CONSULT TO CARDIOLOGY  IP CONSULT TO ORTHOPEDIC SURGERY    Thank you for allowing me to participate in this patients care  Cc Referring Physician   Betsy Dorman MD            *ATTENTION:  This note has been created by a medical student for educational purposes only. Please do not refer to the content of this note for clinical decision-making, billing, or other purposes. Please see attending physicians note to obtain clinical information on this patient. *

## 2023-02-14 NOTE — PROGRESS NOTES
Problem: Falls - Risk of  Goal: *Absence of Falls  Description: Document Keyanna Horton Fall Risk and appropriate interventions in the flowsheet. Outcome: Progressing Towards Goal  Note: Fall Risk Interventions:                                Problem: Patient Education: Go to Patient Education Activity  Goal: Patient/Family Education  Outcome: Progressing Towards Goal     Problem: Pressure Injury - Risk of  Goal: *Prevention of pressure injury  Description: Document Delbert Scale and appropriate interventions in the flowsheet.   Outcome: Progressing Towards Goal  Note: Pressure Injury Interventions:  Sensory Interventions: Float heels, Keep linens dry and wrinkle-free, Maintain/enhance activity level, Minimize linen layers    Moisture Interventions: Absorbent underpads, Internal/External urinary devices, Minimize layers    Activity Interventions: Assess need for specialty bed, Increase time out of bed    Mobility Interventions: Assess need for specialty bed, HOB 30 degrees or less, Pressure redistribution bed/mattress (bed type)    Nutrition Interventions: Document food/fluid/supplement intake    Friction and Shear Interventions: Apply protective barrier, creams and emollients, HOB 30 degrees or less, Minimize layers                Problem: Patient Education: Go to Patient Education Activity  Goal: Patient/Family Education  Outcome: Progressing Towards Goal     Problem: Patient Education: Go to Patient Education Activity  Goal: Patient/Family Education  Outcome: Progressing Towards Goal     Problem: Patient Education: Go to Patient Education Activity  Goal: Patient/Family Education  Outcome: Progressing Towards Goal

## 2023-02-14 NOTE — PROGRESS NOTES
Progress Note  Date:2023       Room:[facility]  Patient Name:Ric Orozco     YOB: 1990     Age:32 y.o. This is a 33 yo female with history of systemic lupus, calcinosis and rheumatoid arthritis treated with Plaquenil, Prednisone, and Jerrald Gail. She also has a history of pulmonary HTN and osteonecrosis. She presented to the ED with complaints of pains and swelling of the right lower extremity diagnosed as cellulitis and was admitted evaluation and treatment of cellulitis. She is s/p and I&D of an infected hematoma performed 2023 that resulted in 400cc of blood clots and hematoma. She is on antibiotics under the direction of infectious disease. Today she reports she has had pain and swelling in her right thigh and lower leg and it's been well controlled with prn pain medications and she reports the pain in general in that area has improved. She denies joint pain and swelling elsewhere. She reports some stiffness when she is in the bed for prolonged periods. She reports a mouth sore. She denies sicca symptoms, SOB, CP, heartburn, nasal sores or rashes. Subjective    Subjective:  Symptoms:  No shortness of breath, cough, chest pain or diarrhea. Diet:  No nausea or vomiting. Review of Systems   Constitutional:  Negative for chills and fever. HENT:  Positive for mouth sores. Negative for sore throat and trouble swallowing. Eyes:  Negative for pain and redness. Respiratory:  Negative for cough and shortness of breath. Cardiovascular:  Negative for chest pain. Gastrointestinal:  Negative for abdominal pain, constipation, diarrhea, nausea and vomiting. Genitourinary:  Negative for dysuria and hematuria. Musculoskeletal:  Negative for arthralgias and joint swelling. Skin:  Negative for rash. Hematological:  Bruises/bleeds easily.    Objective         Vitals Last 24 Hours:  TEMPERATURE:  Temp  Av.3 °F (36.8 °C)  Min: 98 °F (36.7 °C) Max: 98.8 °F (37.1 °C)  RESPIRATIONS RANGE: Resp  Av.7  Min: 16  Max: 18  PULSE OXIMETRY RANGE: SpO2  Av.7 %  Min: 97 %  Max: 98 %  PULSE RANGE: Pulse  Av.7  Min: 99  Max: 115  BLOOD PRESSURE RANGE: Systolic (03ZKS), FOB:808 , Min:118 , ATR:923   ; Diastolic (43LXU), EPH:07, Min:62, Max:87    I/O (24Hr): Intake/Output Summary (Last 24 hours) at 2023 1833  Last data filed at 2023 0227  Gross per 24 hour   Intake 400 ml   Output 500 ml   Net -100 ml       Objective:  General Appearance: In no acute distress. Vital signs: (most recent): Blood pressure 134/87, pulse (!) 112, temperature 98.2 °F (36.8 °C), resp. rate 18, height 4' 11\" (1.499 m), weight 64.8 kg (142 lb 13.7 oz), SpO2 97 %. No fever. HEENT: (Sore on left of tongue)    Lungs:  Normal effort and normal respiratory rate. Breath sounds clear to auscultation. Heart: Normal rate. Abdomen: Abdomen is soft. Bowel sounds are normal.   There is no abdominal tenderness. Extremities: (Right lower extremity swelling improved  Right lateral knee tender  Right ankle tender  Tapered digits)  Neurological: Patient is alert. Pupils:  Pupils are equal, round, and reactive to light. Skin:  Warm and dry. (Calcifications below skin on bilateral lower extremities  Hyperpigmentation lower extremities  Scattered bruising left upper arm  Dressing to right knee D&I)  Labs/Imaging/Diagnostics    Labs:  CBC:  Recent Labs     23  0824 23  1111 23  1203   WBC 7.0 5.4 5.6   RBC 2.52* 2.54* 2.51*   HGB 7.0* 7.0* 6.9*   HCT 23.2* 24.0* 23.1*   MCV 92.1 94.5 92.0   RDW 21.0* 20.3* 20.0*    327 286       CHEMISTRIES:  Recent Labs     23  0824 23  1111 23  1203   * 137 136   K 4.2 4.1 4.4   CL 99 102 104   CO2 30 30 29   BUN 9 10 14   CA 8.6 8.9 8.6   MG  --   --  2.4     PT/INR:No results for input(s): INR, INREXT, INREXT in the last 72 hours.     No lab exists for component: PROTIME  APTT:No results for input(s): APTT in the last 72 hours. LIVER PROFILE:No results for input(s): AST, ALT in the last 72 hours. No lab exists for component: Thieneulalio Mckeon ALKPHOS  Lab Results   Component Value Date/Time    ALT (SGPT) 74 02/07/2023 06:06 AM    AST (SGOT) 48 (H) 02/07/2023 06:06 AM    Alk. phosphatase 260 (H) 02/07/2023 06:06 AM    Bilirubin, direct 0.1 12/04/2021 08:50 PM    Bilirubin, total 0.4 02/07/2023 06:06 AM       Imaging Last 24 Hours:  No results found. Assessment//Plan     Assessment & Plan    This is a 33 yo female with history of systemic lupus and rheumatoid arthritis admitted for right lower extremity cellulitis on treatment with antibiotics. She is s/p and I&D of an infected hematoma performed yesterday that resulted in 400cc of blood clots and hematoma and we have ordered lupus anticoagulant and phospholipid antibodies which are pending. Labs for lupus are pending as well and we will monitor for the results. Direct JINA positive and JINA by immunofluorescence ordered. RF and CCP are normal. Patient reports a history of calcinosis and SCL 70 and Centromere Ab are normal as well. Acute phase reactants have been elevated, which are likely due to infection. Krissy Chafe and Plaquenil are being held and she is now on Prednisone to 15mg every day while she is being treated for infection. Patient denies joint pain and swelling other than at the site of her I&D. Patient has tongue sore. Complements have been ordered and I have added DsDNA to monitor for any lupus disease activity. This case was reviewed and discussed with Dr. Esmeralda Cooks.      CHONG Hull  [facility]  Rheumatology        Electronically signed by Sharad Vann NP on 2/14/2023 at 5:25 PM

## 2023-02-14 NOTE — PROGRESS NOTES
Progress Note    Patient: Ivan Bonilla MRN: [id number]  SSN: [id number]    YOB: 1990  Age: 34 y.o. Sex: female      Admit Date: 2/3/2023    LOS: 11 days     Subjective:   Patient immunocompromised with SLE/RA, followed for right leg cellulitis. MRI showed abscess which has now been incised and drained with cultures pending. She remains afebrile with normal WBC and decreasing procal but  her CRP continues to trend upward. She is sitting up in bedside chair with no new complaints, including no arthralgia. Objective:     Vitals:    02/13/23 1547 02/13/23 2026 02/14/23 0144 02/14/23 0837   BP: 124/77 118/62 120/64 134/87   Pulse: (!) 106 (!) 115 99 (!) 112   Resp: 15 16 16 18   Temp: 98.4 °F (36.9 °C) 98.8 °F (37.1 °C) 98 °F (36.7 °C) 98.2 °F (36.8 °C)   SpO2: 98% 98% 98% 97%   Weight:       Height:            Intake and Output:  Current Shift: No intake/output data recorded. Last three shifts: 02/12 1901 - 02/14 0700  In: 400 [P.O.:300; I.V.:100]  Out: 1000 [Urine:1000]    Physical Exam:   Vitals and nursing note reviewed. Constitutional:       General: She is not in mild respiratory  distress. Appearance: She is ill-appearing. HENT: Nasal O2 cannula 2L/min  Eyes:      Pupils: Pupils are equal, round, and reactive to light. Cardiovascular:      Rate and Rhythm: Normal rate and regular rhythm. Heart sounds: No murmur heard. Pulmonary: clear bilaterally  Genitourinary:     Comments: No Mckeon  Musculoskeletal: .      Right lower leg with bulky gauze dressing at this time, not removed:       Left lower leg: No edema (multiple well-healed scars). Skin:     Findings: Erythema present. No rash. Neurological:      General: No focal deficit present. Mental Status: She is alert and oriented to person, place, and time.    Psychiatric: normal behavior    Lab/Data Review:     WBC 7,000      ESR >140 < 120 < >140  CRP 10.60 <4.91 <2.73 <10.00 <3.52 <6.32 <10.20 <15.20 <23.30  Procal <0.05 <0.08 <0.09 <0.11 <0.18 <0.29 <0.36    ASO Negative  Dnase B Ab Negative    Blood cultures (2/3) No growth FINAL  Wound culture right leg (2/12) No growth thus far  Wound culture right leg (2/12) No growth thus far    MRI right leg (2/10)   1. Large complex collection of popliteal subcutaneous fat overlying the  superficial fascia of the gastrocnemius and biceps femoris muscles, possibly  involving the biceps muscle. Areas of intermediate and increased signal on T1  images are shown. Increased T1 signal can be seen in the setting of hemorrhagic  or proteinaceous material. Ultrasound correlation and aspiration suggested based  on ultrasound imaging results. 2. Nonspecific elongated nonenhancing collection within proximal medial  gastrocnemius muscle. 3. Multifocal osteonecrosis. Assessment:     Active Problems:    Cellulitis (8/7/2021)  Cellulitis right lower extremity with abscess/hematoma, status post I&D, cultures pending, on IV Daptomycin and Meropenem  Sepsis with fever, elevated CRP and procal  Elevated ESR  Immunosuppression on Plaquenil, Prednisone and Tofacitinib  Systemic Lupus Erythematosus  Rheumatoid arthritis  Allergies to Doxycycline and Vancomycin   8. ?Adverse reaction to Unasyn with SOB    Comment:   Unclear why her CRP is increasing, thought possibly related to her SLE or RA but she reportedly has no symptoms associated with flare-ups.      Plan:   Continue IV Daptomycin 6 mg/kg IV daily and Meropenem (if tissue cultures are negative, would discontinue Meropenem)  In am, repeat CBC, procal and CRP, check complement levels  Follow-up tissue cultures         Signed By: Ernie Rojas MD     February 14, 2023

## 2023-02-14 NOTE — PROGRESS NOTES
Hospitalist Progress Note            Daily Progress Note: 2/14/2023 9:28 AM  Hospital course:   Patient is a 33 yo female with history of systemic lupus, calcinosis and rheumatoid arthritis on treatment with Plaquenil, Prednisone, and Julieth Medal. She also has a history of pulmonary HTN and osteonecrosis. She presented to the ED with complaints of pains and swelling of the right lower extremity diagnosed as cellulitis and was admitted evaluation and treatment of cellulitis on 2/3/2023. Chiara Jaime She has been placed on antibiotics under the direction of infectious disease, which included IV meropenem and daptomycin. MRI shows right leg cellulitis subcutaneous fat. Rheumatology and Ortho PDX consulted. Patient underwent a I&D of right leg abscess hematoma by orthopedics on 2/12/2023. GI consulted because patient had a positive FOBT and lowering hemoglobin levels; they are currently recommending close monitoring and if symptoms worsens a colonoscopy. Subjective:     Seen and examined at bedside. She reports feeling fatigued and a lingual sore. Denies other complaints at this time spoke at length about treatment plans and goals. Assessment/Plan:   Active Problems:    Cellulitis (8/7/2021)  Right lower extremity cellulitis complicated with large fluid popliteal collection status post I&D day #1  --Patient does not meet sepsis criteria. Afebrile. --Patient on IV daptomycin meropenem. Infectious disease following  -MRI shows popliteal fluid collection. Status post I&D by orthopedics. --Wound VAC applied on 2/13/2023    Anemia of chronic disease  --Occult blood positive. --Hgb remians borderline. Consult placed to GI ().  will continue to monitor   -- Per GI transfuse packed red blood cells as necessary to maintain hemoglobin if symptoms worsen we will schedule colonoscopy  -- On Protonix         Pulmonary Hypertension  - Continue ambrisenan and riociguant    Lingual sore  --Viscous lidocaine    History of SLE  -Rheumatology consulted  -Prednisone, Plaquenil        DVT Prophylaxis: Held secondary to possible bleed  Code Status: Full Code  POA/NOK:    This care involved high complexity medical decision making. I personally:  Reviewed the flow sheet and previous days notes  Reviewed and summarized records/history from previous days note or discussions with staff and family  Reviewed High Risk Drug therapy requiring intensive monitoring for toxicity to include but is not limited to steroids, pressors and antibiotics  Reviewed and/or clinical laboratory tests  Reviewed images and/or ordered radiology tests  Reviewed the patient's EKG/telemetry  Discussed my assessment/management with: Nursing, Patient, Family, and Hospitalist colleagues for coordination of care.      Disposition and discharge barriers:   IV abx  Intra op cultures  Clearance from [address] discussed with: Patient, nursing, case management    Current Facility-Administered Medications   Medication Dose Route Frequency    HYDROmorphone (DILAUDID) syringe 0.5 mg  0.5 mg IntraVENous Q4H PRN    predniSONE (DELTASONE) tablet 15 mg  15 mg Oral QHS    meropenem (MERREM) 1 g in 0.9% sodium chloride (MBP/ADV) 50 mL MBP  1 g IntraVENous Q8H    0.9% sodium chloride infusion 250 mL  250 mL IntraVENous PRN    metoprolol tartrate (LOPRESSOR) tablet 25 mg  25 mg Oral Q12H    diphenhydrAMINE (BENADRYL) capsule 25 mg  25 mg Oral Q6H PRN    DAPTOmycin (CUBICIN) 350 mg in 0.9% sodium chloride 50 mL IVPB  6 mg/kg (Adjusted) IntraVENous Q24H    riociguat (ADEMPAS) tablet 2.5 mg   2.5 mg Oral TID    DULoxetine (CYMBALTA) capsule 30 mg  30 mg Oral BID    folic acid (FOLVITE) tablet 2 mg  2 mg Oral DAILY    gabapentin (NEURONTIN) capsule 600 mg  600 mg Oral BID    [Held by provider] hydrOXYchloroQUINE (PLAQUENIL) tablet 200 mg  200 mg Oral DAILY    Ambrisentan tab 5 mg - Patient supplied (Patient Supplied)  5 mg Oral QAM    pantoprazole (PROTONIX) tablet 40 mg  40 mg Oral DAILY    [Held by provider] tofacitinib Tb24 1 Tablet  (Patient Supplied)  1 Tablet Oral DAILY    sodium chloride (NS) flush 5-40 mL  5-40 mL IntraVENous Q8H    sodium chloride (NS) flush 5-40 mL  5-40 mL IntraVENous PRN    acetaminophen (TYLENOL) tablet 650 mg  650 mg Oral Q6H PRN    Or    acetaminophen (TYLENOL) suppository 650 mg  650 mg Rectal Q6H PRN    polyethylene glycol (MIRALAX) packet 17 g  17 g Oral DAILY PRN    promethazine (PHENERGAN) 12.5 mg in 0.9% sodium chloride 50 mL IVPB  12.5 mg IntraVENous Q4H PRN    oxyCODONE-acetaminophen (PERCOCET) 5-325 mg per tablet 1 Tablet  1 Tablet Oral Q6H PRN    sodium chloride (NS) flush 5-10 mL  5-10 mL IntraVENous PRN        REVIEW OF SYSTEMS    Review of Systems   Constitutional:  Positive for malaise/fatigue. HENT:          Lingual sore   Respiratory:  Negative for cough, shortness of breath and wheezing. Cardiovascular:  Negative for chest pain and leg swelling. Gastrointestinal:  Negative for abdominal pain, heartburn, nausea and vomiting. Musculoskeletal:  Negative for back pain and myalgias. Neurological:  Negative for dizziness and headaches. Objective:     Visit Vitals  /64 (BP 1 Location: Left upper arm)   Pulse 99   Temp 98 °F (36.7 °C)   Resp 16   Ht 4' 11\" (1.499 m)   Wt 64.8 kg (142 lb 13.7 oz)   SpO2 98%   BMI 28.85 kg/m²    O2 Flow Rate (L/min): 3 l/min O2 Device: None (Room air)    Temp (24hrs), Av.4 °F (36.9 °C), Min:98 °F (36.7 °C), Max:98.8 °F (37.1 °C)        PHYSICAL EXAM:    Physical Exam  Vitals and nursing note reviewed. Constitutional:       General: She is not in acute distress. Appearance: Normal appearance. She is ill-appearing. HENT:      Head: Normocephalic and atraumatic. Mouth/Throat:      Comments: Lingual sore. It seems as if patient may have bite their tongue  Cardiovascular:      Rate and Rhythm: Normal rate and regular rhythm. Heart sounds: No murmur heard.   Pulmonary:      Effort: Pulmonary effort is normal. No respiratory distress. Breath sounds: Normal breath sounds. No wheezing. Comments: Nasal O2 cannula 2L/min  Abdominal:      General: Abdomen is flat. There is no distension. Palpations: Abdomen is soft. Tenderness: There is no abdominal tenderness. Skin:     Comments: R. Leg dressing in place. Neurological:      General: No focal deficit present. Mental Status: She is alert. Motor: Weakness present. Data Review    Recent Results (from the past 24 hour(s))   METABOLIC PANEL, BASIC    Collection Time: 02/13/23 11:11 AM   Result Value Ref Range    Sodium 137 136 - 145 mmol/L    Potassium 4.1 3.5 - 5.1 mmol/L    Chloride 102 97 - 108 mmol/L    CO2 30 21 - 32 mmol/L    Anion gap 5 5 - 15 mmol/L    Glucose 107 (H) 65 - 100 mg/dL    BUN 10 6 - 20 mg/dL    Creatinine 0.46 (L) 0.55 - 1.02 mg/dL    BUN/Creatinine ratio 22 (H) 12 - 20      eGFR >60 >60 ml/min/1.73m2    Calcium 8.9 8.5 - 10.1 mg/dL   CBC WITH AUTOMATED DIFF    Collection Time: 02/13/23 11:11 AM   Result Value Ref Range    WBC 5.4 3.6 - 11.0 K/uL    RBC 2.54 (L) 3.80 - 5.20 M/uL    HGB 7.0 (L) 11.5 - 16.0 g/dL    HCT 24.0 (L) 35.0 - 47.0 %    MCV 94.5 80.0 - 99.0 FL    MCH 27.6 26.0 - 34.0 PG    MCHC 29.2 (L) 30.0 - 36.5 g/dL    RDW 20.3 (H) 11.5 - 14.5 %    PLATELET 745 887 - 173 K/uL    MPV 10.0 8.9 - 12.9 FL    NRBC 7.0 (H) 0.0  WBC    ABSOLUTE NRBC 0.40 (H) 0.00 - 0.01 K/uL    NEUTROPHILS 91 (H) 32 - 75 %    LYMPHOCYTES 4 (L) 12 - 49 %    MONOCYTES 1 (L) 5 - 13 %    EOSINOPHILS 0 0 - 7 %    BASOPHILS 0 0 - 1 %    METAMYELOCYTES 2 (H) 0 %    MYELOCYTES 2 (H) 0 %    NRBC 6.0  WBC    IMMATURE GRANULOCYTES 0 %    ABS. NEUTROPHILS 4.9 1.8 - 8.0 K/UL    ABS. LYMPHOCYTES 0.2 (L) 0.8 - 3.5 K/UL    ABS. MONOCYTES 0.1 0.0 - 1.0 K/UL    ABS. EOSINOPHILS 0.0 0.0 - 0.4 K/UL    ABS. BASOPHILS 0.0 0.0 - 0.1 K/UL    ABSOLUTE NRBC 0.32 K/uL    ABS. IMM.  GRANS. 0.0 K/UL    DF Manual      RBC COMMENTS Anisocytosis  1+        RBC COMMENTS Macrocytosis  1+        RBC COMMENTS Hypochromia  1+        RBC COMMENTS Polychromasia  1+       C REACTIVE PROTEIN, QT    Collection Time: 02/13/23 11:11 AM   Result Value Ref Range    C-Reactive protein 4.91 (H) 0.00 - 0.60 mg/dL   CK    Collection Time: 02/13/23 11:11 AM   Result Value Ref Range    CK 57 26 - 192 U/L   SED RATE (ESR)    Collection Time: 02/13/23 11:11 AM   Result Value Ref Range    Sed rate, automated >140 (H) 0 - 20 mm/hr       MRI TIB/FIB RT W  WO CONT   Final Result   1. Large complex collection of popliteal subcutaneous fat overlying the   superficial fascia of the gastrocnemius and biceps femoris muscles, possibly   involving the biceps muscle. Areas of intermediate and increased signal on T1   images are shown. Increased T1 signal can be seen in the setting of hemorrhagic   or proteinaceous material. Ultrasound correlation and aspiration suggested based   on ultrasound imaging results. 2. Nonspecific elongated nonenhancing collection within proximal medial   gastrocnemius muscle. 3. Multifocal osteonecrosis. XR FEMUR RT 2 VS   Final Result   1. Extensive dermal calcifications   2. Possible AVN of the distal right femur. XR CHEST PORT   Final Result   1. Cardiomegaly and pulmonary vascular congestion. 2. Diffuse heterotopic soft tissue calcification. XR CHEST PA LAT   Final Result      No significant change. Cardiomegaly and chronic interstitial opacities. DUPLEX LOWER EXT VENOUS BILAT   Final Result      XR CHEST PORT   Final Result      Stable exam. Unchanged interstitial lung abnormalities and small pleural   effusions. Intake and Output:  Current Shift: No intake/output data recorded. Last three shifts: 02/12 1901 - 02/14 0700  In: 400 [P.O.:300;  I.V.:100]  Out: 1000 [Urine:1000]      Lab/Data Review:  Recent Labs     02/13/23  1111 02/12/23  1203 02/11/23  1213   WBC 5.4 5.6 6.7   HGB 7.0* 6.9* 7.6* HCT 24.0* 23.1* 24.8*    286 265       Recent Labs     02/13/23  1111 02/12/23  1203 02/11/23  1213    136 136   K 4.1 4.4 4.2    104 103   CO2 30 29 29   * 83 98   BUN 10 14 16   CREA 0.46* 0.27* 0.45*   CA 8.9 8.6 8.9   MG  --  2.4 2.4       No results for input(s): PH, PCO2, PO2, HCO3, FIO2 in the last 72 hours. Recent Results (from the past 24 hour(s))   METABOLIC PANEL, BASIC    Collection Time: 02/13/23 11:11 AM   Result Value Ref Range    Sodium 137 136 - 145 mmol/L    Potassium 4.1 3.5 - 5.1 mmol/L    Chloride 102 97 - 108 mmol/L    CO2 30 21 - 32 mmol/L    Anion gap 5 5 - 15 mmol/L    Glucose 107 (H) 65 - 100 mg/dL    BUN 10 6 - 20 mg/dL    Creatinine 0.46 (L) 0.55 - 1.02 mg/dL    BUN/Creatinine ratio 22 (H) 12 - 20      eGFR >60 >60 ml/min/1.73m2    Calcium 8.9 8.5 - 10.1 mg/dL   CBC WITH AUTOMATED DIFF    Collection Time: 02/13/23 11:11 AM   Result Value Ref Range    WBC 5.4 3.6 - 11.0 K/uL    RBC 2.54 (L) 3.80 - 5.20 M/uL    HGB 7.0 (L) 11.5 - 16.0 g/dL    HCT 24.0 (L) 35.0 - 47.0 %    MCV 94.5 80.0 - 99.0 FL    MCH 27.6 26.0 - 34.0 PG    MCHC 29.2 (L) 30.0 - 36.5 g/dL    RDW 20.3 (H) 11.5 - 14.5 %    PLATELET 385 031 - 047 K/uL    MPV 10.0 8.9 - 12.9 FL    NRBC 7.0 (H) 0.0  WBC    ABSOLUTE NRBC 0.40 (H) 0.00 - 0.01 K/uL    NEUTROPHILS 91 (H) 32 - 75 %    LYMPHOCYTES 4 (L) 12 - 49 %    MONOCYTES 1 (L) 5 - 13 %    EOSINOPHILS 0 0 - 7 %    BASOPHILS 0 0 - 1 %    METAMYELOCYTES 2 (H) 0 %    MYELOCYTES 2 (H) 0 %    NRBC 6.0  WBC    IMMATURE GRANULOCYTES 0 %    ABS. NEUTROPHILS 4.9 1.8 - 8.0 K/UL    ABS. LYMPHOCYTES 0.2 (L) 0.8 - 3.5 K/UL    ABS. MONOCYTES 0.1 0.0 - 1.0 K/UL    ABS. EOSINOPHILS 0.0 0.0 - 0.4 K/UL    ABS. BASOPHILS 0.0 0.0 - 0.1 K/UL    ABSOLUTE NRBC 0.32 K/uL    ABS. IMM.  GRANS. 0.0 K/UL    DF Manual      RBC COMMENTS Anisocytosis  1+        RBC COMMENTS Macrocytosis  1+        RBC COMMENTS Hypochromia  1+        RBC COMMENTS Polychromasia  1+ C REACTIVE PROTEIN, QT    Collection Time: 02/13/23 11:11 AM   Result Value Ref Range    C-Reactive protein 4.91 (H) 0.00 - 0.60 mg/dL   CK    Collection Time: 02/13/23 11:11 AM   Result Value Ref Range    CK 57 26 - 192 U/L   SED RATE (ESR)    Collection Time: 02/13/23 11:11 AM   Result Value Ref Range    Sed rate, automated >140 (H) 0 - 20 mm/hr             _____________________________________________________________________________  Time spent in direct care including coordination of service, review of data and examination: 35 minutes    ______________________________________________________________________________    ERICA Gavin    This is dictation was done by dragon, computer voice recognition software. Quite often unanticipated grammatical, syntax, homophones and other interpretive errors or inadvertently transcribed by the computer software. Please excuse errors that have escaped final proofreading. Thank you.

## 2023-02-15 LAB
ANION GAP SERPL CALC-SCNC: 3 MMOL/L (ref 5–15)
BUN SERPL-MCNC: 10 MG/DL (ref 6–20)
BUN/CREAT SERPL: 24 (ref 12–20)
CA-I BLD-MCNC: 8.7 MG/DL (ref 8.5–10.1)
CHLORIDE SERPL-SCNC: 102 MMOL/L (ref 97–108)
CO2 SERPL-SCNC: 30 MMOL/L (ref 21–32)
CREAT SERPL-MCNC: 0.42 MG/DL (ref 0.55–1.02)
CRP SERPL-MCNC: 9.18 MG/DL (ref 0–0.6)
ENA RNP AB SER-ACNC: 1.5 AI (ref 0–0.9)
ENA SM AB SER-ACNC: <0.2 AI (ref 0–0.9)
ENA SS-A AB SER-ACNC: <0.2 AI (ref 0–0.9)
ENA SS-B AB SER-ACNC: <0.2 AI (ref 0–0.9)
ERYTHROCYTE [DISTWIDTH] IN BLOOD BY AUTOMATED COUNT: 20.3 % (ref 11.5–14.5)
GLUCOSE SERPL-MCNC: 118 MG/DL (ref 65–100)
HCT VFR BLD AUTO: 22.4 % (ref 35–47)
HGB BLD-MCNC: 6.6 G/DL (ref 11.5–16)
LA 2 SCREEN W REFLEX-IMP: NORMAL
MCH RBC QN AUTO: 27.5 PG (ref 26–34)
MCHC RBC AUTO-ENTMCNC: 29.5 G/DL (ref 30–36.5)
MCV RBC AUTO: 93.3 FL (ref 80–99)
NRBC # BLD: 0.09 K/UL (ref 0–0.01)
NRBC BLD-RTO: 1.9 PER 100 WBC
PLATELET # BLD AUTO: 308 K/UL (ref 150–400)
PMV BLD AUTO: 10.2 FL (ref 8.9–12.9)
POTASSIUM SERPL-SCNC: 4.5 MMOL/L (ref 3.5–5.1)
PROCALCITONIN SERPL-MCNC: <0.05 NG/ML
RBC # BLD AUTO: 2.4 M/UL (ref 3.8–5.2)
SCREEN APTT: 38.3 SEC (ref 0–43.5)
SCREEN DRVVT: 44.4 SEC (ref 0–47)
SODIUM SERPL-SCNC: 135 MMOL/L (ref 136–145)
WBC # BLD AUTO: 4.7 K/UL (ref 3.6–11)

## 2023-02-15 PROCEDURE — 74011250637 HC RX REV CODE- 250/637: Performed by: INTERNAL MEDICINE

## 2023-02-15 PROCEDURE — 86140 C-REACTIVE PROTEIN: CPT

## 2023-02-15 PROCEDURE — 74011250636 HC RX REV CODE- 250/636: Performed by: STUDENT IN AN ORGANIZED HEALTH CARE EDUCATION/TRAINING PROGRAM

## 2023-02-15 PROCEDURE — 36415 COLL VENOUS BLD VENIPUNCTURE: CPT

## 2023-02-15 PROCEDURE — 74011250636 HC RX REV CODE- 250/636: Performed by: HOSPITALIST

## 2023-02-15 PROCEDURE — 84145 PROCALCITONIN (PCT): CPT

## 2023-02-15 PROCEDURE — 74011250636 HC RX REV CODE- 250/636: Performed by: LICENSED PRACTICAL NURSE

## 2023-02-15 PROCEDURE — 74011636637 HC RX REV CODE- 636/637: Performed by: NURSE PRACTITIONER

## 2023-02-15 PROCEDURE — 74011000258 HC RX REV CODE- 258: Performed by: STUDENT IN AN ORGANIZED HEALTH CARE EDUCATION/TRAINING PROGRAM

## 2023-02-15 PROCEDURE — 85027 COMPLETE CBC AUTOMATED: CPT

## 2023-02-15 PROCEDURE — 99232 SBSQ HOSP IP/OBS MODERATE 35: CPT | Performed by: INTERNAL MEDICINE

## 2023-02-15 PROCEDURE — 86162 COMPLEMENT TOTAL (CH50): CPT

## 2023-02-15 PROCEDURE — 80048 BASIC METABOLIC PNL TOTAL CA: CPT

## 2023-02-15 PROCEDURE — 86900 BLOOD TYPING SEROLOGIC ABO: CPT

## 2023-02-15 PROCEDURE — P9016 RBC LEUKOCYTES REDUCED: HCPCS

## 2023-02-15 PROCEDURE — 65270000029 HC RM PRIVATE

## 2023-02-15 PROCEDURE — 86160 COMPLEMENT ANTIGEN: CPT

## 2023-02-15 PROCEDURE — 86923 COMPATIBILITY TEST ELECTRIC: CPT

## 2023-02-15 PROCEDURE — 74011000250 HC RX REV CODE- 250: Performed by: INTERNAL MEDICINE

## 2023-02-15 PROCEDURE — 36430 TRANSFUSION BLD/BLD COMPNT: CPT

## 2023-02-15 PROCEDURE — 74011250637 HC RX REV CODE- 250/637: Performed by: STUDENT IN AN ORGANIZED HEALTH CARE EDUCATION/TRAINING PROGRAM

## 2023-02-15 RX ORDER — HYDROMORPHONE HYDROCHLORIDE 1 MG/ML
0.5 INJECTION, SOLUTION INTRAMUSCULAR; INTRAVENOUS; SUBCUTANEOUS
Status: DISCONTINUED | OUTPATIENT
Start: 2023-02-15 | End: 2023-02-16 | Stop reason: HOSPADM

## 2023-02-15 RX ORDER — SODIUM CHLORIDE 9 MG/ML
250 INJECTION, SOLUTION INTRAVENOUS AS NEEDED
Status: DISCONTINUED | OUTPATIENT
Start: 2023-02-15 | End: 2023-02-16 | Stop reason: HOSPADM

## 2023-02-15 RX ADMIN — POLYETHYLENE GLYCOL 3350 17 G: 17 POWDER, FOR SOLUTION ORAL at 15:38

## 2023-02-15 RX ADMIN — MEROPENEM 1 G: 1 INJECTION, POWDER, FOR SOLUTION INTRAVENOUS at 06:16

## 2023-02-15 RX ADMIN — SODIUM CHLORIDE, PRESERVATIVE FREE 10 ML: 5 INJECTION INTRAVENOUS at 21:00

## 2023-02-15 RX ADMIN — PREDNISONE 15 MG: 5 TABLET ORAL at 21:00

## 2023-02-15 RX ADMIN — METOPROLOL TARTRATE 25 MG: 25 TABLET, FILM COATED ORAL at 20:08

## 2023-02-15 RX ADMIN — SODIUM CHLORIDE, PRESERVATIVE FREE 10 ML: 5 INJECTION INTRAVENOUS at 06:16

## 2023-02-15 RX ADMIN — OXYCODONE AND ACETAMINOPHEN 1 TABLET: 5; 325 TABLET ORAL at 15:29

## 2023-02-15 RX ADMIN — RIOCIGUAT 2.5 MG: 2.5 TABLET, FILM COATED ORAL at 15:30

## 2023-02-15 RX ADMIN — HYDROMORPHONE HYDROCHLORIDE 0.5 MG: 1 INJECTION, SOLUTION INTRAMUSCULAR; INTRAVENOUS; SUBCUTANEOUS at 02:03

## 2023-02-15 RX ADMIN — HYDROMORPHONE HYDROCHLORIDE 0.5 MG: 1 INJECTION, SOLUTION INTRAMUSCULAR; INTRAVENOUS; SUBCUTANEOUS at 21:00

## 2023-02-15 RX ADMIN — RIOCIGUAT 2.5 MG: 2.5 TABLET, FILM COATED ORAL at 10:22

## 2023-02-15 RX ADMIN — AMBRISENTAN 5 MG: 5 TABLET, FILM COATED ORAL at 10:21

## 2023-02-15 RX ADMIN — OXYCODONE AND ACETAMINOPHEN 1 TABLET: 5; 325 TABLET ORAL at 06:22

## 2023-02-15 RX ADMIN — PANTOPRAZOLE SODIUM 40 MG: 40 TABLET, DELAYED RELEASE ORAL at 10:21

## 2023-02-15 RX ADMIN — GABAPENTIN 600 MG: 300 CAPSULE ORAL at 20:08

## 2023-02-15 RX ADMIN — OXYCODONE AND ACETAMINOPHEN 1 TABLET: 5; 325 TABLET ORAL at 10:21

## 2023-02-15 RX ADMIN — DULOXETINE HYDROCHLORIDE 30 MG: 30 CAPSULE, DELAYED RELEASE ORAL at 10:20

## 2023-02-15 RX ADMIN — DULOXETINE HYDROCHLORIDE 30 MG: 30 CAPSULE, DELAYED RELEASE ORAL at 20:08

## 2023-02-15 RX ADMIN — ACETAMINOPHEN 650 MG: 325 TABLET ORAL at 01:15

## 2023-02-15 RX ADMIN — RIOCIGUAT 2.5 MG: 2.5 TABLET, FILM COATED ORAL at 21:00

## 2023-02-15 RX ADMIN — GABAPENTIN 600 MG: 300 CAPSULE ORAL at 10:21

## 2023-02-15 RX ADMIN — SODIUM CHLORIDE, PRESERVATIVE FREE 10 ML: 5 INJECTION INTRAVENOUS at 15:32

## 2023-02-15 RX ADMIN — FOLIC ACID 2 MG: 1 TABLET ORAL at 10:20

## 2023-02-15 RX ADMIN — METOPROLOL TARTRATE 25 MG: 25 TABLET, FILM COATED ORAL at 10:21

## 2023-02-15 NOTE — PROGRESS NOTES
Spiritual Care Assessment/Progress Note  [facility]      NAME: Dyane Bence      MRN: [id number]  AGE: 34 y.o.  SEX: female  Congregation Affiliation: John   Language: English     2/15/2023     Total Time (in minutes): 56     Spiritual Assessment begun in [facility] through conversation with:         [x]Patient        [] Family    [] Friend(s)        Reason for Consult: Initial/Spiritual assessment, patient floor     Spiritual beliefs: (Please include comment if needed)     [x] Identifies with a mari tradition:   John     [x] Supported by a mari community: Justine           [] Claims no spiritual orientation:           [] Seeking spiritual identity:                [] Adheres to an individual form of spirituality:           [] Not able to assess:                           Identified resources for coping:      [x] Prayer                               [] Music                  [] Guided Imagery     [x] Family/friends                 [] Pet visits     [x] Devotional reading                         [] Unknown     [] Other:                                               Interventions offered during this visit: (See comments for more details)    Patient Interventions: Prayer (actual), Congregation beliefs/image of God discussed, Spiritual materials provided (comment), Initial/Spiritual assessment, patient floor, Affirmation of emotions/emotional suffering, Affirmation of mari, Coping skills reviewed/reinforced, Iconic (affirming the presence of God/Higher Power)           Plan of Care:     [] Support spiritual and/or cultural needs    [] Support AMD and/or advance care planning process      [] Support grieving process   [] Coordinate Rites and/or Rituals    [] Coordination with community clergy   [] No spiritual needs identified at this time   [] Detailed Plan of Care below (See Comments)  [] Make referral to Music Therapy  [] Make referral to Pet Therapy     [] Make referral to Addiction services  [] Make referral to [facility]  [] Make referral to Spiritual Care Partner  [] No future visits requested        [x] Contact Spiritual Care for further referrals     Comments: During this initial spiritual assessment, Ms. Orozco and I engaged in meaningful conversation regarding her mari. Prayer is of extreme importance to Ms. Orozco. 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 She has a strong support system which includes her parents and her [employer] family. Ms. Rajesh Lombardi requested healing scriptures of which I provided to her as requested. I provided a prayer, comforting presence, emotional support, words of affirmation and encouragement. Ms. Rajesh Lombardi was appreciative for the visit. I informed Ms. Orozco of the availability of the Chaplains should she need additional Spiritual Care. Rev.  Corina Pina, [address]

## 2023-02-15 NOTE — PROGRESS NOTES
Progress Note    Patient: Matt Leslie MRN: [id number]  SSN: [id number]    YOB: 1990  Age: 34 y.o. Sex: female      Admit Date: 2/3/2023    LOS: 12 days     Subjective:   Patient immunocompromised with SLE/RA, followed for right leg cellulitis. MRI showed abscess which has now been incised and drained with cultures pending. She remains afebrile with normal WBC and decreasing procal and her CRP is trending downward today. She has been seen by Rheumatology. Patient is lying in bed with no new complaints. Objective:     Vitals:    02/14/23 2009 02/14/23 2153 02/15/23 0201 02/15/23 0735   BP: 127/81  111/69 121/78   Pulse: (!) 116  92 70   Resp: 20  19 17   Temp: 98.8 °F (37.1 °C)  98.4 °F (36.9 °C) 97.9 °F (36.6 °C)   SpO2: 98%  97% 95%   Weight:  145 lb 8.1 oz (66 kg)     Height:            Intake and Output:  Current Shift: No intake/output data recorded. Last three shifts: 02/13 1901 - 02/15 0700  In: 750 [P.O.:500; I.V.:250]  Out: 1150 [Urine:1150]    Physical Exam:   Vitals and nursing note reviewed. Constitutional:       General: She is not in mild respiratory  distress. Appearance: She is ill-appearing. HENT: Nasal O2 cannula 2L/min  Eyes:      Pupils: Pupils are equal, round, and reactive to light. Cardiovascular:      Rate and Rhythm: Normal rate and regular rhythm. Heart sounds: No murmur heard. Pulmonary: clear bilaterally  Genitourinary:     Comments: No Mckeon  Musculoskeletal: .      Right lower leg with bulky gauze dressing at this time, not removed:       Left lower leg: No edema (multiple well-healed scars). Skin:     Findings: Erythema present. No rash. Neurological:      General: No focal deficit present. Mental Status: She is alert and oriented to person, place, and time.    Psychiatric: normal behavior    Lab/Data Review:     WBC 4,700      ESR >140 < 120 < >140  CRP 9.18 <10.60 <4.91 <2.73 <10.00 <3.52 <6.32 <10.20 <15.20 <23.30  Procal <0.05 <0.05 <0.08 <0.09 <0.11 <0.18 <0.29 <0.36    ASO Negative  Dnase B Ab Negative    Blood cultures (2/3) No growth FINAL  Wound culture right leg (2/12) No growth thus far  Wound culture right leg (2/12) No growth thus far    MRI right leg (2/10)   1. Large complex collection of popliteal subcutaneous fat overlying the  superficial fascia of the gastrocnemius and biceps femoris muscles, possibly  involving the biceps muscle. Areas of intermediate and increased signal on T1  images are shown. Increased T1 signal can be seen in the setting of hemorrhagic  or proteinaceous material. Ultrasound correlation and aspiration suggested based  on ultrasound imaging results. 2. Nonspecific elongated nonenhancing collection within proximal medial  gastrocnemius muscle. 3. Multifocal osteonecrosis. Assessment:     Active Problems:    Cellulitis (8/7/2021)  Cellulitis right lower extremity with abscess/hematoma, status post I&D, cultures pending, on IV Daptomycin Day #10 and Meropenem Day #6  Sepsis with fever, elevated CRP and procal  Elevated ESR  Immunosuppression on Plaquenil, Prednisone and Tofacitinib  Systemic Lupus Erythematosus  Rheumatoid arthritis  Allergies to Doxycycline and Vancomycin   8. ?Adverse reaction to Unasyn with SOB    Comment:   CRP now decreasing and procal remains normal.  Do not feel that is worsening infection. She is also being followed by Rheumatology and DsDNA ordered. Patient currently off Lena Tyra and Plaquenil. Plan:   Continue IV Daptomycin 6 mg/kg IV daily and Meropenem (if tissue cultures are negative tomorrow, would discontinue Meropenem)  In am, repeat CBC, procal and CRP  Follow-up CH50, C3, C4, DsDNA  Follow-up tissue cultures  5.   Otherwise, cleared for discharge from ID standpoint      Signed By: Alla Ramirez MD     February 15, 2023

## 2023-02-15 NOTE — PROGRESS NOTES
Problem: Falls - Risk of  Goal: *Absence of Falls  Description: Document Bony Peng Fall Risk and appropriate interventions in the flowsheet. Outcome: Progressing Towards Goal  Note: Fall Risk Interventions:                                Problem: Pressure Injury - Risk of  Goal: *Prevention of pressure injury  Description: Document Delbert Scale and appropriate interventions in the flowsheet.   Outcome: Progressing Towards Goal  Note: Pressure Injury Interventions:  Sensory Interventions: Float heels, Keep linens dry and wrinkle-free, Maintain/enhance activity level, Minimize linen layers    Moisture Interventions: Absorbent underpads, Internal/External urinary devices, Minimize layers    Activity Interventions: Assess need for specialty bed, Increase time out of bed    Mobility Interventions: Assess need for specialty bed, HOB 30 degrees or less, Pressure redistribution bed/mattress (bed type)    Nutrition Interventions: Document food/fluid/supplement intake    Friction and Shear Interventions: Apply protective barrier, creams and emollients, HOB 30 degrees or less, Minimize layers                Problem: Patient Education: Go to Patient Education Activity  Goal: Patient/Family Education  Outcome: Progressing Towards Goal     Problem: Patient Education: Go to Patient Education Activity  Goal: Patient/Family Education  Outcome: Progressing Towards Goal

## 2023-02-15 NOTE — ROUTINE PROCESS
Bedside shift change report given to Nani RN (oncoming nurse) by Fide Martinez RN (offgoing nurse). Report included the following information SBAR, Intake/Output, MAR, Recent Results, Alarm Parameters , and Quality Measures.

## 2023-02-15 NOTE — PROGRESS NOTES
Pt.s hemoglobin 6.6 today and pt. Will be receiving blood per RN. Will  see pt. After transfusion/at a later time.

## 2023-02-15 NOTE — PROGRESS NOTES
Hospitalist Progress Note            Daily Progress Note: 2/15/2023 9:28 AM  Hospital course:   Patient is a 33 yo female with history of systemic lupus, calcinosis and rheumatoid arthritis on treatment with Plaquenil, Prednisone, and Elliott Vero. She also has a history of pulmonary HTN and osteonecrosis. She presented to the ED with complaints of pains and swelling of the right lower extremity diagnosed as cellulitis and was admitted evaluation and treatment of cellulitis on 2/3/2023. Rosario Nielsmona She has been placed on antibiotics under the direction of infectious disease, which included IV meropenem and daptomycin. MRI shows right leg cellulitis subcutaneous fat. Rheumatology and Ortho PDX consulted. Patient underwent a I&D of right leg abscess hematoma by orthopedics on 2/12/2023. GI consulted because patient had a positive FOBT and lowering hemoglobin levels; they are currently recommending close monitoring. Subjective:     Seen and examined at bedside. She reports feeling fatigued. Denies other complaints at this time spoke at length about treatment plans and goals. Spoke to  at [phone]: He states that if she receives her bladder and is stable she can be discharged tomorrow with follow-up with [facility] as soon as she can. Assessment/Plan:   Active Problems:    Cellulitis (8/7/2021)  Right lower extremity cellulitis complicated with large fluid popliteal collection status post I&D day #1  --Patient does not meet sepsis criteria. Afebrile. --Patient on IV daptomycin meropenem (if tissue cultures are negative we will discontinue the meropenem). Infectious disease following  -MRI shows popliteal fluid collection. Status post I&D by orthopedics. --Wound VAC applied on 2/13/2023    Anemia of chronic disease  --Occult blood positive. -- Consult placed to GI ().  will continue to monitor   --Ordered and transfused 2 units of pRBCs  -- Per GI transfuse packed red blood cells as necessary to maintain hemoglobin if if stable tomorrow can discharge home with follow-up at [facility]. Patient wants to keep all her studies at [facility] currently. -- On Protonix         Pulmonary Hypertension  - Continue ambrisenan and riociguant    Lingual sore  --Viscous lidocaine    History of SLE  -Rheumatology consulted and following  -Prednisone        DVT Prophylaxis: Held secondary to possible bleed  Code Status: Full Code  POA/NOK:    This care involved high complexity medical decision making. I personally:  Reviewed the flow sheet and previous days notes  Reviewed and summarized records/history from previous days note or discussions with staff and family  Reviewed High Risk Drug therapy requiring intensive monitoring for toxicity to include but is not limited to steroids, pressors and antibiotics  Reviewed and/or clinical laboratory tests  Reviewed images and/or ordered radiology tests  Reviewed the patient's EKG/telemetry  Discussed my assessment/management with: Nursing, Patient, Family, and Hospitalist colleagues for coordination of care.      Disposition and discharge barriers:   IV abx  Intra op cultures  Clearance from [id number] discussed with: Patient, nursing, case management    Current Facility-Administered Medications   Medication Dose Route Frequency    0.9% sodium chloride infusion 250 mL  250 mL IntraVENous PRN    lidocaine (XYLOCAINE) 2 % viscous solution 15 mL  15 mL Mouth/Throat PRN    HYDROmorphone (DILAUDID) syringe 0.5 mg  0.5 mg IntraVENous Q4H PRN    predniSONE (DELTASONE) tablet 15 mg  15 mg Oral QHS    meropenem (MERREM) 1 g in 0.9% sodium chloride (MBP/ADV) 50 mL MBP  1 g IntraVENous Q8H    0.9% sodium chloride infusion 250 mL  250 mL IntraVENous PRN    metoprolol tartrate (LOPRESSOR) tablet 25 mg  25 mg Oral Q12H    diphenhydrAMINE (BENADRYL) capsule 25 mg  25 mg Oral Q6H PRN    DAPTOmycin (CUBICIN) 350 mg in 0.9% sodium chloride 50 mL IVPB  6 mg/kg (Adjusted) IntraVENous Q24H    riociguat (ADEMPAS) tablet 2.5 mg   2.5 mg Oral TID    DULoxetine (CYMBALTA) capsule 30 mg  30 mg Oral BID    folic acid (FOLVITE) tablet 2 mg  2 mg Oral DAILY    gabapentin (NEURONTIN) capsule 600 mg  600 mg Oral BID    [Held by provider] hydrOXYchloroQUINE (PLAQUENIL) tablet 200 mg  200 mg Oral DAILY    Ambrisentan tab 5 mg - Patient supplied (Patient Supplied)  5 mg Oral QAM    pantoprazole (PROTONIX) tablet 40 mg  40 mg Oral DAILY    [Held by provider] tofacitinib Tb24 1 Tablet  (Patient Supplied)  1 Tablet Oral DAILY    sodium chloride (NS) flush 5-40 mL  5-40 mL IntraVENous Q8H    sodium chloride (NS) flush 5-40 mL  5-40 mL IntraVENous PRN    acetaminophen (TYLENOL) tablet 650 mg  650 mg Oral Q6H PRN    Or    acetaminophen (TYLENOL) suppository 650 mg  650 mg Rectal Q6H PRN    polyethylene glycol (MIRALAX) packet 17 g  17 g Oral DAILY PRN    promethazine (PHENERGAN) 12.5 mg in 0.9% sodium chloride 50 mL IVPB  12.5 mg IntraVENous Q4H PRN    oxyCODONE-acetaminophen (PERCOCET) 5-325 mg per tablet 1 Tablet  1 Tablet Oral Q6H PRN    sodium chloride (NS) flush 5-10 mL  5-10 mL IntraVENous PRN        REVIEW OF SYSTEMS    Review of Systems   Constitutional:  Positive for malaise/fatigue. HENT:          Lingual sore   Respiratory:  Negative for cough, shortness of breath and wheezing. Cardiovascular:  Negative for chest pain and leg swelling. Gastrointestinal:  Negative for abdominal pain, heartburn, nausea and vomiting. Musculoskeletal:  Negative for back pain and myalgias. Neurological:  Negative for dizziness and headaches.       Objective:     Visit Vitals  /78 (BP 1 Location: Left upper arm, BP Patient Position: At rest;Lying left side)   Pulse 70   Temp 97.9 °F (36.6 °C)   Resp 17   Ht 4' 11\" (1.499 m)   Wt 66 kg (145 lb 8.1 oz)   SpO2 95%   BMI 29.39 kg/m²    O2 Flow Rate (L/min): 3 l/min O2 Device: None (Room air)    Temp (24hrs), Av.4 °F (36.9 °C), Min:97.9 °F (36.6 °C), Max:98.8 °F (37.1 °C)        PHYSICAL EXAM:    Physical Exam  Vitals and nursing note reviewed. Constitutional:       General: She is not in acute distress. Appearance: Normal appearance. She is ill-appearing. HENT:      Head: Normocephalic and atraumatic. Mouth/Throat:      Comments: Lingual sore. It seems as if patient may have bite their tongue  Cardiovascular:      Rate and Rhythm: Normal rate and regular rhythm. Heart sounds: No murmur heard. Pulmonary:      Effort: Pulmonary effort is normal. No respiratory distress. Breath sounds: Normal breath sounds. No wheezing. Comments: Nasal O2 cannula 2L/min  Abdominal:      General: Abdomen is flat. There is no distension. Palpations: Abdomen is soft. Tenderness: There is no abdominal tenderness. Skin:     Coloration: Skin is pale. Comments: R. Leg dressing in place. Neurological:      General: No focal deficit present. Mental Status: She is alert. Motor: Weakness present.         Data Review    Recent Results (from the past 24 hour(s))   CBC W/O DIFF    Collection Time: 02/15/23  7:51 AM   Result Value Ref Range    WBC 4.7 3.6 - 11.0 K/uL    RBC 2.40 (L) 3.80 - 5.20 M/uL    HGB 6.6 (L) 11.5 - 16.0 g/dL    HCT 22.4 (L) 35.0 - 47.0 %    MCV 93.3 80.0 - 99.0 FL    MCH 27.5 26.0 - 34.0 PG    MCHC 29.5 (L) 30.0 - 36.5 g/dL    RDW 20.3 (H) 11.5 - 14.5 %    PLATELET 678 502 - 314 K/uL    MPV 10.2 8.9 - 12.9 FL    NRBC 1.9 (H) 0.0  WBC    ABSOLUTE NRBC 0.09 (H) 0.00 - 3.62 K/uL   METABOLIC PANEL, BASIC    Collection Time: 02/15/23  7:51 AM   Result Value Ref Range    Sodium 135 (L) 136 - 145 mmol/L    Potassium 4.5 3.5 - 5.1 mmol/L    Chloride 102 97 - 108 mmol/L    CO2 30 21 - 32 mmol/L    Anion gap 3 (L) 5 - 15 mmol/L    Glucose 118 (H) 65 - 100 mg/dL    BUN 10 6 - 20 mg/dL    Creatinine 0.42 (L) 0.55 - 1.02 mg/dL    BUN/Creatinine ratio 24 (H) 12 - 20      eGFR >60 >60 ml/min/1.73m2    Calcium 8.7 8.5 - 10.1 mg/dL   C REACTIVE PROTEIN, QT    Collection Time: 02/15/23  7:51 AM   Result Value Ref Range    C-Reactive protein 9.18 (H) 0.00 - 0.60 mg/dL   PROCALCITONIN    Collection Time: 02/15/23  7:51 AM   Result Value Ref Range    Procalcitonin <0.05 (H) 0 ng/mL       MRI TIB/FIB RT W  WO CONT   Final Result   1. Large complex collection of popliteal subcutaneous fat overlying the   superficial fascia of the gastrocnemius and biceps femoris muscles, possibly   involving the biceps muscle. Areas of intermediate and increased signal on T1   images are shown. Increased T1 signal can be seen in the setting of hemorrhagic   or proteinaceous material. Ultrasound correlation and aspiration suggested based   on ultrasound imaging results. 2. Nonspecific elongated nonenhancing collection within proximal medial   gastrocnemius muscle. 3. Multifocal osteonecrosis. XR FEMUR RT 2 VS   Final Result   1. Extensive dermal calcifications   2. Possible AVN of the distal right femur. XR CHEST PORT   Final Result   1. Cardiomegaly and pulmonary vascular congestion. 2. Diffuse heterotopic soft tissue calcification. XR CHEST PA LAT   Final Result      No significant change. Cardiomegaly and chronic interstitial opacities. DUPLEX LOWER EXT VENOUS BILAT   Final Result      XR CHEST PORT   Final Result      Stable exam. Unchanged interstitial lung abnormalities and small pleural   effusions. Intake and Output:  Current Shift: No intake/output data recorded. Last three shifts: 02/13 1901 - 02/15 0700  In: 750 [P.O.:500;  I.V.:250]  Out: 1150 [Urine:1150]      Lab/Data Review:  Recent Labs     02/15/23  0751 02/14/23  0824 02/13/23  1111   WBC 4.7 7.0 5.4   HGB 6.6* 7.0* 7.0*   HCT 22.4* 23.2* 24.0*    301 327       Recent Labs     02/15/23  0751 02/14/23  0824 02/13/23  1111 02/12/23  1203   * 132* 137 136   K 4.5 4.2 4.1 4.4    99 102 104   CO2 30 30 30 29   * 97 107* 83   BUN 10 9 10 14   CREA 0.42* 0.38* 0.46* 0.27*   CA 8.7 8.6 8.9 8.6   MG  --   --   --  2.4       No results for input(s): PH, PCO2, PO2, HCO3, FIO2 in the last 72 hours. Recent Results (from the past 24 hour(s))   CBC W/O DIFF    Collection Time: 02/15/23  7:51 AM   Result Value Ref Range    WBC 4.7 3.6 - 11.0 K/uL    RBC 2.40 (L) 3.80 - 5.20 M/uL    HGB 6.6 (L) 11.5 - 16.0 g/dL    HCT 22.4 (L) 35.0 - 47.0 %    MCV 93.3 80.0 - 99.0 FL    MCH 27.5 26.0 - 34.0 PG    MCHC 29.5 (L) 30.0 - 36.5 g/dL    RDW 20.3 (H) 11.5 - 14.5 %    PLATELET 906 871 - 453 K/uL    MPV 10.2 8.9 - 12.9 FL    NRBC 1.9 (H) 0.0  WBC    ABSOLUTE NRBC 0.09 (H) 0.00 - 2.99 K/uL   METABOLIC PANEL, BASIC    Collection Time: 02/15/23  7:51 AM   Result Value Ref Range    Sodium 135 (L) 136 - 145 mmol/L    Potassium 4.5 3.5 - 5.1 mmol/L    Chloride 102 97 - 108 mmol/L    CO2 30 21 - 32 mmol/L    Anion gap 3 (L) 5 - 15 mmol/L    Glucose 118 (H) 65 - 100 mg/dL    BUN 10 6 - 20 mg/dL    Creatinine 0.42 (L) 0.55 - 1.02 mg/dL    BUN/Creatinine ratio 24 (H) 12 - 20      eGFR >60 >60 ml/min/1.73m2    Calcium 8.7 8.5 - 10.1 mg/dL   C REACTIVE PROTEIN, QT    Collection Time: 02/15/23  7:51 AM   Result Value Ref Range    C-Reactive protein 9.18 (H) 0.00 - 0.60 mg/dL   PROCALCITONIN    Collection Time: 02/15/23  7:51 AM   Result Value Ref Range    Procalcitonin <0.05 (H) 0 ng/mL             _____________________________________________________________________________  Time spent in direct care including coordination of service, review of data and examination: 40 minutes    ______________________________________________________________________________    ERICA Quintanilla    This is dictation was done by [employer] voice recognition software. Quite often unanticipated grammatical, syntax, homophones and other interpretive errors or inadvertently transcribed by the computer software. Please excuse errors that have escaped final proofreading. Thank you.

## 2023-02-15 NOTE — PROGRESS NOTES
11:48am   Chart reviewed. Patient has home health arranged with [facility]. IV abx are arranged through [facility] for Daptomycin only. Awaiting final recs to see if Mecca Mages is needed or not. IF so, prescription will be needed. Also, order for the wound vac was signed and has been faxed to [facility] at [phone]. Call placed to [facility] with [employer] at [phone]. No answer voice message left requesting returned call to find out if order received and how long it will take for approval. CM will continue to follow. 3:44pm  CM has not yet received clarification on if IV merrem is also needed. Also, have attempted to contact [facility] with [employer] multiple times at [phone] with no answer. Voice messages left. Fax was confirmed as sent. Wesly Andrews with wound care informed CM that Dr. Tammy Phillip wanted the patient to follow up with the wound care clinic in addition to home health. YVETTE has left a voice message with our wound care clinic at [phone] requesting call back to arrange an appointment.

## 2023-02-15 NOTE — MED STUDENT NOTES
Gastroenterology Consult     Referring Physician: Gareth Carcamo MD     Consult Date: 2/14/2023     Subjective:     Chief Complaint: Low Hgb    History of Present Illness: Dyane Bence is a 34 y.o. female who is seen in consultation for low Hgb and positive FOBT. Patient was admitted to the hospital with cellulitis    Patient has a history of osteonecrosis, rheumatoid arthritis, calcinosis, and systemic lupus who was admitted 11 days ago for cellulitis. Patient is referred to GI for low Hgb and positive FOBT on 2/11/23. Patient reports last endoscopy at a young age and colonoscopy last year which was significant for polyps. Patient is seen and examined in [facility]. Denies aspirin or anticoagulant use. Family history is significant for colon cancer in her grandmother. Denies abdominal pain, nausea, vomiting, blood in stool. Confirms last bowel movement 2 days ago and is soft in consistency.     Today's labs: Hgb stable (7.0)    Past Medical History:   Diagnosis Date    Lupus ([facility] [address]     Osteonecrosis (H[facility])     Pulmonary HTN (HCC)     RA (rheumatoid arthritis) ([facility]      Past Surgical History:   Procedure Laterality Date    HX CYST REMOVAL      HX ORTHOPAEDIC      x2 left knee sxs    HX ORTHOPAEDIC      bilateral ankle sxs      Family History   Problem Relation Age of Onset    Hypertension Mother     Cancer Paternal Grandmother      Social History     Tobacco Use    Smoking status: Never    Smokeless tobacco: Never   Substance Use Topics    Alcohol use: Never      Allergies   Allergen Reactions    Doxycycline Nausea and Vomiting    Heparin Anaphylaxis    Remicade [Infliximab] Anaphylaxis    Vancomycin Hives    Rituximab Itching     Current Facility-Administered Medications   Medication Dose Route Frequency    lidocaine (XYLOCAINE) 2 % viscous solution 15 mL  15 mL Mouth/Throat PRN    HYDROmorphone (DILAUDID) syringe 0.5 mg  0.5 mg IntraVENous Q4H PRN    predniSONE (DELTASONE) tablet 15 mg  15 mg Oral QHS meropenem (MERREM) 1 g in 0.9% sodium chloride (MBP/ADV) 50 mL MBP  1 g IntraVENous Q8H    0.9% sodium chloride infusion 250 mL  250 mL IntraVENous PRN    metoprolol tartrate (LOPRESSOR) tablet 25 mg  25 mg Oral Q12H    diphenhydrAMINE (BENADRYL) capsule 25 mg  25 mg Oral Q6H PRN    DAPTOmycin (CUBICIN) 350 mg in 0.9% sodium chloride 50 mL IVPB  6 mg/kg (Adjusted) IntraVENous Q24H    riociguat (ADEMPAS) tablet 2.5 mg   2.5 mg Oral TID    DULoxetine (CYMBALTA) capsule 30 mg  30 mg Oral BID    folic acid (FOLVITE) tablet 2 mg  2 mg Oral DAILY    gabapentin (NEURONTIN) capsule 600 mg  600 mg Oral BID    [Held by provider] hydrOXYchloroQUINE (PLAQUENIL) tablet 200 mg  200 mg Oral DAILY    Ambrisentan tab 5 mg - Patient supplied (Patient Supplied)  5 mg Oral QAM    pantoprazole (PROTONIX) tablet 40 mg  40 mg Oral DAILY    [Held by provider] tofacitinib Tb24 1 Tablet  (Patient Supplied)  1 Tablet Oral DAILY    sodium chloride (NS) flush 5-40 mL  5-40 mL IntraVENous Q8H    sodium chloride (NS) flush 5-40 mL  5-40 mL IntraVENous PRN    acetaminophen (TYLENOL) tablet 650 mg  650 mg Oral Q6H PRN    Or    acetaminophen (TYLENOL) suppository 650 mg  650 mg Rectal Q6H PRN    polyethylene glycol (MIRALAX) packet 17 g  17 g Oral DAILY PRN    promethazine (PHENERGAN) 12.5 mg in 0.9% sodium chloride 50 mL IVPB  12.5 mg IntraVENous Q4H PRN    oxyCODONE-acetaminophen (PERCOCET) 5-325 mg per tablet 1 Tablet  1 Tablet Oral Q6H PRN    sodium chloride (NS) flush 5-10 mL  5-10 mL IntraVENous PRN        Review of Systems:  A detailed 10 organ review of systems is obtained with pertinent positives as listed in the History of Present Illness and Past Medical History. All others are negative.     Objective:     Physical Exam:  Visit Vitals  /81 (BP 1 Location: Left upper arm, BP Patient Position: At rest)   Pulse (!) 116   Temp 98.8 °F (37.1 °C)   Resp 18   Ht 4' 11\" (1.499 m)   Wt 64.8 kg (142 lb 13.7 oz)   SpO2 98%   BMI 28.85 kg/m² Skin:  Extremities and face reveal no rashes. No cormier erythema. No telangiectasias on the chest wall. HEENT: Sclerae anicteric. Extra-occular muscles are intact. No oral ulcers. No abnormal pigmentation of the lips. The neck is supple. Cardiovascular: Regular rate and rhythm. No murmurs, gallops, or rubs. PMI nondisplaced. Carotids without bruits. Respiratory:  Comfortable breathing with no accessory muscle use. Clear breath sounds with no wheezes, rales, or rhonchi. GI:  Abdomen distended, hard, and nontender. Calcified nodules were found underneath the skin of the generalized abdominal area. Hypoactive active bowel sounds. No enlargement of the liver or spleen. No masses palpable. Rectal:  Deferred  Musculoskeletal:  No pitting edema of the lower legs. Extremities have good range of motion. No costovertebral tenderness. Neurological:  Gross memory appears intact. Patient is alert and oriented. Psychiatric:  Mood appears appropriate with judgement intact. Lymphatic:  No cervical or supraclavicular adenopathy.     Lab/Data Review:  Recent Results (from the past 24 hour(s))   CBC W/O DIFF    Collection Time: 02/14/23  8:24 AM   Result Value Ref Range    WBC 7.0 3.6 - 11.0 K/uL    RBC 2.52 (L) 3.80 - 5.20 M/uL    HGB 7.0 (L) 11.5 - 16.0 g/dL    HCT 23.2 (L) 35.0 - 47.0 %    MCV 92.1 80.0 - 99.0 FL    MCH 27.8 26.0 - 34.0 PG    MCHC 30.2 30.0 - 36.5 g/dL    RDW 21.0 (H) 11.5 - 14.5 %    PLATELET 479 233 - 835 K/uL    MPV 10.3 8.9 - 12.9 FL    NRBC 2.0 (H) 0.0  WBC    ABSOLUTE NRBC 0.14 (H) 0.00 - 6.44 K/uL   METABOLIC PANEL, BASIC    Collection Time: 02/14/23  8:24 AM   Result Value Ref Range    Sodium 132 (L) 136 - 145 mmol/L    Potassium 4.2 3.5 - 5.1 mmol/L    Chloride 99 97 - 108 mmol/L    CO2 30 21 - 32 mmol/L    Anion gap 3 (L) 5 - 15 mmol/L    Glucose 97 65 - 100 mg/dL    BUN 9 6 - 20 mg/dL    Creatinine 0.38 (L) 0.55 - 1.02 mg/dL    BUN/Creatinine ratio 24 (H) 12 - 20      eGFR >60 >60 ml/min/1.73m2    Calcium 8.6 8.5 - 10.1 mg/dL   C REACTIVE PROTEIN, QT    Collection Time: 02/14/23  8:24 AM   Result Value Ref Range    C-Reactive protein 10.60 (H) 0.00 - 0.60 mg/dL        MRI TIB/FIB RT W  WO CONT   Final Result   1. Large complex collection of popliteal subcutaneous fat overlying the   superficial fascia of the gastrocnemius and biceps femoris muscles, possibly   involving the biceps muscle. Areas of intermediate and increased signal on T1   images are shown. Increased T1 signal can be seen in the setting of hemorrhagic   or proteinaceous material. Ultrasound correlation and aspiration suggested based   on ultrasound imaging results. 2. Nonspecific elongated nonenhancing collection within proximal medial   gastrocnemius muscle. 3. Multifocal osteonecrosis. XR FEMUR RT 2 VS   Final Result   1. Extensive dermal calcifications   2. Possible AVN of the distal right femur. XR CHEST PORT   Final Result   1. Cardiomegaly and pulmonary vascular congestion. 2. Diffuse heterotopic soft tissue calcification. XR CHEST PA LAT   Final Result      No significant change. Cardiomegaly and chronic interstitial opacities. DUPLEX LOWER EXT VENOUS BILAT   Final Result      XR CHEST PORT   Final Result      Stable exam. Unchanged interstitial lung abnormalities and small pleural   effusions. Assessment/Plan:     Active Problems:    Cellulitis (8/7/2021)  -I have discussed the findings with the patient and would probably wait for improvement in her current illness and cellulitis. Patient wishes to keep all her studies at [facility] and it would not be an appropriate for her to get endoscopic evaluation at [facility].       Transfuse pRBC as necessary to maintain Hgb  -If patient has overt bleeding will consider endoscopic evaluation    IP CONSULT TO INFECTIOUS DISEASES  IP CONSULT TO RHEUMATOLOGY  IP CONSULT TO GASTROENTEROLOGY  IP CONSULT TO CARDIOLOGY  IP CONSULT TO ORTHOPEDIC SURGERY    Thank you for allowing me to participate in this patients care  Cc Referring Physician   Eddie Diaz MD            *ATTENTION:  This note has been created by a medical student for educational purposes only. Please do not refer to the content of this note for clinical decision-making, billing, or other purposes. Please see attending physicians note to obtain clinical information on this patient. *

## 2023-02-15 NOTE — WOUND CARE
Per phone call with Dr. Hernández Parents at approximately [phone], he would like the patient to be followed at the [address] [place] [address] for regular wound management of right leg wound with wound vac. I relayed this request to [address] [place].

## 2023-02-15 NOTE — PROGRESS NOTES
Progress Note  Date:2/15/2023       Room:[facility]  Patient Name:Ric Orozco     YOB: 1990     Age:32 y.o. This is a 33 yo female with history of systemic lupus and calcinosis treated with Plaquenil, Prednisone, and Jeanie [place]. She also has a history of pulmonary HTN and osteonecrosis. She presented to the ED with complaints of pains and swelling of the right lower extremity diagnosed as cellulitis and was admitted evaluation and treatment of cellulitis. She is s/p and I&D of an infected hematoma performed 2023. She is on antibiotics under the direction of infectious disease. Today she reports she has had pain and her right thigh and lower leg that's well controlled with prn pain medications. She states had an episode of foot pain and tingling last night that resolved with prn pain medications and is has recurred today. She denies joint pain and swelling elsewhere. She reports some stiffness when she is in the bed for prolonged periods. She reports a mouth sore. She denies sicca symptoms, SOB, CP, nasal sores or rashes. Subjective    Subjective:  Symptoms:  No shortness of breath, cough, chest pain or diarrhea. Diet:  No nausea or vomiting. Review of Systems   Constitutional:  Negative for chills and fever. HENT:  Positive for mouth sores. Negative for sore throat and trouble swallowing. Eyes:  Negative for pain and redness. Respiratory:  Negative for cough and shortness of breath. Cardiovascular:  Negative for chest pain. Gastrointestinal:  Negative for abdominal pain, constipation, diarrhea, nausea and vomiting. Genitourinary:  Negative for dysuria and hematuria. Musculoskeletal:  Negative for arthralgias and joint swelling. Skin:  Negative for rash. Hematological:  Bruises/bleeds easily.    Objective         Vitals Last 24 Hours:  TEMPERATURE:  Temp  Av.4 °F (36.9 °C)  Min: 97.5 °F (36.4 °C)  Max: 98.9 °F (37.2 °C)  RESPIRATIONS RANGE: Resp  Av.3  Min: 17  Max: 20  PULSE OXIMETRY RANGE: SpO2  Av.2 %  Min: 92 %  Max: 98 %  PULSE RANGE: Pulse  Av.8  Min: 70  Max: 116  BLOOD PRESSURE RANGE: Systolic (02ZDB), OZT:439 , Min:111 , WNO:093   ; Diastolic (37ZYC), RYN:90, Min:69, Max:81    I/O (24Hr): Intake/Output Summary (Last 24 hours) at 2/15/2023 1734  Last data filed at 2/15/2023 1657  Gross per 24 hour   Intake 350 ml   Output 650 ml   Net -300 ml       Objective:  General Appearance: In no acute distress. Vital signs: (most recent): Blood pressure 132/80, pulse (!) 108, temperature 98.9 °F (37.2 °C), resp. rate 18, height 4' 11\" (1.499 m), weight 66 kg (145 lb 8.1 oz), SpO2 94 %. No fever. HEENT: (Sore on left of tongue)    Lungs:  Normal effort and normal respiratory rate. Breath sounds clear to auscultation. Heart: Normal rate. Abdomen: Abdomen is soft. Bowel sounds are normal.   There is no abdominal tenderness. Extremities: (Right lower extremity swelling improved  Right lateral knee tender  Right ankle tender  Left MTPs tender  Tapered digits)  Neurological: Patient is alert. Pupils:  Pupils are equal, round, and reactive to light. Skin:  Warm and dry. (Calcifications below skin on bilateral lower extremities  Hyperpigmentation lower extremities  Scattered bruising left upper arm  Dressing to right knee D&I)  Labs/Imaging/Diagnostics    Labs:  CBC:  Recent Labs     02/15/23  07523  0823  1111   WBC 4.7 7.0 5.4   RBC 2.40* 2.52* 2.54*   HGB 6.6* 7.0* 7.0*   HCT 22.4* 23.2* 24.0*   MCV 93.3 92.1 94.5   RDW 20.3* 21.0* 20.3*    301 327       CHEMISTRIES:  Recent Labs     02/15/23  07523  0823  1111   * 132* 137   K 4.5 4.2 4.1    99 102   CO2 30 30 30   BUN 10 9 10   CA 8.7 8.6 8.9     PT/INR:No results for input(s): INR, INREXT, INREXT in the last 72 hours.     No lab exists for component: PROTIME  APTT:No results for input(s): APTT in the last 72 hours. LIVER PROFILE:No results for input(s): AST, ALT in the last 72 hours. No lab exists for component: Shruthi Host, ALKPHOS  Lab Results   Component Value Date/Time    ALT (SGPT) 74 02/07/2023 06:06 AM    AST (SGOT) 48 (H) 02/07/2023 06:06 AM    Alk. phosphatase 260 (H) 02/07/2023 06:06 AM    Bilirubin, direct 0.1 12/04/2021 08:50 PM    Bilirubin, total 0.4 02/07/2023 06:06 AM       Imaging Last 24 Hours:  No results found. Assessment//Plan     Assessment & Plan    This is a 35 yo female with history of systemic lupus and rheumatoid arthritis admitted for right lower extremity cellulitis on treatment with antibiotics. She is s/p and I&D of an infected hematoma performed yesterday that resulted in 400cc of blood clots and hematoma and we have ordered lupus anticoagulant, which is normal, and phospholipid antibodies that are pending. Labs for lupus are pending as well and we will monitor for the results. Direct JINA positive and JINA by immunofluorescence has been ordered and is pending RF and CCP are normal. Patient reports a history of calcinosis and SCL 70 and Centromere Ab are normal as well. Acute phase reactants have been elevated, which are likely due to infection. Ledora Boy and Plaquenil are being held while she is being treated for an infections and she is now on Prednisone to 15mg every day. Patient denies joint pain and swelling other than at the site of her I&D. Patient has tongue sore. Complements and DsDNA ordered and are pending to monitor for lupus disease activity. This case was reviewed and discussed with Dr. Janeen Winston.      Franklin Doctor, ANP  [facility]  Rheumatology        Electronically signed by Denice Hernandez NP on 2/15/2023 at 5:25 PM

## 2023-02-16 VITALS
HEIGHT: 59 IN | WEIGHT: 147.71 LBS | RESPIRATION RATE: 18 BRPM | BODY MASS INDEX: 29.78 KG/M2 | DIASTOLIC BLOOD PRESSURE: 85 MMHG | TEMPERATURE: 98.3 F | HEART RATE: 95 BPM | SYSTOLIC BLOOD PRESSURE: 138 MMHG | OXYGEN SATURATION: 95 %

## 2023-02-16 LAB
ABO + RH BLD: NORMAL
ANION GAP SERPL CALC-SCNC: 3 MMOL/L (ref 5–15)
BACTERIA SPEC CULT: NORMAL
BLD PROD TYP BPU: NORMAL
BLD PROD TYP BPU: NORMAL
BLOOD GROUP ANTIBODIES SERPL: NEGATIVE
BPU ID: NORMAL
BPU ID: NORMAL
BUN SERPL-MCNC: 9 MG/DL (ref 6–20)
BUN/CREAT SERPL: 21 (ref 12–20)
C3 SERPL-MCNC: 167 MG/DL (ref 82–167)
C4 SERPL-MCNC: 34 MG/DL (ref 12–38)
CA-I BLD-MCNC: 8.9 MG/DL (ref 8.5–10.1)
CHLORIDE SERPL-SCNC: 103 MMOL/L (ref 97–108)
CO2 SERPL-SCNC: 30 MMOL/L (ref 21–32)
CREAT SERPL-MCNC: 0.43 MG/DL (ref 0.55–1.02)
CROSSMATCH RESULT,%XM: NORMAL
CROSSMATCH RESULT,%XM: NORMAL
DSDNA AB SER-ACNC: 5 IU/ML (ref 0–9)
ERYTHROCYTE [DISTWIDTH] IN BLOOD BY AUTOMATED COUNT: 18.7 % (ref 11.5–14.5)
GLUCOSE SERPL-MCNC: 108 MG/DL (ref 65–100)
GRAM STN SPEC: NORMAL
GRAM STN SPEC: NORMAL
HCT VFR BLD AUTO: 30.6 % (ref 35–47)
HGB BLD-MCNC: 9.6 G/DL (ref 11.5–16)
MCH RBC QN AUTO: 28.2 PG (ref 26–34)
MCHC RBC AUTO-ENTMCNC: 31.4 G/DL (ref 30–36.5)
MCV RBC AUTO: 90 FL (ref 80–99)
NRBC # BLD: 0.15 K/UL (ref 0–0.01)
NRBC BLD-RTO: 4.2 PER 100 WBC
PLATELET # BLD AUTO: 311 K/UL (ref 150–400)
PMV BLD AUTO: 10.1 FL (ref 8.9–12.9)
POTASSIUM SERPL-SCNC: 4.5 MMOL/L (ref 3.5–5.1)
RBC # BLD AUTO: 3.4 M/UL (ref 3.8–5.2)
SODIUM SERPL-SCNC: 136 MMOL/L (ref 136–145)
SPECIAL REQUESTS,SREQ: NORMAL
SPECIMEN EXP DATE BLD: NORMAL
STATUS OF UNIT,%ST: NORMAL
STATUS OF UNIT,%ST: NORMAL
TRANSFUSION STATUS PATIENT QL: NORMAL
TRANSFUSION STATUS PATIENT QL: NORMAL
UNIT DIVISION, %UDIV: 0
UNIT DIVISION, %UDIV: 0
WBC # BLD AUTO: 3.6 K/UL (ref 3.6–11)

## 2023-02-16 PROCEDURE — 74011250636 HC RX REV CODE- 250/636: Performed by: STUDENT IN AN ORGANIZED HEALTH CARE EDUCATION/TRAINING PROGRAM

## 2023-02-16 PROCEDURE — 36415 COLL VENOUS BLD VENIPUNCTURE: CPT

## 2023-02-16 PROCEDURE — 74011000258 HC RX REV CODE- 258: Performed by: STUDENT IN AN ORGANIZED HEALTH CARE EDUCATION/TRAINING PROGRAM

## 2023-02-16 PROCEDURE — 74011000258 HC RX REV CODE- 258: Performed by: INTERNAL MEDICINE

## 2023-02-16 PROCEDURE — 74011000250 HC RX REV CODE- 250: Performed by: INTERNAL MEDICINE

## 2023-02-16 PROCEDURE — 74011250636 HC RX REV CODE- 250/636: Performed by: INTERNAL MEDICINE

## 2023-02-16 PROCEDURE — 74011250637 HC RX REV CODE- 250/637: Performed by: INTERNAL MEDICINE

## 2023-02-16 PROCEDURE — 80048 BASIC METABOLIC PNL TOTAL CA: CPT

## 2023-02-16 PROCEDURE — 51798 US URINE CAPACITY MEASURE: CPT

## 2023-02-16 PROCEDURE — 99232 SBSQ HOSP IP/OBS MODERATE 35: CPT | Performed by: INTERNAL MEDICINE

## 2023-02-16 PROCEDURE — 85027 COMPLETE CBC AUTOMATED: CPT

## 2023-02-16 PROCEDURE — 74011250636 HC RX REV CODE- 250/636: Performed by: HOSPITALIST

## 2023-02-16 PROCEDURE — 74011250637 HC RX REV CODE- 250/637: Performed by: STUDENT IN AN ORGANIZED HEALTH CARE EDUCATION/TRAINING PROGRAM

## 2023-02-16 PROCEDURE — 97530 THERAPEUTIC ACTIVITIES: CPT

## 2023-02-16 RX ORDER — OXYCODONE AND ACETAMINOPHEN 5; 325 MG/1; MG/1
1 TABLET ORAL
Qty: 12 TABLET | Refills: 0 | Status: SHIPPED | OUTPATIENT
Start: 2023-02-16 | End: 2023-02-19

## 2023-02-16 RX ADMIN — AMBRISENTAN 5 MG: 5 TABLET, FILM COATED ORAL at 09:37

## 2023-02-16 RX ADMIN — HYDROMORPHONE HYDROCHLORIDE 0.5 MG: 1 INJECTION, SOLUTION INTRAMUSCULAR; INTRAVENOUS; SUBCUTANEOUS at 05:10

## 2023-02-16 RX ADMIN — MEROPENEM 1 G: 1 INJECTION, POWDER, FOR SOLUTION INTRAVENOUS at 12:14

## 2023-02-16 RX ADMIN — PANTOPRAZOLE SODIUM 40 MG: 40 TABLET, DELAYED RELEASE ORAL at 09:35

## 2023-02-16 RX ADMIN — HYDROMORPHONE HYDROCHLORIDE 0.5 MG: 1 INJECTION, SOLUTION INTRAMUSCULAR; INTRAVENOUS; SUBCUTANEOUS at 09:41

## 2023-02-16 RX ADMIN — HYDROMORPHONE HYDROCHLORIDE 0.5 MG: 1 INJECTION, SOLUTION INTRAMUSCULAR; INTRAVENOUS; SUBCUTANEOUS at 18:05

## 2023-02-16 RX ADMIN — DULOXETINE HYDROCHLORIDE 30 MG: 30 CAPSULE, DELAYED RELEASE ORAL at 09:35

## 2023-02-16 RX ADMIN — HYDROMORPHONE HYDROCHLORIDE 0.5 MG: 1 INJECTION, SOLUTION INTRAMUSCULAR; INTRAVENOUS; SUBCUTANEOUS at 14:22

## 2023-02-16 RX ADMIN — SODIUM CHLORIDE, PRESERVATIVE FREE 10 ML: 5 INJECTION INTRAVENOUS at 05:05

## 2023-02-16 RX ADMIN — OXYCODONE AND ACETAMINOPHEN 1 TABLET: 5; 325 TABLET ORAL at 00:45

## 2023-02-16 RX ADMIN — MEROPENEM 1 G: 1 INJECTION, POWDER, FOR SOLUTION INTRAVENOUS at 00:46

## 2023-02-16 RX ADMIN — FOLIC ACID 2 MG: 1 TABLET ORAL at 09:34

## 2023-02-16 RX ADMIN — METOPROLOL TARTRATE 25 MG: 25 TABLET, FILM COATED ORAL at 09:34

## 2023-02-16 RX ADMIN — DAPTOMYCIN 350 MG: 500 INJECTION, POWDER, LYOPHILIZED, FOR SOLUTION INTRAVENOUS at 00:46

## 2023-02-16 RX ADMIN — RIOCIGUAT 2.5 MG: 2.5 TABLET, FILM COATED ORAL at 09:35

## 2023-02-16 NOTE — PROGRESS NOTES
Problem: Falls - Risk of  Goal: *Absence of Falls  Description: Document Jerri Adams Fall Risk and appropriate interventions in the flowsheet. Outcome: Progressing Towards Goal  Note: Fall Risk Interventions:                                Problem: Pressure Injury - Risk of  Goal: *Prevention of pressure injury  Description: Document Delbert Scale and appropriate interventions in the flowsheet. Outcome: Progressing Towards Goal  Note: Pressure Injury Interventions:  Sensory Interventions: Float heels, Keep linens dry and wrinkle-free, Maintain/enhance activity level, Minimize linen layers, Turn and reposition approx. every two hours (pillows and wedges if needed), Use 30-degree side-lying position    Moisture Interventions: Absorbent underpads, Minimize layers, Maintain skin hydration (lotion/cream)    Activity Interventions: PT/OT evaluation, Increase time out of bed    Mobility Interventions: PT/OT evaluation, HOB 30 degrees or less, Turn and reposition approx.  every two hours(pillow and wedges)    Nutrition Interventions: Document food/fluid/supplement intake    Friction and Shear Interventions: Minimize layers, HOB 30 degrees or less                Problem: Patient Education: Go to Patient Education Activity  Goal: Patient/Family Education  Outcome: Progressing Towards Goal     Problem: Patient Education: Go to Patient Education Activity  Goal: Patient/Family Education  Outcome: Progressing Towards Goal     Problem: Pain  Goal: *Control of Pain  Outcome: Progressing Towards Goal

## 2023-02-16 NOTE — ROUTINE PROCESS
Bedside shift change report given to [address] [place] [address] (oncoming nurse) by Taylor Booth RN (offgoing nurse). Report included the following information SBAR, MAR, Recent Results, and Quality Measures.

## 2023-02-16 NOTE — PROGRESS NOTES
Hospitalist Progress Note            Daily Progress Note: 2/16/2023 9:28 AM  Hospital course:   Patient is a 35 yo female with history of systemic lupus, calcinosis and rheumatoid arthritis on treatment with Plaquenil, Prednisone, and Jerrald Gail. She also has a history of pulmonary HTN and osteonecrosis. She presented to the ED with complaints of pains and swelling of the right lower extremity diagnosed as cellulitis and was admitted evaluation and treatment of cellulitis on 2/3/2023. She has been placed on antibiotics under the direction of infectious disease, which included IV meropenem and daptomycin. MRI shows right leg cellulitis subcutaneous fat. Rheumatology and Ortho PDX consulted. Patient underwent a I&D of right leg abscess hematoma by orthopedics on 2/12/2023. GI consulted because patient had a positive FOBT, however through shared decision patient wishes to keep all her studies at [address] [place] [address]. Patient received 2 units of pRBCs for low Hgb. Subjective:     Seen and examined at bedside. Denies other complaints at this time spoke at length about treatment plans and goals. Spoke to patient about close follow-up with Rheumatologist and GI doctor. Spoke w/ Dr. Sanjana Stephenson patient to continue only Daptomycin as discharge  Assessment/Plan:   Active Problems:    Cellulitis (8/7/2021)  Right lower extremity cellulitis complicated with large fluid popliteal collection status post I&D day #1  --Patient does not meet sepsis criteria. Afebrile. --Patient on IV daptomycin. Infectious disease following  -MRI shows popliteal fluid collection. Status post I&D by orthopedics. --Wound VAC applied on 2/13/2023    Anemia of chronic disease  --Occult blood positive. -- Consult placed to GI (). will continue to monitor   --Ordered and transfused 2 units of pRBCs  -- Per GI transfuse packed red blood cells as necessary to maintain hemoglobin if if stable tomorrow can discharge home with follow-up at [address] [place] [address].   Patient wants to keep all her studies at [facility] currently. -- On Protonix         Pulmonary Hypertension  - Continue ambrisenan and riociguant    Lingual sore  --Viscous lidocaine    History of SLE  -Rheumatology consulted and following  -Prednisone        DVT Prophylaxis: Held secondary to possible bleed  Code Status: Full Code  POA/NOK:    This care involved high complexity medical decision making. I personally:  Reviewed the flow sheet and previous days notes  Reviewed and summarized records/history from previous days note or discussions with staff and family  Reviewed High Risk Drug therapy requiring intensive monitoring for toxicity to include but is not limited to steroids, pressors and antibiotics  Reviewed and/or clinical laboratory tests  Reviewed images and/or ordered radiology tests  Reviewed the patient's EKG/telemetry  Discussed my assessment/management with: Nursing, Patient, Family, and Hospitalist colleagues for coordination of care.      Disposition and discharge barriers:   IV abx  Intra op cultures  Clearance from [address] discussed with: Patient, nursing, case management    Current Facility-Administered Medications   Medication Dose Route Frequency    0.9% sodium chloride infusion 250 mL  250 mL IntraVENous PRN    HYDROmorphone (DILAUDID) syringe 0.5 mg  0.5 mg IntraVENous Q4H PRN    lidocaine (XYLOCAINE) 2 % viscous solution 15 mL  15 mL Mouth/Throat PRN    predniSONE (DELTASONE) tablet 15 mg  15 mg Oral QHS    meropenem (MERREM) 1 g in 0.9% sodium chloride (MBP/ADV) 50 mL MBP  1 g IntraVENous Q8H    0.9% sodium chloride infusion 250 mL  250 mL IntraVENous PRN    metoprolol tartrate (LOPRESSOR) tablet 25 mg  25 mg Oral Q12H    diphenhydrAMINE (BENADRYL) capsule 25 mg  25 mg Oral Q6H PRN    DAPTOmycin (CUBICIN) 350 mg in 0.9% sodium chloride 50 mL IVPB  6 mg/kg (Adjusted) IntraVENous Q24H    riociguat (ADEMPAS) tablet 2.5 mg   2.5 mg Oral TID    DULoxetine (CYMBALTA) capsule 30 mg  30 mg Oral BID    folic acid (FOLVITE) tablet 2 mg  2 mg Oral DAILY    gabapentin (NEURONTIN) capsule 600 mg  600 mg Oral BID    [Held by provider] hydrOXYchloroQUINE (PLAQUENIL) tablet 200 mg  200 mg Oral DAILY    Ambrisentan tab 5 mg - Patient supplied (Patient Supplied)  5 mg Oral QAM    pantoprazole (PROTONIX) tablet 40 mg  40 mg Oral DAILY    [Held by provider] tofacitinib Tb24 1 Tablet  (Patient Supplied)  1 Tablet Oral DAILY    sodium chloride (NS) flush 5-40 mL  5-40 mL IntraVENous Q8H    sodium chloride (NS) flush 5-40 mL  5-40 mL IntraVENous PRN    acetaminophen (TYLENOL) tablet 650 mg  650 mg Oral Q6H PRN    Or    acetaminophen (TYLENOL) suppository 650 mg  650 mg Rectal Q6H PRN    polyethylene glycol (MIRALAX) packet 17 g  17 g Oral DAILY PRN    promethazine (PHENERGAN) 12.5 mg in 0.9% sodium chloride 50 mL IVPB  12.5 mg IntraVENous Q4H PRN    oxyCODONE-acetaminophen (PERCOCET) 5-325 mg per tablet 1 Tablet  1 Tablet Oral Q6H PRN    sodium chloride (NS) flush 5-10 mL  5-10 mL IntraVENous PRN        REVIEW OF SYSTEMS    Review of Systems   Constitutional:  Positive for malaise/fatigue. HENT:          Lingual sore   Respiratory:  Negative for cough, shortness of breath and wheezing. Cardiovascular:  Negative for chest pain and leg swelling. Gastrointestinal:  Negative for abdominal pain, heartburn, nausea and vomiting. Musculoskeletal:  Negative for back pain and myalgias. Neurological:  Negative for dizziness and headaches. Objective:     Visit Vitals  /83 (BP 1 Location: Left upper arm, BP Patient Position: At rest)   Pulse 90   Temp 98.2 °F (36.8 °C)   Resp 18   Ht 4' 11\" (1.499 m)   Wt 67 kg (147 lb 11.3 oz)   SpO2 94%   BMI 29.83 kg/m²    O2 Flow Rate (L/min): 3 l/min O2 Device: None (Room air)    Temp (24hrs), Av.4 °F (36.9 °C), Min:97.5 °F (36.4 °C), Max:98.9 °F (37.2 °C)        PHYSICAL EXAM:    Physical Exam  Vitals and nursing note reviewed.    Constitutional:       General: She is not in acute distress. Appearance: Normal appearance. She is ill-appearing. HENT:      Head: Normocephalic and atraumatic. Mouth/Throat:      Comments: Lingual sore. It seems as if patient may have bite their tongue  Cardiovascular:      Rate and Rhythm: Normal rate and regular rhythm. Heart sounds: No murmur heard. Pulmonary:      Effort: Pulmonary effort is normal. No respiratory distress. Breath sounds: Normal breath sounds. No wheezing. Comments: Nasal O2 cannula 2L/min  Abdominal:      General: Abdomen is flat. There is no distension. Palpations: Abdomen is soft. Tenderness: There is no abdominal tenderness. Skin:     Coloration: Skin is pale. Comments: R. Leg dressing in place. Neurological:      General: No focal deficit present. Mental Status: She is alert. Motor: Weakness present. Data Review    Recent Results (from the past 24 hour(s))   TYPE & SCREEN    Collection Time: 02/15/23 12:10 PM   Result Value Ref Range    Crossmatch Expiration 02/18/2023,2359     ABO/Rh(D) O Positive     Antibody screen Negative     Unit number [id number]     Blood component type  LR     Unit division 00     Status of unit Issued,final     TRANSFUSION STATUS Ok to transfuse     Crossmatch result Compatible     Unit number [id number]     Blood component type  LR     Unit division 00     Status of unit Issued,final     TRANSFUSION STATUS Ok to transfuse     Crossmatch result Compatible        MRI TIB/FIB RT W  WO CONT   Final Result   1. Large complex collection of popliteal subcutaneous fat overlying the   superficial fascia of the gastrocnemius and biceps femoris muscles, possibly   involving the biceps muscle. Areas of intermediate and increased signal on T1   images are shown. Increased T1 signal can be seen in the setting of hemorrhagic   or proteinaceous material. Ultrasound correlation and aspiration suggested based   on ultrasound imaging results. 2. Nonspecific elongated nonenhancing collection within proximal medial   gastrocnemius muscle. 3. Multifocal osteonecrosis. XR FEMUR RT 2 VS   Final Result   1. Extensive dermal calcifications   2. Possible AVN of the distal right femur. XR CHEST PORT   Final Result   1. Cardiomegaly and pulmonary vascular congestion. 2. Diffuse heterotopic soft tissue calcification. XR CHEST PA LAT   Final Result      No significant change. Cardiomegaly and chronic interstitial opacities. DUPLEX LOWER EXT VENOUS BILAT   Final Result      XR CHEST PORT   Final Result      Stable exam. Unchanged interstitial lung abnormalities and small pleural   effusions. Intake and Output:  Current Shift: No intake/output data recorded. Last three shifts: 02/14 1901 - 02/16 0700  In: 1566.3 [P.O.:450; I.V.:250]  Out: 650 [Urine:650]      Lab/Data Review:  Recent Labs     02/15/23  0751 02/14/23  0824 02/13/23  1111   WBC 4.7 7.0 5.4   HGB 6.6* 7.0* 7.0*   HCT 22.4* 23.2* 24.0*    301 327       Recent Labs     02/15/23  0751 02/14/23  0824 02/13/23  1111   * 132* 137   K 4.5 4.2 4.1    99 102   CO2 30 30 30   * 97 107*   BUN 10 9 10   CREA 0.42* 0.38* 0.46*   CA 8.7 8.6 8.9       No results for input(s): PH, PCO2, PO2, HCO3, FIO2 in the last 72 hours.   Recent Results (from the past 24 hour(s))   TYPE & SCREEN    Collection Time: 02/15/23 12:10 PM   Result Value Ref Range    Crossmatch Expiration 02/18/2023,2359     ABO/Rh(D) O Positive     Antibody screen Negative     Unit number [id number]     Blood component type [facility]     Unit division 00     Status of unit Issued,final     TRANSFUSION STATUS Ok to transfuse     Crossmatch result Compatible     Unit number [id number]     Blood component type [facility]     Unit division 00     Status of unit Issued,final     TRANSFUSION STATUS Ok to transfuse     Crossmatch result Compatible _____________________________________________________________________________  Time spent in direct care including coordination of service, review of data and examination: 40 minutes    ______________________________________________________________________________    ERICA Zuluaga    This is dictation was done by [employer], computer voice recognition software. Quite often unanticipated grammatical, syntax, homophones and other interpretive errors or inadvertently transcribed by the computer software. Please excuse errors that have escaped final proofreading. Thank you.

## 2023-02-16 NOTE — PROGRESS NOTES
Progress Note    Patient: Graciella Bosworth MRN: [id number]  SSN: [id number]    YOB: 1990  Age: 34 y.o. Sex: female      Admit Date: 2/3/2023    LOS: 13 days     Subjective:   Patient immunocompromised with SLE/RA, followed for right leg cellulitis. MRI showed abscess which has now been incised and drained with cultures pending. She remains afebrile with normal WBC and decreasing procal and her CRP is trending downward today. She has been seen by Rheumatology. Patient is lying in bed with no new complaints. Objective:     Vitals:    02/16/23 0039 02/16/23 0045 02/16/23 0203 02/16/23 0921   BP: 129/83 129/83 130/83 (!) 145/92   Pulse: (!) 102 (!) 102 90 99   Resp: 18 19 18 20   Temp: 98.5 °F (36.9 °C) 98.5 °F (36.9 °C) 98.2 °F (36.8 °C) 98 °F (36.7 °C)   SpO2: 99% 99% 94% 95%   Weight:       Height:            Intake and Output:  Current Shift: No intake/output data recorded. Last three shifts: 02/14 1901 - 02/16 0700  In: 1566.3 [P.O.:450; I.V.:250]  Out: 650 [Urine:650]    Physical Exam:   Vitals and nursing note reviewed. Patient unavailable for re-examination  Constitutional:       General: She is not in mild respiratory  distress. Appearance: She is ill-appearing. HENT: Nasal O2 cannula 2L/min  Eyes:      Pupils: Pupils are equal, round, and reactive to light. Cardiovascular:      Rate and Rhythm: Normal rate and regular rhythm. Heart sounds: No murmur heard. Pulmonary: clear bilaterally  Genitourinary:     Comments: No Mckeon  Musculoskeletal: .      Right lower leg with bulky gauze dressing at this time, not removed:       Left lower leg: No edema (multiple well-healed scars). Skin:     Findings: Erythema present. No rash. Neurological:      General: No focal deficit present. Mental Status: She is alert and oriented to person, place, and time.    Psychiatric: normal behavior    Lab/Data Review:     WBC 3,600      ESR >140 < 120 < >140  CRP 9.18 <10.60 <4.91 <2.73 <10.00 <3.52 <6.32 <10.20 <15.20 <23.30  Procal <0.05 <0.05 <0.08 <0.09 <0.11 <0.18 <0.29 <0.36    ASO Negative  Dnase B Ab Negative  C3 and C4 Normal    Blood cultures (2/3) No growth FINAL  Wound culture right leg (2/12) Rare Clostridium perfringens  Wound culture right leg (2/12) No growth thus far    MRI right leg (2/10)   1. Large complex collection of popliteal subcutaneous fat overlying the  superficial fascia of the gastrocnemius and biceps femoris muscles, possibly  involving the biceps muscle. Areas of intermediate and increased signal on T1  images are shown. Increased T1 signal can be seen in the setting of hemorrhagic  or proteinaceous material. Ultrasound correlation and aspiration suggested based  on ultrasound imaging results. 2. Nonspecific elongated nonenhancing collection within proximal medial  gastrocnemius muscle. 3. Multifocal osteonecrosis. Assessment:     Active Problems:    Cellulitis (8/7/2021)  Cellulitis right lower extremity with abscess/hematoma, status post I&D, cultures pending, on IV Daptomycin Day #10 and Meropenem Day #6  Sepsis with fever, elevated CRP and procal  Elevated ESR  Immunosuppression on Plaquenil, Prednisone and Tofacitinib  Systemic Lupus Erythematosus  Rheumatoid arthritis  Allergies to Doxycycline and Vancomycin   8. ?Adverse reaction to Unasyn with SOB    Comment:   Tissue culture grew Clostridium perfringens. Daptomycin showed bactericidal activity against Clostridium perfringens as early as 1 h at four and eight times the SONIA and at 2 and 4 h at two and four times the SONIA, therefore, it should be adequate coverage. Plan:   Continue IV Daptomycin 6 mg/kg IV daily for 2 more weeks  Monitor CBC, procal and CRP weakly  Follow-up CH50  Follow-up pending tissue cultures  5. Cleared for discharge from ID standpoint  6.   Follow-up in ID Clinic in 2 weeks      Signed By: Ketty Hanson MD     February 16, 2023

## 2023-02-16 NOTE — DISCHARGE SUMMARY
Hospitalist Discharge Summary     Patient ID:    Dyane Bence  [id number]  1 y.o.  1990    Admit date: 2/3/2023    Discharge date : 2/16/2023      Final Diagnoses: Active Problems:    Cellulitis (8/7/2021)      Reason for Hospitalization/Hospital Course:   Patient is a 35 yo female with history of systemic lupus, calcinosis and rheumatoid arthritis on treatment with Plaquenil, Prednisone, and Rosey Budd. She also has a history of pulmonary HTN and osteonecrosis. She presented to the ED with complaints of pains and swelling of the right lower extremity diagnosed as cellulitis and was admitted evaluation and treatment of cellulitis on 2/3/2023. She has been placed on antibiotics under the direction of infectious disease, which included IV meropenem and daptomycin. MRI shows right leg cellulitis subcutaneous fat. Rheumatology and Ortho PDX consulted. Patient underwent a I&D of right leg abscess hematoma by orthopedics on 2/12/2023. GI consulted because patient had a positive FOBT, however through shared decision patient wishes to keep all her studies at [facility]. Patient received 2 units of pRBCs for low Hgb. She is to be discharged home with home health in stable condition on IV antibiotics and instructions to follow-up closely with her primary GI doctor and her primary rheumatologist, cardiologist. She is to follow up with Dr. Chula Barker in 2 weeks. She is to stop taking the Plaquenil and  Rosey Budd until the infection resolves or until her primary Rheumatologist restarts it. Discharge Medications:   Current Discharge Medication List        START taking these medications    Details   oxyCODONE-acetaminophen (PERCOCET) 5-325 mg per tablet Take 1 Tablet by mouth every six (6) hours as needed for Pain for up to 3 days. Max Daily Amount: 4 Tablets.   Qty: 12 Tablet, Refills: 0  Start date: 2/16/2023, End date: 2/19/2023    Associated Diagnoses: Right leg pain      DAPTOmycin (CUBICIN) IVPB 400 mg by IntraVENous route every twenty-four (24) hours for 10 days. Indications: Inflammation of the Skin of the Leg  Qty: 4000 Dose, Refills: 0  Start date: 2/8/2023, End date: 2/18/2023           CONTINUE these medications which have NOT CHANGED    Details   folic acid (FOLVITE) 1 mg tablet Take 2 mg by mouth daily. gabapentin (NEURONTIN) 600 mg tablet Take 300 mg by mouth two (2) times a day. Adempas 2.5 mg tab tablet 2.5 mg three (3) times daily. Indications: pulmonary arterial hypertension      predniSONE (DELTASONE) 5 mg tablet Take 5-10 mg by mouth daily. 10 mg in am and 5 mg at night  Indications: lupus      pantoprazole (PROTONIX) 40 mg tablet TAKE 1 TABLET BY MOUTH EVERY DAY      furosemide (LASIX) 20 mg tablet Take 20 mg by mouth daily. DULoxetine (CYMBALTA) 30 mg capsule TAKE 1 CAPSULE BY MOUTH TWICE A DAY      Letairis 5 mg tablet 5 mg daily. In AM      cholecalciferol (VITAMIN D3) (5000 Units/125 mcg) tab tablet Take 5,000 Units by mouth every Monday. Indications: osteoporosis, a condition of weak bones      spironolactone (ALDACTONE) 25 mg tablet 25 mg daily. At bedtime           STOP taking these medications       tofacitinib (Xeljanz XR) 11 mg Tb24 Comments:   Reason for Stopping:         hydrOXYchloroQUINE (PLAQUENIL) 200 mg tablet Comments:   Reason for Stopping:         methotrexate (RHEUMATREX) 2.5 mg tablet Comments:   Reason for Stopping: Follow up Care:    1. Ata Norris MD in 1-2 weeks.       Follow-up Information       Follow up With Specialties Details Why Contact Info    [address] [place] [address]  Call As needed, For wound re-check [address]  Lara Jaramillo MD Internal Medicine Physician   [address]  [address]  [phone]      Jake Rob MD Infectious Disease Physician Follow up in 2 week(s)  [address] [facility] [address]  [phone]      Ashley Gauthier MD Orthopedic Surgery Follow up  [address]  [place] Trevor Serrano MD Cardiovascular Disease Physician, Internal Medicine Physician, Interventional Cardiology Physician Follow up  [address] [place] Abd[place]  [phone]                Patient Follow Up Instructions: Activity: Activity as tolerated  Diet:  Regular Diet and Low fat, Low cholesterol  Wound Care:  Wound vac and dressing changes. Best carried out at the wound healing center of [facility] if possible. Condition at Discharge:  Stable  __________________________________________________________________    Disposition  Home Health Care Svc  ____________________________________________________________________    Code Status:  Full Code  ___________________________________________________________________    Discharge Exam:  Patient seen and examined by me on discharge day. Pertinent Findings:  Gen:    Not in distress  Chest: Clear lungs  CVS:   Regular rhythm.   No edema  Abd:  Soft, not distended, not tender  Neuro:  Alert        CONSULTATIONS: Cardiology, ID, GI, and Rheumaology    Significant Diagnostic Studies:   Recent Results (from the past 24 hour(s))   CBC W/O DIFF    Collection Time: 02/16/23  8:14 AM   Result Value Ref Range    WBC 3.6 3.6 - 11.0 K/uL    RBC 3.40 (L) 3.80 - 5.20 M/uL    HGB 9.6 (L) 11.5 - 16.0 g/dL    HCT 30.6 (L) 35.0 - 47.0 %    MCV 90.0 80.0 - 99.0 FL    MCH 28.2 26.0 - 34.0 PG    MCHC 31.4 30.0 - 36.5 g/dL    RDW 18.7 (H) 11.5 - 14.5 %    PLATELET 227 425 - 406 K/uL    MPV 10.1 8.9 - 12.9 FL    NRBC 4.2 (H) 0.0  WBC    ABSOLUTE NRBC 0.15 (H) 0.00 - 5.79 K/uL   METABOLIC PANEL, BASIC    Collection Time: 02/16/23  8:14 AM   Result Value Ref Range    Sodium 136 136 - 145 mmol/L    Potassium 4.5 3.5 - 5.1 mmol/L    Chloride 103 97 - 108 mmol/L    CO2 30 21 - 32 mmol/L    Anion gap 3 (L) 5 - 15 mmol/L    Glucose 108 (H) 65 - 100 mg/dL    BUN 9 6 - 20 mg/dL    Creatinine 0.43 (L) 0.55 - 1.02 mg/dL    BUN/Creatinine ratio 21 (H) 12 - 20      eGFR >60 >60 ml/min/1.73m2    Calcium 8.9 8.5 - 10.1 mg/dL     MRI TIB/FIB RT W  WO CONT   Final Result   1. Large complex collection of popliteal subcutaneous fat overlying the   superficial fascia of the gastrocnemius and biceps femoris muscles, possibly   involving the biceps muscle. Areas of intermediate and increased signal on T1   images are shown. Increased T1 signal can be seen in the setting of hemorrhagic   or proteinaceous material. Ultrasound correlation and aspiration suggested based   on ultrasound imaging results. 2. Nonspecific elongated nonenhancing collection within proximal medial   gastrocnemius muscle. 3. Multifocal osteonecrosis. XR FEMUR RT 2 VS   Final Result   1. Extensive dermal calcifications   2. Possible AVN of the distal right femur. XR CHEST PORT   Final Result   1. Cardiomegaly and pulmonary vascular congestion. 2. Diffuse heterotopic soft tissue calcification. XR CHEST PA LAT   Final Result      No significant change. Cardiomegaly and chronic interstitial opacities. DUPLEX LOWER EXT VENOUS BILAT   Final Result      XR CHEST PORT   Final Result      Stable exam. Unchanged interstitial lung abnormalities and small pleural   effusions.               Time spent in direct and indirect care including coordination of services: 40 minutes    Signed:  Boo Sahu  2/16/2023  1:39 PM

## 2023-02-16 NOTE — PROGRESS NOTES
YVETTE reached out to Magdaleno at [facility] for update on wound vac. At this time it has not yet been approved by insurance and they are working on it. Will call me with update. Still unsure if we need Merrem at discharge. Waiting for ID to round for final determination.

## 2023-02-16 NOTE — PROGRESS NOTES
Patient is discharging today, home with home health. Home health through [facility]. Also going home with IV antibiotics. [employer] will service IV antibiotics with delivery of supplies to her home this evening. Wound Vac throw [employer], being delivered to room. Patient can leave after delivering of wound vac. Please have nurse access port/change dressing and wet to dry dressing. Discharge plan of care/case management plan validated with provider discharge order. Medicare pt has received, reviewed, and signed 2nd IM letter informing them of their right to appeal the discharge. Signed copied has been placed on pt bedside chart.

## 2023-02-16 NOTE — PROGRESS NOTES
Physician Progress Note      PATIENT:               So Mora  CSN #:                  [id number]  :                       1990  ADMIT DATE:       2/3/2023 3:13 PM  DISCH DATE:  RESPONDING  PROVIDER #:        Ekta MALDONADO          QUERY TEXT:    Patient admitted with cellulitis. Documentation reflects sepsis in the H&P. If possible, please document in the progress notes and discharge summary if sepsis was: The medical record reflects the following:  Risk Factors: 33 yo female with cellulitis, anemia, SLE  Clinical Indicators: WBC 3.1  Bands of 10  Sed rate 140  CRP 23.3  Procalcitonin 0.36  , 133, 138  Treatment: IV Zosyn, Clindamycin, Cefazolin, Meropenem, Daptomycin    Thank you,  Jodie Lanza RN, CCDS  Options provided:  -- Sepsis confirmed after study  -- Sepsis treated and resolved  -- Sepsis ruled out after study  -- Other - I will add my own diagnosis  -- Disagree - Not applicable / Not valid  -- Disagree - Clinically unable to determine / Unknown  -- Refer to Clinical Documentation Reviewer    PROVIDER RESPONSE TEXT:    Sepsis treated and resolved.     Query created by: Horace Julio on 2/15/2023 11:10 AM      Electronically signed by:  Ekta MALDONADO 2023 6:15 PM

## 2023-02-16 NOTE — PROGRESS NOTES
OCCUPATIONAL THERAPY TREATMENT  Patient: Sonu Lowe (38 y.o. female)  Date: 2/16/2023  Diagnosis: Cellulitis [L03.90] <principal problem not specified>  Procedure(s) (LRB):  INCISION AND DRAINAGE RIGHT LEG (Right) 4 Days Post-Op  Precautions: In place during session: PICC line  Chart, occupational therapy assessment, plan of care, and goals were reviewed. ASSESSMENT  Pt continues with skilled OT services and is progressing towards goals. Pt received semi-supine in bed upon arrival, AXO x4 and agreeable to OT tx at this time. Overall, pt continues to present with deficits in generalized strength/AROM, coordination, bed mobility, static/dynamic sitting and standing balance and functional activity tolerance during performance of ADLs/mobility (see below for objective details and assist levels). Pt tolerated session well . Pt rolled side to side for posterior pericare and to replace absorbance pads. Pt reports that she has all dme needed at home. Education provided on energy conc=servation and safety awareness , pt vocalizing good understanding. Will continue to progress. Recommend d/c to Home with Home Health Therapy once medically appropriate. Other factors to consider for discharge: PLOF, time since onset, severity of deficits, and decline from functional baseline. PLAN :  Patient continues to benefit from skilled intervention to address the above impairments. Continue treatment per established plan of care. to address goals. Recommend with staff: Out of bed to chair for meals and Encourage HEP in prep for ADLs/mobility    Recommend next session:  Toileting and Seated grooming    Recommendation for discharge: (in order for the patient to meet his/her long term goals)  Home with [address] [place] [address]    This discharge recommendation:  Has been made in collaboration with the attending provider and/or case management    IF patient discharges home will need the following DME: Pt reports she has all DME needed        SUBJECTIVE:   Patient stated I have been doing this for awhile.     OBJECTIVE DATA SUMMARY:   Cognitive/Behavioral Status:  Neurologic State: Alert  Orientation Level: Oriented X4  Cognition: Follows commands; Appropriate safety awareness; Appropriate for age attention/concentration; Appropriate decision making      Functional Mobility and Transfers for ADLs:  Bed Mobility:  Rolling: Bed Modified;Stand-by assistance     ADL Intervention:    Toileting  Toileting Assistance: Total assistance(dependent)     Pain:  No c/o pain    Activity Tolerance:   Good and requires rest breaks    After treatment patient left in no apparent distress:   Supine in bed, Call bell within reach, Bed / chair alarm activated, and Side rails x 3, bed locked and in lowest position    COMMUNICATION/COLLABORATION:   The patients plan of care was discussed with: Registered nurse. MIGUEL A Tavares  Time Calculation: 24 mins     Problem: Self Care Deficits Care Plan (Adult)  Goal: *Acute Goals and Plan of Care (Insert Text)  Description: FUNCTIONAL STATUS PRIOR TO ADMISSION: Pt was Mod I with SPC PTA. HOME SUPPORT: Lives with family. Mom is home throughout the day for assistance. Occupational Therapy Goals  Initiated 2/13/2023    Pt stated goal To get stronger.   Pt will be Mod I sup <> sit in prep for EOB ADLs  Pt will be Mod I grooming standing sink side LRAD  Pt will be Mod I UB dressing sitting EOB/long sit   Pt will be Mod I LE dressing sitting EOB/long sit  Pt will be Mod I sit <>  prep for toileting LRAD  Pt will be Mod I toileting/toilet transfer/cloth mgmt LRAD    Outcome: Progressing Towards Goal

## 2023-02-17 LAB
BACTERIA SPEC CULT: NORMAL
CH50 SERPL-ACNC: >60 U/ML
GRAM STN SPEC: NORMAL
GRAM STN SPEC: NORMAL
SPECIAL REQUESTS,SREQ: NORMAL

## 2023-02-20 LAB — ANA TITR SER IF: NEGATIVE

## 2023-02-22 LAB
APTT HEX PL PPP: 2 SEC
APTT IMM NP PPP: NORMAL SEC
APTT PPP 1:1 SALINE: NORMAL SEC
APTT PPP: 25.7 SEC
B2 GLYCOPROT1 IGA SER-ACNC: <10 SAU
B2 GLYCOPROT1 IGG SER-ACNC: <10 SGU
B2 GLYCOPROT1 IGM SER-ACNC: <10 SMU
CARDIOLIPIN IGA SER IA-ACNC: <10 APL
CARDIOLIPIN IGG SER IA-ACNC: <10 GPL
CARDIOLIPIN IGM SER IA-ACNC: <10 MPL
CONFIRM APTT: 0.4 SEC
CONFIRM DRVVT: NORMAL SEC
LABORATORY COMMENT REPORT: NORMAL
PROTHROM IGG SERPL-ACNC: 3 G UNITS
PROTHROM IGM SERPL-ACNC: NORMAL M UNITS
PS IGG SER IA-ACNC: 0 GPS
PS IGM SER IA-ACNC: 0 MPS
SCREEN DRVVT/NORMAL: NORMAL RATIO
SCREEN DRVVT: 45.4 SEC

## 2023-02-23 ENCOUNTER — HOSPITAL ENCOUNTER (OUTPATIENT)
Dept: WOUND CARE | Age: 33
Discharge: HOME | End: 2023-02-23
Payer: MEDICARE

## 2023-02-23 VITALS
SYSTOLIC BLOOD PRESSURE: 128 MMHG | TEMPERATURE: 98.1 F | RESPIRATION RATE: 18 BRPM | HEIGHT: 59 IN | WEIGHT: 135 LBS | DIASTOLIC BLOOD PRESSURE: 79 MMHG | BODY MASS INDEX: 27.21 KG/M2 | HEART RATE: 121 BPM

## 2023-02-23 DIAGNOSIS — M87.9 OSTEONECROSIS (HCC): ICD-10-CM

## 2023-02-23 DIAGNOSIS — M32.9 LUPUS (HCC): ICD-10-CM

## 2023-02-23 DIAGNOSIS — S81.801D WOUND OF RIGHT LEG, SUBSEQUENT ENCOUNTER: ICD-10-CM

## 2023-02-23 PROCEDURE — 99213 OFFICE O/P EST LOW 20 MIN: CPT

## 2023-02-23 PROCEDURE — 87205 SMEAR GRAM STAIN: CPT

## 2023-02-23 PROCEDURE — 11042 DBRDMT SUBQ TIS 1ST 20SQCM/<: CPT

## 2023-02-23 PROCEDURE — 74011000250 HC RX REV CODE- 250: Performed by: ORTHOPAEDIC SURGERY

## 2023-02-23 PROCEDURE — 87070 CULTURE OTHR SPECIMN AEROBIC: CPT

## 2023-02-23 RX ORDER — LIDOCAINE HYDROCHLORIDE 20 MG/ML
15 SOLUTION OROPHARYNGEAL AS NEEDED
Status: DISCONTINUED | OUTPATIENT
Start: 2023-02-23 | End: 2023-02-25 | Stop reason: HOSPADM

## 2023-02-23 NOTE — DISCHARGE INSTRUCTIONS
Discharge Instructions for  [address], [address] [facility]  Telephone: [phone]     NAME:  Kimberly Hernandez:  1990  MEDICAL RECORD NUMBER:  [id number]  DATE:  2/23/2023        Return Appointment:  [] Dressing supply provider:   [x] Home Healthcare: [address] [place] [address] [facility]   [x] Return Appointment: 1 Week(s)  [] Nurse Visit:   Week(s)     [] Discharge from [facility]  [x] Ordered tests: CULTURE SENT OUT  [x] Referrals: DR MONTERO- [address] [place] [address]  [] Rx:   [] Other:       Wound Cleansing:   Do not scrub or use excessive force. Cleanse wound prior to applying a clean dressing with:  [x] Normal Saline          [x] Mild Soap & Water/Baby Shampoo   [] Keep Wound Dry in Shower  [] Other:       Topical Treatments:  Do not apply lotions, creams, or ointments to wound bed unless directed. [] Apply moisturizing lotion to skin surrounding the wound prior to dressing change.  [] Apply antifungal ointment to skin surrounding the wound prior to dressing change.  [] Apply thin film of moisture barrier/zinc ointment to skin immediately around wound.   [] Other:                  Dressings:                  Wound Location RIGHT LEG            [] Apply Primary Dressing:                                          [] MediHoney Gel         [] MediHoney Alginate                     [] Calcium Alginate      [] Calcium Alginate with Silver              [] Collagen                   [] Collagen with Silver                [] Santyl with Moistened saline gauze              [] Hydrofera Blue (cut to size and moistened)  [] Hydrofera Blue Ready (Cut to size)              [x] NS wet to dry (CLINIC)         [] Betadine wet to dry              [] Hydrogel                   [] Mepitel                   [] Bactroban/Mupirocin                       [] Other:      [] Cover and Secure with:                   [x] Gauze        [x] Jovana Mcgovern           [] Kerlix              [] Foam          [] Super Absorbant    [] ABD                                      [] ACE Wrap            [] Other:               Limit contact of tape with skin. [] Change dressing:  [] Daily           [] Every Other Day    [] Once per week   [] Twice per week              [] Three times per week        [] Do Not Change Dressing    [] Other:                Negative Pressure:           Wound Location: RIGHT LEG  [x] Pressure @  125 mm/Hg               [x]Continuous  []Intermittent              [x] Black Foam            [x] White Foam- TO UNDERMINING AND TUNNELING               [x] Bridge:               [x] Change vac dressing three times per week     Pressure Relief:  [] When sitting, shift position or do seat lifts every 15 minutes.  [] Wheelchair cushion           [] Specialty Bed/Mattress  [] Turn every 2 hours when in bed. Avoid direct pressure on wound site. Limit side lying to 30 degree tilt. Limit HOB elevation to 30 degrees. Edema Control:  Apply:  [] Compression Stocking:    mmHg   []Right Leg     []Left Leg              [] Tubigrip      []Right Leg Double Layer      []Left Leg Double Layer                                      []Right Leg Single Layer       []Left Leg Single Layer                 [] Elevate leg(s) above the level of the heart when sitting. [] Avoid prolonged standing in one place. Compression:  Apply:  [] Multilayer Compression Wrap:      []RightLeg      []Left Leg                                 []Coflex              []Coflex Lite             []Unna                 [] Do not get leg(s) with wrap wet. If wraps become too tight call the center or completely remove the wrap. [] Elevate leg(s) above the level of the heart when sitting. [] Avoid prolonged standing in one place.      Off-Loading:   [] Off-loading when   [] walking       [] in bed         [] sitting  [] Total non-weight bearing  [] Right Leg  [] Left Leg         [] Assistive Device    [] Walker        [] Cane      [] Wheelchair      [] Crutches              [] Surgical shoe    [] Podus Boot(s)   [] Foam Boot(s)  [] Roll About              [] Cast Boot   [] CROW Boot  [] Wedge Shoe  [] Other:     Contact Cast:  Apply:  [] Total Contact Cast Applied in Clinic          []RightLeg      []Left Leg              [] Do not get cast wet. Contact center or go to emergency room if there is a foul odor or becomes uncomfortable due to feeling tight or swelling. Do not use objects inside of cast to scratch. Dietary:  [x] Diet as tolerated:   [] Calorie Diabetic Diet:         [] No Added Salt:  [x] Increase Protein:   [] Other:              Activity:  [x] Activity as tolerated:    [] Patient has no activity restrictions       [] Strict Bedrest:          [] Remain off Work:       [] May return to full duty work:                                               [] Return to work with restrictions:                   [address] Information: Should you experience any significant changes in your wound(s) or have questions about your wound care, please contact Gianfranco Orta at [place] [address] 8:00 am - 4:30. If you need help with your wound outside of these hours and cannot wait until we are again available, contact your PCP or go to the hospital emergency room. PLEASE NOTE: IF YOU ARE UNABLE TO OBTAIN WOUND SUPPLIES, CONTINUE TO USE THE SUPPLIES YOU HAVE AVAILABLE UNTIL YOU ARE ABLE TO REACH US. IT IS MOST IMPORTANT TO KEEP THE WOUND COVERED AT ALL TIMES.      [] Dr. Subha Carlson   [x] Dr. Ursula Singh  [] Dr. Tanisha Mao

## 2023-02-23 NOTE — WOUND CARE
[address]   Progress Note and Procedure Note     Jacquie Zaldivar  MEDICAL RECORD NUMBER:  [id number]  AGE: 34 y.o. RACE BLACK/  GENDER: female  : 1990  EPISODE DATE:  2023      Subjective:     Chief Complaint   Patient presents with    Wound Check     R leg         HISTORY of PRESENT ILLNESS HPI    Jacquie Zaldivar is a 34 y.o. female who presents today for wound/ulcer evaluation.    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 Breanna Guajardo Pain Timing/Severity: None   Quality of pain: none  Severity:  0/ 10   Modifying Factors: Pain worsens with walking  Associated Signs/Symptoms: edema          PAST MEDICAL HISTORY    Past Medical History:   Diagnosis Date    Lupus ([address]     Osteonecrosis ([address]     Osteoporosis     Pulmonary HTN ([address]     RA (rheumatoid arthritis) ([address]         PAST SURGICAL HISTORY    Past Surgical History:   Procedure Laterality Date    HX CYST REMOVAL      HX ORTHOPAEDIC      x2 left knee sxs    HX ORTHOPAEDIC      bilateral ankle sxs       FAMILY HISTORY    Family History   Problem Relation Age of Onset    Hypertension Mother     Cancer Paternal Grandmother        SOCIAL HISTORY    Social History     Tobacco Use    Smoking status: Never    Smokeless tobacco: Never   Vaping Use    Vaping Use: Never used   Substance Use Topics    Alcohol use: Never    Drug use: Never       ALLERGIES    Allergies   Allergen Reactions    Doxycycline Nausea and Vomiting    Heparin Anaphylaxis    Remicade [Infliximab] Anaphylaxis    Vancomycin Hives    Rituximab Itching       MEDICATIONS    Current Outpatient Medications on File Prior to Encounter   Medication Sig Dispense Refill    DAPTOMYCIN IV 6 mg by IntraVENous route daily. folic acid (FOLVITE) 1 mg tablet Take 2 mg by mouth daily. cholecalciferol (VITAMIN D3) (5000 Units/125 mcg) tab tablet Take 5,000 Units by mouth every Monday. Indications: osteoporosis, a condition of weak bones      gabapentin (NEURONTIN) 600 mg tablet Take 300 mg by mouth two (2) times a day. spironolactone (ALDACTONE) 25 mg tablet 25 mg daily. At bedtime      Adempas 2.5 mg tab tablet 2.5 mg three (3) times daily. Indications: pulmonary arterial hypertension      predniSONE (DELTASONE) 5 mg tablet Take 5-10 mg by mouth daily. 10 mg in am and 5 mg at night  Indications: lupus      pantoprazole (PROTONIX) 40 mg tablet TAKE 1 TABLET BY MOUTH EVERY DAY      furosemide (LASIX) 20 mg tablet Take 20 mg by mouth daily. DULoxetine (CYMBALTA) 30 mg capsule TAKE 1 CAPSULE BY MOUTH TWICE A DAY      Letairis 5 mg tablet 5 mg daily. In AM       No current facility-administered medications on file prior to encounter. REVIEW OF SYSTEMS    A comprehensive review of systems was negative except for that written in the HPI. Objective:     Visit Vitals  /79 (BP 1 Location: Left upper arm, BP Patient Position: At rest;Sitting)   Pulse (!) 121   Temp 98.1 °F (36.7 °C)   Resp 18   Ht 4' 11\" (1.499 m)   Wt 61.2 kg (135 lb)   BMI 27.27 kg/m²       Wt Readings from Last 3 Encounters:   02/23/23 61.2 kg (135 lb)   02/16/23 67 kg (147 lb 11.3 oz)   11/02/22 65 kg (143 lb 4.8 oz)       PHYSICAL EXAM    Constitutional: Patient is awake, ambulating with no devices , no acute distress  Head: normocephalic, atraumatic. Behavioral/Psych: patient is pleasant, cooperative, awake and alert  Debridement Wound Care       Procedure Note  Indications:  Based on my examination of this patient's wound(s)/ulcer(s) today, debridement is required to promote healing and evaluate the wound base.     Performed by: Moreno Dias Javier Saleem MD    Consent obtained: yes  Time out taken: yes  Debridement: excisional    Using curette/scalpel the wound(s)/ulcer(s) was/were sharply debrided down through and including the removal of    subcutaneous tissue    Devitalized Tissue Debrided: slough    Pre Debridement Measurements:  Are located in the [facility]  Documentation Flow Sheet      Wound/Ulcer #: 1    Post Debridement Measurements:  Wound/Ulcer Descriptions are Pre Debridement except measurements:    Wound Leg lower Right;Posterior #1 (Active)   Wound Image   02/23/23 0921   Wound Etiology Non-Healing Surgical 02/23/23 0068   Dressing Status Clean;Dry; Intact 02/23/23 0921   Cleansed Cleansed with saline 02/23/23 0921   Wound Length (cm) 1.5 cm 02/23/23 0921   Wound Width (cm) 4 cm 02/23/23 0921   Wound Depth (cm) 1.4 cm 02/23/23 0921   Wound Surface Area (cm^2) 6 cm^2 02/23/23 0921   Wound Volume (cm^3) 8.4 cm^3 02/23/23 0921   Post-Procedure Length (cm) 1.7 cm 02/23/23 1003   Post-Procedure Width (cm) 4.2 cm 02/23/23 1003   Post-Procedure Depth (cm) 1.6 cm 02/23/23 1003   Post-Procedure Surface Area (cm^2) 7.14 cm^2 02/23/23 1003   Post-Procedure Volume (cm^3) 11.424 cm^3 02/23/23 1003   Undermining Starts ___ O'Clock 9 o'clock 02/23/23 0921   Undermining Ends ___ O'Clock 11 o'clock 02/23/23 0921   Undermining Maximum Distance (cm) 3.5 cm 02/23/23 0921   Wound Assessment Slough;Pink/red;Granulation tissue; Exposed Structure Muscle 02/23/23 0921   Drainage Amount Moderate 02/23/23 0921   Drainage Description Serosanguinous 02/23/23 0921   Wound Odor None 02/23/23 0921   Isabella-Wound/Incision Assessment Intact 02/23/23 0921   Edges Epibole (rolled edges) 02/23/23 0921   Wound Thickness Description Full thickness 02/23/23 0921   Number of days: 0        Total Surface Area Debrided:  20 sq cm     Estimated Blood Loss:  Minimal     Hemostasis Achieved: with pressure    Procedural Pain: 0/ 10     Post Procedural Pain: 0/ 10     Response to treatment: Well-tolerated by the patient    Patient is on IV daptomycin, cultures had shown evidence of staph    Assessment:      Hospital Problems  Date Reviewed: 11/18/2021            Codes Class Noted POA    Wound of right leg, subsequent encounter ICD-10-CM: S81.801D  ICD-9-CM: V58.89, 894.0  2/23/2023 Yes        Lupus ([facility] ICD-10-CM: M32.9  ICD-9-CM: 710.0  Unknown Yes        Osteonecrosis ([facility] ICD-10-CM: M87.9  ICD-9-CM: 733.40  Unknown Yes        Cellulitis ICD-10-CM: L03.90  ICD-9-CM: 682.9  8/7/2021 Yes        Sepsis ([facility] ICD-10-CM: A41.9  ICD-9-CM: 038.9, 995.91  8/7/2021 Yes            POP IN PROCEDURE TYPE (DEBRIDEMENT, BIOPSY, I&D, SKIN SUB, CHEMICAL CAUERTY)     Plan:     Treatment Note please see attached Discharge Instructions  Return Appointment:  [] Dressing supply provider:   [x] Home Healthcare: [address] [place] [address]   [x] Return Appointment: 1 Week(s)  [] Nurse Visit:   Week(s)     [] Discharge from [facility]  [x] Ordered tests: CULTURE SENT OUT  [x] Referrals: DR MONTERO- [address] [place] [address]  [] Rx:   [] Other:       Wound Cleansing:   Do not scrub or use excessive force. Cleanse wound prior to applying a clean dressing with:  [x] Normal Saline          [x] Mild Soap & Water/Baby Shampoo   [] Keep Wound Dry in Shower  [] Other:       Topical Treatments:  Do not apply lotions, creams, or ointments to wound bed unless directed. [] Apply moisturizing lotion to skin surrounding the wound prior to dressing change.  [] Apply antifungal ointment to skin surrounding the wound prior to dressing change.  [] Apply thin film of moisture barrier/zinc ointment to skin immediately around wound.   [] Other:                  Dressings:                  Wound Location RIGHT LEG            [] Apply Primary Dressing:                                          [] MediHoney Gel         [] MediHoney Alginate                     [] Calcium Alginate      [] Calcium Alginate with Silver              [] Collagen [] Collagen with Silver                [] Santyl with Moistened saline gauze              [] Hydrofera Blue (cut to size and moistened)  [] Hydrofera Blue Ready (Cut to size)              [x] NS wet to dry (CLINIC)         [] Betadine wet to dry              [] Hydrogel                   [] Mepitel                   [] Bactroban/Mupirocin                       [] Other:      [] Cover and Secure with:                   [x] Gauze        [x] Sherri           [] Kerlix              [] Foam          [] Super Absorbant    [] ABD                                      [] ACE Wrap            [] Other:               Limit contact of tape with skin. [] Change dressing:  [] Daily           [] Every Other Day    [] Once per week   [] Twice per week              [] Three times per week        [] Do Not Change Dressing    [] Other:                Negative Pressure:           Wound Location: RIGHT LEG  [x] Pressure @  125 mm/Hg               [x]Continuous  []Intermittent              [x] Black Foam            [x] White Foam- TO UNDERMINING AND TUNNELING               [x] Bridge:               [x] Change vac dressing three times per week     Pressure Relief:  [] When sitting, shift position or do seat lifts every 15 minutes.  [] Wheelchair cushion           [] Specialty Bed/Mattress  [] Turn every 2 hours when in bed. Avoid direct pressure on wound site. Limit side lying to 30 degree tilt. Limit HOB elevation to 30 degrees. Edema Control:  Apply:  [] Compression Stocking:    mmHg   []Right Leg     []Left Leg              [] Tubigrip      []Right Leg Double Layer      []Left Leg Double Layer                                      []Right Leg Single Layer       []Left Leg Single Layer                 [] Elevate leg(s) above the level of the heart when sitting. [] Avoid prolonged standing in one place.          Compression:  Apply:  [] Multilayer Compression Wrap:      []RightLeg      []Left Leg []Coflex              []Coflex Lite             []Unna                 [] Do not get leg(s) with wrap wet. If wraps become too tight call the center or completely remove the wrap. [] Elevate leg(s) above the level of the heart when sitting. [] Avoid prolonged standing in one place. Off-Loading:   [] Off-loading when   [] walking       [] in bed         [] sitting  [] Total non-weight bearing  [] Right Leg  [] Left Leg         [] Assistive Device    [] Walker        [] Cane      [] Wheelchair      [] Crutches              [] Surgical shoe    [] Podus Boot(s)   [] Foam Boot(s)  [] Roll About              [] Cast Boot   [] CROW Boot  [] Wedge Shoe  [] Other:     Contact Cast:  Apply:  [] Total Contact Cast Applied in Clinic          []RightLeg      []Left Leg              [] Do not get cast wet. Contact center or go to emergency room if there is a foul odor or becomes uncomfortable due to feeling tight or swelling. Do not use objects inside of cast to scratch. Dietary:  [x] Diet as tolerated:   [] Calorie Diabetic Diet:         [] No Added Salt:  [x] Increase Protein:   [] Other:              Activity:  [x] Activity as tolerated:    [] Patient has no activity restrictions       [] Strict Bedrest:          [] Remain off Work:       [] May return to full duty work:                                               [] Return to work with restrictions:                Written patient dismissal instructions given to patient or POA.          Electronically signed by Samson Johnson MD on 2/23/2023 at 10:09 AM

## 2023-02-23 NOTE — WOUND CARE
Discharge Instructions for  [address], [address] [facility]  Telephone: [phone]    NAME:  Lashaun Camejo:  1990  MEDICAL RECORD NUMBER:  [id number]  DATE:  2/23/2023      Return Appointment:  [] Dressing supply provider:   [x] Home Healthcare: [address] [place] [address] [facility]   [x] Return Appointment: 1 Week(s)  [] Nurse Visit:   Week(s)    [] Discharge from [facility]  [x] Ordered tests: CULTURE SENT OUT  [x] Referrals: DR MONTERO- [address] [place] [address]  [] Rx:   [] Other:      Wound Cleansing:   Do not scrub or use excessive force. Cleanse wound prior to applying a clean dressing with:  [x] Normal Saline   [x] Mild Soap & Water/Baby Shampoo   [] Keep Wound Dry in Shower  [] Other:      Topical Treatments:  Do not apply lotions, creams, or ointments to wound bed unless directed. [] Apply moisturizing lotion to skin surrounding the wound prior to dressing change.  [] Apply antifungal ointment to skin surrounding the wound prior to dressing change.  [] Apply thin film of moisture barrier/zinc ointment to skin immediately around wound. [] Other:       Dressings:           Wound Location RIGHT LEG   [] Apply Primary Dressing:       [] MediHoney Gel    [] MediHoney Alginate               [] Calcium Alginate      [] Calcium Alginate with Silver   [] Collagen                   [] Collagen with Silver                [] Santyl with Moistened saline gauze              [] Hydrofera Blue (cut to size and moistened)  [] Hydrofera Blue Ready (Cut to size)   [x] NS wet to dry (CLINIC)    [] Betadine wet to dry              [] Hydrogel                [] Mepitel     [] Bactroban/Mupirocin               [] Other:     [] Cover and Secure with:     [x] Gauze [x] Sherri [] Kerlix   [] Foam [] Super Absorbant [] ABD     [] ACE Wrap            [] Other:    Limit contact of tape with skin.     [] Change dressing: [] Daily    [] Every Other Day [] Once per week   [] Twice per week   [] Three times per week [] Do Not Change Dressing   [] Other:     Negative Pressure:           Wound Location: RIGHT LEG  [x] Pressure @  125 mm/Hg  [x]Continuous []Intermittent   [x] Black Foam [x] White Foam- TO UNDERMINING AND TUNNELING               [x] Bridge:               [x] Change vac dressing three times per week    Pressure Relief:  [] When sitting, shift position or do seat lifts every 15 minutes.  [] Wheelchair cushion [] Specialty Bed/Mattress  [] Turn every 2 hours when in bed. Avoid direct pressure on wound site. Limit side lying to 30 degree tilt. Limit HOB elevation to 30 degrees. Edema Control:  Apply: [] Compression Stocking:    mmHg  []Right Leg []Left Leg   [] Tubigrip []Right Leg Double Layer []Left Leg Double Layer      []Right Leg Single Layer []Left Leg Single Layer     [] Elevate leg(s) above the level of the heart when sitting. [] Avoid prolonged standing in one place. Compression:  Apply: [] Multilayer Compression Wrap: []RightLeg []Left Leg                                 []Coflex   []Coflex Lite             []Unna     [] Do not get leg(s) with wrap wet. If wraps become too tight call the center or completely remove the wrap. [] Elevate leg(s) above the level of the heart when sitting. [] Avoid prolonged standing in one place. Off-Loading:   [] Off-loading when [] walking  [] in bed [] sitting  [] Total non-weight bearing  [] Right Leg  [] Left Leg   [] Assistive Device [] Walker [] Cane      [] Wheelchair  [] Crutches   [] Surgical shoe    [] Podus Boot(s)   [] Foam Boot(s)  [] Roll About    [] Cast Boot [] CROW Boot  [] Wedge Shoe  [] Other:    Contact Cast:  Apply: [] Total Contact Cast Applied in Clinic []RightLeg []Left Leg   [] Do not get cast wet. Contact center or go to emergency room if there is a foul odor or becomes uncomfortable due to feeling tight or swelling. Do not use objects inside of cast to scratch. Dietary:  [x] Diet as tolerated: [] Calorie Diabetic Diet: [] No Added Salt:  [x] Increase Protein: [] Other:     Activity:  [x] Activity as tolerated:    [] Patient has no activity restrictions       [] Strict Bedrest:   [] Remain off Work:       [] May return to full duty work:                                     [] Return to work with restrictions:               [address] Information: Should you experience any significant changes in your wound(s) or have questions about your wound care, please contact Gianfranco Orta at [place] [address] 8:00 am - 4:30. If you need help with your wound outside of these hours and cannot wait until we are again available, contact your PCP or go to the hospital emergency room. PLEASE NOTE: IF YOU ARE UNABLE TO OBTAIN WOUND SUPPLIES, CONTINUE TO USE THE SUPPLIES YOU HAVE AVAILABLE UNTIL YOU ARE ABLE TO REACH US. IT IS MOST IMPORTANT TO KEEP THE WOUND COVERED AT ALL TIMES.     [] Dr. Albaro Saucedo   [x] Dr. May Dobson  [] Dr. Joss Chu

## 2023-02-28 ENCOUNTER — HOSPITAL ENCOUNTER (OUTPATIENT)
Dept: CT IMAGING | Age: 33
Discharge: HOME | End: 2023-02-28
Payer: MEDICARE

## 2023-02-28 DIAGNOSIS — H05.243 CONSTANT EXOPHTHALMOS OF BOTH EYES: ICD-10-CM

## 2023-02-28 LAB
BACTERIA SPEC CULT: NORMAL
GRAM STN SPEC: NORMAL
GRAM STN SPEC: NORMAL
SPECIAL REQUESTS,SREQ: NORMAL

## 2023-02-28 PROCEDURE — 70480 CT ORBIT/EAR/FOSSA W/O DYE: CPT

## 2023-03-02 ENCOUNTER — HOSPITAL ENCOUNTER (OUTPATIENT)
Dept: WOUND CARE | Age: 33
Discharge: HOME | End: 2023-03-02
Payer: MEDICARE

## 2023-03-02 VITALS
TEMPERATURE: 97.8 F | DIASTOLIC BLOOD PRESSURE: 86 MMHG | HEART RATE: 124 BPM | RESPIRATION RATE: 18 BRPM | SYSTOLIC BLOOD PRESSURE: 148 MMHG

## 2023-03-02 DIAGNOSIS — S81.801D WOUND OF RIGHT LEG, SUBSEQUENT ENCOUNTER: Primary | ICD-10-CM

## 2023-03-02 PROBLEM — E83.59 CALCINOSIS: Status: ACTIVE | Noted: 2023-03-02

## 2023-03-02 PROCEDURE — 74011000250 HC RX REV CODE- 250: Performed by: ORTHOPAEDIC SURGERY

## 2023-03-02 PROCEDURE — 11042 DBRDMT SUBQ TIS 1ST 20SQCM/<: CPT

## 2023-03-02 RX ORDER — SILVER SULFADIAZINE 10 G/1000G
CREAM TOPICAL ONCE
OUTPATIENT
Start: 2023-03-02 | End: 2023-03-02

## 2023-03-02 RX ORDER — LIDOCAINE HYDROCHLORIDE 20 MG/ML
15 SOLUTION OROPHARYNGEAL ONCE
OUTPATIENT
Start: 2023-03-02 | End: 2023-03-02

## 2023-03-02 RX ORDER — LIDOCAINE HYDROCHLORIDE 20 MG/ML
15 SOLUTION OROPHARYNGEAL AS NEEDED
Status: DISCONTINUED | OUTPATIENT
Start: 2023-03-02 | End: 2023-03-04 | Stop reason: HOSPADM

## 2023-03-02 RX ORDER — MUPIROCIN 20 MG/G
OINTMENT TOPICAL ONCE
OUTPATIENT
Start: 2023-03-02 | End: 2023-03-02

## 2023-03-02 NOTE — WOUND CARE
[address]   Progress Note and Procedure Note     Shamir Faith  MEDICAL RECORD NUMBER:  [id number]  AGE: 34 y.o. RACE BLACK/  GENDER: female  : 1990  EPISODE DATE:  3/2/2023      Subjective:     Chief Complaint   Patient presents with    Wound Check     R leg         HISTORY of PRESENT ILLNESS HPI    Shamir Faith is a 34 y.o. female who presents today for wound/ulcer evaluation. History of Wound Context: Patient improving, still has some stiffness, has got very tight skin of the tibia,  Wound/Ulcer Pain Timing/Severity: None   Quality of pain: none  Severity:  0/ 10   Modifying Factors: None  Associated Signs/Symptoms: edema          PAST MEDICAL HISTORY    Past Medical History:   Diagnosis Date    Lupus ([facility] [address]     Osteonecrosis (HCC)     Osteoporosis     Pulmonary HTN (HCC)     RA (rheumatoid arthritis) ([facility] [address]         PAST SURGICAL HISTORY    Past Surgical History:   Procedure Laterality Date    HX CYST REMOVAL      HX ORTHOPAEDIC      x2 left knee sxs    HX ORTHOPAEDIC      bilateral ankle sxs       FAMILY HISTORY    Family History   Problem Relation Age of Onset    Hypertension Mother     Cancer Paternal Grandmother        SOCIAL HISTORY    Social History     Tobacco Use    Smoking status: Never    Smokeless tobacco: Never   Vaping Use    Vaping Use: Never used   Substance Use Topics    Alcohol use: Never    Drug use: Never       ALLERGIES    Allergies   Allergen Reactions    Doxycycline Nausea and Vomiting    Heparin Anaphylaxis    Remicade [Infliximab] Anaphylaxis    Vancomycin Hives    Rituximab Itching       MEDICATIONS    Current Outpatient Medications on File Prior to Encounter   Medication Sig Dispense Refill    DAPTOMYCIN IV 6 mg by IntraVENous route daily. folic acid (FOLVITE) 1 mg tablet Take 2 mg by mouth daily. cholecalciferol (VITAMIN D3) (5000 Units/125 mcg) tab tablet Take 5,000 Units by mouth every Monday.  Indications: osteoporosis, a condition of weak bones      gabapentin (NEURONTIN) 600 mg tablet Take 300 mg by mouth two (2) times a day. spironolactone (ALDACTONE) 25 mg tablet 25 mg daily. At bedtime      Adempas 2.5 mg tab tablet 2.5 mg three (3) times daily. Indications: pulmonary arterial hypertension      predniSONE (DELTASONE) 5 mg tablet Take 5-10 mg by mouth daily. 10 mg in am and 5 mg at night  Indications: lupus      pantoprazole (PROTONIX) 40 mg tablet TAKE 1 TABLET BY MOUTH EVERY DAY      furosemide (LASIX) 20 mg tablet Take 20 mg by mouth daily. DULoxetine (CYMBALTA) 30 mg capsule TAKE 1 CAPSULE BY MOUTH TWICE A DAY      Letairis 5 mg tablet 5 mg daily. In AM       No current facility-administered medications on file prior to encounter. REVIEW OF SYSTEMS    A comprehensive review of systems was negative except for that written in the HPI. Objective:     Visit Vitals  BP (!) 148/86 (BP 1 Location: Left upper arm, BP Patient Position: At rest;Sitting)   Pulse (!) 124   Temp 97.8 °F (36.6 °C)   Resp 18       Wt Readings from Last 3 Encounters:   02/23/23 61.2 kg (135 lb)   02/16/23 67 kg (147 lb 11.3 oz)   11/02/22 65 kg (143 lb 4.8 oz)       PHYSICAL EXAM    Constitutional: Patient is awake, ambulating with no devices , no acute distress  Head: normocephalic, atraumatic. Behavioral/Psych: patient is pleasant, cooperative, awake and alert  Debridement Wound Care       Procedure Note  Indications:  Based on my examination of this patient's wound(s)/ulcer(s) today, debridement is required to promote healing and evaluate the wound base.     Performed by: Tremaine Holloway MD    Consent obtained: yes  Time out taken: yes  Debridement: excisional    Using curette/scalpel the wound(s)/ulcer(s) was/were sharply debrided down through and including the removal of    subcutaneous tissue    Devitalized Tissue Debrided: slough    Pre Debridement Measurements:  Are located in the [place]  Documentation Flow Sheet      Wound/Ulcer #: 1    Post Debridement Measurements:  Wound/Ulcer Descriptions are Pre Debridement except measurements:    Wound Leg lower Right;Posterior #1 (Active)   Wound Image   03/02/23 0937   Wound Etiology Non-Healing Surgical 03/02/23 0937   Dressing Status Clean;Dry; Intact 03/02/23 0937   Cleansed Cleansed with saline 03/02/23 0937   Wound Length (cm) 1.2 cm 03/02/23 0937   Wound Width (cm) 3.7 cm 03/02/23 0937   Wound Depth (cm) 1.5 cm 03/02/23 0937   Wound Surface Area (cm^2) 4.44 cm^2 03/02/23 0937   Change in Wound Size % 26 03/02/23 0937   Wound Volume (cm^3) 6.66 cm^3 03/02/23 0937   Wound Healing % 21 03/02/23 0937   Post-Procedure Length (cm) 1.4 cm 03/02/23 1001   Post-Procedure Width (cm) 3.9 cm 03/02/23 1001   Post-Procedure Depth (cm) 1.7 cm 03/02/23 1001   Post-Procedure Surface Area (cm^2) 5.46 cm^2 03/02/23 1001   Post-Procedure Volume (cm^3) 9.282 cm^3 03/02/23 1001   Undermining Starts ___ O'Clock 9 o'clock 03/02/23 1001   Undermining Ends ___ O'Clock 11 o'clock 03/02/23 1001   Undermining Maximum Distance (cm) 5.8 cm 03/02/23 1001   Wound Assessment Slough;Pink/red;Granulation tissue; Exposed Structure Muscle 03/02/23 0937   Drainage Amount Moderate 03/02/23 0937   Drainage Description Serosanguinous 03/02/23 0937   Wound Odor None 03/02/23 0937   Isabella-Wound/Incision Assessment Intact 03/02/23 0937   Edges Epibole (rolled edges) 03/02/23 0937   Wound Thickness Description Full thickness 03/02/23 0937   Number of days: 7        Total Surface Area Debrided:  12 sq cm     Estimated Blood Loss:  Minimal     Hemostasis Achieved: with pressure    Procedural Pain: 0/ 10     Post Procedural Pain: 0/ 10     Response to treatment: Well-tolerated by the patient  Patient has failed dry skin, its thickened skin over the lower extremity, could be secondary to calcinosis, will get an x-ray done to evaluate that, in the meantime patient is going to take some anti-inflammatories and Tylenol for her pain, will continue with the wound VAC as it is a fairly deep wound,       Assessment:      Hospital Problems  Date Reviewed: 11/18/2021            Codes Class Noted POA    Calcinosis ICD-10-CM: E83.59  ICD-9-CM: 275.49  3/2/2023 Yes        Wound of right leg, subsequent encounter ICD-10-CM: S81.801D  ICD-9-CM: V58.89, 894.0  2/23/2023 Yes        Lupus ([address] ICD-10-CM: M32.9  ICD-9-CM: 710.0  Unknown Yes            POP IN PROCEDURE TYPE (DEBRIDEMENT, BIOPSY, I&D, SKIN SUB, CHEMICAL CAUERTY)     Plan:     Treatment Note please see attached Discharge Instructions  Return Appointment:  [] Dressing supply provider:   [x] Home Healthcare: [address] [place] [address]   [x] Return Appointment: 1 Week(s)  [] Nurse Visit:   Week(s)     [] Discharge from [facility]  [x] Ordered tests: X RAY ORDERED TO BE DONE PRIOR TO NEXT VISIT. [x] Referrals: DR MONTERO- KEEP FOLLOW UP APPOINTMENT  [x] Rx: OVER THE COUNTER Judeen Clubs  [] Other:       Wound Cleansing:   Do not scrub or use excessive force. Cleanse wound prior to applying a clean dressing with:  [x] Normal Saline          [x] Mild Soap & Water/Baby Shampoo   [] Keep Wound Dry in Shower  [] Other:       Topical Treatments:  Do not apply lotions, creams, or ointments to wound bed unless directed. [] Apply moisturizing lotion to skin surrounding the wound prior to dressing change.  [] Apply antifungal ointment to skin surrounding the wound prior to dressing change.  [] Apply thin film of moisture barrier/zinc ointment to skin immediately around wound.   [] Other:                  Dressings:                  Wound Location RIGHT LEG            [] Apply Primary Dressing:                                          [] MediHoney Gel         [] MediHoney Alginate                     [] Calcium Alginate      [x] Calcium Alginate with Silver(CLINIC)              [] Collagen                   [] Collagen with Silver                [] Santyl with Moistened saline gauze              [] Hydrofera Blue (cut to size and moistened)  [] Hydrofera Blue Ready (Cut to size)              [] NS wet to dry            [] Betadine wet to dry              [] Hydrogel                   [] Mepitel                   [] Bactroban/Mupirocin                       [] Other:      [] Cover and Secure with:                   [x] Gauze        [x] Sherri           [] Kerlix              [] Foam          [] Super Absorbant    [] ABD                                      [] ACE Wrap            [] Other:               Limit contact of tape with skin. [] Change dressing:  [] Daily           [] Every Other Day    [] Once per week   [] Twice per week              [] Three times per week        [] Do Not Change Dressing    [] Other:                Negative Pressure:           Wound Location: RIGHT LEG  [x] Pressure @  125 mm/Hg               [x]Continuous  []Intermittent              [x] Black Foam            [x] White Foam- TO UNDERMINING AND TUNNELING               [x] Bridge:               [x] Change vac dressing three times per week     Pressure Relief:  [] When sitting, shift position or do seat lifts every 15 minutes.  [] Wheelchair cushion           [] Specialty Bed/Mattress  [] Turn every 2 hours when in bed. Avoid direct pressure on wound site. Limit side lying to 30 degree tilt. Limit HOB elevation to 30 degrees. Edema Control:  Apply:  [] Compression Stocking:    mmHg   []Right Leg     []Left Leg              [] Tubigrip      []Right Leg Double Layer      []Left Leg Double Layer                                      []Right Leg Single Layer       []Left Leg Single Layer                 [] Elevate leg(s) above the level of the heart when sitting. [] Avoid prolonged standing in one place.          Compression:  Apply:  [] Multilayer Compression Wrap:      []RightLeg      []Left Leg                                 []Coflex              []Coflex Lite             []Unna                 [] Do not get leg(s) with wrap wet. If wraps become too tight call the center or completely remove the wrap. [] Elevate leg(s) above the level of the heart when sitting. [] Avoid prolonged standing in one place. Off-Loading:   [] Off-loading when   [] walking       [] in bed         [] sitting  [] Total non-weight bearing  [] Right Leg  [] Left Leg         [] Assistive Device    [] Walker        [] Cane      [] Wheelchair      [] Crutches              [] Surgical shoe    [] Podus Boot(s)   [] Foam Boot(s)  [] Roll About              [] Cast Boot   [] CROW Boot  [] Wedge Shoe  [] Other:     Contact Cast:  Apply:  [] Total Contact Cast Applied in Clinic          []RightLeg      []Left Leg              [] Do not get cast wet. Contact center or go to emergency room if there is a foul odor or becomes uncomfortable due to feeling tight or swelling. Do not use objects inside of cast to scratch. Dietary:  [x] Diet as tolerated:   [] Calorie Diabetic Diet:         [] No Added Salt:  [x] Increase Protein:   [] Other:              Activity:  [x] Activity as tolerated:    [] Patient has no activity restrictions       [] Strict Bedrest:          [] Remain off Work:       [] May return to full duty work:                                               [] Return to work with restrictions:      Written patient dismissal instructions given to patient or POA.          Electronically signed by Deepak Esquivel MD on 3/2/2023 at 10:24 AM

## 2023-03-02 NOTE — WOUND CARE
Discharge Instructions for  [address], 150[address] [facility]  Telephone: [phone]    NAME:  Mathew Rogers:  1990  MEDICAL RECORD NUMBER:  [id number]  DATE:  3/2/2023      Return Appointment:  [] Dressing supply provider:   [x] [facility] Healthcare: [address] [place] [place] [address]   [x] Return Appointment: 1 Week(s)  [] Nurse Visit:   Week(s)    [] Discharge from [facility]  [x] Ordered tests: X RAY ORDERED TO BE DONE PRIOR TO NEXT VISIT. [x] Referrals: DR MONTERO- KEEP FOLLOW UP APPOINTMENT  [x] Rx: OVER THE COUNTER Eduardo Killer  [] Other:      Wound Cleansing:   Do not scrub or use excessive force. Cleanse wound prior to applying a clean dressing with:  [x] Normal Saline   [x] Mild Soap & Water/Baby Shampoo   [] Keep Wound Dry in Shower  [] Other:      Topical Treatments:  Do not apply lotions, creams, or ointments to wound bed unless directed. [] Apply moisturizing lotion to skin surrounding the wound prior to dressing change.  [] Apply antifungal ointment to skin surrounding the wound prior to dressing change.  [] Apply thin film of moisture barrier/zinc ointment to skin immediately around wound.   [] Other:       Dressings:           Wound Location RIGHT LEG   [] Apply Primary Dressing:       [] MediHoney Gel    [] MediHoney Alginate               [] Calcium Alginate      [x] Calcium Alginate with Silver([facility])   [] Collagen                   [] Collagen with Silver                [] Santyl with Moistened saline gauze              [] Hydrofera Blue (cut to size and moistened)  [] Hydrofera Blue Ready (Cut to size)   [] NS wet to dry     [] Betadine wet to dry              [] Hydrogel                [] Mepitel     [] Bactroban/Mupirocin               [] Other:     [] Cover and Secure with:     [x] Gauze [x] Sherri [] Kerlix   [] Foam [] Super Absorbant [] ABD     [] ACE Wrap            [] Other:    Limit contact of tape with skin.    [] Change dressing: [] Daily    [] Every Other Day [] Once per week   [] Twice per week   [] Three times per week [] Do Not Change Dressing   [] Other:     Negative Pressure:           Wound Location: RIGHT LEG  [x] Pressure @  125 mm/Hg  [x]Continuous []Intermittent   [x] Black Foam [x] White Foam- TO UNDERMINING AND TUNNELING               [x] Bridge:               [x] Change vac dressing three times per week    Pressure Relief:  [] When sitting, shift position or do seat lifts every 15 minutes.  [] Wheelchair cushion [] Specialty Bed/Mattress  [] Turn every 2 hours when in bed. Avoid direct pressure on wound site. Limit side lying to 30 degree tilt. Limit HOB elevation to 30 degrees. Edema Control:  Apply: [] Compression Stocking:    mmHg  []Right Leg []Left Leg   [] Tubigrip []Right Leg Double Layer []Left Leg Double Layer      []Right Leg Single Layer []Left Leg Single Layer     [] Elevate leg(s) above the level of the heart when sitting. [] Avoid prolonged standing in one place. Compression:  Apply: [] Multilayer Compression Wrap: []RightLeg []Left Leg                                 []Coflex   []Coflex Lite             []Unna     [] Do not get leg(s) with wrap wet. If wraps become too tight call the center or completely remove the wrap. [] Elevate leg(s) above the level of the heart when sitting. [] Avoid prolonged standing in one place. Off-Loading:   [] Off-loading when [] walking  [] in bed [] sitting  [] Total non-weight bearing  [] Right Leg  [] Left Leg   [] Assistive Device [] Walker [] Cane      [] Wheelchair  [] Crutches   [] Surgical shoe    [] Podus Boot(s)   [] Foam Boot(s)  [] Roll About    [] Cast Boot [] CROW Boot  [] Wedge Shoe  [] Other:    Contact Cast:  Apply: [] Total Contact Cast Applied in Clinic []RightLeg []Left Leg   [] Do not get cast wet.   Contact center or go to emergency room if there is a foul odor or becomes uncomfortable due to feeling tight or swelling. Do not use objects inside of cast to scratch. Dietary:  [x] Diet as tolerated: [] Calorie Diabetic Diet: [] No Added Salt:  [x] Increase Protein: [] Other:     Activity:  [x] Activity as tolerated:    [] Patient has no activity restrictions       [] Strict Bedrest:   [] Remain off Work:       [] May return to full duty work:                                     [] Return to work with restrictions:               [address] Information: Should you experience any significant changes in your wound(s) or have questions about your wound care, please contact Gianfranco Berg 26 at [place] [address] 8:00 am - 4:30. If you need help with your wound outside of these hours and cannot wait until we are again available, contact your PCP or go to the hospital emergency room. PLEASE NOTE: IF YOU ARE UNABLE TO OBTAIN WOUND SUPPLIES, CONTINUE TO USE THE SUPPLIES YOU HAVE AVAILABLE UNTIL YOU ARE ABLE TO REACH US. IT IS MOST IMPORTANT TO KEEP THE WOUND COVERED AT ALL TIMES.     [] Dr. Jaycee Burns   [x] Dr. Dwight Rowell  [] Dr. Samina Noble

## 2023-03-06 ENCOUNTER — HOSPITAL ENCOUNTER (OUTPATIENT)
Dept: GENERAL RADIOLOGY | Age: 33
Discharge: HOME | End: 2023-03-06
Payer: MEDICARE

## 2023-03-06 ENCOUNTER — OFFICE VISIT (OUTPATIENT)
Dept: INFECTIOUS DISEASES | Age: 33
End: 2023-03-06

## 2023-03-06 VITALS
DIASTOLIC BLOOD PRESSURE: 79 MMHG | HEART RATE: 112 BPM | RESPIRATION RATE: 20 BRPM | OXYGEN SATURATION: 94 % | WEIGHT: 139.6 LBS | SYSTOLIC BLOOD PRESSURE: 148 MMHG | HEIGHT: 59 IN | TEMPERATURE: 97.7 F | BODY MASS INDEX: 28.14 KG/M2

## 2023-03-06 DIAGNOSIS — L03.115 CELLULITIS AND ABSCESS OF RIGHT LEG: Primary | ICD-10-CM

## 2023-03-06 DIAGNOSIS — L02.415 CELLULITIS AND ABSCESS OF RIGHT LEG: Primary | ICD-10-CM

## 2023-03-06 DIAGNOSIS — S81.801D WOUND OF RIGHT LEG, SUBSEQUENT ENCOUNTER: ICD-10-CM

## 2023-03-06 PROCEDURE — 73590 X-RAY EXAM OF LOWER LEG: CPT

## 2023-03-06 RX ORDER — HYDROMORPHONE HYDROCHLORIDE 2 MG/1
TABLET ORAL
COMMUNITY
Start: 2023-02-24

## 2023-03-06 RX ORDER — POLYETHYLENE GLYCOL 3350 17 G/17G
POWDER, FOR SOLUTION ORAL
COMMUNITY
Start: 2020-12-14

## 2023-03-06 RX ORDER — AMLODIPINE BESYLATE 2.5 MG/1
2.5 TABLET ORAL DAILY
COMMUNITY
Start: 2023-02-21

## 2023-03-06 RX ORDER — GUAIFENESIN/DEXTROMETHORPHAN 100-10MG/5
SYRUP ORAL
COMMUNITY
Start: 2022-12-21

## 2023-03-06 RX ORDER — ASPIRIN 81 MG/1
81 TABLET ORAL DAILY
COMMUNITY
Start: 2022-12-15 | End: 2023-03-09

## 2023-03-06 RX ORDER — RISEDRONATE SODIUM 30 MG/1
30 TABLET, FILM COATED ORAL
COMMUNITY
Start: 2022-09-27 | End: 2023-03-06

## 2023-03-06 RX ORDER — MEDROXYPROGESTERONE ACETATE 10 MG/1
TABLET ORAL
COMMUNITY
Start: 2021-11-24

## 2023-03-06 RX ORDER — MELATONIN 5 MG
CAPSULE ORAL
COMMUNITY
Start: 2022-12-15

## 2023-03-06 RX ORDER — OXYCODONE HYDROCHLORIDE 5 MG/1
TABLET ORAL
COMMUNITY

## 2023-03-06 RX ORDER — FLUTICASONE PROPIONATE 50 MCG
SPRAY, SUSPENSION (ML) NASAL
COMMUNITY
Start: 2023-03-03

## 2023-03-06 RX ORDER — VALACYCLOVIR HYDROCHLORIDE 1 G/1
TABLET, FILM COATED ORAL
COMMUNITY
End: 2023-03-06

## 2023-03-06 RX ORDER — ALBUTEROL SULFATE 5 MG/ML
5 SOLUTION RESPIRATORY (INHALATION) ONCE
COMMUNITY
Start: 2022-12-21 | End: 2023-03-09

## 2023-03-06 RX ORDER — LOSARTAN POTASSIUM 50 MG/1
50 TABLET ORAL DAILY
COMMUNITY
Start: 2023-02-22

## 2023-03-06 RX ORDER — GABAPENTIN 300 MG/1
300 CAPSULE ORAL 2 TIMES DAILY
COMMUNITY
Start: 2023-02-28

## 2023-03-06 RX ORDER — METHYLPREDNISOLONE 4 MG/1
TABLET ORAL
COMMUNITY
Start: 2022-12-29 | End: 2023-03-06

## 2023-03-06 RX ORDER — BACLOFEN 10 MG/1
TABLET ORAL
COMMUNITY
Start: 2021-08-02 | End: 2023-03-09

## 2023-03-06 RX ORDER — ERGOCALCIFEROL 1.25 MG/1
CAPSULE ORAL
COMMUNITY
Start: 2022-11-28

## 2023-03-06 RX ORDER — METHOCARBAMOL 500 MG/1
TABLET, FILM COATED ORAL
COMMUNITY

## 2023-03-06 RX ORDER — METHOTREXATE 2.5 MG/1
TABLET ORAL
COMMUNITY
Start: 2023-02-27

## 2023-03-06 RX ORDER — ACETAMINOPHEN 500 MG/1
CAPSULE, LIQUID FILLED ORAL
COMMUNITY

## 2023-03-06 RX ORDER — TIZANIDINE 4 MG/1
TABLET ORAL
COMMUNITY
Start: 2022-12-21

## 2023-03-06 RX ORDER — PREDNISONE 10 MG/1
20 TABLET ORAL DAILY
COMMUNITY
Start: 2023-02-15

## 2023-03-06 RX ORDER — SENNOSIDES 8.6 MG/1
TABLET ORAL
COMMUNITY

## 2023-03-06 RX ORDER — BENZONATATE 100 MG/1
CAPSULE ORAL
COMMUNITY
Start: 2022-12-29

## 2023-03-06 NOTE — PROGRESS NOTES
1. \"Have you been to the ER, urgent care clinic since your last visit? Hospitalized since your last visit? \" Yes- [facility]- 2/3/23 cellulitis    2. \"Have you seen or consulted any other health care providers outside of the [address] [place] [address] since your last visit? \" Yes- [facility]    3. For patients aged 39-70: Has the patient had a colonoscopy / FIT/ Cologuard? NA - based on age      If the patient is female:    4. For patients aged 41-77: Has the patient had a mammogram within the past 2 years? NA - based on age or sex      11. For patients aged 21-65: Has the patient had a pap smear? Yes - Care Gap present.  Most recent result on file   Chief Complaint   Patient presents with    Hospital Follow Up    Skin Infection     cellulitis     Visit Vitals  BP (!) 130/91 (BP 1 Location: Left upper arm, BP Patient Position: Sitting, BP Cuff Size: Adult)   Pulse (!) 112   Temp 97.7 °F (36.5 °C) (Oral)   Resp 20   Ht 4' 11\" (1.499 m)   Wt 139 lb 9.6 oz (63.3 kg)   SpO2 93%   BMI 28.20 kg/m²     Visit Vitals  BP (!) 148/79 (BP 1 Location: Right upper arm, BP Patient Position: Sitting, BP Cuff Size: Adult)   Pulse (!) 112   Temp 97.7 °F (36.5 °C) (Oral)   Resp 20   Ht 4' 11\" (1.499 m)   Wt 139 lb 9.6 oz (63.3 kg)   SpO2 94%   BMI 28.20 kg/m²

## 2023-03-06 NOTE — PROGRESS NOTES
Dane Shelby is a 34 y.o. female. HPI  Patient immunocompromised with SLE/RA, followed at [facility] last month  for right leg cellulitis. MRI showed abscess which was incised and drained with cultures growing Clostridium perfringens. She was treated with Daptomycin and Meropenem, then discharged on Daptomycin for 2 additional weeks which has been completed and her PICC line was pulled. She is followed at the [address] [facility] with repeat wound culture a week after discharge that was no growth. There apparently is concern for calcinosis and x-ray was ordered. Patient states that she has calcinosis elsewhere. No pain, redness right leg but still with moderate swelling. Wound vac in place. Recent photo of wound shown below. No fever or chills. WBC normal.  CRP and ESR remain elevated but possibly related to her autoimmune disease. Review of Systems   Constitutional:  Negative for chills and fever. Musculoskeletal:  Positive for myalgias. Negative for joint pain. Skin:  Negative for rash. Past Medical History:   Diagnosis Date    Lupus ([address]     Osteonecrosis ([address]     Osteoporosis     Pulmonary HTN ([address]     RA (rheumatoid arthritis) ([address]      Past Surgical History:   Procedure Laterality Date    HX CYST REMOVAL      HX ORTHOPAEDIC      x2 left knee sxs    HX ORTHOPAEDIC      bilateral ankle sxs       Objective  Physical Exam  Vitals and nursing note reviewed. Constitutional:       Appearance: She is not ill-appearing. Musculoskeletal:         General: Swelling present. Right lower leg: Edema present. Comments: Wound vac right upper calf (see corresponding wound in photo)   Skin:     Findings: No rash. Neurological:      General: No focal deficit present. Mental Status: She is alert and oriented to person, place, and time. Psychiatric:         Mood and Affect: Mood normal.         Behavior: Behavior normal.         Thought Content:  Thought content normal.         Judgment: Judgment normal.   Media Information  Document Information    Photographic Image:  Mobile Media Capture   R leg   03/02/2023 09:37   Attached To:    Hospital Encounter on 3/2/23 with Shawn Montiel MD       Assessment & Plan  Cellulitis right lower extremity with abscess/hematoma, status post I&D, culture growing Clostridium perfringens,  status post Daptomycin and Meropenem, clinically resolved  Elevated ESR and CRP, attributed to SLE/RA  Systemic Lupus Erythematosus  Rheumatoid arthritis  Allergies to Doxycycline and Vancomycin     Plan:   No further antibiotic recommendations at this time  Follow-up in ID Clinic as needed  Neto Hernandez as scheduled    Diana Garza MD

## 2023-03-09 ENCOUNTER — HOSPITAL ENCOUNTER (OUTPATIENT)
Dept: WOUND CARE | Age: 33
Discharge: HOME | End: 2023-03-09
Payer: MEDICARE

## 2023-03-09 VITALS
DIASTOLIC BLOOD PRESSURE: 71 MMHG | TEMPERATURE: 97.7 F | SYSTOLIC BLOOD PRESSURE: 124 MMHG | RESPIRATION RATE: 18 BRPM | HEART RATE: 113 BPM

## 2023-03-09 DIAGNOSIS — S81.801D WOUND OF RIGHT LEG, SUBSEQUENT ENCOUNTER: Primary | ICD-10-CM

## 2023-03-09 DIAGNOSIS — M05.742 RHEUMATOID ARTHRITIS INVOLVING LEFT HAND WITH POSITIVE RHEUMATOID FACTOR (HCC): ICD-10-CM

## 2023-03-09 PROBLEM — M06.9 RHEUMATOID ARTHRITIS INVOLVING LEFT HAND (HCC): Status: ACTIVE | Noted: 2023-03-09

## 2023-03-09 PROCEDURE — 74011000250 HC RX REV CODE- 250: Performed by: ORTHOPAEDIC SURGERY

## 2023-03-09 PROCEDURE — 11042 DBRDMT SUBQ TIS 1ST 20SQCM/<: CPT

## 2023-03-09 RX ORDER — LIDOCAINE HYDROCHLORIDE 20 MG/ML
15 SOLUTION OROPHARYNGEAL ONCE
OUTPATIENT
Start: 2023-03-09 | End: 2023-03-09

## 2023-03-09 RX ORDER — MUPIROCIN 20 MG/G
OINTMENT TOPICAL ONCE
OUTPATIENT
Start: 2023-03-09 | End: 2023-03-09

## 2023-03-09 RX ORDER — SILVER SULFADIAZINE 10 G/1000G
CREAM TOPICAL ONCE
OUTPATIENT
Start: 2023-03-09 | End: 2023-03-09

## 2023-03-09 RX ORDER — LIDOCAINE HYDROCHLORIDE 20 MG/ML
15 SOLUTION OROPHARYNGEAL ONCE
Status: COMPLETED | OUTPATIENT
Start: 2023-03-09 | End: 2023-03-09

## 2023-03-09 RX ADMIN — LIDOCAINE HYDROCHLORIDE 15 ML: 20 SOLUTION ORAL; TOPICAL at 09:14

## 2023-03-09 NOTE — DISCHARGE INSTRUCTIONS
Discharge Instructions for  [address], [address] [facility]  Telephone: [phone]    NAME:  Regino Kenny:  1990  MEDICAL RECORD NUMBER:  [id number]  DATE:  3/9/2023      Return Appointment:  [] Dressing supply provider:   [x] Home Healthcare: [address] [place] [address] [facility]   [x] Return Appointment: 2 Week(s)  [] Nurse Visit:   Week(s)    [] Discharge from [facility]  [] Ordered tests:    [] Referrals:    [x] Rx: OVER THE COUNTER ALEVE AND VITAMIN C 500MG TWICE A DAY   [] Other:      Wound Cleansing:   Do not scrub or use excessive force. Cleanse wound prior to applying a clean dressing with:  [x] Normal Saline   [x] Mild Soap & Water/Baby Shampoo   [] Keep Wound Dry in Shower  [] Other:      Topical Treatments:  Do not apply lotions, creams, or ointments to wound bed unless directed. [] Apply moisturizing lotion to skin surrounding the wound prior to dressing change.  [] Apply antifungal ointment to skin surrounding the wound prior to dressing change.  [] Apply thin film of moisture barrier/zinc ointment to skin immediately around wound. [] Other:       Dressings:           Wound Location RIGHT LEG   [] Apply Primary Dressing:       [] MediHoney Gel    [] MediHoney Alginate               [] Calcium Alginate      [x] Calcium Alginate with Silver([facility])   [] Collagen                   [] Collagen with Silver                [] Santyl with Moistened saline gauze              [] Hydrofera Blue (cut to size and moistened)  [] Hydrofera Blue Ready (Cut to size)   [] NS wet to dry     [] Betadine wet to dry              [] Hydrogel                [] Mepitel     [] Bactroban/Mupirocin               [] Other:     [] Cover and Secure with:     [x] Gauze [x] Sherri [] Kerlix   [] Foam [] Super Absorbant [] ABD     [] ACE Wrap            [] Other:    Limit contact of tape with skin.     [] Change dressing: [] Daily    [] Every Other Day [] Once per week   [] Twice per week   [] Three times per week [] Do Not Change Dressing   [] Other:     Negative Pressure:           Wound Location: RIGHT LEG  [x] Pressure @  125 mm/Hg  [x]Continuous []Intermittent   [x] Black Foam [x] White Foam- TO UNDERMINING AND TUNNELING               [x] Bridge:               [x] Change vac dressing three times per week    Pressure Relief:  [] When sitting, shift position or do seat lifts every 15 minutes.  [] Wheelchair cushion [] Specialty Bed/Mattress  [] Turn every 2 hours when in bed. Avoid direct pressure on wound site. Limit side lying to 30 degree tilt. Limit HOB elevation to 30 degrees. Edema Control:  Apply: [] Compression Stocking:    mmHg  []Right Leg []Left Leg   [] Tubigrip []Right Leg Double Layer []Left Leg Double Layer      []Right Leg Single Layer []Left Leg Single Layer     [] Elevate leg(s) above the level of the heart when sitting. [] Avoid prolonged standing in one place. Compression:  Apply: [] Multilayer Compression Wrap: []RightLeg []Left Leg                                 []Coflex   []Coflex Lite             []Unna     [] Do not get leg(s) with wrap wet. If wraps become too tight call the center or completely remove the wrap. [] Elevate leg(s) above the level of the heart when sitting. [] Avoid prolonged standing in one place. Off-Loading:   [] Off-loading when [] walking  [] in bed [] sitting  [] Total non-weight bearing  [] Right Leg  [] Left Leg   [] Assistive Device [] Walker [] Cane      [] Wheelchair  [] Crutches   [] Surgical shoe    [] Podus Boot(s)   [] Foam Boot(s)  [] Roll About    [] Cast Boot [] CROW Boot  [] Wedge Shoe  [] Other:    Contact Cast:  Apply: [] Total Contact Cast Applied in Clinic []RightLeg []Left Leg   [] Do not get cast wet. Contact center or go to emergency room if there is a foul odor or becomes uncomfortable due to feeling tight or swelling.   Do not use objects inside of cast to scratch. Dietary:  [x] Diet as tolerated: [] Calorie Diabetic Diet: [] No Added Salt:  [x] Increase Protein: [] Other:     Activity:  [x] Activity as tolerated:    [] Patient has no activity restrictions       [] Strict Bedrest:   [] Remain off Work:       [] May return to full duty work:                                     [] Return to work with restrictions:               [address] Information: Should you experience any significant changes in your wound(s) or have questions about your wound care, please contact [facility] [address] at [place] [phone] 8:00 am - 4:30. If you need help with your wound outside of these hours and cannot wait until we are again available, contact your PCP or go to the hospital emergency room. PLEASE NOTE: IF YOU ARE UNABLE TO OBTAIN WOUND SUPPLIES, CONTINUE TO USE THE SUPPLIES YOU HAVE AVAILABLE UNTIL YOU ARE ABLE TO REACH US. IT IS MOST IMPORTANT TO KEEP THE WOUND COVERED AT ALL TIMES.     [] Dr. Eliecer Garcia   [x] Dr. Lori Monson  [] Dr. Khanh Iglesiasty

## 2023-03-09 NOTE — WOUND CARE
[address]   Progress Note and Procedure Note     Paco Rebolledo  MEDICAL RECORD NUMBER:  [id number]  AGE: 34 y.o. RACE BLACK/  GENDER: female  : 1990  EPISODE DATE:  3/9/2023      Subjective:     Chief Complaint   Patient presents with    Wound Check     R leg         HISTORY of PRESENT ILLNESS HPI    Paco Rebolledo is a 34 y.o. female who presents today for wound/ulcer evaluation. History of Wound Context: Patient is doing well, wounds are improving, has mild pain, no fevers,  Wound/Ulcer Pain Timing/Severity: None   Quality of pain: none  Severity:  0/ 10   Modifying Factors: None  Associated Signs/Symptoms: edema          PAST MEDICAL HISTORY    Past Medical History:   Diagnosis Date    Lupus ([address]     O[address]     Osteoporosis     Pulmonary HTN ([facility])     RA (rheumatoid arthritis) ([facility]         PAST SURGICAL HISTORY    Past Surgical History:   Procedure Laterality Date    HX CYST REMOVAL      HX ORTHOPAEDIC      x2 left knee sxs    HX ORTHOPAEDIC      bilateral ankle sxs       FAMILY HISTORY    Family History   Problem Relation Age of Onset    Hypertension Mother     Cancer Paternal Grandmother        SOCIAL HISTORY    Social History     Tobacco Use    Smoking status: Never    Smokeless tobacco: Never   Vaping Use    Vaping Use: Never used   Substance Use Topics    Alcohol use: Not Currently     Comment: on occ    Drug use: Never       ALLERGIES    Allergies   Allergen Reactions    Doxycycline Nausea and Vomiting    Heparin Anaphylaxis    Remicade [Infliximab] Anaphylaxis    Vancomycin Hives    Ibuprofen Nausea Only    Rituximab Itching    Oxycodone-Acetaminophen Nausea and Vomiting and Nausea Only     Side effect  Side effect    Reaction Type: Allergy  Other reaction(s):  Adverse reaction to substance         MEDICATIONS    Current Outpatient Medications on File Prior to Encounter   Medication Sig Dispense Refill    predniSONE (DELTASONE) 10 mg tablet Take 20 mg by mouth daily. gabapentin (NEURONTIN) 300 mg capsule Take 300 mg by mouth two (2) times a day. tiZANidine (ZANAFLEX) 4 mg tablet TAKE 1 TABLET BY MOUTH EVERY 8 HOURS FOR 10 DAYS      senna (SENOKOT) 8.6 mg tablet       polyethylene glycol (MIRALAX) 17 gram/dose powder       oxyCODONE IR (ROXICODONE) 5 mg immediate release tablet       methotrexate (RHEUMATREX) 2.5 mg tablet TAKE 4 TABLETS (10 MG TOTAL) BY MOUTH 1 TIME PER WEEK      methocarbamoL (ROBAXIN) 500 mg tablet       melatonin 5 mg cap capsule TAKE 1 CAPSULE BY MOUTH AS NEEDED AT BEDTIME FOR SLEEP. medroxyPROGESTERone (PROVERA) 10 mg tablet       losartan (COZAAR) 50 mg tablet Take 50 mg by mouth daily. HYDROmorphone (DILAUDID) 2 mg tablet TAKE 1 TABLET BY MOUTH EVERY 8 HOURS AS NEEDED FOR SEVERE PAIN FOR UP TO 7 DAYS.      guaiFENesin-dextromethorphan (ROBITUSSIN DM) 100-10 mg/5 mL syrup       fluticasone propionate (FLONASE) 50 mcg/actuation nasal spray       ergocalciferol (ERGOCALCIFEROL) 1,250 mcg (50,000 unit) capsule TAKE 1 CAPSULE BY MOUTH 1 TIME PER WEEK FOR 24 WEEKS. benzonatate (TESSALON) 100 mg capsule TAKE 1 CAPSULE BY MOUTH 3 TIMES A DAY AS NEEDED FOR COUGH. DO NOT CRUSH OR CHEW.      amLODIPine (NORVASC) 2.5 mg tablet Take 2.5 mg by mouth daily. acetaminophen (TYLENOL) 500 mg capsule acetaminophen 500 mg capsule   Take 1 capsule every 6 hours by oral route as needed. folic acid (FOLVITE) 1 mg tablet Take 2 mg by mouth daily. cholecalciferol (VITAMIN D3) (5000 Units/125 mcg) tab tablet Take 5,000 Units by mouth every Monday. Indications: osteoporosis, a condition of weak bones      gabapentin (NEURONTIN) 600 mg tablet Take 300 mg by mouth two (2) times a day. spironolactone (ALDACTONE) 25 mg tablet 25 mg daily. At bedtime      Adempas 2.5 mg tab tablet 2.5 mg three (3) times daily.  Indications: pulmonary arterial hypertension      predniSONE (DELTASONE) 5 mg tablet Take 5-10 mg by mouth daily. 10 mg in am and 5 mg at night  Indications: lupus      pantoprazole (PROTONIX) 40 mg tablet TAKE 1 TABLET BY MOUTH EVERY DAY      furosemide (LASIX) 20 mg tablet Take 20 mg by mouth daily. DULoxetine (CYMBALTA) 30 mg capsule TAKE 1 CAPSULE BY MOUTH TWICE A DAY      Letairis 5 mg tablet 5 mg daily. In AM       No current facility-administered medications on file prior to encounter. REVIEW OF SYSTEMS    A comprehensive review of systems was negative except for that written in the HPI. Objective:     Visit Vitals  /71 (BP 1 Location: Left upper arm, BP Patient Position: At rest;Sitting)   Pulse (!) 113   Temp 97.7 °F (36.5 °C)   Resp 18       Wt Readings from Last 3 Encounters:   03/06/23 63.3 kg (139 lb 9.6 oz)   02/23/23 61.2 kg (135 lb)   02/16/23 67 kg (147 lb 11.3 oz)       PHYSICAL EXAM    Constitutional: Patient is awake, ambulating with no devices , no acute distress  Head: normocephalic, atraumatic. Behavioral/Psych: patient is pleasant, cooperative, awake and alert  Debridement Wound Care       Procedure Note  Indications:  Based on my examination of this patient's wound(s)/ulcer(s) today, debridement is required to promote healing and evaluate the wound base. Performed by: Carmen Bui MD    Consent obtained: yes  Time out taken: yes  Debridement: excisional    Using curette/scalpel the wound(s)/ulcer(s) was/were sharply debrided down through and including the removal of    subcutaneous tissue    Devitalized Tissue Debrided: slough    Pre Debridement Measurements:  Are located in the [facility]  Documentation Flow Sheet      Wound/Ulcer #: 1    Post Debridement Measurements:  Wound/Ulcer Descriptions are Pre Debridement except measurements:    Wound Leg lower Right;Posterior #1 (Active)   Wound Image   03/09/23 0916   Wound Etiology Non-Healing Surgical 03/09/23 0916   Dressing Status Clean;Dry; Intact 03/09/23 0916   Cleansed Cleansed with saline 03/09/23 0916 Wound Length (cm) 1.3 cm 03/09/23 0916   Wound Width (cm) 3.4 cm 03/09/23 0916   Wound Depth (cm) 1.6 cm 03/09/23 0916   Wound Surface Area (cm^2) 4.42 cm^2 03/09/23 0916   Change in Wound Size % 26.33 03/09/23 0916   Wound Volume (cm^3) 7.072 cm^3 03/09/23 0916   Wound Healing % 16 03/09/23 0916   Post-Procedure Length (cm) 1.5 cm 03/09/23 0926   Post-Procedure Width (cm) 3.6 cm 03/09/23 0926   Post-Procedure Depth (cm) 1.8 cm 03/09/23 0926   Post-Procedure Surface Area (cm^2) 5.4 cm^2 03/09/23 0926   Post-Procedure Volume (cm^3) 9.72 cm^3 03/09/23 0926   Undermining Starts ___ O'Clock 9 o'clock 03/09/23 0916   Undermining Ends ___ O'Clock 11 o'clock 03/09/23 0916   Undermining Maximum Distance (cm) 4 cm 03/09/23 0916   Wound Assessment Subcutaneous; Slough;Pink/red;Granulation tissue 03/09/23 0916   Drainage Amount Moderate 03/09/23 0916   Drainage Description Serosanguinous 03/09/23 0916   Wound Odor None 03/09/23 0916   Isabella-Wound/Incision Assessment Intact 03/09/23 0916   Edges Epibole (rolled edges) 03/09/23 0916   Wound Thickness Description Full thickness 03/09/23 0916   Number of days: 14        Total Surface Area Debrided:  9 sq cm     Estimated Blood Loss:  Minimal     Hemostasis Achieved: with pressure    Procedural Pain: 0/ 10     Post Procedural Pain: 0/ 10     Response to treatment: Well-tolerated by the patient    X-rays of the leg were reviewed shows diffuse calcinosis cutis, debridement did not reveal any calcifications, no biopsy was necessary, patient is improving, continue with wound VAC, both the hands were examined has radial deviation secondary to subluxation of the extensor tendon particularly on the left side patient has a flexion deformity at the MP joint, 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 secondary to rheumatoid    Assessment:      Hospital Problems  Date Reviewed: 3/6/2023            Codes Class Noted POA    Rheumatoid arthritis involving left hand [facility]) ICD-10-CM: M06.9  ICD-9-CM: 714.0  3/9/2023 Yes        Calcinosis ICD-10-CM: E83.59  ICD-9-CM: 275.49  3/2/2023 Yes        Wound of right leg, subsequent encounter ICD-10-CM: S81.801D  ICD-9-CM: V58.89, 894.0  2/23/2023 Yes        Lupus ([facility] ICD-10-CM: M32.9  ICD-9-CM: 710.0  Unknown Yes        Sepsis ([facility] ICD-10-CM: A41.9  ICD-9-CM: 038.9, 995.91  8/7/2021 Yes            POP IN PROCEDURE TYPE (DEBRIDEMENT, BIOPSY, I&D, SKIN SUB, CHEMICAL CAUERTY)     Plan:     Treatment Note please see attached Discharge Instructions  Return Appointment:  [] Dressing supply provider:   [x] Home Healthcare: [address] [place] [address]   [x] Return Appointment: 2 Week(s)  [] Nurse Visit:   Week(s)     [] Discharge from [facility]  [] Ordered tests:    [] Referrals:    [x] Rx: OVER THE COUNTER ALEVE AND VITAMIN C 500MG TWICE A DAY   [] Other:       Wound Cleansing:   Do not scrub or use excessive force. Cleanse wound prior to applying a clean dressing with:  [x] Normal Saline          [x] Mild Soap & Water/Baby Shampoo   [] Keep Wound Dry in Shower  [] Other:       Topical Treatments:  Do not apply lotions, creams, or ointments to wound bed unless directed. [] Apply moisturizing lotion to skin surrounding the wound prior to dressing change.  [] Apply antifungal ointment to skin surrounding the wound prior to dressing change.  [] Apply thin film of moisture barrier/zinc ointment to skin immediately around wound.   [] Other:                  Dressings:                  Wound Location RIGHT LEG            [] Apply Primary Dressing:                                          [] MediHoney Gel         [] MediHoney Alginate                     [] Calcium Alginate      [x] Calcium Alginate with Silver([facility])              [] Collagen                   [] Collagen with Silver                [] Santyl with Moistened saline gauze              [] Hydrofera Blue (cut to size and moistened)  [] Hydrofera Blue Ready (Cut to size) [] NS wet to dry            [] Betadine wet to dry              [] Hydrogel                   [] Mepitel                   [] Bactroban/Mupirocin                       [] Other:      [] Cover and Secure with:                   [x] Gauze        [x] Sherri           [] Kerlix              [] Foam          [] Super Absorbant    [] ABD                                      [] ACE Wrap            [] Other:               Limit contact of tape with skin. [] Change dressing:  [] Daily           [] Every Other Day    [] Once per week   [] Twice per week              [] Three times per week        [] Do Not Change Dressing    [] Other:                Negative Pressure:           Wound Location: RIGHT LEG  [x] Pressure @  125 mm/Hg               [x]Continuous  []Intermittent              [x] Black Foam            [x] White Foam- TO UNDERMINING AND TUNNELING               [x] Bridge:               [x] Change vac dressing three times per week     Pressure Relief:  [] When sitting, shift position or do seat lifts every 15 minutes.  [] Wheelchair cushion           [] Specialty Bed/Mattress  [] Turn every 2 hours when in bed. Avoid direct pressure on wound site. Limit side lying to 30 degree tilt. Limit HOB elevation to 30 degrees. Edema Control:  Apply:  [] Compression Stocking:    mmHg   []Right Leg     []Left Leg              [] Tubigrip      []Right Leg Double Layer      []Left Leg Double Layer                                      []Right Leg Single Layer       []Left Leg Single Layer                 [] Elevate leg(s) above the level of the heart when sitting. [] Avoid prolonged standing in one place. Compression:  Apply:  [] Multilayer Compression Wrap:      []RightLeg      []Left Leg                                 []Coflex              []Coflex Lite             []Unna                 [] Do not get leg(s) with wrap wet.   If wraps become too tight call the center or completely remove the wrap. [] Elevate leg(s) above the level of the heart when sitting. [] Avoid prolonged standing in one place. Off-Loading:   [] Off-loading when   [] walking       [] in bed         [] sitting  [] Total non-weight bearing  [] Right Leg  [] Left Leg         [] Assistive Device    [] Walker        [] Cane      [] Wheelchair      [] Crutches              [] Surgical shoe    [] Podus Boot(s)   [] Foam Boot(s)  [] Roll About              [] Cast Boot   [] CROW Boot  [] Wedge Shoe  [] Other:     Contact Cast:  Apply:  [] Total Contact Cast Applied in Clinic          []RightLeg      []Left Leg              [] Do not get cast wet. Contact center or go to emergency room if there is a foul odor or becomes uncomfortable due to feeling tight or swelling. Do not use objects inside of cast to scratch. Dietary:  [x] Diet as tolerated:   [] Calorie Diabetic Diet:         [] No Added Salt:  [x] Increase Protein:   [] Other:              Activity:  [x] Activity as tolerated:    [] Patient has no activity restrictions       [] Strict Bedrest:          [] Remain off Work:       [] May return to full duty work:                                               [] Return to work with restrictions:      Written patient dismissal instructions given to patient or POA.          Electronically signed by Uriah Burks MD on 3/9/2023 at 11:34 AM

## 2023-03-09 NOTE — WOUND CARE
Discharge Instructions for  [address], [address] [facility]  Telephone: [phone]    NAME:  Asaf Him:  1990  MEDICAL RECORD NUMBER:  [id number]  DATE:  3/9/2023      Return Appointment:  [] Dressing supply provider:   [x] Home Healthcare: [facility] [employer] [facility]   [x] Return Appointment: 2 Week(s)  [] Nurse Visit:   Week(s)    [] Discharge from [facility]  [] Ordered tests:    [] Referrals:    [x] Rx: OVER THE COUNTER ALEVE AND VITAMIN C 500MG TWICE A DAY   [] Other:      Wound Cleansing:   Do not scrub or use excessive force. Cleanse wound prior to applying a clean dressing with:  [x] Normal Saline   [x] Mild Soap & Water/Baby Shampoo   [] Keep Wound Dry in Shower  [] Other:      Topical Treatments:  Do not apply lotions, creams, or ointments to wound bed unless directed. [] Apply moisturizing lotion to skin surrounding the wound prior to dressing change.  [] Apply antifungal ointment to skin surrounding the wound prior to dressing change.  [] Apply thin film of moisture barrier/zinc ointment to skin immediately around wound. [] Other:       Dressings:           Wound Location RIGHT LEG   [] Apply Primary Dressing:       [] MediHoney Gel    [] MediHoney Alginate               [] Calcium Alginate      [x] Calcium Alginate with Silver([facility])   [] Collagen                   [] Collagen with Silver                [] Santyl with Moistened saline gauze              [] Hydrofera Blue (cut to size and moistened)  [] Hydrofera Blue Ready (Cut to size)   [] NS wet to dry     [] Betadine wet to dry              [] Hydrogel                [] Mepitel     [] Bactroban/Mupirocin               [] Other:     [] Cover and Secure with:     [x] Gauze [x] Sherri [] Kerlix   [] Foam [] Super Absorbant [] ABD     [] ACE Wrap            [] Other:    Limit contact of tape with skin.     [] Change dressing: [] Daily    [] Every Other Day [] Once per week   [] Twice per week   [] Three times per week [] Do Not Change Dressing   [] Other:     Negative Pressure:           Wound Location: RIGHT LEG  [x] Pressure @  125 mm/Hg  [x]Continuous []Intermittent   [x] Black Foam [x] White Foam- TO UNDERMINING AND TUNNELING               [x] Bridge:               [x] Change vac dressing three times per week    Pressure Relief:  [] When sitting, shift position or do seat lifts every 15 minutes.  [] Wheelchair cushion [] Specialty Bed/Mattress  [] Turn every 2 hours when in bed. Avoid direct pressure on wound site. Limit side lying to 30 degree tilt. Limit HOB elevation to 30 degrees. Edema Control:  Apply: [] Compression Stocking:    mmHg  []Right Leg []Left Leg   [] Tubigrip []Right Leg Double Layer []Left Leg Double Layer      []Right Leg Single Layer []Left Leg Single Layer     [] Elevate leg(s) above the level of the heart when sitting. [] Avoid prolonged standing in one place. Compression:  Apply: [] Multilayer Compression Wrap: []RightLeg []Left Leg                                 []Coflex   []Coflex Lite             []Unna     [] Do not get leg(s) with wrap wet. If wraps become too tight call the center or completely remove the wrap. [] Elevate leg(s) above the level of the heart when sitting. [] Avoid prolonged standing in one place. Off-Loading:   [] Off-loading when [] walking  [] in bed [] sitting  [] Total non-weight bearing  [] Right Leg  [] Left Leg   [] Assistive Device [] Walker [] Cane      [] Wheelchair  [] Crutches   [] Surgical shoe    [] Podus Boot(s)   [] Foam Boot(s)  [] Roll About    [] Cast Boot [] CROW Boot  [] Wedge Shoe  [] Other:    Contact Cast:  Apply: [] Total Contact Cast Applied in Clinic []RightLeg []Left Leg   [] Do not get cast wet. Contact center or go to emergency room if there is a foul odor or becomes uncomfortable due to feeling tight or swelling.   Do not use objects inside of cast to scratch. Dietary:  [x] Diet as tolerated: [] Calorie Diabetic Diet: [] No Added Salt:  [x] Increase Protein: [] Other:     Activity:  [x] Activity as tolerated:    [] Patient has no activity restrictions       [] Strict Bedrest:   [] Remain off Work:       [] May return to full duty work:                                     [] Return to work with restrictions:               [address] Information: Should you experience any significant changes in your wound(s) or have questions about your wound care, please contact Gianfranco Berg 26 at [place] [address] 8:00 am - 4:30. If you need help with your wound outside of these hours and cannot wait until we are again available, contact your PCP or go to the hospital emergency room. PLEASE NOTE: IF YOU ARE UNABLE TO OBTAIN WOUND SUPPLIES, CONTINUE TO USE THE SUPPLIES YOU HAVE AVAILABLE UNTIL YOU ARE ABLE TO REACH US. IT IS MOST IMPORTANT TO KEEP THE WOUND COVERED AT ALL TIMES.     [] Dr. Sadie Alvarez   [x] Dr. Gabi Delatorre  [] Dr. Anna Perez

## 2023-03-23 ENCOUNTER — HOSPITAL ENCOUNTER (OUTPATIENT)
Dept: WOUND CARE | Age: 33
Discharge: HOME | End: 2023-03-23
Payer: MEDICARE

## 2023-03-23 VITALS
SYSTOLIC BLOOD PRESSURE: 144 MMHG | TEMPERATURE: 96.8 F | HEART RATE: 101 BPM | DIASTOLIC BLOOD PRESSURE: 88 MMHG | RESPIRATION RATE: 18 BRPM

## 2023-03-23 DIAGNOSIS — S81.801D WOUND OF RIGHT LEG, SUBSEQUENT ENCOUNTER: Primary | ICD-10-CM

## 2023-03-23 PROCEDURE — 74011000250 HC RX REV CODE- 250: Performed by: ORTHOPAEDIC SURGERY

## 2023-03-23 PROCEDURE — 11042 DBRDMT SUBQ TIS 1ST 20SQCM/<: CPT

## 2023-03-23 RX ORDER — SILVER SULFADIAZINE 10 G/1000G
CREAM TOPICAL ONCE
OUTPATIENT
Start: 2023-03-23 | End: 2023-03-23

## 2023-03-23 RX ORDER — LIDOCAINE HYDROCHLORIDE 20 MG/ML
15 SOLUTION OROPHARYNGEAL ONCE
Status: COMPLETED | OUTPATIENT
Start: 2023-03-23 | End: 2023-03-23

## 2023-03-23 RX ORDER — MUPIROCIN 20 MG/G
OINTMENT TOPICAL ONCE
OUTPATIENT
Start: 2023-03-23 | End: 2023-03-23

## 2023-03-23 RX ORDER — LIDOCAINE HYDROCHLORIDE 20 MG/ML
15 SOLUTION OROPHARYNGEAL ONCE
OUTPATIENT
Start: 2023-03-23 | End: 2023-03-23

## 2023-03-23 RX ADMIN — LIDOCAINE HYDROCHLORIDE 15 ML: 20 SOLUTION ORAL; TOPICAL at 09:30

## 2023-03-23 NOTE — WOUND CARE
Discharge Instructions for  [address], [address] [facility]  Telephone: [phone]    NAME:  Lisha Torres:  1990  MEDICAL RECORD NUMBER:  [id number]  DATE:  3/23/2023      Return Appointment:  [] Dressing supply provider:   [x] Home Healthcare: [address] [place] [address] [employer]   [x] Return Appointment: 2 Week(s)  [] Nurse Visit:   Week(s)    [] Discharge from [facility]  [] Ordered tests:    [] Referrals:    [] Rx:   [] Other:      Wound Cleansing:   Do not scrub or use excessive force. Cleanse wound prior to applying a clean dressing with:  [x] Normal Saline   [x] Mild Soap & Water/Baby Shampoo   [] Keep Wound Dry in Shower  [] Other:      Topical Treatments:  Do not apply lotions, creams, or ointments to wound bed unless directed. [] Apply moisturizing lotion to skin surrounding the wound prior to dressing change.  [] Apply antifungal ointment to skin surrounding the wound prior to dressing change.  [] Apply thin film of moisture barrier/zinc ointment to skin immediately around wound. [] Other:       Dressings:           Wound Location RIGHT LEG   [] Apply Primary Dressing:       [] MediHoney Gel    [] MediHoney Alginate               [] Calcium Alginate      [x] Calcium Alginate with Silver(CLINIC AND ON Saturday-3/25/2023 -PATIENT TO CHANGE ON Saturday)   [] Collagen                   [] Collagen with Silver                [] Santyl with Moistened saline gauze              [] Hydrofera Blue (cut to size and moistened)  [] Hydrofera Blue Ready (Cut to size)   [] NS wet to dry     [] Betadine wet to dry              [] Hydrogel                [] Mepitel     [] Bactroban/Mupirocin               [] Other:     [] Cover and Secure with:     [x] Gauze [x] Sherri [] Kerlix   [] Foam [] Super Absorbant [] ABD     [] ACE Wrap            [] Other:    Limit contact of tape with skin.     [] Change dressing: [] Daily    [] Every Other Day [] Once per week   [] Twice per week   [] Three times per week [] Do Not Change Dressing   [] Other:     Negative Pressure:           Wound Location: RIGHT LEG- HOLD VAC TILL 3/27/2023   [x] Pressure @  125 mm/Hg  [x]Continuous []Intermittent   [x] Black Foam [x] White Foam- TO UNDERMINING AND TUNNELING               [x] Bridge:               [x] Change vac dressing three times per week    Pressure Relief:  [] When sitting, shift position or do seat lifts every 15 minutes.  [] Wheelchair cushion [] Specialty Bed/Mattress  [] Turn every 2 hours when in bed. Avoid direct pressure on wound site. Limit side lying to 30 degree tilt. Limit HOB elevation to 30 degrees. Edema Control:  Apply: [] Compression Stocking:    mmHg  []Right Leg []Left Leg   [] Tubigrip []Right Leg Double Layer []Left Leg Double Layer      []Right Leg Single Layer []Left Leg Single Layer     [] Elevate leg(s) above the level of the heart when sitting. [] Avoid prolonged standing in one place. Compression:  Apply: [] Multilayer Compression Wrap: []RightLeg []Left Leg                                 []Coflex   []Coflex Lite             []Unna     [] Do not get leg(s) with wrap wet. If wraps become too tight call the center or completely remove the wrap. [] Elevate leg(s) above the level of the heart when sitting. [] Avoid prolonged standing in one place. Off-Loading:   [] Off-loading when [] walking  [] in bed [] sitting  [] Total non-weight bearing  [] Right Leg  [] Left Leg   [] Assistive Device [] Walker [] Cane      [] Wheelchair  [] Crutches   [] Surgical shoe    [] Podus Boot(s)   [] Foam Boot(s)  [] Roll About    [] Cast Boot [] CROW Boot  [] Wedge Shoe  [] Other:    Contact Cast:  Apply: [] Total Contact Cast Applied in Clinic []RightLeg []Left Leg   [] Do not get cast wet. Contact center or go to emergency room if there is a foul odor or becomes uncomfortable due to feeling tight or swelling.   Do not use objects inside of cast to scratch. Dietary:  [x] Diet as tolerated: [] Calorie Diabetic Diet: [] No Added Salt:  [x] Increase Protein: [] Other:     Activity:  [x] Activity as tolerated:    [] Patient has no activity restrictions       [] Strict Bedrest:   [] Remain off Work:       [] May return to full duty work:                                     [] Return to work with restrictions:               [address] Information: Should you experience any significant changes in your wound(s) or have questions about your wound care, please contact Gianfranco Orta at [place] [address] 8:00 am - 4:30. If you need help with your wound outside of these hours and cannot wait until we are again available, contact your PCP or go to the hospital emergency room. PLEASE NOTE: IF YOU ARE UNABLE TO OBTAIN WOUND SUPPLIES, CONTINUE TO USE THE SUPPLIES YOU HAVE AVAILABLE UNTIL YOU ARE ABLE TO REACH US. IT IS MOST IMPORTANT TO KEEP THE WOUND COVERED AT ALL TIMES.     [] Dr. Christy Gallardo   [x] Dr. Billy Simmons  [] Dr. Iglesia Aguilera

## 2023-03-23 NOTE — WOUND CARE
[address]   Progress Note and Procedure Note     Shamir Faith  MEDICAL RECORD NUMBER:  [id number]  AGE: 34 y.o. RACE BLACK/  GENDER: female  : 1990  EPISODE DATE:  3/23/2023      Subjective:     Chief Complaint   Patient presents with    Wound Check     R leg         HISTORY of PRESENT ILLNESS HPI    Shamir Faith is a 34 y.o. female who presents today for wound/ulcer evaluation. History of Wound Context: Patient doing well, denies any symptoms, fairly stable no fever on wound VAC  Wound/Ulcer Pain Timing/Severity: None   Quality of pain: none  Severity:  0/ 10   Modifying Factors: None  Associated Signs/Symptoms: edema          PAST MEDICAL HISTORY    Past Medical History:   Diagnosis Date    Lupus ([facility] [address]     Osteonecrosis (HCC)     Osteoporosis     Pulmonary HTN (HCC)     RA (rheumatoid arthritis) ([facility]         PAST SURGICAL HISTORY    Past Surgical History:   Procedure Laterality Date    HX CYST REMOVAL      HX ORTHOPAEDIC      x2 left knee sxs    HX ORTHOPAEDIC      bilateral ankle sxs       FAMILY HISTORY    Family History   Problem Relation Age of Onset    Hypertension Mother     Cancer Paternal Grandmother        SOCIAL HISTORY    Social History     Tobacco Use    Smoking status: Never    Smokeless tobacco: Never   Vaping Use    Vaping Use: Never used   Substance Use Topics    Alcohol use: Not Currently     Comment: on occ    Drug use: Never       ALLERGIES    Allergies   Allergen Reactions    Doxycycline Nausea and Vomiting    Heparin Anaphylaxis    Remicade [Infliximab] Anaphylaxis    Vancomycin Hives    Ibuprofen Nausea Only    Rituximab Itching    Oxycodone-Acetaminophen Nausea and Vomiting and Nausea Only     Side effect  Side effect    Reaction Type: Allergy  Other reaction(s):  Adverse reaction to substance         MEDICATIONS    Current Outpatient Medications on File Prior to Encounter   Medication Sig Dispense Refill    predniSONE (Zaina Alon) 10 mg tablet Take 20 mg by mouth daily. gabapentin (NEURONTIN) 300 mg capsule Take 300 mg by mouth two (2) times a day. tiZANidine (ZANAFLEX) 4 mg tablet TAKE 1 TABLET BY MOUTH EVERY 8 HOURS FOR 10 DAYS      senna (SENOKOT) 8.6 mg tablet       polyethylene glycol (MIRALAX) 17 gram/dose powder       oxyCODONE IR (ROXICODONE) 5 mg immediate release tablet       methotrexate (RHEUMATREX) 2.5 mg tablet TAKE 4 TABLETS (10 MG TOTAL) BY MOUTH 1 TIME PER WEEK      methocarbamoL (ROBAXIN) 500 mg tablet       melatonin 5 mg cap capsule TAKE 1 CAPSULE BY MOUTH AS NEEDED AT BEDTIME FOR SLEEP. medroxyPROGESTERone (PROVERA) 10 mg tablet       losartan (COZAAR) 50 mg tablet Take 50 mg by mouth daily. HYDROmorphone (DILAUDID) 2 mg tablet TAKE 1 TABLET BY MOUTH EVERY 8 HOURS AS NEEDED FOR SEVERE PAIN FOR UP TO 7 DAYS.      guaiFENesin-dextromethorphan (ROBITUSSIN DM) 100-10 mg/5 mL syrup       fluticasone propionate (FLONASE) 50 mcg/actuation nasal spray       ergocalciferol (ERGOCALCIFEROL) 1,250 mcg (50,000 unit) capsule TAKE 1 CAPSULE BY MOUTH 1 TIME PER WEEK FOR 24 WEEKS. benzonatate (TESSALON) 100 mg capsule TAKE 1 CAPSULE BY MOUTH 3 TIMES A DAY AS NEEDED FOR COUGH. DO NOT CRUSH OR CHEW.      amLODIPine (NORVASC) 2.5 mg tablet Take 2.5 mg by mouth daily. acetaminophen (TYLENOL) 500 mg capsule acetaminophen 500 mg capsule   Take 1 capsule every 6 hours by oral route as needed. folic acid (FOLVITE) 1 mg tablet Take 2 mg by mouth daily. cholecalciferol (VITAMIN D3) (5000 Units/125 mcg) tab tablet Take 5,000 Units by mouth every Monday. Indications: osteoporosis, a condition of weak bones      gabapentin (NEURONTIN) 600 mg tablet Take 300 mg by mouth two (2) times a day. spironolactone (ALDACTONE) 25 mg tablet 25 mg daily. At bedtime      Adempas 2.5 mg tab tablet 2.5 mg three (3) times daily.  Indications: pulmonary arterial hypertension      predniSONE (DELTASONE) 5 mg tablet Take 5-10 mg by mouth daily. 10 mg in am and 5 mg at night  Indications: lupus      pantoprazole (PROTONIX) 40 mg tablet TAKE 1 TABLET BY MOUTH EVERY DAY      furosemide (LASIX) 20 mg tablet Take 20 mg by mouth daily. DULoxetine (CYMBALTA) 30 mg capsule TAKE 1 CAPSULE BY MOUTH TWICE A DAY      Letairis 5 mg tablet 5 mg daily. In AM       No current facility-administered medications on file prior to encounter. REVIEW OF SYSTEMS    A comprehensive review of systems was negative except for that written in the HPI. Objective:     Visit Vitals  BP (!) 144/88 (BP 1 Location: Left upper arm, BP Patient Position: At rest;Sitting)   Pulse (!) 101   Temp 96.8 °F (36 °C)   Resp 18       Wt Readings from Last 3 Encounters:   03/06/23 63.3 kg (139 lb 9.6 oz)   02/23/23 61.2 kg (135 lb)   02/16/23 67 kg (147 lb 11.3 oz)       PHYSICAL EXAM    Constitutional: Patient is awake, ambulating with no devices , no acute distress  Head: normocephalic, atraumatic. Behavioral/Psych: patient is pleasant, cooperative, awake and alert  Debridement Wound Care       Procedure Note  Indications:  Based on my examination of this patient's wound(s)/ulcer(s) today, debridement is required to promote healing and evaluate the wound base. Performed by: Merleen Neely MD    Consent obtained: yes  Time out taken: yes  Debridement: excisional    Using curette/scalpel the wound(s)/ulcer(s) was/were sharply debrided down through and including the removal of    subcutaneous tissue    Devitalized Tissue Debrided: slough    Pre Debridement Measurements:  Are located in the [place]  Documentation Flow Sheet      Wound/Ulcer #: 1    Post Debridement Measurements:  Wound/Ulcer Descriptions are Pre Debridement except measurements:    Wound Leg lower Right;Posterior #1 (Active)   Wound Image   03/23/23 0922   Wound Etiology Non-Healing Surgical 03/23/23 6545   Dressing Status Clean;Dry; Intact 03/23/23 0922   Cleansed Cleansed with saline 03/23/23 5258   Wound Length (cm) 1.8 cm 03/23/23 0922   Wound Width (cm) 2.5 cm 03/23/23 0922   Wound Depth (cm) 1 cm 03/23/23 0922   Wound Surface Area (cm^2) 4.5 cm^2 03/23/23 0922   Change in Wound Size % 25 03/23/23 0922   Wound Volume (cm^3) 4.5 cm^3 03/23/23 0922   Wound Healing % 46 03/23/23 0922   Post-Procedure Length (cm) 2 cm 03/23/23 0936   Post-Procedure Width (cm) 2.7 cm 03/23/23 0936   Post-Procedure Depth (cm) 1.2 cm 03/23/23 0936   Post-Procedure Surface Area (cm^2) 5.4 cm^2 03/23/23 0936   Post-Procedure Volume (cm^3) 6.48 cm^3 03/23/23 0936   Undermining Starts ___ O'Clock 9 o'clock 03/23/23 0922   Undermining Ends ___ O'Clock 11 o'clock 03/23/23 0922   Undermining Maximum Distance (cm) 3.2 cm 03/23/23 0922   Wound Assessment Subcutaneous; Slough;Pink/red;Granulation tissue 03/23/23 0922   Drainage Amount Moderate 03/23/23 0922   Drainage Description Serosanguinous 03/23/23 0922   Wound Odor None 03/23/23 0922   Isabella-Wound/Incision Assessment Intact 03/23/23 0922   Edges Epibole (rolled edges); Unattached edges 03/23/23 6569   Wound Thickness Description Full thickness 03/23/23 0922   Number of days: 28        Total Surface Area Debrided:  4 sq cm     Estimated Blood Loss:  Minimal     Hemostasis Achieved: with pressure    Procedural Pain: 0/ 10     Post Procedural Pain: 0/ 10     Response to treatment: Well-tolerated by the patient    Patient has thick skin because of calcinosis, the wound is improving although slowly, will continue with wound VAC for now, there is about 3 cm deep,    Assessment:      Hospital Problems  Date Reviewed: 3/6/2023            Codes Class Noted POA    Calcinosis ICD-10-CM: E83.59  ICD-9-CM: 275.49  3/2/2023 Yes        Wound of right leg, subsequent encounter ICD-10-CM: S81.801D  ICD-9-CM: V58.89, 894.0  2/23/2023 Yes        Lupus ([facility] [address] ICD-10-CM: M32.9  ICD-9-CM: 710.0  Unknown Yes        Cellulitis ICD-10-CM: L03.90  ICD-9-CM: 682.9  8/7/2021 Yes            POP IN PROCEDURE TYPE (DEBRIDEMENT, BIOPSY, I&D, SKIN SUB, CHEMICAL CAUERTY)     Plan:     Treatment Note please see attached Discharge Instructions  Return Appointment:  [] Dressing supply provider:   [x] Home Healthcare: [facility]   [x] Return Appointment: 2 Week(s)  [] Nurse Visit:   Week(s)     [] [facility]charge from [facility]  [] Ordered tests:    [] Referrals:    [] Rx:   [] Other:       Wound Cleansing:   Do not scrub or use excessive force. Cleanse wound prior to applying a clean dressing with:  [x] Normal Saline          [x] Mild Soap & Water/Baby Shampoo   [] Keep Wound Dry in Shower  [] Other:       Topical Treatments:  Do not apply lotions, creams, or ointments to wound bed unless directed. [] Apply moisturizing lotion to skin surrounding the wound prior to dressing change.  [] Apply antifungal ointment to skin surrounding the wound prior to dressing change.  [] Apply thin film of moisture barrier/zinc ointment to skin immediately around wound.   [] Other:                  Dressings:                  Wound Location RIGHT LEG            [] Apply Primary Dressing:                                          [] MediHoney Gel         [] MediHoney Alginate                     [] Calcium Alginate      [x] Calcium Alginate with Silver(CLINIC AND ON Saturday-3/25/2023 -PATIENT TO CHANGE ON Saturday)              [] Collagen                   [] Collagen with Silver                [] Santyl with Moistened saline gauze              [] Hydrofera Blue (cut to size and moistened)  [] Hydrofera Blue Ready (Cut to size)              [] NS wet to dry            [] Betadine wet to dry              [] Hydrogel                   [] Mepitel                   [] Bactroban/Mupirocin                       [] Other:      [] Cover and Secure with:                   [x] Gauze        [x] Sherri           [] Kerlix              [] Foam          [] Super Absorbant    [] ABD                                      [] ACE Wrap            [] Other: Limit contact of tape with skin. [] Change dressing:  [] Daily           [] Every Other Day    [] Once per week   [] Twice per week              [] Three times per week        [] Do Not Change Dressing    [] Other:                Negative Pressure:           Wound Location: RIGHT LEG- HOLD VAC TILL 3/27/2023   [x] Pressure @  125 mm/Hg               [x]Continuous  []Intermittent              [x] Black Foam            [x] White Foam- TO UNDERMINING AND TUNNELING               [x] Bridge:               [x] Change vac dressing three times per week     Pressure Relief:  [] When sitting, shift position or do seat lifts every 15 minutes.  [] Wheelchair cushion           [] Specialty Bed/Mattress  [] Turn every 2 hours when in bed. Avoid direct pressure on wound site. Limit side lying to 30 degree tilt. Limit HOB elevation to 30 degrees. Edema Control:  Apply:  [] Compression Stocking:    mmHg   []Right Leg     []Left Leg              [] Tubigrip      []Right Leg Double Layer      []Left Leg Double Layer                                      []Right Leg Single Layer       []Left Leg Single Layer                 [] Elevate leg(s) above the level of the heart when sitting. [] Avoid prolonged standing in one place. Compression:  Apply:  [] Multilayer Compression Wrap:      []RightLeg      []Left Leg                                 []Coflex              []Coflex Lite             []Unna                 [] Do not get leg(s) with wrap wet. If wraps become too tight call the center or completely remove the wrap. [] Elevate leg(s) above the level of the heart when sitting. [] Avoid prolonged standing in one place.      Off-Loading:   [] Off-loading when   [] walking       [] in bed         [] sitting  [] Total non-weight bearing  [] Right Leg  [] Left Leg         [] Assistive Device    [] Walker        [] Cane      [] Wheelchair      [] Crutches [] Surgical shoe    [] Podus Boot(s)   [] Foam Boot(s)  [] Roll About              [] Cast Boot   [] CROW Boot  [] Wedge Shoe  [] Other:     Contact Cast:  Apply:  [] Total Contact Cast Applied in Clinic          []RightLeg      []Left Leg              [] Do not get cast wet. Contact center or go to emergency room if there is a foul odor or becomes uncomfortable due to feeling tight or swelling. Do not use objects inside of cast to scratch. Dietary:  [x] Diet as tolerated:   [] Calorie Diabetic Diet:         [] No Added Salt:  [x] Increase Protein:   [] Other:              Activity:  [x] Activity as tolerated:    [] Patient has no activity restrictions       [] Strict Bedrest:          [] Remain off Work:       [] May return to full duty work:                                               [] Return to work with restrictions:      Written patient dismissal instructions given to patient or POA.          Electronically signed by Sarbjit Khoury MD on 3/23/2023 at 11:15 AM

## 2023-03-23 NOTE — DISCHARGE INSTRUCTIONS
Discharge Instructions for  [address], [address] [facility]  Telephone: [phone]    NAME:  Kaila Grad:  1990  MEDICAL RECORD NUMBER:  [id number]  DATE:  3/23/2023      Return Appointment:  [] Dressing supply provider:   [x] Home Healthcare: [address] [place] [address] [employer] [facility]   [x] Return Appointment: 2 Week(s)  [] Nurse Visit:   Week(s)    [] Discharge from [facility]  [] Ordered tests:    [] Referrals:    [] Rx:   [] Other:      Wound Cleansing:   Do not scrub or use excessive force. Cleanse wound prior to applying a clean dressing with:  [x] Normal Saline   [x] Mild Soap & Water/Baby Shampoo   [] Keep Wound Dry in Shower  [] Other:      Topical Treatments:  Do not apply lotions, creams, or ointments to wound bed unless directed. [] Apply moisturizing lotion to skin surrounding the wound prior to dressing change.  [] Apply antifungal ointment to skin surrounding the wound prior to dressing change.  [] Apply thin film of moisture barrier/zinc ointment to skin immediately around wound. [] Other:       Dressings:           Wound Location RIGHT LEG   [] Apply Primary Dressing:       [] MediHoney Gel    [] MediHoney Alginate               [] Calcium Alginate      [x] Calcium Alginate with Silver(CLINIC AND ON Saturday-3/25/2023 -PATIENT TO CHANGE ON Saturday)   [] Collagen                   [] Collagen with Silver                [] Santyl with Moistened saline gauze              [] Hydrofera Blue (cut to size and moistened)  [] Hydrofera Blue Ready (Cut to size)   [] NS wet to dry     [] Betadine wet to dry              [] Hydrogel                [] Mepitel     [] Bactroban/Mupirocin               [] Other:     [] Cover and Secure with:     [x] Gauze [x] Sherri [] Kerlix   [] Foam [] Super Absorbant [] ABD     [] ACE Wrap            [] Other:    Limit contact of tape with skin.     [] Change dressing: [] Daily    [] Every Other Day [] Once per week   [] Twice per week   [] Three times per week [] Do Not Change Dressing   [] Other:     Negative Pressure:           Wound Location: RIGHT LEG- HOLD VAC TILL 3/27/2023   [x] Pressure @  125 mm/Hg  [x]Continuous []Intermittent   [x] Black Foam [x] White Foam- TO UNDERMINING AND TUNNELING               [x] Bridge:               [x] Change vac dressing three times per week    Pressure Relief:  [] When sitting, shift position or do seat lifts every 15 minutes.  [] Wheelchair cushion [] Specialty Bed/Mattress  [] Turn every 2 hours when in bed. Avoid direct pressure on wound site. Limit side lying to 30 degree tilt. Limit HOB elevation to 30 degrees. Edema Control:  Apply: [] Compression Stocking:    mmHg  []Right Leg []Left Leg   [] Tubigrip []Right Leg Double Layer []Left Leg Double Layer      []Right Leg Single Layer []Left Leg Single Layer     [] Elevate leg(s) above the level of the heart when sitting. [] Avoid prolonged standing in one place. Compression:  Apply: [] Multilayer Compression Wrap: []RightLeg []Left Leg                                 []Coflex   []Coflex Lite             []Unna     [] Do not get leg(s) with wrap wet. If wraps become too tight call the center or completely remove the wrap. [] Elevate leg(s) above the level of the heart when sitting. [] Avoid prolonged standing in one place. Off-Loading:   [] Off-loading when [] walking  [] in bed [] sitting  [] Total non-weight bearing  [] Right Leg  [] Left Leg   [] Assistive Device [] Walker [] Cane      [] Wheelchair  [] Crutches   [] Surgical shoe    [] Podus Boot(s)   [] Foam Boot(s)  [] Roll About    [] Cast Boot [] CROW Boot  [] Wedge Shoe  [] Other:    Contact Cast:  Apply: [] Total Contact Cast Applied in Clinic []RightLeg []Left Leg   [] Do not get cast wet. Contact center or go to emergency room if there is a foul odor or becomes uncomfortable due to feeling tight or swelling.   Do not use objects inside of cast to scratch. Dietary:  [x] Diet as tolerated: [] Calorie Diabetic Diet: [] No Added Salt:  [x] Increase Protein: [] Other:     Activity:  [x] Activity as tolerated:    [] Patient has no activity restrictions       [] Strict Bedrest:   [] Remain off Work:       [] May return to full duty work:                                     [] Return to work with restrictions:               [address] Information: Should you experience any significant changes in your wound(s) or have questions about your wound care, please contact Gianfranco Orta at [place] [phone] 8:00 am - 4:30. If you need help with your wound outside of these hours and cannot wait until we are again available, contact your PCP or go to the hospital emergency room. PLEASE NOTE: IF YOU ARE UNABLE TO OBTAIN WOUND SUPPLIES, CONTINUE TO USE THE SUPPLIES YOU HAVE AVAILABLE UNTIL YOU ARE ABLE TO REACH US. IT IS MOST IMPORTANT TO KEEP THE WOUND COVERED AT ALL TIMES.     [] Dr. Shukri Boyd   [x] Dr. Hailey Jhaveri  [] Dr. Megan Singh

## 2023-04-06 ENCOUNTER — HOSPITAL ENCOUNTER (OUTPATIENT)
Dept: WOUND CARE | Age: 33
End: 2023-04-06
Payer: MEDICARE

## 2023-04-06 PROCEDURE — 74011000250 HC RX REV CODE- 250: Performed by: ORTHOPAEDIC SURGERY

## 2023-04-06 PROCEDURE — 11042 DBRDMT SUBQ TIS 1ST 20SQCM/<: CPT

## 2023-04-06 RX ADMIN — LIDOCAINE HYDROCHLORIDE 15 ML: 20 SOLUTION ORAL; TOPICAL at 09:50

## 2023-04-06 NOTE — DISCHARGE INSTRUCTIONS
Discharge Instructions for  [address], [address] [facility]  Telephone: [phone]    NAME:  Michael Ace:  1990  MEDICAL RECORD NUMBER:  [id number]  DATE:  4/6/2023      Return Appointment:  [] Dressing supply provider:   [x] Home Healthcare: [facility] [employer]   [x] Return Appointment: 1 Week(s)  [] Nurse Visit:   Week(s)    [] Discharge from [facility]  [] Ordered tests:    [] Referrals:    [] Rx:   [] Other:      Wound Cleansing:   Do not scrub or use excessive force. Cleanse wound prior to applying a clean dressing with:  [x] Normal Saline   [] Mild Soap & Water/Baby Shampoo   [] Keep Wound Dry in Shower  [] Other:      Topical Treatments:  Do not apply lotions, creams, or ointments to wound bed unless directed. [] Apply moisturizing lotion to skin surrounding the wound prior to dressing change.  [] Apply antifungal ointment to skin surrounding the wound prior to dressing change.  [] Apply thin film of moisture barrier/zinc ointment to skin immediately around wound. [] Other:       Dressings:           Wound Location RIGHT LEG   [x] Apply Primary Dressing:       [] MediHoney Gel    [] MediHoney Alginate               [] Calcium Alginate      [x] Calcium Alginate with Silver in clinic   [] Collagen                   [] Collagen with Silver                [] Santyl with Moistened saline gauze              [] Hydrofera Blue (cut to size and moistened)  [] Hydrofera Blue Ready (Cut to size)   [] NS wet to dry     [] Betadine wet to dry              [] Hydrogel                [] Mepitel     [] Bactroban/Mupirocin               [] Other:     [x] Cover and Secure with:     [x] Gauze [x] Sherri [] Kerlix   [] Foam [] Super Absorbant [] ABD     [] ACE Wrap            [] Other:    Limit contact of tape with skin.     [x] Change dressing: [] Daily    [] Every Other Day [] Once per week   [] Twice per week   [] Three times per week [] Do Not Change Dressing   [] Other:     Negative Pressure:           Wound Location: RIGHT LEG  [x] Pressure @  125 mm/Hg  [x]Continuous []Intermittent   [x] Black Foam [x] White Foam- TO UNDERMINING AND TUNNELING               [x] Bridge for comfort              [x] Change vac dressing three times per week    Pressure Relief:  [] When sitting, shift position or do seat lifts every 15 minutes.  [] Wheelchair cushion [] Specialty Bed/Mattress  [x]  Avoid direct pressure on wound site. Edema Control:  Apply: [] Compression Stocking:    mmHg  []Right Leg []Left Leg   [] Tubigrip []Right Leg Double Layer []Left Leg Double Layer      []Right Leg Single Layer []Left Leg Single Layer     [] Elevate leg(s) above the level of the heart when sitting. [] Avoid prolonged standing in one place. Dietary:  [x] Diet as tolerated: [] Calorie Diabetic Diet: [] No Added Salt:  [x] Increase Protein: [] Other:     Activity:  [x] Activity as tolerated:    [] Patient has no activity restrictions       [] Strict Bedrest:   [] Remain off Work:       [] May return to full duty work:                                     [] Return to work with restrictions:               [address] Information: Should you experience any significant changes in your wound(s) or have questions about your wound care, please contact Gianfranco Berg 26 at [place] [phone] 8:00 am - 4:30. If you need help with your wound outside of these hours and cannot wait until we are again available, contact your PCP or go to the hospital emergency room. PLEASE NOTE: IF YOU ARE UNABLE TO OBTAIN WOUND SUPPLIES, CONTINUE TO USE THE SUPPLIES YOU HAVE AVAILABLE UNTIL YOU ARE ABLE TO REACH US. IT IS MOST IMPORTANT TO KEEP THE WOUND COVERED AT ALL TIMES.     [] Dr. Gem Conklin   [x] Dr. Enmanuel Collins  [] Dr. Mindy Hollins

## 2023-04-06 NOTE — WOUND CARE
[address]   Progress Note and Procedure Note     Андрей Pizarro  MEDICAL RECORD NUMBER:  [id number]  AGE: 34 y.o. RACE BLACK/  GENDER: female  : 1990  EPISODE DATE:  2023      Subjective:     Chief Complaint   Patient presents with    Wound Check     R post leg         HISTORY of PRESENT ILLNESS HPI    Андрей Pizarro is a 34 y.o. female who presents today for wound/ulcer evaluation. History of Wound Context: Patient is doing well, the wounds are healing well, there is minimal output from the wound VAC,  Wound/Ulcer Pain Timing/Severity: None   Quality of pain: none  Severity:  0/ 10   Modifying Factors: None  Associated Signs/Symptoms: edema          PAST MEDICAL HISTORY    Past Medical History:   Diagnosis Date    Lupus ([facility]     Osteonecrosis ([facility] [facility]     Osteoporosis     Pulmonary HTN ([facility] [address]     RA (rheumatoid arthritis) ([facility] [address]         PAST SURGICAL HISTORY    Past Surgical History:   Procedure Laterality Date    HX CYST REMOVAL      HX ORTHOPAEDIC      x2 left knee sxs    HX ORTHOPAEDIC      bilateral ankle sxs       FAMILY HISTORY    Family History   Problem Relation Age of Onset    Hypertension Mother     Cancer Paternal Grandmother        SOCIAL HISTORY    Social History     Tobacco Use    Smoking status: Never    Smokeless tobacco: Never   Vaping Use    Vaping Use: Never used   Substance Use Topics    Alcohol use: Not Currently     Comment: on occ    Drug use: Never       ALLERGIES    Allergies   Allergen Reactions    Doxycycline Nausea and Vomiting    Heparin Anaphylaxis    Remicade [Infliximab] Anaphylaxis    Vancomycin Hives    Ibuprofen Nausea Only    Rituximab Itching    Oxycodone-Acetaminophen Nausea and Vomiting and Nausea Only     Side effect  Side effect    Reaction Type: Allergy  Other reaction(s):  Adverse reaction to substance         MEDICATIONS    Current Outpatient Medications on File Prior to Encounter   Medication Sig Dispense Refill predniSONE (DELTASONE) 10 mg tablet Take 20 mg by mouth daily. gabapentin (NEURONTIN) 300 mg capsule Take 1 Capsule by mouth two (2) times a day. tiZANidine (ZANAFLEX) 4 mg tablet TAKE 1 TABLET BY MOUTH EVERY 8 HOURS FOR 10 DAYS      senna (SENOKOT) 8.6 mg tablet       polyethylene glycol (MIRALAX) 17 gram/dose powder       oxyCODONE IR (ROXICODONE) 5 mg immediate release tablet       methotrexate (RHEUMATREX) 2.5 mg tablet TAKE 4 TABLETS (10 MG TOTAL) BY MOUTH 1 TIME PER WEEK      methocarbamoL (ROBAXIN) 500 mg tablet       melatonin 5 mg cap capsule TAKE 1 CAPSULE BY MOUTH AS NEEDED AT BEDTIME FOR SLEEP. medroxyPROGESTERone (PROVERA) 10 mg tablet       losartan (COZAAR) 50 mg tablet Take 1 Tablet by mouth daily. HYDROmorphone (DILAUDID) 2 mg tablet TAKE 1 TABLET BY MOUTH EVERY 8 HOURS AS NEEDED FOR SEVERE PAIN FOR UP TO 7 DAYS.      guaiFENesin-dextromethorphan (ROBITUSSIN DM) 100-10 mg/5 mL syrup       fluticasone propionate (FLONASE) 50 mcg/actuation nasal spray       ergocalciferol (ERGOCALCIFEROL) 1,250 mcg (50,000 unit) capsule TAKE 1 CAPSULE BY MOUTH 1 TIME PER WEEK FOR 24 WEEKS. benzonatate (TESSALON) 100 mg capsule TAKE 1 CAPSULE BY MOUTH 3 TIMES A DAY AS NEEDED FOR COUGH. DO NOT CRUSH OR CHEW.      amLODIPine (NORVASC) 2.5 mg tablet Take 1 Tablet by mouth daily. acetaminophen (TYLENOL) 500 mg capsule acetaminophen 500 mg capsule   Take 1 capsule every 6 hours by oral route as needed. folic acid (FOLVITE) 1 mg tablet Take 2 Tablets by mouth daily. cholecalciferol (VITAMIN D3) (5000 Units/125 mcg) tab tablet Take 1 Tablet by mouth every Monday. Indications: osteoporosis, a condition of weak bones      gabapentin (NEURONTIN) 600 mg tablet Take 0.5 Tablets by mouth two (2) times a day. spironolactone (ALDACTONE) 25 mg tablet 1 Tablet daily. At bedtime      Adempas 2.5 mg tab tablet 1 Tablet three (3) times daily.  Indications: pulmonary arterial hypertension predniSONE (DELTASONE) 5 mg tablet Take 1-2 Tablets by mouth daily. 10 mg in am and 5 mg at night  Indications: lupus      pantoprazole (PROTONIX) 40 mg tablet TAKE 1 TABLET BY MOUTH EVERY DAY      furosemide (LASIX) 20 mg tablet Take 1 Tablet by mouth daily. DULoxetine (CYMBALTA) 30 mg capsule TAKE 1 CAPSULE BY MOUTH TWICE A DAY      Letairis 5 mg tablet 5 mg daily. In AM       No current facility-administered medications on file prior to encounter. REVIEW OF SYSTEMS    A comprehensive review of systems was negative except for that written in the HPI. Objective:     Visit Vitals  /76 (BP 1 Location: Right upper arm, BP Patient Position: At rest;Sitting)   Pulse (!) 112 Comment: manual pulse check performed, Dr. Breezy Sanchez notified   Temp 96.8 °F (36 °C)   Resp 18       Wt Readings from Last 3 Encounters:   03/06/23 63.3 kg (139 lb 9.6 oz)   02/23/23 61.2 kg (135 lb)   02/16/23 67 kg (147 lb 11.3 oz)       PHYSICAL EXAM    Constitutional: Patient is awake, ambulating with no devices , no acute distress  Head: normocephalic, atraumatic. Behavioral/Psych: patient is pleasant, cooperative, awake and alert  Debridement Wound Care       Procedure Note  Indications:  Based on my examination of this patient's wound(s)/ulcer(s) today, debridement is required to promote healing and evaluate the wound base.     Performed by: Tami Najera MD    Consent obtained: yes  Time out taken: yes  Debridement: excisional    Using curette/scalpel the wound(s)/ulcer(s) was/were sharply debrided down through and including the removal of    subcutaneous tissue    Devitalized Tissue Debrided: slough    Pre Debridement Measurements:  Are located in the [place]  Documentation Flow Sheet      Wound/Ulcer #: 1    Post Debridement Measurements:  Wound/Ulcer Descriptions are Pre Debridement except measurements:    Wound Leg lower Right;Posterior #1 (Active)   Wound Image   04/06/23 0936   Wound Etiology Non-Healing Surgical 04/06/23 0943   Dressing Status Clean;Dry; Intact 04/06/23 0943   Cleansed Cleansed with saline 04/06/23 0943   Wound Length (cm) 1.5 cm 04/06/23 0943   Wound Width (cm) 2.2 cm 04/06/23 0943   Wound Depth (cm) 0.6 cm 04/06/23 0943   Wound Surface Area (cm^2) 3.3 cm^2 04/06/23 0943   Change in Wound Size % 45 04/06/23 0943   Wound Volume (cm^3) 1.98 cm^3 04/06/23 0943   Wound Healing % 76 04/06/23 0943   Post-Procedure Length (cm) 1.5 cm 04/06/23 0953   Post-Procedure Width (cm) 2.2 cm 04/06/23 0953   Post-Procedure Depth (cm) 0.6 cm 04/06/23 0953   Post-Procedure Surface Area (cm^2) 3.3 cm^2 04/06/23 0953   Post-Procedure Volume (cm^3) 1.98 cm^3 04/06/23 0953   Undermining Starts ___ O'Clock 7 o'clock 04/06/23 0943   Undermining Ends ___ O'Clock 9 o'clock 04/06/23 0943   Undermining Maximum Distance (cm) 3.2 cm 04/06/23 0943   Wound Assessment Subcutaneous; Slough;Pink/red;Granulation tissue 04/06/23 0943   Drainage Amount Moderate 04/06/23 0943   Drainage Description Serosanguinous 04/06/23 0943   Wound Odor None 04/06/23 0943   Isabella-Wound/Incision Assessment Intact; Hyperpigmented 04/06/23 0943   Edges Epibole (rolled edges); Unattached edges 04/06/23 0943   Wound Thickness Description Full thickness 04/06/23 0943   Number of days: 42        Total Surface Area Debrided:  6 sq cm     Estimated Blood Loss:  None    Hemostasis Achieved: with pressure    Procedural Pain: 0/ 10     Post Procedural Pain: 0/ 10     Response to treatment: Well-tolerated by the patient  There is still some depth in the wound, I have recommended continued use of the wound VAC for another week, the wound is getting smaller but there is still depth, will continue with wound VAC to try to close off,       Assessment:      Hospital Problems  Date Reviewed: 3/6/2023            Codes Class Noted POA    Calcinosis ICD-10-CM: E83.59  ICD-9-CM: 275.49  3/2/2023 Yes        Wound of right leg, subsequent encounter ICD-10-CM: S81.801D  ICD-9-CM: V58.89, 894.0  2/23/2023 Yes        Lupus ([facility] [address] ICD-10-CM: M32.9  ICD-9-CM: 710.0  Unknown Yes            POP IN PROCEDURE TYPE (DEBRIDEMENT, BIOPSY, I&D, SKIN SUB, CHEMICAL CAUERTY)     Plan:     Treatment Note please see attached Discharge Instructions  Return Appointment:  [] Dressing supply provider:   [x] Home Healthcare: [address] [place] [address]   [x] Return Appointment: 1 Week(s)  [] Nurse Visit:   Week(s)     [] Discharge from [facility]  [] Ordered tests:    [] Referrals:    [] Rx:   [] Other:       Wound Cleansing:   Do not scrub or use excessive force. Cleanse wound prior to applying a clean dressing with:  [x] Normal Saline          [] Mild Soap & Water/Baby Shampoo   [] Keep Wound Dry in Shower  [] Other:       Topical Treatments:  Do not apply lotions, creams, or ointments to wound bed unless directed. [] Apply moisturizing lotion to skin surrounding the wound prior to dressing change.  [] Apply antifungal ointment to skin surrounding the wound prior to dressing change.  [] Apply thin film of moisture barrier/zinc ointment to skin immediately around wound.   [] Other:                  Dressings:                  Wound Location RIGHT LEG            [x] Apply Primary Dressing:                                          [] MediHoney Gel         [] MediHoney Alginate                     [] Calcium Alginate      [x] Calcium Alginate with Silver in clinic              [] Collagen                   [] Collagen with Silver                [] Santyl with Moistened saline gauze              [] Hydrofera Blue (cut to size and moistened)  [] Hydrofera Blue Ready (Cut to size)              [] NS wet to dry            [] Betadine wet to dry              [] Hydrogel                   [] Mepitel                   [] Bactroban/Mupirocin                       [] Other:      [x] Cover and Secure with:                   [x] Gauze        [x] Sherri           [] Kerlix              [] Foam          [] Super Absorbant    [] ABD [] ACE Wrap            [] Other:               Limit contact of tape with skin. [x] Change dressing:  [] Daily           [] Every Other Day    [] Once per week   [] Twice per week              [] Three times per week        [] Do Not Change Dressing    [] Other:                Negative Pressure:           Wound Location: RIGHT LEG  [x] Pressure @  125 mm/Hg               [x]Continuous  []Intermittent              [x] Black Foam            [x] White Foam- TO UNDERMINING AND TUNNELING               [x] Bridge for comfort              [x] Change vac dressing three times per week     Pressure Relief:  [] When sitting, shift position or do seat lifts every 15 minutes.  [] Wheelchair cushion           [] Specialty Bed/Mattress  [x]  Avoid direct pressure on wound site. Edema Control:  Apply:  [] Compression Stocking:    mmHg   []Right Leg     []Left Leg              [] Tubigrip      []Right Leg Double Layer      []Left Leg Double Layer                                      []Right Leg Single Layer       []Left Leg Single Layer                 [] Elevate leg(s) above the level of the heart when sitting. [] Avoid prolonged standing in one place. Dietary:  [x] Diet as tolerated:   [] Calorie Diabetic Diet:         [] No Added Salt:  [x] Increase Protein:   [] Other:              Activity:  [x] Activity as tolerated:    [] Patient has no activity restrictions       [] Strict Bedrest:          [] Remain off Work:       [] May return to full duty work:                                               [] Return to work with restrictions:                Written patient dismissal instructions given to patient or POA.          Electronically signed by Jj Rosenberg MD on 4/6/2023 at 10:31 AM

## 2023-04-06 NOTE — WOUND CARE
Discharge Instructions for  [address], [address] [facility]  Telephone: [phone]    NAME:  Michael Bello:  1990  MEDICAL RECORD NUMBER:  [id number]  DATE:  4/6/2023      Return Appointment:  [] Dressing supply provider:   [x] [facility]: [facility]   [x] Return Appointment: 1 Week(s)  [] Nurse Visit:   Week(s)    [] Discharge from [facility]  [] Ordered tests:    [] Referrals:    [] Rx:   [] Other:      Wound Cleansing:   Do not scrub or use excessive force. Cleanse wound prior to applying a clean dressing with:  [x] Normal Saline   [] Mild Soap & Water/Baby Shampoo   [] Keep Wound Dry in Shower  [] Other:      Topical Treatments:  Do not apply lotions, creams, or ointments to wound bed unless directed. [] Apply moisturizing lotion to skin surrounding the wound prior to dressing change.  [] Apply antifungal ointment to skin surrounding the wound prior to dressing change.  [] Apply thin film of moisture barrier/zinc ointment to skin immediately around wound. [] Other:       Dressings:           Wound Location RIGHT LEG   [x] Apply Primary Dressing:       [] MediHoney Gel    [] MediHoney Alginate               [] Calcium Alginate      [x] Calcium Alginate with Silver in clinic   [] Collagen                   [] Collagen with Silver                [] Santyl with Moistened saline gauze              [] Hydrofera Blue (cut to size and moistened)  [] Hydrofera Blue Ready (Cut to size)   [] NS wet to dry     [] Betadine wet to dry              [] Hydrogel                [] Mepitel     [] Bactroban/Mupirocin               [] Other:     [x] Cover and Secure with:     [x] Gauze [x] Sherri [] Kerlix   [] Foam [] Super Absorbant [] ABD     [] ACE Wrap            [] Other:    Limit contact of tape with skin.     [x] Change dressing: [] Daily    [] Every Other Day [] Once per week   [] Twice per week   [] Three times per week [] Do Not Change Dressing   [] Other:     Negative Pressure:           Wound Location: RIGHT LEG  [x] Pressure @  125 mm/Hg  [x]Continuous []Intermittent   [x] Black Foam [x] White Foam- TO UNDERMINING AND TUNNELING               [x] Bridge for comfort              [x] Change vac dressing three times per week    Pressure Relief:  [] When sitting, shift position or do seat lifts every 15 minutes.  [] Wheelchair cushion [] Specialty Bed/Mattress  [x]  Avoid direct pressure on wound site. Edema Control:  Apply: [] Compression Stocking:    mmHg  []Right Leg []Left Leg   [] Tubigrip []Right Leg Double Layer []Left Leg Double Layer      []Right Leg Single Layer []Left Leg Single Layer     [] Elevate leg(s) above the level of the heart when sitting. [] Avoid prolonged standing in one place. Dietary:  [x] Diet as tolerated: [] Calorie Diabetic Diet: [] No Added Salt:  [x] Increase Protein: [] Other:     Activity:  [x] Activity as tolerated:    [] Patient has no activity restrictions       [] Strict Bedrest:   [] Remain off Work:       [] May return to full duty work:                                     [] Return to work with restrictions:               [address] Information: Should you experience any significant changes in your wound(s) or have questions about your wound care, please contact Gianfranco Berg 26 at [place] [address] 8:00 am - 4:30. If you need help with your wound outside of these hours and cannot wait until we are again available, contact your PCP or go to the hospital emergency room. PLEASE NOTE: IF YOU ARE UNABLE TO OBTAIN WOUND SUPPLIES, CONTINUE TO USE THE SUPPLIES YOU HAVE AVAILABLE UNTIL YOU ARE ABLE TO REACH US. IT IS MOST IMPORTANT TO KEEP THE WOUND COVERED AT ALL TIMES.     [] Dr. Henry Bowles   [x] Dr. Tita Diallo  [] Dr. Juliette Jauregui

## 2023-05-03 ENCOUNTER — HOSPITAL ENCOUNTER (EMERGENCY)
Age: 33
Discharge: HOME | End: 2023-05-03
Attending: EMERGENCY MEDICINE
Payer: MEDICARE

## 2023-05-03 VITALS
OXYGEN SATURATION: 99 % | RESPIRATION RATE: 22 BRPM | DIASTOLIC BLOOD PRESSURE: 60 MMHG | TEMPERATURE: 97.1 F | HEART RATE: 90 BPM | SYSTOLIC BLOOD PRESSURE: 101 MMHG

## 2023-05-03 DIAGNOSIS — S51.812A LACERATION OF LEFT FOREARM, INITIAL ENCOUNTER: ICD-10-CM
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
## 2023-05-03 PROCEDURE — 90714 TD VACC NO PRESV 7 YRS+ IM: CPT | Performed by: EMERGENCY MEDICINE

## 2023-05-03 PROCEDURE — 90471 IMMUNIZATION ADMIN: CPT

## 2023-05-03 PROCEDURE — 74011000250 HC RX REV CODE- 250: Performed by: EMERGENCY MEDICINE

## 2023-05-03 PROCEDURE — 74011250636 HC RX REV CODE- 250/636: Performed by: EMERGENCY MEDICINE

## 2023-05-03 PROCEDURE — 75810000293 HC SIMP/SUPERF WND  RPR

## 2023-05-03 PROCEDURE — 99284 EMERGENCY DEPT VISIT MOD MDM: CPT

## 2023-05-03 RX ORDER — LIDOCAINE HYDROCHLORIDE AND EPINEPHRINE 15; 5 MG/ML; UG/ML
1.5 INJECTION, SOLUTION EPIDURAL ONCE
Status: COMPLETED | OUTPATIENT
Start: 2023-05-03 | End: 2023-05-03

## 2023-05-03 RX ADMIN — CLOSTRIDIUM TETANI TOXOID ANTIGEN (FORMALDEHYDE INACTIVATED) AND CORYNEBACTERIUM DIPHTHERIAE TOXOID ANTIGEN (FORMALDEHYDE INACTIVATED) 0.5 ML: 5; 2 INJECTION, SUSPENSION INTRAMUSCULAR at 13:35

## 2023-05-03 RX ADMIN — LIDOCAINE HYDROCHLORIDE AND EPINEPHRINE 22.5 MG: 15; 5 INJECTION, SOLUTION EPIDURAL at 13:36

## 2023-05-04 ENCOUNTER — HOSPITAL ENCOUNTER (OUTPATIENT)
Dept: WOUND CARE | Age: 33
Discharge: HOME | End: 2023-05-04
Payer: MEDICARE

## 2023-05-04 VITALS
TEMPERATURE: 98.6 F | DIASTOLIC BLOOD PRESSURE: 61 MMHG | RESPIRATION RATE: 18 BRPM | HEART RATE: 129 BPM | SYSTOLIC BLOOD PRESSURE: 111 MMHG

## 2023-05-04 DIAGNOSIS — S81.801D WOUND OF RIGHT LEG, SUBSEQUENT ENCOUNTER: Primary | ICD-10-CM

## 2023-05-04 PROCEDURE — 74011000250 HC RX REV CODE- 250: Performed by: ORTHOPAEDIC SURGERY

## 2023-05-04 PROCEDURE — 11042 DBRDMT SUBQ TIS 1ST 20SQCM/<: CPT

## 2023-05-04 RX ORDER — MUPIROCIN 20 MG/G
OINTMENT TOPICAL ONCE
OUTPATIENT
Start: 2023-05-04 | End: 2023-05-04

## 2023-05-04 RX ORDER — LIDOCAINE HYDROCHLORIDE 20 MG/ML
15 SOLUTION OROPHARYNGEAL ONCE
Status: COMPLETED | OUTPATIENT
Start: 2023-05-04 | End: 2023-05-04

## 2023-05-04 RX ORDER — LIDOCAINE HYDROCHLORIDE 20 MG/ML
15 SOLUTION OROPHARYNGEAL ONCE
OUTPATIENT
Start: 2023-05-04 | End: 2023-05-04

## 2023-05-04 RX ORDER — SILVER SULFADIAZINE 10 G/1000G
CREAM TOPICAL ONCE
OUTPATIENT
Start: 2023-05-04 | End: 2023-05-04

## 2023-05-04 RX ADMIN — LIDOCAINE HYDROCHLORIDE 15 ML: 20 SOLUTION ORAL; TOPICAL at 09:47

## 2023-05-06 ENCOUNTER — HOSPITAL ENCOUNTER (INPATIENT)
Facility: HOSPITAL | Age: 33
LOS: 7 days | Discharge: HOME | DRG: 871 | End: 2023-05-13
Attending: EMERGENCY MEDICINE | Admitting: INTERNAL MEDICINE
Payer: MEDICARE

## 2023-05-06 DIAGNOSIS — R53.1 GENERALIZED WEAKNESS: Primary | ICD-10-CM

## 2023-05-06 DIAGNOSIS — D61.818 PANCYTOPENIA (HCC): ICD-10-CM

## 2023-05-06 LAB
ALBUMIN SERPL-MCNC: 1.6 G/DL (ref 3.5–5)
ALBUMIN/GLOB SERPL: 0.4 (ref 1.1–2.2)
ALP SERPL-CCNC: 736 U/L (ref 45–117)
ALT SERPL-CCNC: 51 U/L (ref 12–78)
ANION GAP SERPL CALC-SCNC: 8 MMOL/L (ref 5–15)
AST SERPL W P-5'-P-CCNC: 50 U/L (ref 15–37)
BILIRUB DIRECT SERPL-MCNC: 0.8 MG/DL (ref 0–0.2)
BILIRUB SERPL-MCNC: 1.1 MG/DL (ref 0.2–1)
BUN SERPL-MCNC: 27 MG/DL (ref 6–20)
BUN/CREAT SERPL: 44 (ref 12–20)
CA-I BLD-MCNC: 8.6 MG/DL (ref 8.5–10.1)
CHLORIDE SERPL-SCNC: 98 MMOL/L (ref 97–108)
CO2 SERPL-SCNC: 25 MMOL/L (ref 21–32)
CREAT SERPL-MCNC: 0.61 MG/DL (ref 0.55–1.02)
CRP SERPL-MCNC: 56.13 MG/DL (ref 0–0.6)
DIFFERENTIAL METHOD BLD: ABNORMAL
ERYTHROCYTE [DISTWIDTH] IN BLOOD BY AUTOMATED COUNT: 21.2 % (ref 11.5–14.5)
ERYTHROCYTE [SEDIMENTATION RATE] IN BLOOD: 128 MM/HR (ref 0–20)
GLOBULIN SER CALC-MCNC: 3.8 G/DL (ref 2–4)
GLUCOSE SERPL-MCNC: 90 MG/DL (ref 65–100)
HCT VFR BLD AUTO: 16.8 % (ref 35–47)
HGB BLD-MCNC: 4.8 G/DL (ref 11.5–16)
LDH SERPL L TO P-CCNC: 369 U/L (ref 81–246)
LYMPHOCYTES NFR BLD: 23 % (ref 12–49)
MCH RBC QN AUTO: 26.2 PG (ref 26–34)
MCHC RBC AUTO-ENTMCNC: 28.6 G/DL (ref 30–36.5)
MCV RBC AUTO: 91.8 FL (ref 80–99)
MONOCYTES NFR BLD: 5 % (ref 5–13)
NEUTS SEG NFR BLD: 72 % (ref 32–75)
PLATELET # BLD AUTO: 53 K/UL (ref 150–400)
PMV BLD AUTO: 10.6 FL (ref 8.9–12.9)
POTASSIUM SERPL-SCNC: 4.5 MMOL/L (ref 3.5–5.1)
PROT SERPL-MCNC: 5.4 G/DL (ref 6.4–8.2)
RBC # BLD AUTO: 1.83 M/UL (ref 3.8–5.2)
RETICS # AUTO: 0.01 M/UL (ref 0.02–0.08)
RETICS/RBC NFR AUTO: 0.3 % (ref 0.7–2.1)
SODIUM SERPL-SCNC: 131 MMOL/L (ref 136–145)
TSH SERPL DL<=0.05 MIU/L-ACNC: 0.12 UIU/ML (ref 0.36–3.74)
WBC # BLD AUTO: 0.6 K/UL (ref 3.6–11)

## 2023-05-06 PROCEDURE — 99285 EMERGENCY DEPT VISIT HI MDM: CPT

## 2023-05-06 PROCEDURE — 85045 AUTOMATED RETICULOCYTE COUNT: CPT

## 2023-05-06 PROCEDURE — 86923 COMPATIBILITY TEST ELECTRIC: CPT

## 2023-05-06 PROCEDURE — 82668 ASSAY OF ERYTHROPOIETIN: CPT

## 2023-05-06 PROCEDURE — 85025 COMPLETE CBC W/AUTO DIFF WBC: CPT

## 2023-05-06 PROCEDURE — 83615 LACTATE (LD) (LDH) ENZYME: CPT

## 2023-05-06 PROCEDURE — 80048 BASIC METABOLIC PNL TOTAL CA: CPT

## 2023-05-06 PROCEDURE — 86140 C-REACTIVE PROTEIN: CPT

## 2023-05-06 PROCEDURE — 83540 ASSAY OF IRON: CPT

## 2023-05-06 PROCEDURE — 36430 TRANSFUSION BLD/BLD COMPNT: CPT

## 2023-05-06 PROCEDURE — 86850 RBC ANTIBODY SCREEN: CPT

## 2023-05-06 PROCEDURE — 85652 RBC SED RATE AUTOMATED: CPT

## 2023-05-06 PROCEDURE — 80076 HEPATIC FUNCTION PANEL: CPT

## 2023-05-06 PROCEDURE — 86901 BLOOD TYPING SEROLOGIC RH(D): CPT

## 2023-05-06 PROCEDURE — 36415 COLL VENOUS BLD VENIPUNCTURE: CPT

## 2023-05-06 PROCEDURE — 82746 ASSAY OF FOLIC ACID SERUM: CPT

## 2023-05-06 PROCEDURE — 84443 ASSAY THYROID STIM HORMONE: CPT

## 2023-05-06 PROCEDURE — 83010 ASSAY OF HAPTOGLOBIN QUANT: CPT

## 2023-05-06 PROCEDURE — 96374 THER/PROPH/DIAG INJ IV PUSH: CPT

## 2023-05-06 PROCEDURE — 1100000000 HC RM PRIVATE

## 2023-05-06 PROCEDURE — 86900 BLOOD TYPING SEROLOGIC ABO: CPT

## 2023-05-06 PROCEDURE — 82306 VITAMIN D 25 HYDROXY: CPT

## 2023-05-06 PROCEDURE — 6360000002 HC RX W HCPCS: Performed by: EMERGENCY MEDICINE

## 2023-05-06 PROCEDURE — 82607 VITAMIN B-12: CPT

## 2023-05-06 RX ORDER — METHYLPREDNISOLONE SODIUM SUCCINATE 125 MG/2ML
125 INJECTION, POWDER, LYOPHILIZED, FOR SOLUTION INTRAMUSCULAR; INTRAVENOUS
Status: COMPLETED | OUTPATIENT
Start: 2023-05-06 | End: 2023-05-06

## 2023-05-06 RX ADMIN — METHYLPREDNISOLONE SODIUM SUCCINATE 125 MG: 125 INJECTION, POWDER, FOR SOLUTION INTRAMUSCULAR; INTRAVENOUS at 21:14

## 2023-05-06 ASSESSMENT — PAIN - FUNCTIONAL ASSESSMENT: PAIN_FUNCTIONAL_ASSESSMENT: 0-10

## 2023-05-06 ASSESSMENT — LIFESTYLE VARIABLES
HOW OFTEN DO YOU HAVE A DRINK CONTAINING ALCOHOL: NEVER
HOW MANY STANDARD DRINKS CONTAINING ALCOHOL DO YOU HAVE ON A TYPICAL DAY: PATIENT DOES NOT DRINK

## 2023-05-06 ASSESSMENT — PAIN SCALES - GENERAL: PAINLEVEL_OUTOF10: 5

## 2023-05-07 ENCOUNTER — APPOINTMENT (OUTPATIENT)
Facility: HOSPITAL | Age: 33
DRG: 871 | End: 2023-05-07
Payer: MEDICARE

## 2023-05-07 PROBLEM — D84.9 IMMUNOCOMPROMISED (HCC): Status: ACTIVE | Noted: 2023-05-07

## 2023-05-07 PROBLEM — M32.9 SLE (SYSTEMIC LUPUS ERYTHEMATOSUS RELATED SYNDROME) (HCC): Status: ACTIVE | Noted: 2023-05-07

## 2023-05-07 PROBLEM — R53.1 GENERALIZED WEAKNESS: Status: ACTIVE | Noted: 2023-05-07

## 2023-05-07 LAB
BASOPHILS # BLD: 0 K/UL (ref 0–0.1)
BASOPHILS NFR BLD: 0 % (ref 0–1)
BLASTS NFR BLD MANUAL: 0 %
CK SERPL-CCNC: 10 U/L (ref 26–192)
CRP SERPL-MCNC: 11.8 MG/DL (ref 0–0.6)
DIFFERENTIAL METHOD BLD: ABNORMAL
EOSINOPHIL # BLD: 0 K/UL (ref 0–0.4)
EOSINOPHIL NFR BLD: 0 % (ref 0–7)
ERYTHROCYTE [DISTWIDTH] IN BLOOD BY AUTOMATED COUNT: 18.7 % (ref 11.5–14.5)
ERYTHROCYTE [SEDIMENTATION RATE] IN BLOOD: 127 MM/HR (ref 0–20)
HAPTOGLOB SERPL-MCNC: 298 MG/DL (ref 30–200)
HCT VFR BLD AUTO: 15.7 % (ref 35–47)
HCT VFR BLD AUTO: 22.1 % (ref 35–47)
HGB BLD-MCNC: 4.8 G/DL (ref 11.5–16)
HGB BLD-MCNC: 6.8 G/DL (ref 11.5–16)
IMM GRANULOCYTES # BLD AUTO: 0 K/UL
IMM GRANULOCYTES NFR BLD AUTO: 0 %
IRON SATN MFR SERPL: 12 % (ref 20–50)
IRON SERPL-MCNC: 14 UG/DL (ref 35–150)
LYMPHOCYTES # BLD: 0.1 K/UL (ref 0.8–3.5)
LYMPHOCYTES NFR BLD: 6 % (ref 12–49)
MANUAL DIFFERENTIAL PERFORMED BLD QL: ABNORMAL
MCH RBC QN AUTO: 27.1 PG (ref 26–34)
MCHC RBC AUTO-ENTMCNC: 30.6 G/DL (ref 30–36.5)
MCV RBC AUTO: 88.7 FL (ref 80–99)
METAMYELOCYTES NFR BLD MANUAL: 0 %
MONOCYTES # BLD: 0.1 K/UL (ref 0–1)
MONOCYTES NFR BLD: 6 % (ref 5–13)
MRSA DNA SPEC QL NAA+PROBE: NOT DETECTED
MYELOCYTES NFR BLD MANUAL: 0 %
NEUTS BAND NFR BLD MANUAL: 0 % (ref 0–6)
NEUTS SEG # BLD: 1 K/UL (ref 1.8–8)
NEUTS SEG NFR BLD: 88 % (ref 32–75)
NRBC # BLD: 0.05 K/UL (ref 0–0.01)
NRBC BLD-RTO: 4.2 PER 100 WBC
OTHER CELLS NFR BLD MANUAL: 0 %
PLATELET # BLD AUTO: 58 K/UL (ref 150–400)
PMV BLD AUTO: 11.2 FL (ref 8.9–12.9)
PROMYELOCYTES NFR BLD MANUAL: 0 %
RBC # BLD AUTO: 1.77 M/UL (ref 3.8–5.2)
RBC MORPH BLD: ABNORMAL
RBC MORPH BLD: ABNORMAL
T4 FREE SERPL-MCNC: 0.9 NG/DL (ref 0.8–1.5)
TIBC SERPL-MCNC: 116 UG/DL (ref 250–450)
TOTAL CELLS COUNTED SPEC: 50
WBC # BLD AUTO: 1.2 K/UL (ref 3.6–11)

## 2023-05-07 PROCEDURE — 6370000000 HC RX 637 (ALT 250 FOR IP): Performed by: INTERNAL MEDICINE

## 2023-05-07 PROCEDURE — 86431 RHEUMATOID FACTOR QUANT: CPT

## 2023-05-07 PROCEDURE — 99222 1ST HOSP IP/OBS MODERATE 55: CPT | Performed by: INTERNAL MEDICINE

## 2023-05-07 PROCEDURE — 86200 CCP ANTIBODY: CPT

## 2023-05-07 PROCEDURE — 82977 ASSAY OF GGT: CPT

## 2023-05-07 PROCEDURE — 86225 DNA ANTIBODY NATIVE: CPT

## 2023-05-07 PROCEDURE — 2000000000 HC ICU R&B

## 2023-05-07 PROCEDURE — 86235 NUCLEAR ANTIGEN ANTIBODY: CPT

## 2023-05-07 PROCEDURE — 86140 C-REACTIVE PROTEIN: CPT

## 2023-05-07 PROCEDURE — 87040 BLOOD CULTURE FOR BACTERIA: CPT

## 2023-05-07 PROCEDURE — 87150 DNA/RNA AMPLIFIED PROBE: CPT

## 2023-05-07 PROCEDURE — 71045 X-RAY EXAM CHEST 1 VIEW: CPT

## 2023-05-07 PROCEDURE — 87641 MR-STAPH DNA AMP PROBE: CPT

## 2023-05-07 PROCEDURE — 2580000003 HC RX 258: Performed by: INTERNAL MEDICINE

## 2023-05-07 PROCEDURE — 36410 VNPNXR 3YR/> PHY/QHP DX/THER: CPT

## 2023-05-07 PROCEDURE — 6370000000 HC RX 637 (ALT 250 FOR IP): Performed by: HOSPITALIST

## 2023-05-07 PROCEDURE — P9016 RBC LEUKOCYTES REDUCED: HCPCS

## 2023-05-07 PROCEDURE — 6360000002 HC RX W HCPCS: Performed by: INTERNAL MEDICINE

## 2023-05-07 PROCEDURE — 30233N1 TRANSFUSION OF NONAUTOLOGOUS RED BLOOD CELLS INTO PERIPHERAL VEIN, PERCUTANEOUS APPROACH: ICD-10-PCS | Performed by: INTERNAL MEDICINE

## 2023-05-07 PROCEDURE — 86160 COMPLEMENT ANTIGEN: CPT

## 2023-05-07 PROCEDURE — 84439 ASSAY OF FREE THYROXINE: CPT

## 2023-05-07 PROCEDURE — 85027 COMPLETE CBC AUTOMATED: CPT

## 2023-05-07 PROCEDURE — 83874 ASSAY OF MYOGLOBIN: CPT

## 2023-05-07 PROCEDURE — 85018 HEMOGLOBIN: CPT

## 2023-05-07 PROCEDURE — 85007 BL SMEAR W/DIFF WBC COUNT: CPT

## 2023-05-07 PROCEDURE — 82550 ASSAY OF CK (CPK): CPT

## 2023-05-07 PROCEDURE — 85041 AUTOMATED RBC COUNT: CPT

## 2023-05-07 PROCEDURE — 85652 RBC SED RATE AUTOMATED: CPT

## 2023-05-07 PROCEDURE — 76937 US GUIDE VASCULAR ACCESS: CPT

## 2023-05-07 PROCEDURE — 05H933Z INSERTION OF INFUSION DEVICE INTO RIGHT BRACHIAL VEIN, PERCUTANEOUS APPROACH: ICD-10-PCS | Performed by: INTERNAL MEDICINE

## 2023-05-07 PROCEDURE — C1751 CATH, INF, PER/CENT/MIDLINE: HCPCS

## 2023-05-07 PROCEDURE — 82085 ASSAY OF ALDOLASE: CPT

## 2023-05-07 PROCEDURE — 85014 HEMATOCRIT: CPT

## 2023-05-07 PROCEDURE — 82955 ASSAY OF G6PD ENZYME: CPT

## 2023-05-07 RX ORDER — SODIUM CHLORIDE 9 MG/ML
INJECTION, SOLUTION INTRAVENOUS PRN
Status: DISCONTINUED | OUTPATIENT
Start: 2023-05-07 | End: 2023-05-11

## 2023-05-07 RX ORDER — FOLIC ACID 1 MG/1
2 TABLET ORAL DAILY
Status: DISCONTINUED | OUTPATIENT
Start: 2023-05-07 | End: 2023-05-13 | Stop reason: HOSPADM

## 2023-05-07 RX ORDER — PANTOPRAZOLE SODIUM 40 MG/1
40 TABLET, DELAYED RELEASE ORAL DAILY
Status: DISCONTINUED | OUTPATIENT
Start: 2023-05-07 | End: 2023-05-13 | Stop reason: HOSPADM

## 2023-05-07 RX ORDER — FLUTICASONE PROPIONATE 50 MCG
1 SPRAY, SUSPENSION (ML) NASAL DAILY
Status: DISCONTINUED | OUTPATIENT
Start: 2023-05-07 | End: 2023-05-13 | Stop reason: HOSPADM

## 2023-05-07 RX ORDER — HYDROXYCHLOROQUINE SULFATE 200 MG/1
300 TABLET, FILM COATED ORAL DAILY
COMMUNITY

## 2023-05-07 RX ORDER — ERGOCALCIFEROL 1.25 MG/1
50000 CAPSULE ORAL
Status: DISCONTINUED | OUTPATIENT
Start: 2023-05-07 | End: 2023-05-13 | Stop reason: HOSPADM

## 2023-05-07 RX ORDER — BENZONATATE 100 MG/1
100 CAPSULE ORAL 3 TIMES DAILY PRN
Status: DISCONTINUED | OUTPATIENT
Start: 2023-05-07 | End: 2023-05-13 | Stop reason: HOSPADM

## 2023-05-07 RX ORDER — POTASSIUM CHLORIDE 1500 MG/1
20 TABLET, EXTENDED RELEASE ORAL DAILY
COMMUNITY
Start: 2023-04-03

## 2023-05-07 RX ORDER — ALBUTEROL SULFATE 2.5 MG/3ML
3 SOLUTION RESPIRATORY (INHALATION) EVERY 4 HOURS PRN
COMMUNITY
Start: 2023-04-04

## 2023-05-07 RX ORDER — DAPSONE 100 MG/1
100 TABLET ORAL 2 TIMES DAILY
COMMUNITY
Start: 2023-04-19

## 2023-05-07 RX ORDER — ALBUTEROL SULFATE 2.5 MG/3ML
2.5 SOLUTION RESPIRATORY (INHALATION) EVERY 4 HOURS PRN
Status: DISCONTINUED | OUTPATIENT
Start: 2023-05-07 | End: 2023-05-13 | Stop reason: HOSPADM

## 2023-05-07 RX ORDER — DULOXETIN HYDROCHLORIDE 30 MG/1
30 CAPSULE, DELAYED RELEASE ORAL 2 TIMES DAILY
Status: DISCONTINUED | OUTPATIENT
Start: 2023-05-07 | End: 2023-05-13 | Stop reason: HOSPADM

## 2023-05-07 RX ORDER — HYDROMORPHONE HYDROCHLORIDE 2 MG/1
2 TABLET ORAL
COMMUNITY

## 2023-05-07 RX ORDER — METHYLPREDNISOLONE SODIUM SUCCINATE 125 MG/2ML
60 INJECTION, POWDER, LYOPHILIZED, FOR SOLUTION INTRAMUSCULAR; INTRAVENOUS EVERY 6 HOURS
Status: DISCONTINUED | OUTPATIENT
Start: 2023-05-07 | End: 2023-05-13 | Stop reason: HOSPADM

## 2023-05-07 RX ORDER — POTASSIUM CHLORIDE 20 MEQ/1
20 TABLET, EXTENDED RELEASE ORAL DAILY
Status: DISCONTINUED | OUTPATIENT
Start: 2023-05-07 | End: 2023-05-12

## 2023-05-07 RX ORDER — TOFACITINIB 11 MG/1
11 TABLET, FILM COATED, EXTENDED RELEASE ORAL DAILY
COMMUNITY
Start: 2023-03-06

## 2023-05-07 RX ORDER — HYDROMORPHONE HYDROCHLORIDE 2 MG/1
2 TABLET ORAL EVERY 6 HOURS PRN
Status: DISCONTINUED | OUTPATIENT
Start: 2023-05-07 | End: 2023-05-10

## 2023-05-07 RX ORDER — CHOLECALCIFEROL (VITAMIN D3) 125 MCG
5 CAPSULE ORAL NIGHTLY PRN
Status: DISCONTINUED | OUTPATIENT
Start: 2023-05-07 | End: 2023-05-13 | Stop reason: HOSPADM

## 2023-05-07 RX ADMIN — METHYLPREDNISOLONE 60 MG: 125 INJECTION, POWDER, LYOPHILIZED, FOR SOLUTION INTRAMUSCULAR; INTRAVENOUS at 20:37

## 2023-05-07 RX ADMIN — HYDROMORPHONE HYDROCHLORIDE 2 MG: 2 TABLET ORAL at 16:14

## 2023-05-07 RX ADMIN — METHYLPREDNISOLONE 60 MG: 125 INJECTION, POWDER, LYOPHILIZED, FOR SOLUTION INTRAMUSCULAR; INTRAVENOUS at 13:49

## 2023-05-07 RX ADMIN — DULOXETINE HYDROCHLORIDE 30 MG: 30 CAPSULE, DELAYED RELEASE ORAL at 20:37

## 2023-05-07 RX ADMIN — ERGOCALCIFEROL 50000 UNITS: 1.25 CAPSULE ORAL at 13:02

## 2023-05-07 RX ADMIN — DAPTOMYCIN 380 MG: 500 INJECTION, POWDER, LYOPHILIZED, FOR SOLUTION INTRAVENOUS at 15:15

## 2023-05-07 RX ADMIN — CEFEPIME 2000 MG: 2 INJECTION, POWDER, FOR SOLUTION INTRAVENOUS at 17:12

## 2023-05-07 RX ADMIN — DULOXETINE HYDROCHLORIDE 30 MG: 30 CAPSULE, DELAYED RELEASE ORAL at 13:02

## 2023-05-07 RX ADMIN — HYDROMORPHONE HYDROCHLORIDE 2 MG: 2 TABLET ORAL at 07:05

## 2023-05-07 RX ADMIN — FOLIC ACID 2 MG: 1 TABLET ORAL at 13:02

## 2023-05-07 ASSESSMENT — PAIN SCALES - GENERAL
PAINLEVEL_OUTOF10: 8
PAINLEVEL_OUTOF10: 9
PAINLEVEL_OUTOF10: 8
PAINLEVEL_OUTOF10: 7
PAINLEVEL_OUTOF10: 4
PAINLEVEL_OUTOF10: 8

## 2023-05-07 ASSESSMENT — PAIN DESCRIPTION - ONSET: ONSET: ON-GOING

## 2023-05-07 ASSESSMENT — PAIN DESCRIPTION - PAIN TYPE
TYPE: CHRONIC PAIN
TYPE: CHRONIC PAIN

## 2023-05-07 ASSESSMENT — ENCOUNTER SYMPTOMS
TROUBLE SWALLOWING: 0
DIARRHEA: 0
ABDOMINAL PAIN: 0
VOMITING: 0
NAUSEA: 0
SORE THROAT: 0
CONSTIPATION: 0
COUGH: 0
EYE PAIN: 0
BLOOD IN STOOL: 0
EYE REDNESS: 0
SHORTNESS OF BREATH: 1

## 2023-05-07 ASSESSMENT — PAIN DESCRIPTION - LOCATION
LOCATION: GENERALIZED
LOCATION: LEG
LOCATION: GENERALIZED

## 2023-05-07 ASSESSMENT — PAIN DESCRIPTION - DESCRIPTORS: DESCRIPTORS: ACHING;SORE

## 2023-05-07 ASSESSMENT — PAIN DESCRIPTION - FREQUENCY: FREQUENCY: CONTINUOUS

## 2023-05-07 ASSESSMENT — PAIN - FUNCTIONAL ASSESSMENT
PAIN_FUNCTIONAL_ASSESSMENT: 0-10

## 2023-05-07 ASSESSMENT — PAIN DESCRIPTION - ORIENTATION: ORIENTATION: RIGHT;LEFT

## 2023-05-08 ENCOUNTER — APPOINTMENT (OUTPATIENT)
Facility: HOSPITAL | Age: 33
DRG: 871 | End: 2023-05-08
Payer: MEDICARE

## 2023-05-08 PROBLEM — J96.21 ACUTE AND CHRONIC RESPIRATORY FAILURE WITH HYPOXIA (HCC): Status: ACTIVE | Noted: 2023-05-08

## 2023-05-08 LAB
25(OH)D3 SERPL-MCNC: 34.8 NG/ML (ref 30–100)
ABO + RH BLD: NORMAL
ALBUMIN SERPL-MCNC: 1.7 G/DL (ref 3.5–5)
ALBUMIN/GLOB SERPL: 0.4 (ref 1.1–2.2)
ALP SERPL-CCNC: 927 U/L (ref 45–117)
ALT SERPL-CCNC: 47 U/L (ref 12–78)
ANION GAP SERPL CALC-SCNC: 6 MMOL/L (ref 5–15)
AST SERPL W P-5'-P-CCNC: 52 U/L (ref 15–37)
BASOPHILS # BLD: 0 K/UL (ref 0–0.1)
BASOPHILS NFR BLD: 0 % (ref 0–1)
BILIRUB SERPL-MCNC: 2.5 MG/DL (ref 0.2–1)
BLD PROD TYP BPU: NORMAL
BLD PROD TYP BPU: NORMAL
BLOOD BANK DISPENSE STATUS: NORMAL
BLOOD BANK DISPENSE STATUS: NORMAL
BLOOD GROUP ANTIBODIES SERPL: NEGATIVE
BPU ID: NORMAL
BPU ID: NORMAL
BUN SERPL-MCNC: 23 MG/DL (ref 6–20)
BUN/CREAT SERPL: 40 (ref 12–20)
CA-I BLD-MCNC: 8.3 MG/DL (ref 8.5–10.1)
CHLORIDE SERPL-SCNC: 101 MMOL/L (ref 97–108)
CO2 SERPL-SCNC: 24 MMOL/L (ref 21–32)
CREAT SERPL-MCNC: 0.58 MG/DL (ref 0.55–1.02)
CROSSMATCH RESULT: NORMAL
CROSSMATCH RESULT: NORMAL
DIFFERENTIAL METHOD BLD: ABNORMAL
EOSINOPHIL # BLD: 0 K/UL (ref 0–0.4)
EOSINOPHIL NFR BLD: 0 % (ref 0–7)
EPO SERPL-ACNC: 565.9 MIU/ML (ref 2.6–18.5)
ERYTHROCYTE [DISTWIDTH] IN BLOOD BY AUTOMATED COUNT: 19.5 % (ref 11.5–14.5)
FOLATE SERPL-MCNC: 15.2 NG/ML (ref 5–21)
GGT SERPL-CCNC: 994 U/L (ref 5–55)
GLOBULIN SER CALC-MCNC: 4.2 G/DL (ref 2–4)
GLUCOSE SERPL-MCNC: 133 MG/DL (ref 65–100)
HCT VFR BLD AUTO: 23.2 % (ref 35–47)
HGB BLD-MCNC: 7.2 G/DL (ref 11.5–16)
IMM GRANULOCYTES # BLD AUTO: 0 K/UL (ref 0–0.04)
IMM GRANULOCYTES NFR BLD AUTO: 1 % (ref 0–0.5)
LDH SERPL L TO P-CCNC: 478 U/L (ref 81–246)
LYMPHOCYTES # BLD: 0.1 K/UL (ref 0.8–3.5)
LYMPHOCYTES NFR BLD: 5 % (ref 12–49)
MCH RBC QN AUTO: 27.3 PG (ref 26–34)
MCHC RBC AUTO-ENTMCNC: 31 G/DL (ref 30–36.5)
MCV RBC AUTO: 87.9 FL (ref 80–99)
MONOCYTES # BLD: 0.1 K/UL (ref 0–1)
MONOCYTES NFR BLD: 12 % (ref 5–13)
MYOGLOBIN SERPL-MCNC: 14 NG/ML (ref 13–71)
NEUTS SEG # BLD: 0.9 K/UL (ref 1.8–8)
NEUTS SEG NFR BLD: 82 % (ref 32–75)
NRBC # BLD: 0.07 K/UL (ref 0–0.01)
NRBC BLD-RTO: 6.3 PER 100 WBC
PLATELET # BLD AUTO: 46 K/UL (ref 150–400)
PMV BLD AUTO: 11.6 FL (ref 8.9–12.9)
POTASSIUM SERPL-SCNC: 4.5 MMOL/L (ref 3.5–5.1)
PROCALCITONIN SERPL-MCNC: 0.39 NG/ML
PROT SERPL-MCNC: 5.9 G/DL (ref 6.4–8.2)
RBC # BLD AUTO: 2.64 M/UL (ref 3.8–5.2)
RBC MORPH BLD: ABNORMAL
RHEUMATOID FACT SERPL-ACNC: <10 IU/ML
SODIUM SERPL-SCNC: 131 MMOL/L (ref 136–145)
SPECIMEN EXP DATE BLD: NORMAL
TRANSFUSION STATUS PATIENT QL: NORMAL
TRANSFUSION STATUS PATIENT QL: NORMAL
UNIT DIVISION: 0
UNIT DIVISION: 0
VIT B12 SERPL-MCNC: >2000 PG/ML (ref 193–986)
WBC # BLD AUTO: 1.1 K/UL (ref 3.6–11)

## 2023-05-08 PROCEDURE — 85025 COMPLETE CBC W/AUTO DIFF WBC: CPT

## 2023-05-08 PROCEDURE — 6370000000 HC RX 637 (ALT 250 FOR IP): Performed by: HOSPITALIST

## 2023-05-08 PROCEDURE — 36415 COLL VENOUS BLD VENIPUNCTURE: CPT

## 2023-05-08 PROCEDURE — 6360000002 HC RX W HCPCS: Performed by: INTERNAL MEDICINE

## 2023-05-08 PROCEDURE — 99232 SBSQ HOSP IP/OBS MODERATE 35: CPT | Performed by: INTERNAL MEDICINE

## 2023-05-08 PROCEDURE — 76705 ECHO EXAM OF ABDOMEN: CPT

## 2023-05-08 PROCEDURE — 2000000000 HC ICU R&B

## 2023-05-08 PROCEDURE — 71045 X-RAY EXAM CHEST 1 VIEW: CPT

## 2023-05-08 PROCEDURE — 83615 LACTATE (LD) (LDH) ENZYME: CPT

## 2023-05-08 PROCEDURE — 94640 AIRWAY INHALATION TREATMENT: CPT

## 2023-05-08 PROCEDURE — 2700000000 HC OXYGEN THERAPY PER DAY

## 2023-05-08 PROCEDURE — 6370000000 HC RX 637 (ALT 250 FOR IP): Performed by: INTERNAL MEDICINE

## 2023-05-08 PROCEDURE — 80053 COMPREHEN METABOLIC PANEL: CPT

## 2023-05-08 PROCEDURE — 84145 PROCALCITONIN (PCT): CPT

## 2023-05-08 PROCEDURE — 2580000003 HC RX 258: Performed by: INTERNAL MEDICINE

## 2023-05-08 PROCEDURE — 6360000002 HC RX W HCPCS

## 2023-05-08 RX ORDER — IPRATROPIUM BROMIDE AND ALBUTEROL SULFATE 2.5; .5 MG/3ML; MG/3ML
SOLUTION RESPIRATORY (INHALATION)
Status: DISCONTINUED
Start: 2023-05-08 | End: 2023-05-08 | Stop reason: WASHOUT

## 2023-05-08 RX ORDER — FUROSEMIDE 10 MG/ML
40 INJECTION INTRAMUSCULAR; INTRAVENOUS ONCE
Status: COMPLETED | OUTPATIENT
Start: 2023-05-08 | End: 2023-05-08

## 2023-05-08 RX ADMIN — FUROSEMIDE 40 MG: 10 INJECTION, SOLUTION INTRAMUSCULAR; INTRAVENOUS at 12:16

## 2023-05-08 RX ADMIN — ERGOCALCIFEROL 50000 UNITS: 1.25 CAPSULE ORAL at 12:16

## 2023-05-08 RX ADMIN — ALBUTEROL SULFATE 2.5 MG: 2.5 SOLUTION RESPIRATORY (INHALATION) at 09:58

## 2023-05-08 RX ADMIN — HYDROMORPHONE HYDROCHLORIDE 2 MG: 2 TABLET ORAL at 01:05

## 2023-05-08 RX ADMIN — METHYLPREDNISOLONE 60 MG: 125 INJECTION, POWDER, LYOPHILIZED, FOR SOLUTION INTRAMUSCULAR; INTRAVENOUS at 07:51

## 2023-05-08 RX ADMIN — PANTOPRAZOLE SODIUM 40 MG: 40 TABLET, DELAYED RELEASE ORAL at 07:50

## 2023-05-08 RX ADMIN — METHYLPREDNISOLONE 60 MG: 125 INJECTION, POWDER, LYOPHILIZED, FOR SOLUTION INTRAMUSCULAR; INTRAVENOUS at 14:44

## 2023-05-08 RX ADMIN — HYDROMORPHONE HYDROCHLORIDE 2 MG: 2 TABLET ORAL at 16:52

## 2023-05-08 RX ADMIN — DULOXETINE HYDROCHLORIDE 30 MG: 30 CAPSULE, DELAYED RELEASE ORAL at 07:50

## 2023-05-08 RX ADMIN — CEFEPIME 2000 MG: 2 INJECTION, POWDER, FOR SOLUTION INTRAVENOUS at 10:28

## 2023-05-08 RX ADMIN — DULOXETINE HYDROCHLORIDE 30 MG: 30 CAPSULE, DELAYED RELEASE ORAL at 20:55

## 2023-05-08 RX ADMIN — CEFEPIME 2000 MG: 2 INJECTION, POWDER, FOR SOLUTION INTRAVENOUS at 16:57

## 2023-05-08 RX ADMIN — FOLIC ACID 2 MG: 1 TABLET ORAL at 07:50

## 2023-05-08 RX ADMIN — METHYLPREDNISOLONE 60 MG: 125 INJECTION, POWDER, LYOPHILIZED, FOR SOLUTION INTRAMUSCULAR; INTRAVENOUS at 01:06

## 2023-05-08 RX ADMIN — CEFEPIME 2000 MG: 2 INJECTION, POWDER, FOR SOLUTION INTRAVENOUS at 01:05

## 2023-05-08 RX ADMIN — METHYLPREDNISOLONE 60 MG: 125 INJECTION, POWDER, LYOPHILIZED, FOR SOLUTION INTRAMUSCULAR; INTRAVENOUS at 20:55

## 2023-05-08 ASSESSMENT — ENCOUNTER SYMPTOMS
DIARRHEA: 0
CONSTIPATION: 0
ABDOMINAL PAIN: 1
COUGH: 0
VOMITING: 0
SHORTNESS OF BREATH: 1
EYE REDNESS: 0
BLOOD IN STOOL: 0
TROUBLE SWALLOWING: 0
NAUSEA: 0
EYE PAIN: 0
SORE THROAT: 0

## 2023-05-08 ASSESSMENT — PAIN SCALES - GENERAL
PAINLEVEL_OUTOF10: 4
PAINLEVEL_OUTOF10: 8
PAINLEVEL_OUTOF10: 4
PAINLEVEL_OUTOF10: 7

## 2023-05-08 ASSESSMENT — PAIN DESCRIPTION - LOCATION: LOCATION: LEG

## 2023-05-08 ASSESSMENT — PAIN DESCRIPTION - ORIENTATION: ORIENTATION: RIGHT;LEFT

## 2023-05-08 ASSESSMENT — PAIN DESCRIPTION - DESCRIPTORS: DESCRIPTORS: ACHING;SHOOTING

## 2023-05-08 NOTE — CARE COORDINATION
1015: Chart reviewed. Per notes; patient followed via hematology, ID, pulmonology and rheumatology. PRN home oxygen in place. [facility] to resume care of patient when medically stable. CM will continue to follow patient and recs of medical team.    6553: Updates attached in [address] Gretchen Bronson.

## 2023-05-09 ENCOUNTER — APPOINTMENT (OUTPATIENT)
Facility: HOSPITAL | Age: 33
DRG: 871 | End: 2023-05-09
Payer: MEDICARE

## 2023-05-09 PROBLEM — R74.8 ELEVATED ALKALINE PHOSPHATASE LEVEL: Status: ACTIVE | Noted: 2023-05-09

## 2023-05-09 LAB
ACCESSION NUMBER, LLC1M: ABNORMAL
ACINETOBACTER CALCOAC BAUMANNII COMPLEX BY PCR: NOT DETECTED
ALBUMIN SERPL-MCNC: 1.6 G/DL (ref 3.5–5)
ALBUMIN/GLOB SERPL: 0.4 (ref 1.1–2.2)
ALDOLASE SERPL-CCNC: 8.7 U/L (ref 3.3–10.3)
ALP SERPL-CCNC: 1018 U/L (ref 45–117)
ALT SERPL-CCNC: 48 U/L (ref 12–78)
ANION GAP SERPL CALC-SCNC: 8 MMOL/L (ref 5–15)
AST SERPL W P-5'-P-CCNC: 54 U/L (ref 15–37)
BACTEROIDES FRAGILIS BY PCR: NOT DETECTED
BASOPHILS # BLD: 0 K/UL (ref 0–0.1)
BASOPHILS NFR BLD: 0 % (ref 0–1)
BILIRUB SERPL-MCNC: 2.1 MG/DL (ref 0.2–1)
BIOFIRE TEST COMMENT: ABNORMAL
BUN SERPL-MCNC: 23 MG/DL (ref 6–20)
BUN/CREAT SERPL: 38 (ref 12–20)
CA-I BLD-MCNC: 8.1 MG/DL (ref 8.5–10.1)
CANDIDA ALBICANS BY PCR: NOT DETECTED
CANDIDA AURIS BY PCR: NOT DETECTED
CANDIDA GLABRATA: NOT DETECTED
CANDIDA KRUSEI BY PCR: NOT DETECTED
CANDIDA PARAPSILOSIS BY PCR: NOT DETECTED
CANDIDA TROPICALIS BY PCR: NOT DETECTED
CCP IGA+IGG SERPL IA-ACNC: 3 UNITS (ref 0–19)
CHLORIDE SERPL-SCNC: 98 MMOL/L (ref 97–108)
CO2 SERPL-SCNC: 24 MMOL/L (ref 21–32)
CREAT SERPL-MCNC: 0.61 MG/DL (ref 0.55–1.02)
CRP SERPL-MCNC: 12.3 MG/DL (ref 0–0.6)
CRYPTOCOCCUS NEOFORMANS/GATTII BY PCR: NOT DETECTED
DIFFERENTIAL METHOD BLD: ABNORMAL
ENTEROBACTER CLOACAE COMPLEX BY PCR: NOT DETECTED
ENTEROBACTERALES BY PCR: NOT DETECTED
ENTEROCOCCUS FAECALIS BY PCR: NOT DETECTED
ENTEROCOCCUS FAECIUM BY PCR: NOT DETECTED
EOSINOPHIL # BLD: 0 K/UL (ref 0–0.4)
EOSINOPHIL NFR BLD: 0 % (ref 0–7)
ERYTHROCYTE [DISTWIDTH] IN BLOOD BY AUTOMATED COUNT: 19.2 % (ref 11.5–14.5)
ERYTHROCYTE [SEDIMENTATION RATE] IN BLOOD: 93 MM/HR (ref 0–20)
ESCHERICHIA COLI: NOT DETECTED
GLOBULIN SER CALC-MCNC: 4.2 G/DL (ref 2–4)
GLUCOSE SERPL-MCNC: 144 MG/DL (ref 65–100)
HAEMOPHILUS INFLUENZAE BY PCR: NOT DETECTED
HCT VFR BLD AUTO: 23 % (ref 35–47)
HGB BLD-MCNC: 7.1 G/DL (ref 11.5–16)
IMM GRANULOCYTES # BLD AUTO: 0 K/UL (ref 0–0.04)
IMM GRANULOCYTES NFR BLD AUTO: 1 % (ref 0–0.5)
KLEBSIELLA AEROGENES BY PCR: NOT DETECTED
KLEBSIELLA OXYTOCA BY PCR: NOT DETECTED
KLEBSIELLA PNEUMONIAE GROUP BY PCR: NOT DETECTED
LISTERIA MONOCYTOGENES BY PCR: NOT DETECTED
LYMPHOCYTES # BLD: 0.1 K/UL (ref 0.8–3.5)
LYMPHOCYTES NFR BLD: 7 % (ref 12–49)
MCH RBC QN AUTO: 27.3 PG (ref 26–34)
MCHC RBC AUTO-ENTMCNC: 30.9 G/DL (ref 30–36.5)
MCV RBC AUTO: 88.5 FL (ref 80–99)
METHICILLIN RESISTANCE MECA/C  BY PCR: DETECTED
MONOCYTES # BLD: 0.2 K/UL (ref 0–1)
MONOCYTES NFR BLD: 16 % (ref 5–13)
NEISSERIA MENINGITIDIS BY PCR: NOT DETECTED
NEUTS SEG # BLD: 0.9 K/UL (ref 1.8–8)
NEUTS SEG NFR BLD: 76 % (ref 32–75)
NRBC # BLD: 0.07 K/UL (ref 0–0.01)
NRBC BLD-RTO: 6.1 PER 100 WBC
PLATELET # BLD AUTO: 48 K/UL (ref 150–400)
PLATELET COMMENT: ABNORMAL
PMV BLD AUTO: 11.9 FL (ref 8.9–12.9)
POTASSIUM SERPL-SCNC: 4.2 MMOL/L (ref 3.5–5.1)
PROT SERPL-MCNC: 5.8 G/DL (ref 6.4–8.2)
PROTEUS BY PCR: NOT DETECTED
PSEUDOMONAS AERUGINOSA, PSAEP: NOT DETECTED
RBC # BLD AUTO: 2.6 M/UL (ref 3.8–5.2)
RBC MORPH BLD: ABNORMAL
RBC MORPH BLD: ABNORMAL
RESISTANT GENE TARGETS: ABNORMAL
SALMONELLA SPECIES BY PCR: NOT DETECTED
SERRATIA MARCESCENS BY PCR: NOT DETECTED
SODIUM SERPL-SCNC: 130 MMOL/L (ref 136–145)
STAPHYLOCOCCUS AUREUS: NOT DETECTED
STAPHYLOCOCCUS EPIDERMIDIS BY PCR: DETECTED
STAPHYLOCOCCUS LUGDUNENSIS BY PCR: NOT DETECTED
STAPHYLOCOCCUS: DETECTED
STENOTROPHOMONAS MALTOPHILIA BY PCR: NOT DETECTED
STREPTOCOCCUS AGALACTIAE (GROUP B): NOT DETECTED
STREPTOCOCCUS PNEUMONIAE , SPNP: NOT DETECTED
STREPTOCOCCUS PYOGENES (GROUP A), SPYOP: NOT DETECTED
STREPTOCOCCUS: NOT DETECTED
WBC # BLD AUTO: 1.2 K/UL (ref 3.6–11)

## 2023-05-09 PROCEDURE — 71045 X-RAY EXAM CHEST 1 VIEW: CPT

## 2023-05-09 PROCEDURE — 85652 RBC SED RATE AUTOMATED: CPT

## 2023-05-09 PROCEDURE — 6360000002 HC RX W HCPCS: Performed by: INTERNAL MEDICINE

## 2023-05-09 PROCEDURE — 85025 COMPLETE CBC W/AUTO DIFF WBC: CPT

## 2023-05-09 PROCEDURE — 6370000000 HC RX 637 (ALT 250 FOR IP): Performed by: HOSPITALIST

## 2023-05-09 PROCEDURE — 6360000002 HC RX W HCPCS

## 2023-05-09 PROCEDURE — 86015 ACTIN ANTIBODY EACH: CPT

## 2023-05-09 PROCEDURE — 80053 COMPREHEN METABOLIC PANEL: CPT

## 2023-05-09 PROCEDURE — 86381 MITOCHONDRIAL ANTIBODY EACH: CPT

## 2023-05-09 PROCEDURE — 6370000000 HC RX 637 (ALT 250 FOR IP): Performed by: INTERNAL MEDICINE

## 2023-05-09 PROCEDURE — 99232 SBSQ HOSP IP/OBS MODERATE 35: CPT | Performed by: INTERNAL MEDICINE

## 2023-05-09 PROCEDURE — 36415 COLL VENOUS BLD VENIPUNCTURE: CPT

## 2023-05-09 PROCEDURE — 2700000000 HC OXYGEN THERAPY PER DAY

## 2023-05-09 PROCEDURE — 87040 BLOOD CULTURE FOR BACTERIA: CPT

## 2023-05-09 PROCEDURE — 94640 AIRWAY INHALATION TREATMENT: CPT

## 2023-05-09 PROCEDURE — 2000000000 HC ICU R&B

## 2023-05-09 PROCEDURE — 2580000003 HC RX 258: Performed by: INTERNAL MEDICINE

## 2023-05-09 PROCEDURE — 82164 ANGIOTENSIN I ENZYME TEST: CPT

## 2023-05-09 PROCEDURE — 86140 C-REACTIVE PROTEIN: CPT

## 2023-05-09 RX ADMIN — HYDROMORPHONE HYDROCHLORIDE 2 MG: 2 TABLET ORAL at 04:03

## 2023-05-09 RX ADMIN — METHYLPREDNISOLONE 60 MG: 125 INJECTION, POWDER, LYOPHILIZED, FOR SOLUTION INTRAMUSCULAR; INTRAVENOUS at 01:51

## 2023-05-09 RX ADMIN — CEFEPIME 2000 MG: 2 INJECTION, POWDER, FOR SOLUTION INTRAVENOUS at 17:52

## 2023-05-09 RX ADMIN — METHYLPREDNISOLONE 60 MG: 125 INJECTION, POWDER, LYOPHILIZED, FOR SOLUTION INTRAMUSCULAR; INTRAVENOUS at 21:46

## 2023-05-09 RX ADMIN — METHYLPREDNISOLONE 60 MG: 125 INJECTION, POWDER, LYOPHILIZED, FOR SOLUTION INTRAMUSCULAR; INTRAVENOUS at 09:15

## 2023-05-09 RX ADMIN — ALBUTEROL SULFATE 2.5 MG: 2.5 SOLUTION RESPIRATORY (INHALATION) at 13:32

## 2023-05-09 RX ADMIN — CEFEPIME 2000 MG: 2 INJECTION, POWDER, FOR SOLUTION INTRAVENOUS at 09:15

## 2023-05-09 RX ADMIN — DULOXETINE HYDROCHLORIDE 30 MG: 30 CAPSULE, DELAYED RELEASE ORAL at 09:16

## 2023-05-09 RX ADMIN — FOLIC ACID 2 MG: 1 TABLET ORAL at 09:16

## 2023-05-09 RX ADMIN — CEFEPIME 2000 MG: 2 INJECTION, POWDER, FOR SOLUTION INTRAVENOUS at 01:51

## 2023-05-09 RX ADMIN — HYDROMORPHONE HYDROCHLORIDE 2 MG: 2 TABLET ORAL at 17:53

## 2023-05-09 RX ADMIN — ALBUTEROL SULFATE 2.5 MG: 2.5 SOLUTION RESPIRATORY (INHALATION) at 21:33

## 2023-05-09 RX ADMIN — PANTOPRAZOLE SODIUM 40 MG: 40 TABLET, DELAYED RELEASE ORAL at 09:16

## 2023-05-09 RX ADMIN — Medication 5 MG: at 21:46

## 2023-05-09 RX ADMIN — METHYLPREDNISOLONE 60 MG: 125 INJECTION, POWDER, LYOPHILIZED, FOR SOLUTION INTRAMUSCULAR; INTRAVENOUS at 14:57

## 2023-05-09 RX ADMIN — DULOXETINE HYDROCHLORIDE 30 MG: 30 CAPSULE, DELAYED RELEASE ORAL at 21:46

## 2023-05-09 ASSESSMENT — ENCOUNTER SYMPTOMS
BLOOD IN STOOL: 0
DIARRHEA: 0
SHORTNESS OF BREATH: 1
COUGH: 0
TROUBLE SWALLOWING: 0
EYE PAIN: 0
ABDOMINAL PAIN: 1
SORE THROAT: 0
EYE REDNESS: 0
CONSTIPATION: 0
VOMITING: 0
NAUSEA: 0

## 2023-05-09 ASSESSMENT — PAIN SCALES - GENERAL
PAINLEVEL_OUTOF10: 0
PAINLEVEL_OUTOF10: 8

## 2023-05-09 ASSESSMENT — PAIN - FUNCTIONAL ASSESSMENT: PAIN_FUNCTIONAL_ASSESSMENT: ACTIVITIES ARE NOT PREVENTED

## 2023-05-09 ASSESSMENT — PAIN DESCRIPTION - ORIENTATION: ORIENTATION: RIGHT;LEFT

## 2023-05-09 ASSESSMENT — PAIN DESCRIPTION - LOCATION: LOCATION: LEG

## 2023-05-09 ASSESSMENT — PAIN DESCRIPTION - DESCRIPTORS: DESCRIPTORS: ACHING;SHOOTING

## 2023-05-09 NOTE — WOUND CARE
05/09/23 0850   Wound Surface Area (cm^2) 1.17 cm^2 05/09/23 0850   Wound Volume (cm^3) 1.404 cm^3 05/09/23 0850   Wound Assessment Pink/red 05/09/23 0850   Drainage Amount Scant 05/09/23 0850   Drainage Description Serosanguinous 05/09/23 0850   Odor None 05/09/23 0850   Marily-wound Assessment Intact 05/09/23 0850   Margins Epibole (rolled edges) 05/09/23 0850   Number of days: 2       Wound 05/06/23 Radial Left (Active)   Wound Etiology Traumatic 05/08/23 0710   Dressing Status New dressing applied 05/07/23 1100   Dressing/Treatment Foam 05/08/23 1930   Number of days: 2            WCN to continue to follow while admitted, Please re-consult WCN for any changes in skin condition.     Miladis Heller, RN, BSN  [facility]   05/09/23  8:52 AM

## 2023-05-09 NOTE — ADT AUTH CERT
Jani Reyes #[id number] (SE) (KZN: 28 y.o. F) (Adm: 23)  [id number]  PCP    [facility]  Demographics  Comment        Address   [address] [facility]  [address]    Home Phone   [phone]    Work Phone       Mobile Phone   [phone]             Social Security Number       Insurance Information   [facility] MEDICARE    Marital Status   Single    Shinto   Restorationism               Admission Information    Arrival Date/Time: 2023 Admit Date/Time: 2023 IP Adm.  Date/Time: 2023   Admission Type: Emergency Point of Origin: Non-health Care Facility/self Admit Category:    Means of Arrival: Walk In Primary Service: Medical Secondary Service: N/A   Transfer Source:  Service Area: [address] [place] [address] Unit: SSR 2 CCU   Admit Provider: Ayse Mendes MD Attending Provider: Malena Libman, MD Referring Provider:      Patient Diagnoses    Reasons for Admission Coded Admission Diagnoses    *[facility] erythematosus related [facility] [M32.9]    Generalized weakness [R53.1]    Pancytopenia [facility] [D61.818]     Insurance Payors as of 2023    [facility] MEDICARE    Plan: [employer] DUAL ADVANTAGE Group: [id number] Member: [id number]   Effective from: 2022 Subscriber: Tala VILLASEÑOR Subscriber ID: [id number]   Guarantor: [facility]    Plan: Addi Raymundo Member: [id number] Effective from: 2023   Subscriber: Jonny Escoto Subscriber ID: [id number] Guarantor: Jonny Escoto     Documents Filed to Patient    Power of  Living Will Clinical Unknown Study Attachment Consent Form ABN Waiver After Visit Summary Lab Result Scan Code Status MyChart Status Advance Care Planning    Not on File  Filed on 23  Not on File  Not on File  Not on File  Not on File  Not on File  Not on File  FULL [Updated on 23 0903]  Active Jump to the Activity      Auth/Cert

## 2023-05-10 ENCOUNTER — APPOINTMENT (OUTPATIENT)
Facility: HOSPITAL | Age: 33
DRG: 871 | End: 2023-05-10
Payer: MEDICARE

## 2023-05-10 PROBLEM — K86.89 MASS OF HEAD OF PANCREAS: Status: ACTIVE | Noted: 2023-05-10

## 2023-05-10 LAB
ALBUMIN SERPL-MCNC: 1.8 G/DL (ref 3.5–5)
ALBUMIN/GLOB SERPL: 0.5 (ref 1.1–2.2)
ALP SERPL-CCNC: 988 U/L (ref 45–117)
ALT SERPL-CCNC: 48 U/L (ref 12–78)
ANION GAP SERPL CALC-SCNC: 5 MMOL/L (ref 5–15)
AST SERPL W P-5'-P-CCNC: 51 U/L (ref 15–37)
BACTERIA SPEC CULT: ABNORMAL
BACTERIA SPEC CULT: ABNORMAL
BASOPHILS # BLD: 0 K/UL (ref 0–0.1)
BASOPHILS NFR BLD: 0 % (ref 0–1)
BILIRUB SERPL-MCNC: 2 MG/DL (ref 0.2–1)
BUN SERPL-MCNC: 26 MG/DL (ref 6–20)
BUN/CREAT SERPL: 60 (ref 12–20)
C3 SERPL-MCNC: 241 MG/DL (ref 82–167)
C4 SERPL-MCNC: 55 MG/DL (ref 12–38)
CA-I BLD-MCNC: 8.3 MG/DL (ref 8.5–10.1)
CHLORIDE SERPL-SCNC: 102 MMOL/L (ref 97–108)
CO2 SERPL-SCNC: 24 MMOL/L (ref 21–32)
CREAT SERPL-MCNC: 0.43 MG/DL (ref 0.55–1.02)
DIFFERENTIAL METHOD BLD: ABNORMAL
DSDNA AB SER-ACNC: 2 IU/ML (ref 0–9)
ENA RNP AB SER-ACNC: 1 AI (ref 0–0.9)
ENA SM AB SER-ACNC: <0.2 AI (ref 0–0.9)
ENA SS-A AB SER-ACNC: <0.2 AI (ref 0–0.9)
ENA SS-B AB SER-ACNC: <0.2 AI (ref 0–0.9)
EOSINOPHIL # BLD: 0 K/UL (ref 0–0.4)
EOSINOPHIL NFR BLD: 0 % (ref 0–7)
ERYTHROCYTE [DISTWIDTH] IN BLOOD BY AUTOMATED COUNT: 18.9 % (ref 11.5–14.5)
GLOBULIN SER CALC-MCNC: 4 G/DL (ref 2–4)
GLUCOSE SERPL-MCNC: 134 MG/DL (ref 65–100)
HCT VFR BLD AUTO: 23.4 % (ref 35–47)
HGB BLD-MCNC: 7.1 G/DL (ref 11.5–16)
IMM GRANULOCYTES # BLD AUTO: 0 K/UL
IMM GRANULOCYTES NFR BLD AUTO: 0 %
LIPASE SERPL-CCNC: 115 U/L (ref 73–393)
LYMPHOCYTES # BLD: 0.1 K/UL (ref 0.8–3.5)
LYMPHOCYTES NFR BLD: 10 % (ref 12–49)
Lab: ABNORMAL
MCH RBC QN AUTO: 27 PG (ref 26–34)
MCHC RBC AUTO-ENTMCNC: 30.3 G/DL (ref 30–36.5)
MCV RBC AUTO: 89 FL (ref 80–99)
MONOCYTES # BLD: 0.2 K/UL (ref 0–1)
MONOCYTES NFR BLD: 12 % (ref 5–13)
NEUTS SEG # BLD: 1.1 K/UL (ref 1.8–8)
NEUTS SEG NFR BLD: 76 % (ref 32–75)
NRBC # BLD: 0.1 K/UL (ref 0–0.01)
NRBC BLD-RTO: 7.4 PER 100 WBC
OTHER CELLS NFR BLD MANUAL: 2 %
PLATELET # BLD AUTO: 62 K/UL (ref 150–400)
POTASSIUM SERPL-SCNC: 4.6 MMOL/L (ref 3.5–5.1)
PROCALCITONIN SERPL-MCNC: 0.35 NG/ML
PROT SERPL-MCNC: 5.8 G/DL (ref 6.4–8.2)
RBC # BLD AUTO: 2.63 M/UL (ref 3.8–5.2)
RBC MORPH BLD: ABNORMAL
SODIUM SERPL-SCNC: 131 MMOL/L (ref 136–145)
TOTAL CELLS COUNTED SPEC: 50
WBC # BLD AUTO: 1.4 K/UL (ref 3.6–11)

## 2023-05-10 PROCEDURE — 6360000002 HC RX W HCPCS: Performed by: INTERNAL MEDICINE

## 2023-05-10 PROCEDURE — 6370000000 HC RX 637 (ALT 250 FOR IP): Performed by: HOSPITALIST

## 2023-05-10 PROCEDURE — 74177 CT ABD & PELVIS W/CONTRAST: CPT

## 2023-05-10 PROCEDURE — 2500000003 HC RX 250 WO HCPCS: Performed by: INTERNAL MEDICINE

## 2023-05-10 PROCEDURE — 80053 COMPREHEN METABOLIC PANEL: CPT

## 2023-05-10 PROCEDURE — 71045 X-RAY EXAM CHEST 1 VIEW: CPT

## 2023-05-10 PROCEDURE — 99232 SBSQ HOSP IP/OBS MODERATE 35: CPT | Performed by: INTERNAL MEDICINE

## 2023-05-10 PROCEDURE — 2000000000 HC ICU R&B

## 2023-05-10 PROCEDURE — 84145 PROCALCITONIN (PCT): CPT

## 2023-05-10 PROCEDURE — 6370000000 HC RX 637 (ALT 250 FOR IP): Performed by: INTERNAL MEDICINE

## 2023-05-10 PROCEDURE — 83690 ASSAY OF LIPASE: CPT

## 2023-05-10 PROCEDURE — 2580000003 HC RX 258: Performed by: INTERNAL MEDICINE

## 2023-05-10 PROCEDURE — 6360000004 HC RX CONTRAST MEDICATION: Performed by: INTERNAL MEDICINE

## 2023-05-10 PROCEDURE — 85025 COMPLETE CBC W/AUTO DIFF WBC: CPT

## 2023-05-10 PROCEDURE — 36415 COLL VENOUS BLD VENIPUNCTURE: CPT

## 2023-05-10 RX ORDER — HYDROMORPHONE HYDROCHLORIDE 1 MG/ML
0.5 INJECTION, SOLUTION INTRAMUSCULAR; INTRAVENOUS; SUBCUTANEOUS EVERY 4 HOURS PRN
Status: DISCONTINUED | OUTPATIENT
Start: 2023-05-10 | End: 2023-05-11

## 2023-05-10 RX ORDER — ONDANSETRON 2 MG/ML
4 INJECTION INTRAMUSCULAR; INTRAVENOUS EVERY 8 HOURS PRN
Status: DISCONTINUED | OUTPATIENT
Start: 2023-05-10 | End: 2023-05-13 | Stop reason: HOSPADM

## 2023-05-10 RX ADMIN — FLUTICASONE PROPIONATE 1 SPRAY: 50 SPRAY, METERED NASAL at 08:40

## 2023-05-10 RX ADMIN — CEFEPIME 2000 MG: 2 INJECTION, POWDER, FOR SOLUTION INTRAVENOUS at 09:44

## 2023-05-10 RX ADMIN — HYDROMORPHONE HYDROCHLORIDE 0.5 MG: 1 INJECTION, SOLUTION INTRAMUSCULAR; INTRAVENOUS; SUBCUTANEOUS at 13:20

## 2023-05-10 RX ADMIN — PANTOPRAZOLE SODIUM 40 MG: 40 TABLET, DELAYED RELEASE ORAL at 08:39

## 2023-05-10 RX ADMIN — HYDROMORPHONE HYDROCHLORIDE 2 MG: 2 TABLET ORAL at 04:45

## 2023-05-10 RX ADMIN — METHYLPREDNISOLONE 60 MG: 125 INJECTION, POWDER, LYOPHILIZED, FOR SOLUTION INTRAMUSCULAR; INTRAVENOUS at 01:35

## 2023-05-10 RX ADMIN — FOLIC ACID 2 MG: 1 TABLET ORAL at 08:39

## 2023-05-10 RX ADMIN — METHYLPREDNISOLONE 60 MG: 125 INJECTION, POWDER, LYOPHILIZED, FOR SOLUTION INTRAMUSCULAR; INTRAVENOUS at 14:17

## 2023-05-10 RX ADMIN — DULOXETINE HYDROCHLORIDE 30 MG: 30 CAPSULE, DELAYED RELEASE ORAL at 21:33

## 2023-05-10 RX ADMIN — POTASSIUM CHLORIDE 20 MEQ: 1500 TABLET, EXTENDED RELEASE ORAL at 21:33

## 2023-05-10 RX ADMIN — DULOXETINE HYDROCHLORIDE 30 MG: 30 CAPSULE, DELAYED RELEASE ORAL at 08:39

## 2023-05-10 RX ADMIN — CEFEPIME 2000 MG: 2 INJECTION, POWDER, FOR SOLUTION INTRAVENOUS at 17:29

## 2023-05-10 RX ADMIN — HYDROMORPHONE HYDROCHLORIDE 0.5 MG: 1 INJECTION, SOLUTION INTRAMUSCULAR; INTRAVENOUS; SUBCUTANEOUS at 21:57

## 2023-05-10 RX ADMIN — METHYLPREDNISOLONE 60 MG: 125 INJECTION, POWDER, LYOPHILIZED, FOR SOLUTION INTRAMUSCULAR; INTRAVENOUS at 08:39

## 2023-05-10 RX ADMIN — ONDANSETRON 4 MG: 2 INJECTION INTRAMUSCULAR; INTRAVENOUS at 08:40

## 2023-05-10 RX ADMIN — IOPAMIDOL 100 ML: 755 INJECTION, SOLUTION INTRAVENOUS at 13:48

## 2023-05-10 RX ADMIN — METHYLPREDNISOLONE 60 MG: 125 INJECTION, POWDER, LYOPHILIZED, FOR SOLUTION INTRAMUSCULAR; INTRAVENOUS at 21:33

## 2023-05-10 RX ADMIN — CEFEPIME 2000 MG: 2 INJECTION, POWDER, FOR SOLUTION INTRAVENOUS at 01:34

## 2023-05-10 ASSESSMENT — ENCOUNTER SYMPTOMS
SHORTNESS OF BREATH: 1
CONSTIPATION: 0
EYE PAIN: 0
NAUSEA: 0
ABDOMINAL PAIN: 1
SORE THROAT: 0
VOMITING: 0
TROUBLE SWALLOWING: 0
BLOOD IN STOOL: 0
DIARRHEA: 0
EYE REDNESS: 0
COUGH: 0

## 2023-05-10 ASSESSMENT — PAIN DESCRIPTION - LOCATION
LOCATION: GENERALIZED
LOCATION: OTHER (COMMENT)
LOCATION: GENERALIZED

## 2023-05-10 ASSESSMENT — PAIN SCALES - GENERAL
PAINLEVEL_OUTOF10: 0
PAINLEVEL_OUTOF10: 9
PAINLEVEL_OUTOF10: 0
PAINLEVEL_OUTOF10: 8
PAINLEVEL_OUTOF10: 3
PAINLEVEL_OUTOF10: 0
PAINLEVEL_OUTOF10: 3
PAINLEVEL_OUTOF10: 0
PAINLEVEL_OUTOF10: 0
PAINLEVEL_OUTOF10: 8
PAINLEVEL_OUTOF10: 8

## 2023-05-10 ASSESSMENT — PAIN DESCRIPTION - DESCRIPTORS
DESCRIPTORS: ACHING

## 2023-05-10 NOTE — CARE COORDINATION
Pending CT of liver/pancreas. GI consult ordered. Sees pulmonology ([facility]). Home oxygen (PRN) in place. [facility], will require resumption of care order when medically stable.       Michel Rosario, MSW

## 2023-05-11 ENCOUNTER — APPOINTMENT (OUTPATIENT)
Facility: HOSPITAL | Age: 33
DRG: 871 | End: 2023-05-11
Payer: MEDICARE

## 2023-05-11 LAB
ALBUMIN SERPL-MCNC: 1.8 G/DL (ref 3.5–5)
ALBUMIN/GLOB SERPL: 0.5 (ref 1.1–2.2)
ALP SERPL-CCNC: 952 U/L (ref 45–117)
ALT SERPL-CCNC: 50 U/L (ref 12–78)
ANION GAP SERPL CALC-SCNC: 3 MMOL/L (ref 5–15)
AST SERPL W P-5'-P-CCNC: 53 U/L (ref 15–37)
BASOPHILS # BLD: 0 K/UL (ref 0–0.1)
BASOPHILS NFR BLD: 0 % (ref 0–1)
BILIRUB SERPL-MCNC: 1.9 MG/DL (ref 0.2–1)
BUN SERPL-MCNC: 27 MG/DL (ref 6–20)
BUN/CREAT SERPL: 57 (ref 12–20)
CA-I BLD-MCNC: 8.2 MG/DL (ref 8.5–10.1)
CHLORIDE SERPL-SCNC: 105 MMOL/L (ref 97–108)
CO2 SERPL-SCNC: 24 MMOL/L (ref 21–32)
CREAT SERPL-MCNC: 0.47 MG/DL (ref 0.55–1.02)
CRP SERPL-MCNC: 7.45 MG/DL (ref 0–0.6)
DIFFERENTIAL METHOD BLD: ABNORMAL
EOSINOPHIL # BLD: 0 K/UL (ref 0–0.4)
EOSINOPHIL NFR BLD: 0 % (ref 0–7)
ERYTHROCYTE [DISTWIDTH] IN BLOOD BY AUTOMATED COUNT: 19.3 % (ref 11.5–14.5)
ERYTHROCYTE [SEDIMENTATION RATE] IN BLOOD: 92 MM/HR (ref 0–20)
GLOBULIN SER CALC-MCNC: 4 G/DL (ref 2–4)
GLUCOSE SERPL-MCNC: 130 MG/DL (ref 65–100)
HCT VFR BLD AUTO: 22.6 % (ref 35–47)
HGB BLD-MCNC: 6.7 G/DL (ref 11.5–16)
IMM GRANULOCYTES # BLD AUTO: 0 K/UL
IMM GRANULOCYTES NFR BLD AUTO: 0 %
LYMPHOCYTES # BLD: 0.2 K/UL (ref 0.8–3.5)
LYMPHOCYTES NFR BLD: 17 % (ref 12–49)
MCH RBC QN AUTO: 26.8 PG (ref 26–34)
MCHC RBC AUTO-ENTMCNC: 29.6 G/DL (ref 30–36.5)
MCV RBC AUTO: 90.4 FL (ref 80–99)
METAMYELOCYTES NFR BLD MANUAL: 1 %
MONOCYTES # BLD: 0.1 K/UL (ref 0–1)
MONOCYTES NFR BLD: 10 % (ref 5–13)
NEUTS BAND NFR BLD MANUAL: 2 % (ref 0–6)
NEUTS SEG # BLD: 1 K/UL (ref 1.8–8)
NEUTS SEG NFR BLD: 67 % (ref 32–75)
NRBC # BLD: 0.15 K/UL (ref 0–0.01)
NRBC BLD-RTO: 11 PER 100 WBC
OTHER CELLS NFR BLD MANUAL: 3 %
PLATELET # BLD AUTO: 85 K/UL (ref 150–400)
PLATELET COMMENT: ABNORMAL
PMV BLD AUTO: 13 FL (ref 8.9–12.9)
POTASSIUM SERPL-SCNC: 4.9 MMOL/L (ref 3.5–5.1)
PROCALCITONIN SERPL-MCNC: 0.31 NG/ML
PROT SERPL-MCNC: 5.8 G/DL (ref 6.4–8.2)
RBC # BLD AUTO: 2.5 M/UL (ref 3.8–5.2)
RBC MORPH BLD: ABNORMAL
RBC MORPH BLD: ABNORMAL
SODIUM SERPL-SCNC: 132 MMOL/L (ref 136–145)
WBC # BLD AUTO: 1.4 K/UL (ref 3.6–11)
WBC MORPH BLD: ABNORMAL

## 2023-05-11 PROCEDURE — 86140 C-REACTIVE PROTEIN: CPT

## 2023-05-11 PROCEDURE — 6370000000 HC RX 637 (ALT 250 FOR IP): Performed by: INTERNAL MEDICINE

## 2023-05-11 PROCEDURE — 85025 COMPLETE CBC W/AUTO DIFF WBC: CPT

## 2023-05-11 PROCEDURE — 36430 TRANSFUSION BLD/BLD COMPNT: CPT

## 2023-05-11 PROCEDURE — P9016 RBC LEUKOCYTES REDUCED: HCPCS

## 2023-05-11 PROCEDURE — 84145 PROCALCITONIN (PCT): CPT

## 2023-05-11 PROCEDURE — 99232 SBSQ HOSP IP/OBS MODERATE 35: CPT | Performed by: INTERNAL MEDICINE

## 2023-05-11 PROCEDURE — 2000000000 HC ICU R&B

## 2023-05-11 PROCEDURE — 86901 BLOOD TYPING SEROLOGIC RH(D): CPT

## 2023-05-11 PROCEDURE — 71045 X-RAY EXAM CHEST 1 VIEW: CPT

## 2023-05-11 PROCEDURE — 36415 COLL VENOUS BLD VENIPUNCTURE: CPT

## 2023-05-11 PROCEDURE — 86900 BLOOD TYPING SEROLOGIC ABO: CPT

## 2023-05-11 PROCEDURE — 6360000002 HC RX W HCPCS: Performed by: INTERNAL MEDICINE

## 2023-05-11 PROCEDURE — 86923 COMPATIBILITY TEST ELECTRIC: CPT

## 2023-05-11 PROCEDURE — 2500000003 HC RX 250 WO HCPCS: Performed by: INTERNAL MEDICINE

## 2023-05-11 PROCEDURE — 80053 COMPREHEN METABOLIC PANEL: CPT

## 2023-05-11 PROCEDURE — 2580000003 HC RX 258: Performed by: INTERNAL MEDICINE

## 2023-05-11 PROCEDURE — 85652 RBC SED RATE AUTOMATED: CPT

## 2023-05-11 PROCEDURE — 86850 RBC ANTIBODY SCREEN: CPT

## 2023-05-11 PROCEDURE — 2700000000 HC OXYGEN THERAPY PER DAY

## 2023-05-11 RX ORDER — LOSARTAN POTASSIUM 50 MG/1
50 TABLET ORAL DAILY
Status: DISCONTINUED | OUTPATIENT
Start: 2023-05-11 | End: 2023-05-13 | Stop reason: HOSPADM

## 2023-05-11 RX ORDER — AMBRISENTAN 5 MG/1
5 TABLET, FILM COATED ORAL DAILY
Status: DISCONTINUED | OUTPATIENT
Start: 2023-05-11 | End: 2023-05-13 | Stop reason: HOSPADM

## 2023-05-11 RX ORDER — HYDROMORPHONE HYDROCHLORIDE 2 MG/1
2 TABLET ORAL EVERY 6 HOURS PRN
Status: DISCONTINUED | OUTPATIENT
Start: 2023-05-11 | End: 2023-05-13 | Stop reason: HOSPADM

## 2023-05-11 RX ORDER — SODIUM CHLORIDE 9 MG/ML
INJECTION, SOLUTION INTRAVENOUS PRN
Status: DISCONTINUED | OUTPATIENT
Start: 2023-05-11 | End: 2023-05-13 | Stop reason: HOSPADM

## 2023-05-11 RX ADMIN — FOLIC ACID 2 MG: 1 TABLET ORAL at 08:12

## 2023-05-11 RX ADMIN — HYDROMORPHONE HYDROCHLORIDE 0.5 MG: 1 INJECTION, SOLUTION INTRAMUSCULAR; INTRAVENOUS; SUBCUTANEOUS at 06:13

## 2023-05-11 RX ADMIN — HYDROMORPHONE HYDROCHLORIDE 2 MG: 2 TABLET ORAL at 19:45

## 2023-05-11 RX ADMIN — LOSARTAN POTASSIUM 50 MG: 50 TABLET, FILM COATED ORAL at 16:05

## 2023-05-11 RX ADMIN — PANTOPRAZOLE SODIUM 40 MG: 40 TABLET, DELAYED RELEASE ORAL at 08:12

## 2023-05-11 RX ADMIN — METHYLPREDNISOLONE 60 MG: 125 INJECTION, POWDER, LYOPHILIZED, FOR SOLUTION INTRAMUSCULAR; INTRAVENOUS at 02:00

## 2023-05-11 RX ADMIN — DULOXETINE HYDROCHLORIDE 30 MG: 30 CAPSULE, DELAYED RELEASE ORAL at 08:12

## 2023-05-11 RX ADMIN — CEFEPIME 2000 MG: 2 INJECTION, POWDER, FOR SOLUTION INTRAVENOUS at 01:20

## 2023-05-11 RX ADMIN — CEFEPIME 2000 MG: 2 INJECTION, POWDER, FOR SOLUTION INTRAVENOUS at 17:19

## 2023-05-11 RX ADMIN — METHYLPREDNISOLONE 60 MG: 125 INJECTION, POWDER, LYOPHILIZED, FOR SOLUTION INTRAMUSCULAR; INTRAVENOUS at 08:12

## 2023-05-11 RX ADMIN — METHYLPREDNISOLONE 60 MG: 125 INJECTION, POWDER, LYOPHILIZED, FOR SOLUTION INTRAMUSCULAR; INTRAVENOUS at 19:45

## 2023-05-11 RX ADMIN — METHYLPREDNISOLONE 60 MG: 125 INJECTION, POWDER, LYOPHILIZED, FOR SOLUTION INTRAMUSCULAR; INTRAVENOUS at 14:17

## 2023-05-11 RX ADMIN — HYDROMORPHONE HYDROCHLORIDE 0.5 MG: 1 INJECTION, SOLUTION INTRAMUSCULAR; INTRAVENOUS; SUBCUTANEOUS at 10:57

## 2023-05-11 RX ADMIN — POTASSIUM CHLORIDE 20 MEQ: 1500 TABLET, EXTENDED RELEASE ORAL at 23:08

## 2023-05-11 RX ADMIN — AMBRISENTAN 5 MG: 5 TABLET, FILM COATED ORAL at 12:45

## 2023-05-11 RX ADMIN — CEFEPIME 2000 MG: 2 INJECTION, POWDER, FOR SOLUTION INTRAVENOUS at 09:03

## 2023-05-11 RX ADMIN — CEFEPIME 2000 MG: 2 INJECTION, POWDER, FOR SOLUTION INTRAVENOUS at 21:00

## 2023-05-11 RX ADMIN — FLUTICASONE PROPIONATE 1 SPRAY: 50 SPRAY, METERED NASAL at 08:13

## 2023-05-11 RX ADMIN — DULOXETINE HYDROCHLORIDE 30 MG: 30 CAPSULE, DELAYED RELEASE ORAL at 23:07

## 2023-05-11 ASSESSMENT — PAIN DESCRIPTION - DESCRIPTORS
DESCRIPTORS: ACHING
DESCRIPTORS: ACHING

## 2023-05-11 ASSESSMENT — ENCOUNTER SYMPTOMS
EYE PAIN: 0
COUGH: 0
BLOOD IN STOOL: 0
ABDOMINAL PAIN: 1
SHORTNESS OF BREATH: 1
NAUSEA: 0
CONSTIPATION: 0
TROUBLE SWALLOWING: 0
SORE THROAT: 0
VOMITING: 0
DIARRHEA: 0
EYE REDNESS: 0

## 2023-05-11 ASSESSMENT — PAIN SCALES - GENERAL
PAINLEVEL_OUTOF10: 8
PAINLEVEL_OUTOF10: 3
PAINLEVEL_OUTOF10: 2
PAINLEVEL_OUTOF10: 5
PAINLEVEL_OUTOF10: 8
PAINLEVEL_OUTOF10: 5

## 2023-05-11 ASSESSMENT — PAIN DESCRIPTION - LOCATION
LOCATION: OTHER (COMMENT)
LOCATION: OTHER (COMMENT)
LOCATION: GENERALIZED

## 2023-05-11 ASSESSMENT — PAIN DESCRIPTION - ONSET: ONSET: GRADUAL

## 2023-05-12 LAB
ABO + RH BLD: NORMAL
ACE SERPL-CCNC: 144 U/L (ref 14–82)
ACTIN IGG SERPL-ACNC: 4 UNITS (ref 0–19)
ALBUMIN SERPL-MCNC: 1.8 G/DL (ref 3.5–5)
ALBUMIN/GLOB SERPL: 0.5 (ref 1.1–2.2)
ALP SERPL-CCNC: 919 U/L (ref 45–117)
ALT SERPL-CCNC: 44 U/L (ref 12–78)
ANION GAP SERPL CALC-SCNC: 2 MMOL/L (ref 5–15)
AST SERPL W P-5'-P-CCNC: 44 U/L (ref 15–37)
BASOPHILS # BLD: 0 K/UL (ref 0–0.1)
BASOPHILS NFR BLD: 0 % (ref 0–1)
BILIRUB SERPL-MCNC: 1.4 MG/DL (ref 0.2–1)
BLD PROD TYP BPU: NORMAL
BLOOD BANK DISPENSE STATUS: NORMAL
BLOOD GROUP ANTIBODIES SERPL: NEGATIVE
BPU ID: NORMAL
BUN SERPL-MCNC: 23 MG/DL (ref 6–20)
BUN/CREAT SERPL: 79 (ref 12–20)
CA-I BLD-MCNC: 8 MG/DL (ref 8.5–10.1)
CHLORIDE SERPL-SCNC: 106 MMOL/L (ref 97–108)
CO2 SERPL-SCNC: 26 MMOL/L (ref 21–32)
CREAT SERPL-MCNC: 0.29 MG/DL (ref 0.55–1.02)
CROSSMATCH RESULT: NORMAL
CRP SERPL-MCNC: 2.52 MG/DL (ref 0–0.6)
CRP SERPL-MCNC: 4.05 MG/DL (ref 0–0.6)
DIFFERENTIAL METHOD BLD: ABNORMAL
EOSINOPHIL # BLD: 0 K/UL (ref 0–0.4)
EOSINOPHIL NFR BLD: 1 % (ref 0–7)
ERYTHROCYTE [DISTWIDTH] IN BLOOD BY AUTOMATED COUNT: 18.1 % (ref 11.5–14.5)
ERYTHROCYTE [SEDIMENTATION RATE] IN BLOOD: 69 MM/HR (ref 0–20)
GLOBULIN SER CALC-MCNC: 3.7 G/DL (ref 2–4)
GLUCOSE SERPL-MCNC: 118 MG/DL (ref 65–100)
HCT VFR BLD AUTO: 23.2 % (ref 35–47)
HCT VFR BLD AUTO: 23.2 % (ref 35–47)
HGB BLD-MCNC: 7.2 G/DL (ref 11.5–16)
HGB BLD-MCNC: 7.2 G/DL (ref 11.5–16)
IMM GRANULOCYTES # BLD AUTO: 0 K/UL
IMM GRANULOCYTES NFR BLD AUTO: 0 %
LYMPHOCYTES # BLD: 0.4 K/UL (ref 0.8–3.5)
LYMPHOCYTES NFR BLD: 33 % (ref 12–49)
MCH RBC QN AUTO: 28.2 PG (ref 26–34)
MCHC RBC AUTO-ENTMCNC: 31 G/DL (ref 30–36.5)
MCV RBC AUTO: 91 FL (ref 80–99)
MITOCHONDRIA M2 IGG SER-ACNC: <20 UNITS (ref 0–20)
MONOCYTES # BLD: 0.2 K/UL (ref 0–1)
MONOCYTES NFR BLD: 18 % (ref 5–13)
NEUTS SEG # BLD: 0.5 K/UL (ref 1.8–8)
NEUTS SEG NFR BLD: 48 % (ref 32–75)
NRBC # BLD: 0.08 K/UL (ref 0–0.01)
NRBC BLD-RTO: 7.2 PER 100 WBC
PLATELET # BLD AUTO: 87 K/UL (ref 150–400)
PMV BLD AUTO: 12.7 FL (ref 8.9–12.9)
POTASSIUM SERPL-SCNC: 4.5 MMOL/L (ref 3.5–5.1)
PROCALCITONIN SERPL-MCNC: 0.26 NG/ML
PROT SERPL-MCNC: 5.5 G/DL (ref 6.4–8.2)
RBC # BLD AUTO: 2.55 M/UL (ref 3.8–5.2)
RBC MORPH BLD: ABNORMAL
SODIUM SERPL-SCNC: 134 MMOL/L (ref 136–145)
SPECIMEN EXP DATE BLD: NORMAL
TRANSFUSION STATUS PATIENT QL: NORMAL
UNIT DIVISION: 0
WBC # BLD AUTO: 1.1 K/UL (ref 3.6–11)

## 2023-05-12 PROCEDURE — 85652 RBC SED RATE AUTOMATED: CPT

## 2023-05-12 PROCEDURE — 6360000002 HC RX W HCPCS: Performed by: INTERNAL MEDICINE

## 2023-05-12 PROCEDURE — 36415 COLL VENOUS BLD VENIPUNCTURE: CPT

## 2023-05-12 PROCEDURE — 80053 COMPREHEN METABOLIC PANEL: CPT

## 2023-05-12 PROCEDURE — 1100000000 HC RM PRIVATE

## 2023-05-12 PROCEDURE — 86140 C-REACTIVE PROTEIN: CPT

## 2023-05-12 PROCEDURE — 2580000003 HC RX 258: Performed by: INTERNAL MEDICINE

## 2023-05-12 PROCEDURE — 84145 PROCALCITONIN (PCT): CPT

## 2023-05-12 PROCEDURE — 85014 HEMATOCRIT: CPT

## 2023-05-12 PROCEDURE — 6370000000 HC RX 637 (ALT 250 FOR IP): Performed by: INTERNAL MEDICINE

## 2023-05-12 PROCEDURE — 99232 SBSQ HOSP IP/OBS MODERATE 35: CPT | Performed by: INTERNAL MEDICINE

## 2023-05-12 PROCEDURE — 85025 COMPLETE CBC W/AUTO DIFF WBC: CPT

## 2023-05-12 PROCEDURE — 85018 HEMOGLOBIN: CPT

## 2023-05-12 RX ORDER — PREDNISONE 10 MG/1
10 TABLET ORAL 2 TIMES DAILY
Qty: 30 TABLET | Refills: 0 | Status: SHIPPED | OUTPATIENT
Start: 2023-05-12 | End: 2023-05-12 | Stop reason: SDUPTHER

## 2023-05-12 RX ORDER — PREDNISONE 20 MG/1
TABLET ORAL
Qty: 21 TABLET | Refills: 0 | Status: SHIPPED | OUTPATIENT
Start: 2023-05-12

## 2023-05-12 RX ADMIN — DULOXETINE HYDROCHLORIDE 30 MG: 30 CAPSULE, DELAYED RELEASE ORAL at 20:11

## 2023-05-12 RX ADMIN — METHYLPREDNISOLONE 60 MG: 125 INJECTION, POWDER, LYOPHILIZED, FOR SOLUTION INTRAMUSCULAR; INTRAVENOUS at 08:50

## 2023-05-12 RX ADMIN — CEFEPIME 2000 MG: 2 INJECTION, POWDER, FOR SOLUTION INTRAVENOUS at 08:46

## 2023-05-12 RX ADMIN — AMBRISENTAN 5 MG: 5 TABLET, FILM COATED ORAL at 08:45

## 2023-05-12 RX ADMIN — HYDROMORPHONE HYDROCHLORIDE 2 MG: 2 TABLET ORAL at 17:54

## 2023-05-12 RX ADMIN — LOSARTAN POTASSIUM 50 MG: 50 TABLET, FILM COATED ORAL at 08:45

## 2023-05-12 RX ADMIN — FOLIC ACID 2 MG: 1 TABLET ORAL at 08:45

## 2023-05-12 RX ADMIN — METHYLPREDNISOLONE 60 MG: 125 INJECTION, POWDER, LYOPHILIZED, FOR SOLUTION INTRAMUSCULAR; INTRAVENOUS at 20:11

## 2023-05-12 RX ADMIN — CEFEPIME 2000 MG: 2 INJECTION, POWDER, FOR SOLUTION INTRAVENOUS at 23:19

## 2023-05-12 RX ADMIN — DULOXETINE HYDROCHLORIDE 30 MG: 30 CAPSULE, DELAYED RELEASE ORAL at 08:45

## 2023-05-12 RX ADMIN — FLUTICASONE PROPIONATE 1 SPRAY: 50 SPRAY, METERED NASAL at 08:45

## 2023-05-12 RX ADMIN — PANTOPRAZOLE SODIUM 40 MG: 40 TABLET, DELAYED RELEASE ORAL at 08:45

## 2023-05-12 RX ADMIN — Medication 5 MG: at 23:19

## 2023-05-12 RX ADMIN — HYDROMORPHONE HYDROCHLORIDE 2 MG: 2 TABLET ORAL at 23:19

## 2023-05-12 RX ADMIN — METHYLPREDNISOLONE 60 MG: 125 INJECTION, POWDER, LYOPHILIZED, FOR SOLUTION INTRAMUSCULAR; INTRAVENOUS at 16:04

## 2023-05-12 RX ADMIN — FILGRASTIM-AAFI 300 MCG: 300 INJECTION, SOLUTION SUBCUTANEOUS at 15:57

## 2023-05-12 RX ADMIN — METHYLPREDNISOLONE 60 MG: 125 INJECTION, POWDER, LYOPHILIZED, FOR SOLUTION INTRAMUSCULAR; INTRAVENOUS at 02:00

## 2023-05-12 RX ADMIN — CEFEPIME 2000 MG: 2 INJECTION, POWDER, FOR SOLUTION INTRAVENOUS at 16:01

## 2023-05-12 RX ADMIN — CEFEPIME 2000 MG: 2 INJECTION, POWDER, FOR SOLUTION INTRAVENOUS at 00:56

## 2023-05-12 ASSESSMENT — PAIN SCALES - GENERAL
PAINLEVEL_OUTOF10: 0
PAINLEVEL_OUTOF10: 8
PAINLEVEL_OUTOF10: 0

## 2023-05-12 ASSESSMENT — ENCOUNTER SYMPTOMS
NAUSEA: 0
ABDOMINAL PAIN: 1
EYE PAIN: 0
CONSTIPATION: 0
BLOOD IN STOOL: 0
SHORTNESS OF BREATH: 1
EYE REDNESS: 0
VOMITING: 0
COUGH: 0
TROUBLE SWALLOWING: 0
DIARRHEA: 0
SORE THROAT: 0

## 2023-05-12 ASSESSMENT — PAIN DESCRIPTION - ORIENTATION: ORIENTATION: LEFT;RIGHT

## 2023-05-12 ASSESSMENT — PAIN DESCRIPTION - DESCRIPTORS: DESCRIPTORS: ACHING

## 2023-05-12 ASSESSMENT — PAIN DESCRIPTION - LOCATION: LOCATION: LEG

## 2023-05-12 NOTE — CARE COORDINATION
CM reviewed chart. Patient transferred out of ICU. Current discharge plan per previous case management note is home with [facility]. Patient open with [facility]. Updated clinicals sent.

## 2023-05-12 NOTE — DISCHARGE SUMMARY
Hospitalist Discharge Summary     Patient ID:  Emily Haynes  [id number]  36 y.o.  1990 5/6/2023    PCP on record: Aubrie Lantigua MD    Admit date: 5/6/2023  Discharge date and time: 5/12/2023    DISCHARGE DIAGNOSIS:    Neutropenic fever/severe sepsis/septic shock/hypotension/pancytopenia/acute on chronic anemia/diffuse liver lesions/chronic hypoxic respiratory failure/pulmonary hypertension    CONSULTATIONS:  IP CONSULT TO HEMATOLOGY  IP CONSULT TO RHEUMATOLOGY  IP CONSULT TO PHARMACY  IP CONSULT TO PULMONOLOGY  IP CONSULT TO INFECTIOUS DISEASES  IP CONSULT TO PICC TEAM  IP CONSULT TO PULMONOLOGY  IP WOUND CARE NURSE CONSULT TO EVAL  IP CONSULT TO GI    Excerpted HPI from H&P of Slava Burnett MD:  Emily Haynes is a 28 y.o. female with past medical history of SLE, pulmonary hypertension, rheumatoid arthritis who presents with increasing generalized weakness, tiredness and lethargy for the past few days. Outpatient hemoglobin was 6.5. Initial evaluation in the emergency room showed a white cell count of 0.6 and a hemoglobin of 4.8 along with platelets of 53.  1 unit PRBC transfusion was ordered. Solu-Medrol 125 mg IV x1 given after discussion with oncologist.  Prior to this, she was hospitalized at [facility] from 4/8/2023 to 4/19/2023 for sepsis with hypoxic respiratory failure. She recalls having fevers 2 days ago. When discharged from the hospital, she was placed on prednisone 30 mg daily and methotrexate was placed on hold. In the past, she has been treated for SLE by prednisone, methotrexate, and Nima Kuster. Her primary rheumatologist was Sher[facility] [place]     In the past in the past she has been treated for pulmonary hypertension with Adempas. Also on diuretics plus Aldactone the dose of which was recently increased.      She was transferred from freestanding ED to the [facility] hospital ED and awaiting further evaluation/recommendations from oncologist.  I spoke to Dr. Junior Grijalva who suggested

## 2023-05-13 VITALS
OXYGEN SATURATION: 98 % | DIASTOLIC BLOOD PRESSURE: 87 MMHG | RESPIRATION RATE: 18 BRPM | SYSTOLIC BLOOD PRESSURE: 140 MMHG | HEART RATE: 98 BPM | HEIGHT: 64 IN | TEMPERATURE: 98 F | WEIGHT: 140 LBS | BODY MASS INDEX: 23.9 KG/M2

## 2023-05-13 PROBLEM — J96.21 ACUTE AND CHRONIC RESPIRATORY FAILURE WITH HYPOXIA (HCC): Status: RESOLVED | Noted: 2023-05-08 | Resolved: 2023-05-13

## 2023-05-13 PROBLEM — D61.818 PANCYTOPENIA (HCC): Status: RESOLVED | Noted: 2023-05-06 | Resolved: 2023-05-13

## 2023-05-13 LAB
BACTERIA SPEC CULT: NORMAL
G6PD BLD QN: 326 U/10E12 RBC (ref 127–427)
Lab: NORMAL
RBC # BLD AUTO: 2.66 X10E6/UL (ref 3.77–5.28)

## 2023-05-13 PROCEDURE — 6360000002 HC RX W HCPCS: Performed by: INTERNAL MEDICINE

## 2023-05-13 PROCEDURE — 6370000000 HC RX 637 (ALT 250 FOR IP): Performed by: INTERNAL MEDICINE

## 2023-05-13 PROCEDURE — 97161 PT EVAL LOW COMPLEX 20 MIN: CPT

## 2023-05-13 PROCEDURE — 97530 THERAPEUTIC ACTIVITIES: CPT

## 2023-05-13 PROCEDURE — 2580000003 HC RX 258: Performed by: INTERNAL MEDICINE

## 2023-05-13 RX ORDER — CIPROFLOXACIN 500 MG/1
500 TABLET, FILM COATED ORAL 2 TIMES DAILY
Qty: 14 TABLET | Refills: 0 | Status: SHIPPED | OUTPATIENT
Start: 2023-05-13 | End: 2023-05-20

## 2023-05-13 RX ADMIN — FOLIC ACID 2 MG: 1 TABLET ORAL at 08:54

## 2023-05-13 RX ADMIN — METHYLPREDNISOLONE 60 MG: 125 INJECTION, POWDER, LYOPHILIZED, FOR SOLUTION INTRAMUSCULAR; INTRAVENOUS at 08:54

## 2023-05-13 RX ADMIN — METHYLPREDNISOLONE 60 MG: 125 INJECTION, POWDER, LYOPHILIZED, FOR SOLUTION INTRAMUSCULAR; INTRAVENOUS at 02:44

## 2023-05-13 RX ADMIN — FILGRASTIM-AAFI 300 MCG: 300 INJECTION, SOLUTION SUBCUTANEOUS at 08:55

## 2023-05-13 RX ADMIN — DULOXETINE HYDROCHLORIDE 30 MG: 30 CAPSULE, DELAYED RELEASE ORAL at 08:54

## 2023-05-13 RX ADMIN — PANTOPRAZOLE SODIUM 40 MG: 40 TABLET, DELAYED RELEASE ORAL at 08:54

## 2023-05-13 RX ADMIN — HYDROMORPHONE HYDROCHLORIDE 2 MG: 2 TABLET ORAL at 05:24

## 2023-05-13 RX ADMIN — FLUTICASONE PROPIONATE 1 SPRAY: 50 SPRAY, METERED NASAL at 08:56

## 2023-05-13 RX ADMIN — AMBRISENTAN 5 MG: 5 TABLET, FILM COATED ORAL at 08:54

## 2023-05-13 RX ADMIN — LOSARTAN POTASSIUM 50 MG: 50 TABLET, FILM COATED ORAL at 08:54

## 2023-05-13 RX ADMIN — CEFEPIME 2000 MG: 2 INJECTION, POWDER, FOR SOLUTION INTRAVENOUS at 08:56

## 2023-05-13 ASSESSMENT — PAIN SCALES - GENERAL
PAINLEVEL_OUTOF10: 8
PAINLEVEL_OUTOF10: 0

## 2023-05-13 NOTE — CARE COORDINATION
Patient has orders to discharge home with home health. She has been accepted with [facility]. Discharge orders have been sent via [address] [place] [address],[facility].

## 2023-05-13 NOTE — PROGRESS NOTES
0945-Patient complained of SOB 's RR-30-40. RT called for PRN Breathing treatment. 1010-Patient more comfortable after breathing treatment. Ginger ale and ice provided.
1900 Report received from PALOMO Avilez using SBAR reporting system.
Attempted to take pt to MRI. Patient unable to lay flat for 30+ minutes at this time. Will continue to monitor and reassess for appropriate resp status.
Comprehensive Nutrition Assessment    Type and Reason for Visit:  Initial, RD Nutrition Re-Screen/LOS    Nutrition Recommendations/Plan:   Continue ETC diet  Add ensure clear 2x/d per pt preference   Monitor and document intake %, supplement tolerance, bms, wts in I/Os thanks     Malnutrition Assessment:  Malnutrition Status: At risk for malnutrition (Comment) (05/12/23 1425)    Context:  Acute Illness     Findings of the 6 clinical characteristics of malnutrition:  Energy Intake:  75% or less of estimated energy requirements for 7 or more days  Weight Loss:  No significant weight loss     Body Fat Loss:        Muscle Mass Loss:  Mild muscle mass loss Clavicles (pectoralis & deltoids), Temples (temporalis)  Fluid Accumulation:  No significant fluid accumulation     Strength:  Not Performed    Nutrition Assessment:    Pt admitted w/ pancytopenia, w/ weakness, low hgb, +fever x2 days pta. Noted w/ diminished appetite per emar, PO 25-50% 5/7. CT scan 5/10 w/ findings of hepatic lesions -complications of lupus vs metastases? Pt seen in room w/ mother at bedside. Both endorse poor appetite, but improving x2 days. Pt requesting ensure clear, will add to support adequate intake PO. Pt denies/is unsure of wt changes recent, notes poor appeite x6 days, since IP, but improving x24-48 hrs. Per emar, slight wt trend down 4% x90 days Labs: H/H 7.2/23.2, Na 134, , BUN 23, Cr 0.29, AST 44, Alb 1.8, Ca 8.0. Meds: ambrisentan, cefepime, cybalta, folic acid, losartan, ppi, Vit D2, riociguat. Nutrition Related Findings:    NPFE deferred d/t pt receiving care from CNA; migdalia w/ mild s/s No n/v/c/d. BM 5/7. No chew/swallow issues. +1 non-pitting generalized edema.  Wound Type: Skin Tears (Skin tears to RLE, LUE)       Current Nutrition Intake & Therapies:    Average Meal Intake: 26-50%, 1-25%  Average Supplements Intake: None Ordered  ADULT DIET; Easy to Chew    Anthropometric Measures:  Height: 162.6 cm (5' 4\")  Ideal
Discharge summary was provided to patient, Port removed upon discharge.
Hematology and Oncology Progress Note    Patient: Chantel Menchaca MRN: [id number]  SSN: [id number]    YOB: 1990  Age: 28 y.o. Sex: female      Admit Date: 5/6/2023    LOS: 3 days     Chief Complaint: Patient is feeling better    Subjective:   Patient is feeling better. She has no mucosal bleeding. She has no nausea or vomiting. No mouth sores or dysphagia. Objective:     Vitals:    05/09/23 0400 05/09/23 0433 05/09/23 0500 05/09/23 0600   BP: (!) 144/102  (!) 139/98 (!) 151/104   Pulse: (!) 120  (!) 120 (!) 124   Resp: 29 (!) 31 30 29   Temp:       TempSrc:       SpO2: 96%  95% 95%   Weight:       Height:              Physical Exam:   Constitutional: Young -American female looks chronically ill. Not in any acute distress or pain. Eyes: Sclerae anicteric. Conjunctivae shows pallor. ENMT: Oral mucosa is moist, no thrush, mucositis, or petechiae. Neck: No adenopathy. Respiratory: Lungs are clear bilaterally. Cardiovascular: Normal sinus rhythm. Abdomen: Soft. Nontender. No hepatosplenomegaly. No guarding or rigidity. Bowel sounds present. Extremities: No edema. Skin: No petechiae; no skin rash. Neurologic: Alert/oriented x 3.     Lab/Data Review:    Recent Results (from the past 24 hour(s))   CBC with Auto Differential    Collection Time: 05/09/23  4:15 AM   Result Value Ref Range    WBC 1.2 (L) 3.6 - 11.0 K/uL    RBC 2.60 (L) 3.80 - 5.20 M/uL    Hemoglobin 7.1 (L) 11.5 - 16.0 g/dL    Hematocrit 23.0 (L) 35.0 - 47.0 %    MCV 88.5 80.0 - 99.0 FL    MCH 27.3 26.0 - 34.0 PG    MCHC 30.9 30.0 - 36.5 g/dL    RDW 19.2 (H) 11.5 - 14.5 %    Platelets 48 (LL) 258 - 400 K/uL    MPV 11.9 8.9 - 12.9 FL    Nucleated RBCs 6.1 (H) 0.0  WBC    nRBC 0.07 (H) 0.00 - 0.01 K/uL    Seg Neutrophils PENDING %    Lymphocytes PENDING %    Monocytes PENDING %    Eosinophils % PENDING %    Basophils PENDING %    Immature Granulocytes PENDING %    Segs Absolute PENDING K/UL    Absolute Lymph #
Hematology and Oncology Progress Note    Patient: Jenna Bryan MRN: [id number]  SSN: [id number]    YOB: 1990  Age: 28 y.o. Sex: female      Admit Date: 5/6/2023    LOS: 5 days     Chief Complaint: Patient is feeling tired    Subjective:   Patient is feeling tired and has some dyspnea. She has no nausea or vomiting. She has some abdominal discomfort    Objective:     Vitals:    05/11/23 0400 05/11/23 0500 05/11/23 0600 05/11/23 0700   BP: (!) 145/93 (!) 135/96 (!) 150/111    Pulse: (!) 124 (!) 120 (!) 117    Resp: 27 26 30    Temp:    97.8 °F (36.6 °C)   TempSrc:    Oral   SpO2: (!) 89% 97% 97%    Weight:       Height:              Physical Exam:   Constitutional: Young -American female looks chronically ill. Not in any acute distress or pain. Eyes: Sclerae anicteric. Conjunctivae shows pallor. ENMT: Oral mucosa is moist, no thrush, mucositis, or petechiae. Neck: No adenopathy. Respiratory: Lungs are clear bilaterally. Cardiovascular: Normal sinus rhythm. Abdomen: Mild upper abdominal tenderness and distention. No hepatosplenomegaly. No guarding or rigidity. Bowel sounds present. Extremities: No edema. Skin: No petechiae; no skin rash. Neurologic: Alert/oriented x 3.     Lab/Data Review:    Recent Results (from the past 24 hour(s))   Lipase    Collection Time: 05/10/23  2:09 PM   Result Value Ref Range    Lipase 115 73 - 393 U/L   Comprehensive Metabolic Panel    Collection Time: 05/11/23  6:00 AM   Result Value Ref Range    Sodium 132 (L) 136 - 145 mmol/L    Potassium 4.9 3.5 - 5.1 mmol/L    Chloride 105 97 - 108 mmol/L    CO2 24 21 - 32 mmol/L    Anion Gap 3 (L) 5 - 15 mmol/L    Glucose 130 (H) 65 - 100 mg/dL    BUN 27 (H) 6 - 20 mg/dL    Creatinine 0.47 (L) 0.55 - 1.02 mg/dL    Bun/Cre Ratio 57 (H) 12 - 20      Est, Glom Filt Rate >60 >60 ml/min/1.73m2    Calcium 8.2 (L) 8.5 - 10.1 mg/dL    Total Bilirubin 1.9 (H) 0.2 - 1.0 mg/dL    AST 53 (H) 15 - 37 U/L    ALT 50 12 - 78
Hematology and Oncology Progress Note    Patient: Leigh Desai MRN: [id number]  SSN: [id number]    YOB: 1990  Age: 28 y.o. Sex: female      Admit Date: 5/6/2023    LOS: 4 days     Chief Complaint: Patient is feeling better    Subjective:     Feeling much better. No nausea or vomiting. No mucosal bleeding. No dizziness or chest pain. Objective:     Vitals:    05/10/23 0200 05/10/23 0300 05/10/23 0400 05/10/23 0700   BP: (!) 152/114 (!) 146/104 (!) 138/102 (!) 154/116   Pulse: (!) 119 (!) 122 (!) 131 (!) 120   Resp: (!) 31 (!) 40 27 29   Temp:  97.4 °F (36.3 °C)  98.1 °F (36.7 °C)   TempSrc:  Oral  Axillary   SpO2: 96% 91% 95%    Weight:       Height:              Physical Exam:   Constitutional: Young -American female looks chronically ill. Not in any acute distress or pain. Eyes: Sclerae anicteric. Conjunctivae shows pallor. ENMT: Oral mucosa is moist, no thrush, mucositis, or petechiae. Neck: No adenopathy. Respiratory: Lungs are clear bilaterally. Cardiovascular: Normal sinus rhythm. Abdomen: Soft. Nontender. No hepatosplenomegaly. No guarding or rigidity. Bowel sounds present. Extremities: No edema. Skin: No petechiae; no skin rash. Neurologic: Alert/oriented x 3.     Lab/Data Review:    Recent Results (from the past 24 hour(s))   C-Reactive Protein    Collection Time: 05/09/23  6:10 PM   Result Value Ref Range    CRP 12.30 (H) 0.00 - 0.60 mg/dL   Sedimentation Rate    Collection Time: 05/09/23  6:10 PM   Result Value Ref Range    Sed Rate, Automated 93 (H) 0 - 20 mm/hr   Procalcitonin    Collection Time: 05/10/23  5:00 AM   Result Value Ref Range    Procalcitonin 0.35 (H) 0 ng/mL   Comprehensive Metabolic Panel    Collection Time: 05/10/23  5:00 AM   Result Value Ref Range    Sodium 131 (L) 136 - 145 mmol/L    Potassium 4.6 3.5 - 5.1 mmol/L    Chloride 102 97 - 108 mmol/L    CO2 24 21 - 32 mmol/L    Anion Gap 5 5 - 15 mmol/L    Glucose 134 (H) 65 - 100 mg/dL    BUN
Hospitalist Progress Note               Daily Progress Note: 5/10/2023      Subjective:   Hospital course to date:    58-year-old female with history of SLE, pulm hypertension and rheumatoid arthritis who presented to the ED on 5/7 with increasing generalized weakness, fatigue and lethargy. Outpatient hemoglobin was 6.5 and she was referred to the ED. Repeat labs showed a hemoglobin of 4.8, platelets 53 and a white count of 0.6. The admitting physician consulted with hematology and she was given a dose of IV steroids and admitted. Does recall having a fever 2 days previous. Patient was recently hospitalized at [facility] from 1/8 through 4/19 for sepsis, pneumonia and hypoxic respiratory failure. Upon discharge she was started on prednisone 30 mg daily and her methotrexate was held. In the past she has been treated for SLE with prednisone, methotrexate and Allegra Kava. Her pulm hypertension was previously treated with Adempas    She was started on cefepime and daptomycin for neutropenic fever and was seen by ID as well as pulmonology and hematology    Limited abdominal ultrasound shows nonspecific gallbladder mural thickening and a possible pancreatic head mass. MRI recommended    I did review her [facility] records from her April hospitalization. Patient did have an elevated alkaline phosphatase at that time in the 300 range. She underwent a CT of the abdomen pelvis which demonstrated numerous ill-defined hypodense liver lesions as well as retroperitoneal adenopathy. Patient underwent a CT-guided liver biopsy on 4/18. Numerous cultures including AFB and fungal culture were sent although at the time of discharge from [facility] these were all pending and I am unable to find any results.   Similarly, I do not see any pathology results although patient's mother whom I spoke with indicated there was no evidence for malignancy    I did reach out to [facility] but after numerous transfers I was unsuccessful in reaching anybody
Hospitalist Progress Note               Daily Progress Note: 5/10/2023      Subjective:   Hospital course to date:    78-year-old female with history of SLE, pulm hypertension and rheumatoid arthritis who presented to the ED on 5/7 with increasing generalized weakness, fatigue and lethargy. Outpatient hemoglobin was 6.5 and she was referred to the ED. Repeat labs showed a hemoglobin of 4.8, platelets 53 and a white count of 0.6. The admitting physician consulted with hematology and she was given a dose of IV steroids and admitted. Does recall having a fever 2 days previous. Patient was recently hospitalized at [address] [place] [id number] from 1/8 through 4/19 for sepsis, pneumonia and hypoxic respiratory failure. Upon discharge she was started on prednisone 30 mg daily and her methotrexate was held. In the past she has been treated for SLE with prednisone, methotrexate and Loyde Fanti. Her pulm hypertension was previously treated with Adempas    She was started on cefepime and daptomycin for neutropenic fever and was seen by ID as well as pulmonology and hematology    Limited abdominal ultrasound shows nonspecific gallbladder mural thickening and a possible pancreatic head mass. MRI recommended    I did review her [facility] records from her April hospitalization. Patient did have an elevated alkaline phosphatase at that time in the 300 range. She underwent a CT of the abdomen pelvis which demonstrated numerous ill-defined hypodense liver lesions as well as retroperitoneal adenopathy. Patient underwent a CT-guided liver biopsy of a left hepatic lobe lesion on 4/18. Numerous cultures including AFB and fungal culture were sent although at the time of discharge from [facility] these were all pending and I am unable to find any results. Similarly, I do not see any pathology results although patient's mother whom I spoke with indicated there was no evidence for malignancy  --------  Patient is seen today for follow-up.    Patient was
Hospitalist Progress Note               Daily Progress Note: 5/8/2023      Subjective:   Hospital course to date:    77-year-old female with history of SLE, pulm hypertension and rheumatoid arthritis who presented to the ED on 5/7 with increasing generalized weakness, fatigue and lethargy. Outpatient hemoglobin was 6.5 and she was referred to the ED. Repeat labs showed a hemoglobin of 4.8, platelets 53 and a white count of 0.6. The admitting physician consulted with hematology and she was given a dose of IV steroids and admitted. Does recall having a fever 2 days previous. Patient was recently hospitalized at [facility] from 1/8 through 4/19 for sepsis, pneumonia and hypoxic respiratory failure. Upon discharge she was started on prednisone 30 mg daily and her methotrexate was held. In the past she has been treated for SLE with prednisone, methotrexate and Wali Ducking. Her pulm hypertension was previously treated with Adempas    She was started on cefepime and daptomycin for neutropenic fever and was seen by ID as well as pulmonology and hematology  --------  Patient is seen today for follow-up.       Problem List:      Medications reviewed  Current Facility-Administered Medications   Medication Dose Route Frequency    0.9 % sodium chloride infusion   IntraVENous PRN    HYDROmorphone (DILAUDID) tablet 2 mg  2 mg Oral Q6H PRN    methylPREDNISolone sodium succ (SOLU-MEDROL) injection 60 mg  60 mg IntraVENous Q6H    cefepime (MAXIPIME) 2,000 mg in sterile water 20 mL IV syringe  2,000 mg IntraVENous Once    Followed by    cefepime (MAXIPIME) 2,000 mg in sodium chloride 0.9 % 100 mL IVPB (mini-bag)  2,000 mg IntraVENous Q8H    albuterol (PROVENTIL) nebulizer solution 2.5 mg  2.5 mg Nebulization Q4H PRN    potassium chloride (KLOR-CON M) extended release tablet 20 mEq  20 mEq Oral Daily    benzonatate (TESSALON) capsule 100 mg  100 mg Oral TID PRN    DULoxetine (CYMBALTA) extended release capsule 30 mg  30 mg
Hospitalist Progress Note               Daily Progress Note: 5/9/2023      Subjective:   Hospital course to date:    26-year-old female with history of SLE, pulm hypertension and rheumatoid arthritis who presented to the ED on 5/7 with increasing generalized weakness, fatigue and lethargy. Outpatient hemoglobin was 6.5 and she was referred to the ED. Repeat labs showed a hemoglobin of 4.8, platelets 53 and a white count of 0.6. The admitting physician consulted with hematology and she was given a dose of IV steroids and admitted. Does recall having a fever 2 days previous. Patient was recently hospitalized at [facility] from 1/8 through 4/19 for sepsis, pneumonia and hypoxic respiratory failure. Upon discharge she was started on prednisone 30 mg daily and her methotrexate was held. In the past she has been treated for SLE with prednisone, methotrexate and Sammye Conine. Her pulm hypertension was previously treated with Adempas    She was started on cefepime and daptomycin for neutropenic fever and was seen by ID as well as pulmonology and hematology  --------  Patient is seen today for follow-up. No new problems at present. Patient remains tachycardic. No fever. Blood cultures are still pending    Limited abdominal ultrasound shows nonspecific gallbladder mural thickening and a possible pancreatic head mass.   MRI recommended    Alkaline phosphatase and bilirubin continue to trend upward      Medications reviewed  Current Facility-Administered Medications   Medication Dose Route Frequency    Riociguat TABS 2.5 mg (Patient Supplied)  2.5 mg Oral BID    Followed by    Radha Marmolejo ON 5/10/2023] Riociguat TABS 2.5 mg (Patient Supplied)  2.5 mg Oral TID    0.9 % sodium chloride infusion   IntraVENous PRN    HYDROmorphone (DILAUDID) tablet 2 mg  2 mg Oral Q6H PRN    methylPREDNISolone sodium succ (SOLU-MEDROL) injection 60 mg  60 mg IntraVENous Q6H    cefepime (MAXIPIME) 2,000 mg in sterile water 20 mL IV syringe
Infectious Disease Progress Note           Subjective:   Pt seen and examined at bedside. States she feels much better today, denies new complaints. No acute events since last seen.  Hgb and WBC staying stable   Objective:   Physical Exam:     BP (!) 152/114   Pulse (!) 124   Temp 98.3 °F (36.8 °C) (Oral)   Resp (!) 35   Ht 1.626 m (5' 4\")   Wt 63.5 kg (140 lb)   SpO2 94%   BMI 24.03 kg/m²         Temp (24hrs), Av.7 °F (36.5 °C), Min:97.3 °F (36.3 °C), Max:98.3 °F (36.8 °C)    701 - 1900  In: -   Out: 200 [Urine:200]   1901 - 700  In: 100   Out: 1200 [Urine:1200]    General: NAD, AAO x 4, resting   HEENT: TONYA, Moist mucosa   Lungs: CTA b/l, decreased at the bases   Heart: S1S2+, RRR, no murmur  Abdo: Soft, NT, ND, +BS   : no womack cath   Exts: chronic skin changes involving b/l legs no edema, + pulses b/l   Skin: right arm mid line, right chest mediport     Data Review:       Recent Days:    Recent Labs     23  1500 23  1620 23  0410 23  0415   WBC 1.2*  --  1.1* 1.2*   HGB 4.8* 6.8* 7.2* 7.1*   HCT 15.7* 22.1* 23.2* 23.0*   PLT 58*  --  46* 48*       Recent Labs     23  1945 23  0410 23  0415   BUN 27* 23* 23*   CREATININE 0.61 0.58 0.61         Lab Results   Component Value Date/Time    CRP 11.80 2023 03:00 PM            Microbiology     Results       Procedure Component Value Units Date/Time    Culture, Blood 1 [[id number]]  (Abnormal) Collected: 23 1500    Order Status: Completed Specimen: Blood Updated: 23 8339     Special Requests No Special Requests        Culture       POSSIBLE Staphylococcus species, coagulase negative growing in this single bottle drawn No Site Indicated            (NOTE) [facility] IN 1 OF 1 CLT CHAZ ANDERSON MT AT 1407 ON 23 BY MD.    Culture, Blood, PCR ID Panel [[id number]]  (Abnormal) Collected: 23 1500    Order Status: Completed Specimen: Blood Updated:
Infectious Disease Progress Note           Subjective:   Pt seen, states she doesn't feel well , but denies specific complaints, no acute events since last seen   Objective:   Physical Exam:     BP (!) 155/110   Pulse (!) 114   Temp 97.8 °F (36.6 °C) (Oral)   Resp 23   Ht 1.626 m (5' 4\")   Wt 63.5 kg (140 lb)   SpO2 97%   BMI 24.03 kg/m²         Temp (24hrs), Av.8 °F (36.6 °C), Min:97.5 °F (36.4 °C), Max:98.2 °F (36.8 °C)    701 - 1900  In: -   Out: 300 [Urine:300]   1901 - 700  In: 250 [P.O.:150]  Out: 1550 [TSXYB:3388]    General: NAD, AAO x 4, OOB to chair   HEENT: TONYA, Moist mucosa   Lungs: CTA b/l, decreased at the bases   Heart: S1S2+, RRR, no murmur  Abdo: Soft, NT, ND, +BS   : no womack cath   Exts: chronic skin changes involving b/l legs no edema, + pulses b/l   Skin: right arm mid line, right chest mediport, right lateral proximal leg ulcer     Data Review:       Recent Days:    Recent Labs     23  0415 05/10/23  0500 23  0600   WBC 1.2* 1.4* 1.4*   HGB 7.1* 7.1* 6.7*   HCT 23.0* 23.4* 22.6*   PLT 48* 62* 85*       Recent Labs     23  0415 05/10/23  0500 23  0600   BUN 23* 26* 27*   CREATININE 0.61 0.43* 0.47*         Lab Results   Component Value Date/Time    CRP 7.45 2023 06:00 AM        Microbiology     Results       Procedure Component Value Units Date/Time    Culture, Blood 1 [[id number]] Collected: 23    Order Status: Completed Specimen: Blood Updated: 05/10/23 1831     Special Requests No Special Requests        Culture No growth after 11 hours       Culture, Blood 1 [[id number]]  (Abnormal) Collected: 23 1500    Order Status: Completed Specimen: Blood Updated: 05/10/23 0751     Special Requests No Special Requests        Culture       Staphylococcus species, coagulase negative growing in this single bottle drawn No Site Indicated            (NOTE) [facility] IN 1 OF 1 CLT CHAZ ANDERSON MT AT
Patient is a 27 yo female with history of systemic lupus, calcinosis and rheumatoid arthritis treated with Methotrexate, Plaquenil, Prednisone, and Bryce Charter in the outpatient setting followed by Dr. Milan Montanez at [facility]. She also has a history of pulmonary HTN. She is admitted for pancytopenia. Sepsis is suspected and she is on empiric Cefepime. She is also receiving IV Solumedrol 60mg every 6 hours and she currently is not on her Methotrexate, Plaquenil and Bryce Charter. Patient seen today. She continues to reports fatigue. She states she has a generalized achiness and the swelling in her feet have improved. She states she has dyspnea on exertion that resolves with rest and she denies chest pain. She denies sicca symptoms, sores in her mouth or nose, rash, fever and chills. She denies blood in her stool and urine. Review of Systems   Constitutional:  Positive for fatigue. Negative for chills and fever. HENT:  Negative for mouth sores, sore throat and trouble swallowing. Eyes:  Negative for pain and redness. Respiratory:  Positive for shortness of breath (HEDRICK that resolves with resat). Negative for cough. Cardiovascular:  Negative for chest pain. Gastrointestinal:  Positive for abdominal pain (reports stomach pain prior to bowel movements). Negative for blood in stool, constipation, diarrhea, nausea and vomiting. Genitourinary:  Negative for dysuria and hematuria. Musculoskeletal:  Positive for arthralgias (generalized). Negative for joint swelling. Skin:  Negative for rash. Neurological:  Positive for weakness. Hematological:  Bruises/bleeds easily. Physical Exam  Constitutional:       Appearance: She is ill-appearing. HENT:      Head: Normocephalic. Mouth/Throat:      Mouth: Mucous membranes are dry. Eyes:      Pupils: Pupils are equal, round, and reactive to light. Cardiovascular:      Rate and Rhythm: Tachycardia present.    Pulmonary:      Effort: Pulmonary effort
Patient transfer to [address] [place] [address] with her belongings. Transfer report given to the primary nurse PALOMO Alvarado, received at bedside.
Physician Progress Note      PATIENT:               Alex Yates  CSN #:                  [id number]  :                       1990  ADMIT DATE:       2023 6:28 PM  [address] [place] DATE:  RESPONDING  PROVIDER #:        Kin Lopez MD          QUERY TEXT:    Pt admitted with Neutropenic fever & possible Sepsis and has respiratory   failure documented. If possible, please document in progress notes and   discharge summary further specificity regarding the type and acuity of   respiratory failure: The medical record reflects the following:  Risk Factors: Home Oxygen at 2-3L NC, fatigue, SOB, Pulmonary congestion    Clinical Indicators:  Pulmo PN: \"Acute respiratory failure with hypoxia. \"    Pulmo PN: Gen Ferguson is also on oxygen between 2 to 3 L around-the-clock. \" ID PN    \"on chronic home O2 at 3L. \"    Treatment: Increased O2 to 4L NC, CXR, Lasix IV, Monitoring I&O's    Thank you,  MALENA Castaneda, RN, [facility], CDI Specialist  [email or web address]  Can also be reached on [employer]  Options provided:  -- Acute on chronic respiratory failure with hypoxia  -- Other - I will add my own diagnosis  -- Disagree - Not applicable / Not valid  -- Disagree - Clinically unable to determine / Unknown  -- Refer to Clinical Documentation Reviewer    PROVIDER RESPONSE TEXT:    This patient is in acute on chronic respiratory failure with hypoxia.     Query created by: Joan Yi on 2023 2:53 PM      Electronically signed by:  Kin Lopez MD 5/10/2023 5:48 PM
Pulmonology and Critical Care progress note    Subjective:   Consult Note: 2023 12:26 PM    Chief Complaint:   Chief Complaint   Patient presents with    Fatigue      Patient seen and examined in ICU  Overnight events noted    Lying in bed comfortably  Awake alert  More short of breath this morning  On 3 L nasal cannula oxygen    Chest x-ray showing cardiomegaly and bilateral pulmonary vascular congestion/edema       Current Facility-Administered Medications   Medication Dose Route Frequency Provider Last Rate Last Admin    ambrisentan (LETAIRIS) tablet 5 mg (Patient Supplied)  5 mg Oral Daily Pradeep Dunn MD   5 mg at 23 0845    0.9 % sodium chloride infusion   IntraVENous PRN Yanci Hanna MD        losartan (COZAAR) tablet 50 mg  50 mg Oral Daily Staci Bowie MD   50 mg at 23 0845    HYDROmorphone (DILAUDID) tablet 2 mg  2 mg Oral Q6H PRN Staci Bowie MD   2 mg at 23 1945    ondansetron (ZOFRAN) injection 4 mg  4 mg IntraVENous Q8H PRN Ritesh Lanza MD   4 mg at 05/10/23 0840    Riociguat TABS 2.5 mg (Patient Supplied)  2.5 mg Oral TID Pradeep Dunn MD   2.5 mg at 23 0845    methylPREDNISolone sodium succ (SOLU-MEDROL) injection 60 mg  60 mg IntraVENous Q6H Jeanette Uriostegui MD   60 mg at 23 0850    cefepime (MAXIPIME) 2,000 mg in sterile water 20 mL IV syringe  2,000 mg IntraVENous Once Jeanette Uriostegui MD        Followed by    cefepime (MAXIPIME) 2,000 mg in sodium chloride 0.9 % 100 mL IVPB (mini-bag)  2,000 mg IntraVENous Q8H Jeanette Uriostegui MD 25 mL/hr at 23 0846 2,000 mg at 23 0846    albuterol (PROVENTIL) nebulizer solution 2.5 mg  2.5 mg Nebulization Q4H PRN Jeanette Uriostegui MD   2.5 mg at 23 2133    benzonatate (TESSALON) capsule 100 mg  100 mg Oral TID PRN Jeanette Uriostegui MD        DULoxetine (CYMBALTA) extended release capsule 30 mg  30 mg Oral BID Jeanette Uriostegui MD   30 mg at 23 3964    vitamin D
Pulmonology and Critical Care progress note    Subjective:   Consult Note: 5/10/2023 9:22 AM    Chief Complaint:   Chief Complaint   Patient presents with    Fatigue      Patient seen and examined in ICU  Overnight events noted    Lying in bed comfortably  Awake alert  More short of breath this morning  On 3 L nasal cannula oxygen    Chest x-ray showing cardiomegaly and bilateral pulmonary vascular congestion/edema       Current Facility-Administered Medications   Medication Dose Route Frequency Provider Last Rate Last Admin    ondansetron (ZOFRAN) injection 4 mg  4 mg IntraVENous Q8H PRN Radha Bhakta MD   4 mg at 05/10/23 0840    HYDROmorphone HCl PF (DILAUDID) injection 0.5 mg  0.5 mg IntraVENous Q4H PRN Radha Bhakta MD        Riociguat TABS 2.5 mg (Patient Supplied)  2.5 mg Oral TID Yeimi Alvarez MD   2.5 mg at 05/10/23 0840    0.9 % sodium chloride infusion   IntraVENous PRN Hua Dial MD        methylPREDNISolone sodium succ (SOLU-MEDROL) injection 60 mg  60 mg IntraVENous Q6H Angelique Koch MD   60 mg at 05/10/23 0839    cefepime (MAXIPIME) 2,000 mg in sterile water 20 mL IV syringe  2,000 mg IntraVENous Once Angelique Koch MD        Followed by    cefepime (MAXIPIME) 2,000 mg in sodium chloride 0.9 % 100 mL IVPB (mini-bag)  2,000 mg IntraVENous Q8H Angelique Koch MD   Stopped at 05/10/23 0534    albuterol (PROVENTIL) nebulizer solution 2.5 mg  2.5 mg Nebulization Q4H PRN Angelique Koch MD   2.5 mg at 05/09/23 2133    potassium chloride (KLOR-CON M) extended release tablet 20 mEq  20 mEq Oral Daily Angelique Koch MD        benzonatate (TESSALON) capsule 100 mg  100 mg Oral TID PRN Angelique Koch MD        DULoxetine (CYMBALTA) extended release capsule 30 mg  30 mg Oral BID Angelique Koch MD   30 mg at 05/10/23 0839    vitamin D (ERGOCALCIFEROL) capsule 50,000 Units  50,000 Units Oral Q28 Days Angelique Koch MD   50,000 Units at 23/23/81 5521    folic acid
Pulmonology and Critical Care progress note    Subjective:   Consult Note: 5/11/2023 10:10 AM    Chief Complaint:   Chief Complaint   Patient presents with    Fatigue      Patient seen and examined in ICU  Overnight events noted    Lying in bed comfortably  Awake alert  More short of breath this morning  On 3 L nasal cannula oxygen    Chest x-ray showing cardiomegaly and bilateral pulmonary vascular congestion/edema       Current Facility-Administered Medications   Medication Dose Route Frequency Provider Last Rate Last Admin    ambrisentan (LETAIRIS) tablet 5 mg  5 mg Oral Daily Wolfgang Trevino MD        ondansetron (ZOFRAN) injection 4 mg  4 mg IntraVENous Q8H PRN Stu Trevino MD   4 mg at 05/10/23 0840    HYDROmorphone HCl PF (DILAUDID) injection 0.5 mg  0.5 mg IntraVENous Q4H PRN Stu Trevino MD   0.5 mg at 05/11/23 8717    Riociguat TABS 2.5 mg (Patient Supplied)  2.5 mg Oral TID Wolfgang Trevino MD   2.5 mg at 05/11/23 0813    0.9 % sodium chloride infusion   IntraVENous PRN Felipe Alexis MD        methylPREDNISolone sodium succ (SOLU-MEDROL) injection 60 mg  60 mg IntraVENous Q6H Juana Glass MD   60 mg at 05/11/23 2093    cefepime (MAXIPIME) 2,000 mg in sterile water 20 mL IV syringe  2,000 mg IntraVENous Once Juana Glass MD        Followed by    cefepime (MAXIPIME) 2,000 mg in sodium chloride 0.9 % 100 mL IVPB (mini-bag)  2,000 mg IntraVENous Q8H Juana Glass MD 25 mL/hr at 05/11/23 0903 2,000 mg at 05/11/23 0903    albuterol (PROVENTIL) nebulizer solution 2.5 mg  2.5 mg Nebulization Q4H PRN Juana Glass MD   2.5 mg at 05/09/23 2133    potassium chloride (KLOR-CON M) extended release tablet 20 mEq  20 mEq Oral Daily Juana Glass MD   20 mEq at 05/10/23 2133    benzonatate (TESSALON) capsule 100 mg  100 mg Oral TID PRN Juana Glass MD        DULoxetine (CYMBALTA) extended release capsule 30 mg  30 mg Oral BID Juana Glass MD   30 mg at 05/11/23 0623
Pulmonology and Critical Care progress note    Subjective:   Consult Note: 5/9/2023 10:50 AM    Chief Complaint:   Chief Complaint   Patient presents with    Fatigue      Patient seen and examined in ICU  Overnight events noted    Lying in bed comfortably  Awake alert  More short of breath this morning  On 4 L nasal cannula oxygen    Chest x-ray showing cardiomegaly and bilateral pulmonary vascular congestion/edema       Current Facility-Administered Medications   Medication Dose Route Frequency Provider Last Rate Last Admin    Riociguat TABS 2.5 mg (Patient Supplied)  2.5 mg Oral BID Shukri Colvin MD   2.5 mg at 05/09/23 0916    Followed by    Kendell Mccoy ON 5/10/2023] Riociguat TABS 2.5 mg (Patient Supplied)  2.5 mg Oral TID Shukri Colvin MD        0.9 % sodium chloride infusion   IntraVENous PRN Neville Hawley MD        HYDROmorphone (DILAUDID) tablet 2 mg  2 mg Oral Q6H PRN Salvador Shaw MD   2 mg at 05/09/23 0403    methylPREDNISolone sodium succ (SOLU-MEDROL) injection 60 mg  60 mg IntraVENous Q6H Ari Mejia MD   60 mg at 05/09/23 0915    cefepime (MAXIPIME) 2,000 mg in sterile water 20 mL IV syringe  2,000 mg IntraVENous Once Ari Mejia MD        Followed by    cefepime (MAXIPIME) 2,000 mg in sodium chloride 0.9 % 100 mL IVPB (mini-bag)  2,000 mg IntraVENous Q8H Ari Mejia MD 25 mL/hr at 05/09/23 0915 2,000 mg at 05/09/23 0915    albuterol (PROVENTIL) nebulizer solution 2.5 mg  2.5 mg Nebulization Q4H PRN Ari Mejia MD        potassium chloride (KLOR-CON M) extended release tablet 20 mEq  20 mEq Oral Daily Ari Mejia MD        benzonatate (TESSALON) capsule 100 mg  100 mg Oral TID PRN Ari Mejia MD        DULoxetine (CYMBALTA) extended release capsule 30 mg  30 mg Oral BID Ari Mejia MD   30 mg at 05/09/23 0916    vitamin D (ERGOCALCIFEROL) capsule 50,000 Units  50,000 Units Oral Q28 Days Ari Mejia MD   50,000 Units at 05/08/23 1211
Rheumatology Note    Patient is a 29 yo female with Mixed Connective Tissue Disease and calcinosis treated with Methotrexate, Plaquenil, Prednisone, and Fabricio Leer in the outpatient setting followed by Dr. Blaze Vega at [facility]. She also has a history of pulmonary HTN. She is admitted for pancytopenia. Sepsis is suspected and she is on empiric Cefepime. She is also receiving IV Solumedrol 60mg every 6 hours and she currently is not on her Methotrexate, Plaquenil and Fabricio Leer. Patient seen today. She currently denies pain. She has IV Dilaudid available prn and states it provides relief. She reports shortness of breath. She denies chest pain and abdominal pain, but reports abdominal tenderness. She denies sicca symptoms, sores in her mouth or nose, rash, fever and chills. She denies blood in her stool and urine. Review of Systems   Constitutional:  Positive for fatigue. Negative for chills and fever. HENT:  Negative for mouth sores, sore throat and trouble swallowing. Eyes:  Negative for pain and redness. Respiratory:  Positive for shortness of breath. Negative for cough. Cardiovascular:  Negative for chest pain. Gastrointestinal:  Positive for abdominal pain (reports abdominal tenderness). Negative for blood in stool, constipation, diarrhea, nausea and vomiting. Genitourinary:  Negative for dysuria and hematuria. Musculoskeletal:  Negative for arthralgias and joint swelling. Skin:  Negative for rash. Neurological:  Positive for weakness. Hematological:  Bruises/bleeds easily. Physical Exam  Constitutional:       Appearance: She is ill-appearing. HENT:      Head: Normocephalic. Mouth/Throat:      Mouth: Mucous membranes are moist.   Eyes:      Pupils: Pupils are equal, round, and reactive to light. Cardiovascular:      Rate and Rhythm: Tachycardia present. Pulmonary:      Effort: Pulmonary effort is normal.      Breath sounds: Rales (bases) present.    Abdominal:
Rheumatology Note    Patient is a 29 yo female with Mixed Connective Tissue Disease and calcinosis treated with Methotrexate, Plaquenil, Prednisone, and Wali Ducking in the outpatient setting followed by Dr. Shyann Deluca at [facility]. She also has a history of pulmonary HTN. She is admitted for pancytopenia. Sepsis is suspected and she is on empiric Cefepime. She is also receiving IV Solumedrol 60mg every 6 hours and she currently is not on her Methotrexate, Plaquenil and Wali Ducking. Patient seen today. She reports a mild generalize ache. She has IV Dilaudid available prn and states it provides relief. She reports shortness of breath, but states it has improved. She denies chest pain. Reports she had abdominal pain with a bowel movement this mornings, she noted a small amount of blood with straining and reports a hemorroid. She denies sicca symptoms, sores in her mouth or nose, rash, fever and chills. She denies blood in her urine. Review of Systems   Constitutional:  Positive for fatigue. Negative for chills and fever. HENT:  Negative for mouth sores, sore throat and trouble swallowing. Eyes:  Negative for pain and redness. Respiratory:  Positive for shortness of breath. Negative for cough. Cardiovascular:  Negative for chest pain. Gastrointestinal:  Positive for abdominal pain (reports abdominal tenderness). Negative for blood in stool, constipation, diarrhea, nausea and vomiting. Genitourinary:  Negative for dysuria and hematuria. Musculoskeletal:  Negative for arthralgias and joint swelling. Skin:  Negative for rash. Neurological:  Positive for weakness. Hematological:  Bruises/bleeds easily. Physical Exam  Constitutional:       Appearance: She is ill-appearing. HENT:      Head: Normocephalic. Mouth/Throat:      Mouth: Mucous membranes are moist.   Eyes:      Pupils: Pupils are equal, round, and reactive to light. Cardiovascular:      Rate and Rhythm: Tachycardia present.
Spiritual Care Assessment/Progress Note  [id number]    Name: Catarina Lanza MRN: [id number]    Age: 28 y.o. Sex: female   Language: English     Date: 5/12/2023            Total Time Calculated: 32 min              Spiritual Assessment begun in [facility] [place] [facility]  Service Provided For[de-identified] Patient and family together  Referral/Consult From[de-identified] Rounding  Encounter Overview/Reason : Initial Encounter    Spiritual beliefs:      [x] Involved in a monique tradition/spiritual practice:      [x] Supported by a monique community:      [] Claims no spiritual orientation:      [] Seeking spiritual identity:           [] Adheres to an individual form of spirituality:      [] Not able to assess:                Identified resources for coping and support system:   Support System: Family members       [x] Prayer                  [x] Devotional reading               [] Music                  [x] Guided Imagery     [] Pet visits                                        [] Other: (COMMENT)     Specific area/focus of visit   Encounter: Type: Initial Screen/Assessment  Crisis:    Spiritual/Emotional needs: Type: Spiritual Support  Ritual, Rites and Sacraments:    Grief, Loss, and Adjustments:    Ethics/Mediation:    Behavioral Health:    Palliative Care: Advance Care Planning:           Narrative: The purpose of the visit was to do a spiritual assessment on the patient. The patient was being visited by her mother, however they welcomed the visit. The patient mentioned tearfully that she was still struggling with accepting her illness, as well she mentioned feeling hopeful towards her healing. She mentioned that her greatest spiritual pain is her daughter's care. She expressed tearfulness, and agony when she mentioned how she suffers in her body. She also shared that she is grateful for the support of her family, and how thankful she is that they are their for her.  The  listened empathically, facilitated
1 spray Each Nostril Daily Jory Milner MD        melatonin tablet 5 mg  5 mg Oral Nightly PRN Jory Milner MD        pantoprazole (PROTONIX) tablet 40 mg  40 mg Oral Daily Jory Milner MD   40 mg at 23 0750        Home Meds reviewed. Allergies   Allergen Reactions    Heparin Anaphylaxis    Infliximab Anaphylaxis    Doxycycline Nausea And Vomiting and Rash    Ibuprofen Nausea Only    Oxycodone-Acetaminophen Nausea And Vomiting and Nausea Only     Side effect  Side effect    Reaction Type: Allergy  Other reaction(s): Adverse reaction to substance    Rituximab Itching    Vancomycin Hives and Rash       Review of Systems:  Pertinent items are noted in HPI. All other systems were reviewed and are negative. Objective:     Blood pressure (!) 140/93, pulse (!) 119, temperature 99 °F (37.2 °C), temperature source Oral, resp. rate 28, height 5' 4\" (1.626 m), weight 140 lb (63.5 kg), SpO2 96 %. Temp (24hrs), Av.7 °F (37.1 °C), Min:98.4 °F (36.9 °C), Max:99 °F (37.2 °C)      Intake and Output:  Current Shift: No intake/output data recorded. Last 3 Shifts:  1901 -  0700  In: 56 [P.O.:120]  Out: 800 [Urine:800]    Physical Exam:   Patient is a middle-aged appearing -American female who is on 3.5 L oxygen. She gets dyspneic with conversation but not in any distress. HEENT examination show pupils are equal and reactive to light. Pallor is noted. Nasal passages are patent. Oropharynx is without thrush. Neck is supple and trachea central.  No JVD or lymphadenopathy. She is not using any assessory muscles of respiration. Chest is symmetrical.  She has a Port-A-Cath on the right side which has been accessed. She has diminished breath sounds with crackles particularly over the left lung base. No chest wall tenderness. Rhythm is regular without any loud murmur or gallop. Abdomen is obese and appears to be somewhat distended. No obvious masses organomegaly.   Bowel sounds
Pulmonary:      Effort: Pulmonary effort is normal.      Breath sounds: Rales (right lower lobe) present. Abdominal:      General: Bowel sounds are normal.      Palpations: Abdomen is soft. Tenderness: There is abdominal tenderness (RUQ and RLQ). Musculoskeletal:         General: Tenderness and deformity present. Cervical back: Normal range of motion. Right lower leg: Edema present. Left lower leg: Edema present. Comments: Bilateral elbow contractures R>L  PIP contracures  Left shoulder  LROM   Bilateral knees tender  Bilateral MTPs tender  Trace bilateral pedal edema   Skin:     General: Skin is warm and dry. Comments: Lower extremity hyperpigmentation  Calcinosis on anterior thighs L>R  Dressing D&I right lower leg   Neurological:      Mental Status: She is alert and oriented to person, place, and time. Motor: Weakness present. Psychiatric:         Mood and Affect: Mood normal.         Behavior: Behavior normal.         Thought Content: Thought content normal.     Assessment & Plan      Patient is a 27 yo female with Mixed Connective Tissue Disease and calcinosis. She is pancytopenic and hematology reports indirect joseph was negative making autoimmune hemolytic anemia less likely. DsDNA is 5, C 3 is 241 and C4 is 55 indicating low disease activity decreasing the likelihood that the pancytopenia is related to her autoimmune disease. CCP is 3 and RF is <10. SSA <0.2, SSB <0.2, RNP 1.0 and Bloom <0.2. STUART, Scl 70 and Centromere is pending. She is currently on IV Solumedrol 60mg every 6 hours. We agree with steroid therapy. We agree with continuing to hold Methotrexate, Plaquenil and Sheridan Joey if we are considering medication side effects as the cause of the pancytopenia. Sepsis is suspected and she is on empiric Cefepime. CRP is 12.30 and sed rate is decreased at 93 likely related to suspected sepsis. Her Creatinine is 0.43, ALT 48, AST 51, Alk Phos 988.  GGT is 994 (H)
[[id number]]     Order Status: Sent Specimen: Urine, clean catch              Diagnostic   CXR 05/07: Cardiomegaly, pulmonary vascular congestion, left pleural effusion. Diffuse soft tissue calcinosis. Assessment/Plan     Suspected sepsis in the setting pancytopenia and immunocompromised status         Still no focal source of infection, Sputum, blood and urine Cxs from 05/07 are pending         Remains leukopenic, WBC up to 7.2 after transfusion        Elevated CRP and ESR ? Related to rheumatologic disease/infection         Will leave on empiric Cefepime until ANC > 1500, will d/c daptomycin since MRSA screen neg         Routine labs in the morning          2. SLE/Rheumatoid arthritis, followed by [facility], suspected flare        Remains on IV steroid, Seen by Rheumatology      4. Pancytopenia, multifactorial etiology including meds and rheumatologic disease       Maintain on neutropenic precaution      5. Recurrent leg ulcers, w h/o Calcinosis multiple hyperpigmented skin lesions on legs       Healing right lateral leg ulcer, no evidence of acute infection        Wound care consulted per request by mother      6.   Acute on chronic hypoxia, episode of dyspnea this morning, on chronic home O2 at 3L        Cardiomegaly, pulmonary vascular congestion, left pleural effusion on CXR (05/07)        Follow up CXR ordered, chest images reviewed w Dr Eva Paz MD    5/8/2023
for malignancy     I did reach out to [facility] but after numerous transfers I was unsuccessful in reaching anybody to get any further results  --------  5/11/2023  Patient is seen today for follow-up. Sister/family at bedside. Patient was unable to lie flat long enough to have the MRI done 5/09/2023. Patient is alert and oriented x4 and reliable historian. Although she does not remember details of her previous hospitalization at [facility] that lasted approximately 10 days in April, 2023. Continues to feel significantly weak and diminished appetite noted. No overnight fever/chills however patient continues to have baseline shortness of breath and dependent on 3 L NC O2, which is what she is on at home. Patient with chronic history of extensive pulmonary hypertension. Extensive consultation/telephone conference with patient's MPOA, mother. Reviewed patient's past medical history which includes multiple bouts and hospitalizations for pancytopenia and outpatient antibiotic treatments with complicated lupus and rheumatoid arthritis. Mother also states that patient is chronically tachycardic even when she is at home in the 100s to 110s. Reviewed patient's extensive liver lesions noted on CT abdomen/pelvis, 5/10/2023 with significant findings of hepatic lesions concerning for metastases. Mother states that liver biopsy performed on 4/18/2023 and essentially was benign however no corroborating documentation from [facility] is found on E care everywhere. Patient is chronically follow-up with hematology/oncology at [facility]. And is actively being worked up regarding the liver lesions however [facility] has stated that liver lesions in the left likely secondary to complicated and extensive lupus. Mother also confirms patient's chronic pancytopenia which is documented on labs from [facility]. Discharge from [facility] - 4/19/2023 -WBC of 2.0, hemoglobin of 7.1, platelets of 218 with neutrophil count of only 1.6 noted.     Patient and mother
Alk Phosphatase 919 (H) 45 - 117 U/L    Total Protein 5.5 (L) 6.4 - 8.2 g/dL    Albumin 1.8 (L) 3.5 - 5.0 g/dL    Globulin 3.7 2.0 - 4.0 g/dL    Albumin/Globulin Ratio 0.5 (L) 1.1 - 2.2             Assessment and plan:     Pancytopenia:  -CBC on admission showed a white count of 0.6, hemoglobin of 4.8 and platelet of 53  -Patient received 1 unit of packed red blood cell transfusions yesterday. She has a white cell count of 1100, ANC of 500 hemoglobin of 7.2 g/dL today. Platelets better at 87 K. She desires to go home. -Recommend G-CSF injections for 1 to 2 days to improve her white blood count. Discussed pros and cons of this. She is willing and want to get the filgrastim injections.  -Her blood smear was reviewed by Dr. Elvira Humphries no evidence of any TTP.  -Her improvement in blood counts also consistent with this  --LDH is only mildly elevated at 369, and indirect Luis at the time of crossmatch was negative for antibodies, haptoglobin was normal: Therefore unlikely to represent hemolysis  --Given significant elevation in CRP and sed rate, the most likely etiology for pancytopenia would be due to heightened inflammation from a lupus flare and also immunosuppression from her lupus medications. SLE and mixed connective tissue disorder:  -Follows with [facility]  -On [place], methotrexate 10 mg/week and Plaquenil as outpatient  -Rheumatology following here. Pulmonary artery hypertension:  -Follows with [address] [place] [address] pulmonology. Multiple liver lesion: According to patient she had partial work-up at [facility] and she was supposed to get ERCP by [address] [place] [address] gastroenterologist but she missed that appointment. Given patient's multiple acute issues I think this work-up should be done when patient is stable and preferably at [address] [place] [address] where she has partial work-up is already done. Patient understands and ask appropriate questions. This dictation was done by dragon, computer voice recognition software.
Eosinophils % 0 0 - 7 %    Basophils 0 0 - 1 %    Immature Granulocytes 1 (H) 0 - 0.5 %    Segs Absolute 0.9 (L) 1.8 - 8.0 K/UL    Absolute Lymph # 0.1 (L) 0.8 - 3.5 K/UL    Absolute Mono # 0.1 0.0 - 1.0 K/UL    Absolute Eos # 0.0 0.0 - 0.4 K/UL    Basophils Absolute 0.0 0.0 - 0.1 K/UL    Absolute Immature Granulocyte 0.0 0.00 - 0.04 K/UL    Differential Type AUTOMATED      RBC Comment Anisocytosis  1+        RBC Comment Stomatocytes  1+        RBC Comment Polychromasia  1+       Comprehensive Metabolic Panel    Collection Time: 05/08/23  4:10 AM   Result Value Ref Range    Sodium 131 (L) 136 - 145 mmol/L    Potassium 4.5 3.5 - 5.1 mmol/L    Chloride 101 97 - 108 mmol/L    CO2 24 21 - 32 mmol/L    Anion Gap 6 5 - 15 mmol/L    Glucose 133 (H) 65 - 100 mg/dL    BUN 23 (H) 6 - 20 mg/dL    Creatinine 0.58 0.55 - 1.02 mg/dL    Bun/Cre Ratio 40 (H) 12 - 20      Est, Glom Filt Rate >60 >60 ml/min/1.73m2    Calcium 8.3 (L) 8.5 - 10.1 mg/dL    Total Bilirubin 2.5 (H) 0.2 - 1.0 mg/dL    AST 52 (H) 15 - 37 U/L    ALT 47 12 - 78 U/L    Alk Phosphatase 927 (H) 45 - 117 U/L    Total Protein 5.9 (L) 6.4 - 8.2 g/dL    Albumin 1.7 (L) 3.5 - 5.0 g/dL    Globulin 4.2 (H) 2.0 - 4.0 g/dL    Albumin/Globulin Ratio 0.4 (L) 1.1 - 2.2     Lactate Dehydrogenase    Collection Time: 05/08/23  4:10 AM   Result Value Ref Range     (H) 81 - 246 U/L           Assessment and plan:     Pancytopenia:  -CBC on admission showed a white count of 0.6, hemoglobin of 4.8 and platelet of 53  -Patient's CBC on 8 May shows WBC 1100, hemoglobin 7.2, hematocrit 23.2 and platelet count of 94,359. So patient's H&H has improved after transfusion. Platelet counts are slightly worse than yesterday.   White cell count has not changed much.  -Lab work at [facility] done over the last few months was reviewed, baseline hemoglobin appears to be around 7, platelets have recently been normal and white count has been low normal.  -Peripheral smear was personally reviewed
Interpretation Guide in EPIC Links         Culture, MRSA, Screening [[id number]] Collected: 05/07/23 1145    Order Status: Canceled Specimen: Nares     MRSA by PCR [[id number]] Collected: 05/07/23 1130    Order Status: Completed Specimen: Swab Updated: 05/07/23 1250     MRSA by PCR Not Detected       Culture, Respiratory [[id number]] Collected: 05/07/23 0915    Order Status: Canceled Specimen: Sputum Expectorated     Culture, Urine [[id number]] Collected: 05/07/23 0915    Order Status: Canceled Specimen: Urine              Diagnostic   CXR 05/07: Cardiomegaly, pulmonary vascular congestion, left pleural effusion. Diffuse soft tissue calcinosis. Assessment/Plan     1. CoNS bacteremia w isolation from 1 blood Cx collected in ED       Underlying right chest mediport, no evidence of site infection       Repeat Blood Cx from 05/09 is Negative, no need for directed therapy       Received 3 days of Daptomycin. Remains afebrile and leukopenic           2. SLE/Mixed connective tissue disease, followed by [facility], suspected flare        Remains on IV steroid, being followed by rheumatology      3. Pancytopenia, multifactorial etiology including meds and rheumatologic disease       Seen, GCSF ordered, w plans to d/c on AM, hgb staying stable after PRBC transfusion       Continue on Cefepime while hospitalized, may transition to Ciprofloxacin 500 mg BIX x 2 wks upon d/c      4. Recurrent leg ulcers, underlying Calcinosis multiple hyperpigmented skin lesions on legs       Healing right lateral leg ulcer, will continue to monitor for superinfection      5. Acute on chronic hypoxia, h/o chronic hypoxia on home O2       Cardiomegaly, pulmonary vascular congestion, left pleural effusion on CXR (05/07)      6.  Elevated ALP, and Total bili \"Possible pancreatic head mass\" on US (05/07)       Peripancreatic adenopathy and hepatic lesions on CT, recent biopsy at [facility] was benign       [facility] recommends follow up w [facility]     Janene
[[id number]] Collected: 05/07/23 1145    Order Status: Canceled Specimen: Nares     MRSA by PCR [[id number]] Collected: 05/07/23 1130    Order Status: Completed Specimen: Swab Updated: 05/07/23 1250     MRSA by PCR Not Detected       Culture, Respiratory [[id number]] Collected: 05/07/23 0915    Order Status: Canceled Specimen: Sputum Expectorated     Culture, Urine [[id number]] Collected: 05/07/23 0915    Order Status: Canceled Specimen: Urine              Diagnostic   CXR 05/07: Cardiomegaly, pulmonary vascular congestion, left pleural effusion. Diffuse soft tissue calcinosis. Assessment/Plan     Suspected sepsis in the setting pancytopenia and immunocompromised status         Staph epidermidis isolated from 1/2 Blood Cx. Sputum and urine Cxs not done         WBC trending up on serial labs, no new source of infection         Continue on Empiric Cefepime for now. Routine labs in the morning     2. CoNS bacteremia w isolation from 1 blood Cx collected in ED       Underlying right chest mediport, no evidence of site infection       Repeat Blood Cx from 05/09 is pending           3. SLE/Mixed connective tissue disease, followed by [facility], suspected flare        Remains on IV steroid, being followed by rheumatology      4. Pancytopenia, multifactorial etiology including meds and rheumatologic disease       Counts are slowly trending up, ANC < 1500, Hgb stable post transfusion      5. Recurrent leg ulcers, w h/o Calcinosis multiple hyperpigmented skin lesions on legs       Healing right lateral leg ulcer, will continue to monitor for superinfection      6. Acute on chronic hypoxia, episode of dyspnea this morning, on chronic home O2 at 3L        Cardiomegaly, pulmonary vascular congestion, left pleural effusion on CXR (05/07)      7.  Elevated ALP, and Total bili \"Possible pancreatic head mass\" on US (05/07)       Per records recent biopsy of left hepatic lobe by [facility], cytology neg for malignancy       It does not appear
labs and an ACE. WBC 1.2, Hgb 7.1, Hct 23.0, and Plts are 48. We will continue to monitor. This case has been reviewed and discussed with Dr. Briseyda Gazra.      Wilfrid Romero, ANP  Rheumatology   [facility]
non tender  Musculoskeletal:         General: Non tender and deformity [place] neck deformity, reducible and right Ulna Deviation reducible. Surgical scars on palm of the left hand with fixed mild flexion and MCP joint ulna deviation present. Cervical back: Normal range of motion. Right lower leg: Edema present. Left lower leg: Edema present. Comments: Bilateral elbow contractures R>L  PIP contracures  Left shoulder  LROM   Bilateral knees non tender  Bilateral MTPs non tender  Trace bilateral pedal edema   Skin:     General: Skin is warm and dry. Comments: Lower extremity hyperpigmentation  Calcinosis on anterior thighs L>R   Neurological:      Mental Status: She is alert and oriented to person, place, and time. Motor: Weakness present. Negative Tinel's sign. Psychiatric:         Mood and Affect: Mood normal.         Behavior: Behavior normal.         Thought Content: Thought content normal.     Assessment & Plan :      Patient is a 27 yo female who has a Pancreatic Mass in the Head of the pancrease and CT scan showing many Hypo dense lesions who also has pancytopenic and Pulmonary Hypertension. She has been receiving steroids to treat the Pancytopenia. She was diagnosed having Lupus when she was 7years old and has been taking Prednisone and other DMARD's for many years. The reviews of systems for lupus is negative for active disease and the STUART is positive and dsDNA and all the remaining serologic subsets and complements are negative. I will not restart any DMARD at this time. She will require chronic steroids for now and DMARD's can be considered for use if she develop symptoms flares and / or the Pancytopenia resolves and the GI work up is favorable.
the right side which has been accessed. She has diminished breath sounds with crackles particularly over the left lung base. No chest wall tenderness. Rhythm is regular without any loud murmur or gallop. Abdomen is obese and appears to be somewhat distended. No obvious masses organomegaly. Bowel sounds audible. Extremities do not show any cyanosis or clubbing. Trace pitting edema. Pulses are palpable. Neurologic system examination is grossly intact. Skin is warm and dry. Lab/Data Review:  Portable chest x-ray was reviewed. Cardiomegaly and pulmonary vascular congestion is noted. There is also a small to moderate left pleural effusion. According to the report there is diffuse calcinosis. Echocardiogram done in February of this year showed an EF of 35 to 40% with moderate TR. WBC count 8.6 with neutrophils of 72%, hemoglobin 4.8 and platelet count of 53. Electrolytes are within normal limits. BUN is 27 creatinine is 1.61. Blood glucose of 90. CRP is 56.13 and LDH is 369. Assessment and Plan:     1. Acute respiratory failure with hypoxia:  Likely related to volume overload  Now tapered down to 3 L nasal cannula oxygen  Patient received 2 unit PRBCs   We will give Lasix prn  Intake and output charting  Check electrolytes and replace as needed    2. Pulmonary arterial hypertension. Apparently patient goes to the [facility] pulmonary hypertension clinic. She has been on Adempas, Ambrisentan, and oxygen 2 to 3 L all the time. She has been a lifelong non-smoker. Now on Adempas 2.5 mg 3 times daily  LFTs improved  Start Ambrisentan today with LFT monitoring    3. Severe pancytopenia. Etiology is not very clear. Possible lupus flare. Appreciate rheumatology input  However ID is also consulted and she is empirically started on daptomycin and cefepime. Cultures have been obtained. She has received 2 units of packed RBCs. Heme-onc is also on the case. Repeat all labs in the morning.   She
fullness, unchanged. Interstitial and airspace opacities and pleural thickening/effusions, unchanged. Diffuse soft tissue calcifications. No significant change. XR CHEST PORTABLE    Result Date: 5/10/2023  INDICATION: Respiratory failure Weakness / Reason for exam:->Respiratory failure EXAMINATION:  AP CHEST, PORTABLE COMPARISON: 5/9/2023 FINDINGS: Single AP portable view of the chest demonstrates unchanged right IJ Port-A-Cath. The cardiomediastinal silhouette is unchanged. Interstitial opacities and areas of pleural thickening and not changed significantly. No pneumothorax. Soft tissue calcifications throughout the chest again noted. No significant change. Assessment/Plan:     Neutropenic fever  no clear etiology at this time. Continue cefepime. Daptomycin discontinued. Likely bacteremia 1/4 cultures from staph epi on 5/7/2023. New liver lesions questionable metastatic noted on CT abdomen/pelvis. GI consult pending. Acute on chronic anemia/pancytopenia  SLE flare and possibly sepsis   No evidence for hemolytic process  Given hemoglobin of 6.7 we will transfuse second unit of PRBC today. Questionable sepsis   With neutropenia and tachycardia. Patient remains tachycardic. Per mother, patient is chronically tachycardic. Plan after reviewing patient's labs from [facility], patient has chronic neutropenia noted. Staph epi bacteremia   single bottle, due to Staph epidermidis. Likely contaminant. Diffuse liver lesions  Unsure if these are metastatic versus complications from lupus  Biopsy from 4/18/2023 per family negative and benign. No corroborating documentation from [facility] can be found on E care everywhere. Hyponatremia  Improved.     Elevated bilirubin and alkaline phosphatase  etiology unclear  Ultrasound shows possible pancreatic head mass  Patient only able to complete MRI of abdomen/MRCP, could not lie flat  Triple phase CT of abdomen ordered  --> Previous abdominal CT at

## 2023-05-13 NOTE — PLAN OF CARE
Problem: Discharge Planning  Goal: Discharge to home or other facility with appropriate resources  Outcome: Progressing     Problem: Pain  Goal: Verbalizes/displays adequate comfort level or baseline comfort level  Outcome: Progressing     Problem: Safety - Adult  Goal: Free from fall injury  Outcome: Progressing     Problem: Chronic Conditions and Co-morbidities  Goal: Patient's chronic conditions and co-morbidity symptoms are monitored and maintained or improved  Outcome: Progressing
Complexity : Zero comorbidities / personal factors that will impact the outcome / POC LOW Complexity : 1-2 Standardized tests and measures addressing body structure, function, activity limitation and / or participation in recreation  LOW Complexity : Stable, uncomplicated  Other outcome measures [facility] 6  LOW      Based on the above components, the patient evaluation is determined to be of the following complexity level: LOW          Pain Ratin/10 overall  Pain Intervention(s):   nursing notified    Activity Tolerance:   Fair     After treatment patient left in no apparent distress:   Bed locked and in lowest position Patient left in no apparent distress sitting up in chair, Call bell within reach, and Bed/ chair alarm activated and nsg updated. Problem: Physical Therapy - Adult  Goal: By Discharge: Performs mobility at highest level of function for planned discharge setting. See evaluation for individualized goals. Description: FUNCTIONAL STATUS PRIOR TO ADMISSION: The patient  required minimal assistance for basic and instrumental ADLs. HOME SUPPORT PRIOR TO ADMISSION: The patient lived with parents and required minimal assistance for mobility and ADLs. Physical Therapy Goals  Initiated 2023  Pt stated goal: go home today  Pt will be I with LE HEP in 7 days. Pt will perform bed mobility with Modified [place] in 7 days. Pt will perform transfers with Modified [place] in 7 days. Pt will amb 30 feet with LRAD safely with Supervision in 7 days. Pt will verbalize and demonstrate compliance with fall precautions . Pt will demonstrate improvement in standing balance from fair to good with support in 7 days.     Outcome: Progressing     COMMUNICATION/EDUCATION:   The patients plan of care was discussed with: Occupational therapist, Registered nurse, and Case management    Patient Education  Education Provided: Role of Therapy;Plan of Care;Transfer Training;Precautions  Education

## 2023-05-13 NOTE — DISCHARGE SUMMARY
Hospitalist Discharge Summary     Patient ID:  Ricki Londono  [id number]  12 y.o.  1990 5/6/2023    PCP on record: Summer Slater MD    Admit date: 5/6/2023  Discharge date and time: 5/13/2023    DISCHARGE DIAGNOSIS:    Neutropenic fever/severe sepsis/septic shock/hypotension/pancytopenia/acute on chronic anemia/diffuse liver lesions/chronic hypoxic respiratory failure/pulmonary hypertension    CONSULTATIONS:  IP CONSULT TO HEMATOLOGY  IP CONSULT TO RHEUMATOLOGY  IP CONSULT TO PHARMACY  IP CONSULT TO PULMONOLOGY  IP CONSULT TO INFECTIOUS DISEASES  IP CONSULT TO PICC TEAM  IP CONSULT TO PULMONOLOGY  IP WOUND CARE NURSE CONSULT TO EVAL  IP CONSULT TO GI  IP WOUND CARE NURSE CONSULT TO EVAL    Excerpted HPI from H&P of Juana Glass MD:  Ricki Londono is a 28 y.o. female with past medical history of SLE, pulmonary hypertension, rheumatoid arthritis who presents with increasing generalized weakness, tiredness and lethargy for the past few days. Outpatient hemoglobin was 6.5. Initial evaluation in the emergency room showed a white cell count of 0.6 and a hemoglobin of 4.8 along with platelets of 53.  1 unit PRBC transfusion was ordered. Solu-Medrol 125 mg IV x1 given after discussion with oncologist.  Prior to this, she was hospitalized at [facility] from 4/8/2023 to 4/19/2023 for sepsis with hypoxic respiratory failure. She recalls having fevers 2 days ago. When discharged from the hospital, she was placed on prednisone 30 mg daily and methotrexate was placed on hold. In the past, she has been treated for SLE by prednisone, methotrexate, and Bryce Bateman. Her primary rheumatologist was Milan Montanez     In the past in the past she has been treated for pulmonary hypertension with Adempas. Also on diuretics plus Aldactone the dose of which was recently increased.      She was transferred from freestanding ED to the [facility] hospital ED and awaiting further evaluation/recommendations from oncologist.  I

## 2023-05-16 LAB
BACTERIA SPEC CULT: NORMAL
Lab: NORMAL

## 2023-06-01 ENCOUNTER — HOSPITAL ENCOUNTER (OUTPATIENT)
Facility: HOSPITAL | Age: 33
Discharge: HOME | End: 2023-06-01
Payer: MEDICARE

## 2023-06-01 VITALS
DIASTOLIC BLOOD PRESSURE: 67 MMHG | HEART RATE: 93 BPM | SYSTOLIC BLOOD PRESSURE: 160 MMHG | RESPIRATION RATE: 16 BRPM | TEMPERATURE: 98.1 F

## 2023-06-01 PROCEDURE — 11042 DBRDMT SUBQ TIS 1ST 20SQCM/<: CPT

## 2023-06-01 RX ORDER — LIDOCAINE HYDROCHLORIDE 20 MG/ML
15 SOLUTION OROPHARYNGEAL PRN
Status: DISCONTINUED | OUTPATIENT
Start: 2023-06-01 | End: 2023-06-02 | Stop reason: HOSPADM

## 2023-06-01 NOTE — DISCHARGE INSTRUCTIONS
Hydrogel                 [] Mepitel                       [] Bactroban/Mupirocin              [] Iodoform Packing Strip      [] Plain Packing Strip              [] Skin Sub:   [] Other:       [x] Cover and Secure with:                   [x] Gauze         [x] Marizol           [] Kerlix              [] Foam          [] Super Absorbant    [] ABD                                      [] Ace Wrap   [] Other:               Limit contact of tape with skin. [] Change dressing:   [] Daily           [] Every Other Day    [] Twice per week   [] Three times per week              [] Once a week          [] Do Not Change Dressing     [] Other:                Negative Pressure:           Wound Location: RIGHT LEG  [x] Pressure @ 125 mm/Hg                [x]Continuous  []Intermittent              [x] Black Foam            [x] White Foam- TO UNDERMINING AND TUNNELING      [x] Bridge FOR COMFORT  [x] Change dressing 3 times per week             [] Other:      Pressure Relief:  [] When sitting, shift position or do seat lifts every 15 minutes.  [] Wheelchair cushion           [] Specialty Bed/Mattress  [] Turn every 2 hours when in bed. Avoid direct pressure on wound site. Limit side lying to 30 degree tilt. Limit HOB elevation to 30 degrees. [] Other                Edema Control:  Apply:  [] Compression Stocking:   mmHg    []Right Leg     []Left Leg              [] Tubigrip      []Right Leg Double Layer      []Left Leg Double Layer                                      []Right Leg Single Layer       []Left Leg Single Layer                            [] Elevate leg(s) above the level of the heart when sitting. [] Avoid prolonged standing in one place. Compression:  Apply:  [] Multilayer Compression Wrap to   []RightLeg      []Left Leg                          []  Coflex        [] Coflex Lite              [] Unna with Calamine Lite                 [] Do not get leg(s) with wrap wet.  If wrap becomes too [As Noted in HPI] : as noted in HPI [Negative] : Genitourinary

## 2023-06-01 NOTE — WOUND CARE
Wound Clinic Physician Orders and Discharge Instructions  [address] [facility] [address], [address] [facility]  Telephone: [phone]    NAME:  Sandeep Walter  YOB: 1990  MEDICAL RECORD NUMBER:  [id number]  DATE:  6/1/2023      Return Appointment:  [] Dressing Supply Provider:   [x] Home Healthcare: [facility]  [x] Return Appointment: 3 Week(s)  [] Nurse Visit:      [] Discharge from [facility]: [] Healed        [] Refer to Provider:         [] Consult    Follow-up Information:  [] Ordered Tests:   [] Referrals:   [] Rx:   [] Other:       Wound Cleansing:   Do not scrub or use excessive force. Cleanse wound prior to applying a clean dressing with:  [x] Normal Saline OR   [] Keep Wound Dry in Shower     [] Wound Cleanser   [x] Cleanse wound with Mild Soap & Water    [] Other:       Topical Treatments:  Do not apply lotions, creams, or ointments to wound bed unless directed. [] Apply moisturizing lotion to skin surrounding the wound prior to dressing change.  [] Apply antifungal ointment to skin surrounding the wound prior to dressing change.  [] Apply thin film of moisture barrier ointment to skin immediately around wound.   [] Apply Betadine to skin immediately around wound   [] Other:      Dressings:           Wound Location RIGHT LEG    [] Apply Primary Dressing:       [] MediHoney Gel [] MediHoney Alginate  [x] Calcium Alginate with Silver   [] Calcium Alginate without silver- IN CLINIC AND IF WOUND VAC MALFUNCTIONS    [] Collagen with silver [] Collagen without Silver    [] Santyl with moist saline gauze     [] Hydrofera Blue (cut to size and moistened with normal saline)  [] Hydrofera Blue Ready (cut to size)      [] Normal Saline wet to dry  [] Betadine wet to dry    [] Hydrogel  [] Mepitel     [] Bactroban/Mupirocin   [] Iodoform Packing Strip [] Plain Packing Strip   [] Skin Sub:   [] Other:      [x] Cover and Secure
Change dressing 3 times per week             [] Other:      Pressure Relief:  [] When sitting, shift position or do seat lifts every 15 minutes.  [] Wheelchair cushion           [] Specialty Bed/Mattress  [] Turn every 2 hours when in bed. Avoid direct pressure on wound site. Limit side lying to 30 degree tilt. Limit HOB elevation to 30 degrees. [] Other                Edema Control:  Apply:  [] Compression Stocking:   mmHg    []Right Leg     []Left Leg              [] Tubigrip      []Right Leg Double Layer      []Left Leg Double Layer                                      []Right Leg Single Layer       []Left Leg Single Layer                            [] Elevate leg(s) above the level of the heart when sitting. [] Avoid prolonged standing in one place. Compression:  Apply:  [] Multilayer Compression Wrap to   []RightLeg      []Left Leg                          []  Coflex        [] Coflex Lite              [] Unna with Calamine Lite                 [] Do not get leg(s) with wrap wet. If wrap becomes too tight, call the wound center and remove wrap from leg by unrolling each layer. [] Elevate leg(s) above the level of the heart when sitting. [] Avoid prolonged standing in one place. Off-Loading:   [] Off-loading when:   [] walking       [] in bed         [] sitting  [] Total non-weight bearing  [] Right Leg  [] Left Leg          [] Assistive Device     [] Walker        [] Cane           [] Wheelchair  [] Crutches              [] Surgical shoe    [] Wedge Shoe  [] Foam Boot(s)  [] Knee Scooter              [] Cast Boot   [] Deette Credit     [] Diabetic Shoes    [] Offloading heel boot  [] Other:      Contact Cast:  Apply:  [] Total Contact Cast Applied in Clinic to      []RightLeg      []Left Leg              [] Do not get cast wet. Contact wound center or go to emergency room if there is a foul odor or becomes uncomfortable due to feeling tight or swelling.

## 2024-11-14 NOTE — OP NOTES
Transition Clinic Progress Note    Patient Name:   Mary Shaikh Date: 2024          Service Type: Individual        No data recorded Start: 730am     No data recorded End time: 7:51am     Session Length:  TC Session Length: 30 (16-37 Minutes)    Session #:  2    Attendees: TC Attendees: Client alone    Service Modality:  Service Modality: Video Visit:      Provider verified identity through the following two step process.  Patient provided:  Patient  and Patient address    Telemedicine Visit: The patient's condition can be safely assessed and treated via synchronous audio and visual telemedicine encounter.      Reason for Telemedicine Visit: Patient convenience (e.g. access to timely appointments / distance to available provider)    Originating Site (Patient Location): Patient's home    Distant Site (Provider Location): [facility]    Consent:  The patient/guardian has verbally consented to: the potential risks and benefits of telemedicine (video visit) versus in person care; bill my insurance or make self-payment for services provided; and responsibility for payment of non-covered services.     Patient would like the video invitation sent by:  My Chart    Mode of Communication:  Video Conference via [facility]    Distant Location (Provider):  Off-site    As the provider I attest to compliance with applicable laws and regulations related to telemedicine.    Informed Consent and Assessment Methods    This provider and patient discussed HIPAA, and the limits of confidentiality; including mandated reporting, the possibility of collaborative discussions with patient's primary care provider and the multidisciplinary team in the MH clinic during consultation.  Discussed the no show policy, and the benefits and possible unintended consequences of therapy.  Patient indicated understanding Transition Clinic services are short term, solution focused, until  This is operative report on Jana Berrios, patient's YOB: 1990. Date of surgery: August 9, 2021. Surgeon: Dr. Wynetta Holter. Preop diagnosis: Multiple right arm abscesses. Postop diagnosis: Same as above. Procedure:  1.  I&D of the right wrist abscess 3 cm. 2.  I&D of right volar aspect of forearm abscess 2 cm. 3.  I&D of right volar aspect distal forearm abscess 2 cm   5. I&D of right deltoid upper arm abscess 3 cm. Anesthesia: MAC. EBL: Minimal  Assistant: None. Specimen: Culture swabs taken from I&D site. Complication: None. Indication: Please see consultation report on rational for the surgery. Description of procedure: Patient was brought to the operating table comfortable supine position followed by MAC anesthesia was placed. After patient is comfortable right arm was prepped usual sterile technique using Betadine solution. Followed by sterile drapes applied usual fashion. At this time timeout was called after confirmation made procedure commenced. I used 1% lidocaine for local anesthetics. Followed by using #10 blade skin incision made over the xiphoid area of the abscess. Once upon entering the skin dermal tissue possible drained especially large amount of right wrist area. And relatively small pus pocket noted proximal forearm distal forearm area. I&D was also performed the right upper deltoid area abscess as well. All wounds are packed with iodoform packing strip. Sterile dressings applied. Culture swabs taken. Appropriate sterile dressings applied patient was awakened at the end the procedure transferred recovery in stable condition. "a referral can be made and patient can bridge to long term therapy.  Patient verbally indicated understanding the informed consent.        DATA  Interactive Complexity: No  Crisis: No        Progress Since Last Session (Related to Symptoms / Goals / Homework):   Symptoms: Improving    Homework: Completed in session      Episode of Care Goals: Satisfactory progress - ACTION (Actively working towards change); Intervened by reinforcing change plan / affirming steps taken     Current / Ongoing Stressors and Concerns:     Pt reports improving sxs, reports everything is going well, reports just having come back from a trip she enjoyed\".  Pt reports she did not attend [facility] setup on 11/11/24, as agency did not respond to her calls.  Pt requested to set up new long term therapy setup today via care [facility], appt set up below in plan section of this note with another agency  Pt reports anxiety sxs around a procedure she has tomorrow, writer actively listened provided empathy and validation.  Pt and writer reviewed relaxation strategies to help prior to procedure in attempts to reduce sxs, including mindfulness, meditation and progressive muscle relaxation strategies. Pt reports phenomenal supports\", feeling good about this.  Pt denies current SI, plan, intent, SIB, HI, AH/VH, and/or rosa sxs at this point in time.  CSSR completed.  Pt reports she is hopeful, looking forward to getting procedure out of way, the weekend, wil[place] weather.  Pt reports parents, siblings, pets, part of consistent support network. Pt states \"I have great support\".  Pt reports adequate supports, and denies current substance use.  Pt reports the following protective factors:  willingness to engage in therapy, attached by fam/friends/pet, hopeful, future-oriented, access to variety of clinical interventions, strong bond to family unit, community support, or employment, lives in a responsibly safe and stable environment, help seeking, sense " of belonging, constructive use of leisure time, enjoyable activities, resilience.     Pt processed regarding sxs, upcomming procedure, last appt setup, new setup going forward, supports, coping.  Writer provided active listening, empathy and validation.  Pt and writer explored additional coping skill psychoeducation including mindfulness with focus on present moment, urge surfing feelings, deep breathing, progressive muscle relaxation, self-care and compassion.  Pt appeared engaged and receptive to the above interventions.    Plan: 11/19/24 at 9:30 am     Treatment Objective(s) Addressed in This Session:   Identify negative self-talk and behaviors: challenge core beliefs, myths, and actions  Encourage challenge negative thoughts, replace with alternatives  Encourage self-care and positive self-talk     Intervention:   Solution Focused Brief Therapy   Brief Psychotherapy - discussed and prioritizing the most efficient treatment., Cognitive Behavioral Therapy (CBT) - Discussed changing thoughts is the path to changing behaviors and feelings., Mindfulness Therapy - Cultivation of present-oriented, non-judgmental attitude. It helps nurtures great awareness, clarity, and acceptance of reality., Motivational Interviewing - helping to find the motivation to make positive behavior changes., Person-Centered Therapy - Actualizes potential and moves towards increased awareness, spontaneity, trust in self and inner directedness., and Problem-Solving Therapy (PST) - Identify specific problems; brainstorming, evaluation, implementing and reviewing solutions.    Assessments completed prior to visit:  The following assessments were completed by patient for this visit:  PROMIS 10-Global Health (only subscores and total score):       11/4/2024     7:17 PM   PROMIS-10 Scores Only   Global Mental Health Score 4    Global Physical Health Score 7    PROMIS TOTAL - SUBSCORES 11        Patient-reported      [place] Suicide Severity Rating  Scale (Short Version)      7/31/2024     3:00 PM 11/1/2024    10:01 PM 11/2/2024     2:08 AM 11/2/2024     2:10 AM 11/5/2024     2:00 PM 11/14/2024     7:00 AM   [place] Suicide Severity Rating (Short Version)   Q1 Wished to be Dead (Past Month) 0-->no 1-->yes  1-->yes     Q2 Suicidal Thoughts (Past Month) 0-->no 1-->yes  1-->yes     Q3 Suicidal Thought Method  1-->yes  0-->no     Q4 Suicidal Intent without Specific Plan  1-->yes  0-->no     Q5 Suicide Intent with Specific Plan  0-->no  0-->no     Q6 Suicide Behavior (Lifetime) 1-->yes 1-->yes 0-->no      If yes to Q6, within past 3 months? 0-->no 0-->no  0-->no     Level of Risk per Screen moderate risk high risk  moderate risk     1. Wish to be Dead (Since Last Contact)     N N   2. Non-Specific Active Suicidal Thoughts (Since Last Contact)     N N   Actual Attempt (Since Last Contact)     N N   Has subject engaged in non-suicidal self-injurious behavior? (Since Last Contact)     N N   Interrupted Attempts (Since Last Contact)     N N   Aborted or Self-Interrupted Attempt (Since Last Contact)     N N   Preparatory Acts or Behavior (Since Last Contact)     N N   Suicide (Since Last Contact)     N N   Calculated C-SSRS Risk Score (Since Last Contact)     No Risk Indicated No Risk Indicated          ASSESSMENT: Current Emotional / Mental Status (status of significant symptoms):   Risk status (Self / Other harm or suicidal ideation)   Patient denies current fears or concerns for personal safety.   Patient denies current or recent suicidal ideation or behaviors.   Patient denies current or recent homicidal ideation or behaviors.   Patient denies current or recent self injurious behavior or ideation.   Patient denies other safety concerns.   Patient reports there has been no change in risk factors since their last session.     Patient reports there has been no change in protective factors since their last session.     Recommended that patient call 911 or go to the  local ED should there be a change in any of these risk factors     Appearance:   Appropriate    Eye Contact:   Good    Psychomotor Behavior: Normal    Attitude:   Cooperative    Orientation:   All   Speech    Rate / Production: Normal     Volume:  Normal    Mood:    Normal   Affect:    Appropriate    Thought Content:  Clear    Thought Form:  Coherent  Logical    Insight:    Good      Medication Review:   No current psychiatric medications prescribed     Medication Compliance:   NA     Changes in Health Issues:   None reported     Chemical Use Review:   Substance Use: Chemical use reviewed, no active concerns identified      Tobacco Use: No current tobacco use.      Diagnoses:   Adjustment Disorders  309.28 (F43.23) With mixed anxiety and depressed mood    Collateral Reports Completed:   TC Collateral: [facility] electronic medical records reviewed.    PLAN: (Patient Tasks / Therapist Tasks / Other)    [facility]  Date: Monday, 11/18/2024  Time: 11:00 am - 12:00 pm  Provider: Catalina Ag  Location: [facility], [address] [place] [address] [place] [address]  Phone: [phone]  Type: Teletherapy      TC follow up session: 11/19/24 at 9:30am    Is this the patient's last discharge?: No    Procedure Code: Psychotherapy (with patient) - 30  [CPT 86466]    LOUIS ARRIOLA ANTHONY                                                         ______________________________________________________________________  Safety Plan:  Reviewed safety plan with pt, pt agrees to follow, safety plan in this note and in pts active mychart.  If unable to follow safety plan call 911.     [facility] Safety Plan     STEP 1: WARNING SIGNS  overwhlemed  2. stress     STEP 2: INTERNAL COPING STRATEGIES - THINGS I CAN DO TO TAKE MY MIND OFF   MY PROBLEMS WITHOUT CONTACTING ANOTHER PERSON  Mindfulness meditation  2.  Music and cleaning up  3. Journaling  4. Walks with dog     STEP 3: PEOPLE AND SOCIAL SETTINGS THAT  "PROVIDE DISTRACTION  STEP 4: PEOPLE WHOM I CAN ASK FOR HELP DURING A CRISIS     1. Name: Contact:       Emily Regan-mother- [phone]   3. Place: 4. Place: Outside/nature/walk with dog  1. Name: Contact: CRISIS:  [facility] Crisis line:  [phone]     STEP 5: PROFESSIONALS OR AGENCIES I CAN CONTACT DURING A CRISIS:  911  2. Emergency dept  3.  [facility] Crisis line:  [phone]     STEP 6: MAKING THE ENVIRONMENT SAFER (PLAN FOR LETHAL MEANS SAFETY)  1.Restrict access to means  2.  Stay with friends/family     [facility] Safety Plan. Lilia Montez and Napoleon Sewell. Used with permission of the  authors.        Grounding Techniques:  Try to notice where you are, your surroundings including the people, the sounds like the TV or  radio.  Concentrate on your breathing. Take a deep cleansing breath from your diaphragm. Count the  breaths as you exhale. Make sure you breath slowly.  Hold something that you find comforting, for some it may be a stuffed animal or a blanket. Notice  how it feels in your hands. Is it hard or soft?  During a non-crisis time make a list of positive affirmations. Print them out and keep them handy  for times of intense anxiety. At those times, read them aloud.  Try the [employer] game:  Name 5 things you can see in the room with you  Name 4 things you can feel (\"chair on my back\" or \"feet on floor\")  Name 3 things you can hear right now (\"people talking\" or \"tv\")  Name 2 things you can smell right now (or, 2 things you like the smell of)  Name 1 good thing about yourself  Create A Safe Place  Image a safe place - it can be a real or imaginary place:  What do you see - especially colors?  What sounds do you hear?  What sensations do you feel?  What smells do you smell?  What people or animals would you want in your safe place?  Imagine a protective bubble, wall or boundary around your safe place.  Imagine a door or gate with a guard at your safe place.  Image a " "lock and key to your safe place and only you can unlock it.  You can draw or make a collage that represents your safe place.  Choose a souvenir of your safe place - a color, an object, a song.  Keep your image of your safe place so you can come back to it when you need to.  Reduce Extreme Emotion QUICKLY: Changing Your Body Chemistry  T: Change your body Temperature to change your autonomic nervous system  Use Ice Water to calm yourself down FAST  Put your face in a bowl of ice water (this is the best way; have the person keep his/her face in ice  water for 30-45 seconds - initial research is showing that the longer s/he can hold her/his face in  the water, the better the response), or  Splash ice water on your face, or hold an ice pack on your face  I: Intensely exercise to calm down a body revved up by emotion  Examples: running, walking fast, jumping, playing basketball, weight lifting, swimming, calisthenics,  etc.  Engage in exercises that DO NOT include violent behaviors. Exercises that utilize violent behaviors  tend to function as \"behavioral rehearsal,\" and rather than calming the person down, may actually  \"rev\" the person up more, increasing the likelihood of violence, and lessening the likelihood that  they will \"burn off\" energy  [place] Safety Plan (continued)  P: Progressively relax your muscles  Starting with your hands, moving to your forearms, upper arms, shoulders, neck, forehead, eyes,  cheeks and lips, tongue and teeth, chest, upper back, stomach, buttocks, thighs, calves, ankles,  feet  Tense (10 seconds,   of the way), then relax each muscle (all the way)  Notice the tension  Notice the difference when relaxed (by tensing first, and then relaxing, you are able to get a more  thorough relaxation than by simply relaxing)  P: Paced breathing to relax  The standard technique is to begin with counting the number of steps one takes for a typical inhale,  then counting the steps one takes for a " typical exhale, and then lengthening the amount of steps  for the exhalation by one or two steps. OR  Repeat this pattern for 1-2 minutes  Inhale for four (4) seconds  Exhale for six (6) to eight (8) seconds  Research demonstrated that one can change one's overall level of anxiety by doing this exercise  for even a few minutes per day     [facility] Safety Plan. Lilia Montez and Napoleon Sewell. Used with permission of the  authors.

## (undated) DEVICE — MINOR GENERAL PACK: Brand: MEDLINE INDUSTRIES, INC.

## (undated) DEVICE — GLOVE ORTHO 8   MSG9480

## (undated) DEVICE — DRAPE,REIN 53X77,STERILE: Brand: MEDLINE

## (undated) DEVICE — GLOVE SURG SZ 7.5 L11.73IN FNGR THK9.8MIL STRW LTX POLYMER

## (undated) DEVICE — PREP PAD BNS: Brand: CONVERTORS

## (undated) DEVICE — SOLUTION IRRIG 500ML 0.9% SOD CHLO USP POUR PLAS BTL

## (undated) DEVICE — GAUZE,PACKING STRIP,IODOFORM,1"X5YD,STRL: Brand: CURAD

## (undated) DEVICE — GARMENT,MEDLINE,DVT,INT,CALF,MED, GEN2: Brand: MEDLINE

## (undated) DEVICE — GLOVE SURG SZ 75 L12IN FNGR THK79MIL GRN LTX FREE

## (undated) DEVICE — PADDING CAST W4INXL4YD SYN NAT PROTOUCH

## (undated) DEVICE — BANDAGE COMPR ELASTIC 4 IN STRL ACE

## (undated) DEVICE — BANDAGE,GAUZE,BULKEE II,4.5"X4.1YD,STRL: Brand: MEDLINE

## (undated) DEVICE — SOUTHSIDE TURNOVER: Brand: MEDLINE INDUSTRIES, INC.

## (undated) DEVICE — TOWEL SURG W17XL27IN STD BLU COT NONFENESTRATED PREWASHED

## (undated) DEVICE — BASIC SINGLE BASIN-LF: Brand: MEDLINE INDUSTRIES, INC.

## (undated) DEVICE — SYRINGE IRRIG 60ML SFT PLIABLE BLB EZ TO GRP 1 HND USE W/

## (undated) DEVICE — DRAPE,HAND,STERILE: Brand: MEDLINE

## (undated) DEVICE — INTENDED FOR TISSUE SEPARATION, AND OTHER PROCEDURES THAT REQUIRE A SHARP SURGICAL BLADE TO PUNCTURE OR CUT.: Brand: BARD-PARKER ® CARBON RIB-BACK BLADES

## (undated) DEVICE — GLOVE ORANGE PI 7 1/2   MSG9075

## (undated) DEVICE — GAUZE,PACKING STRIP,IODOFORM,1/2"X5YD,ST: Brand: CURAD

## (undated) DEVICE — GLOVE SURG SZ 8 L12IN FNGR THK79MIL GRN LTX FREE

## (undated) DEVICE — 3M™ STERI-DRAPE™ U-DRAPE 1015: Brand: STERI-DRAPE™

## (undated) DEVICE — SOLUTION IRRIG 500ML 0.9% SOD CHL USP POUR PLAS BTL

## (undated) DEVICE — WET SKIN PREP TRAY: Brand: MEDLINE INDUSTRIES, INC.

## (undated) DEVICE — 3M™ STERI-DRAPE™ INCISE DRAPE 1050 (60CM X 45CM): Brand: STERI-DRAPE™

## (undated) DEVICE — MINOR EXTREMITY PACK: Brand: MEDLINE INDUSTRIES, INC.

## (undated) DEVICE — GAUZE,SPONGE,4"X4",16PLY,STRL,LF,10/TRAY: Brand: MEDLINE